# Patient Record
Sex: FEMALE | Race: WHITE | NOT HISPANIC OR LATINO | Employment: UNEMPLOYED
[De-identification: names, ages, dates, MRNs, and addresses within clinical notes are randomized per-mention and may not be internally consistent; named-entity substitution may affect disease eponyms.]

---
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
## 2022-10-21 ENCOUNTER — HOSPITAL ENCOUNTER (EMERGENCY)
Facility: HOSPITAL | Age: 22
Discharge: HOME | End: 2022-10-21
Attending: PHYSICIAN ASSISTANT | Admitting: PHYSICIAN ASSISTANT
Payer: COMMERCIAL

## 2022-10-21 VITALS
SYSTOLIC BLOOD PRESSURE: 111 MMHG | BODY MASS INDEX: 19.77 KG/M2 | TEMPERATURE: 98.8 F | OXYGEN SATURATION: 96 % | DIASTOLIC BLOOD PRESSURE: 67 MMHG | HEART RATE: 74 BPM | RESPIRATION RATE: 18 BRPM | WEIGHT: 141.76 LBS

## 2022-10-21 DIAGNOSIS — J06.9 URI (UPPER RESPIRATORY INFECTION): ICD-10-CM

## 2022-10-21 DIAGNOSIS — J06.9 UPPER RESPIRATORY TRACT INFECTION, UNSPECIFIED TYPE: ICD-10-CM

## 2022-10-21 PROCEDURE — G0463 HOSPITAL OUTPT CLINIC VISIT: HCPCS

## 2022-10-21 PROCEDURE — 99213 OFFICE O/P EST LOW 20 MIN: CPT | Performed by: PHYSICIAN ASSISTANT

## 2022-10-21 RX ORDER — MONTELUKAST SODIUM 10 MG/1
1 TABLET ORAL AT BEDTIME
COMMUNITY
Start: 2022-10-14 | End: 2022-10-28

## 2022-10-21 RX ORDER — FERROUS SULFATE 325(65) MG
325 TABLET ORAL
COMMUNITY
End: 2022-11-18

## 2022-10-21 RX ORDER — RISPERIDONE 0.5 MG/1
0.5 TABLET ORAL 2 TIMES DAILY
COMMUNITY
Start: 2022-10-14 | End: 2023-02-10

## 2022-10-21 RX ORDER — LANOLIN ALCOHOL/MO/W.PET/CERES
1 CREAM (GRAM) TOPICAL
COMMUNITY
End: 2022-10-28

## 2022-10-21 ASSESSMENT — ENCOUNTER SYMPTOMS: FEVER: 0

## 2022-10-22 NOTE — ED PROVIDER NOTES
"  History     Chief Complaint   Patient presents with     Sinusitis     Otalgia     Bilateral ears \"a little bit\", left side worse     HPI  Yudi Morales is a 22 year old female who presents for bilateral ear pain and congestion for about a week.  Patient denies any fevers, body aches, sore throat, nausea, vomiting, diarrhea, or any other associated symptoms.    Allergies:  Allergies   Allergen Reactions     Other [No Clinical Screening - See Comments] Anaphylaxis     \"Sodium Penathol\" per mother. Used in anesthesia.  Family hx of reaction.     Amoxicillin Trihydrate GI Disturbance     Amoxicillin-Pot Clavulanate GI Disturbance     Thiopental      Other reaction(s): Other - Describe In Comment Field  Family Hx of death with this medication       Problem List:    Patient Active Problem List    Diagnosis Date Noted     Elevated LFTs 06/10/2020     Priority: Medium     KIRSTY (generalized anxiety disorder) 07/19/2019     Priority: Medium     Allergic rhinitis 10/04/2011     Priority: Medium        Past Medical History:    Past Medical History:   Diagnosis Date     Allergic rhinitis 10/4/2011     KIRSTY (generalized anxiety disorder) 7/19/2019     PTSD (post-traumatic stress disorder)        Past Surgical History:    No past surgical history on file.    Family History:    No family history on file.    Social History:  Marital Status:  Single [1]  Social History     Tobacco Use     Smoking status: Never     Smokeless tobacco: Never   Substance Use Topics     Alcohol use: No     Drug use: No        Medications:    ferrous sulfate (FEROSUL) 325 (65 Fe) MG tablet  FLUoxetine (PROZAC) 20 MG capsule  melatonin 3 MG tablet  montelukast (SINGULAIR) 10 MG tablet  Multiple Vitamin (TAB-A-TOM) TABS  paliperidone ER (INVEGA) 6 MG 24 hr tablet  risperiDONE (RISPERDAL) 0.5 MG tablet  sertraline (ZOLOFT) 50 MG tablet  vitamin D3 (CHOLECALCIFEROL) 2000 units (50 mcg) tablet          Review of Systems   Constitutional: Negative for fever. "   HENT: Positive for congestion and ear pain.    All other systems reviewed and are negative.      Physical Exam   BP: 111/67  Pulse: 74  Temp: 98.8  F (37.1  C)  Resp: 18  Weight: 64.3 kg (141 lb 12.1 oz)  SpO2: 96 %      Physical Exam  Vitals and nursing note reviewed.   Constitutional:       General: She is not in acute distress.     Appearance: Normal appearance. She is not ill-appearing or toxic-appearing.   HENT:      Right Ear: Tympanic membrane normal.      Left Ear: Tympanic membrane normal.      Nose: No congestion or rhinorrhea.      Mouth/Throat:      Mouth: Mucous membranes are moist.      Pharynx: No oropharyngeal exudate or posterior oropharyngeal erythema.   Eyes:      Conjunctiva/sclera: Conjunctivae normal.      Pupils: Pupils are equal, round, and reactive to light.   Cardiovascular:      Rate and Rhythm: Regular rhythm.      Heart sounds: Normal heart sounds.   Pulmonary:      Breath sounds: Normal breath sounds.   Neurological:      Mental Status: She is oriented to person, place, and time.         ED Course                 Procedures             Critical Care time:               No results found for this or any previous visit (from the past 24 hour(s)).    Medications - No data to display    Assessments & Plan (with Medical Decision Making)   #1.  URI    Discussed exam findings with patient and guardian.  No acute otitis media on physical exam today.  Patient is encouraged to utilize Mucinex with Sudafed as directed over-the-counter.  Any additional concerns patient can return to urgent care or follow-up with primary care provider.  Patient verbalized understanding and agreement of plan.    I have reviewed the nursing notes.    I have reviewed the findings, diagnosis, plan and need for follow up with the patient.    New Prescriptions    No medications on file       Final diagnoses:   URI (upper respiratory infection)       10/21/2022   [facility]     Sudhakar Robbins,  AGNES  10/21/22 2975

## 2022-10-22 NOTE — ED TRIAGE NOTES
Patient presents today with sinusitis, mild otalgia bilaterally and a headache that patient states is from sinus infection. These symptoms have been going on for about a week. Patient has a staff worker with her.      Triage Assessment     Row Name 10/21/22 2020       Triage Assessment (Adult)    Airway WDL WDL       Respiratory WDL    Respiratory WDL X;cough    Cough Frequency infrequent    Cough Type congested       Skin Circulation/Temperature WDL    Skin Circulation/Temperature WDL WDL       Cardiac WDL    Cardiac WDL WDL       Peripheral/Neurovascular WDL    Peripheral Neurovascular WDL WDL       Cognitive/Neuro/Behavioral WDL    Cognitive/Neuro/Behavioral WDL WDL

## 2022-10-22 NOTE — DISCHARGE INSTRUCTIONS
Take Mucinex with Sudafed as directed over-the-counter for symptoms.  Any increase in ear pain patient should return for reevaluation or follow-up with primary care provider.

## 2022-10-22 NOTE — ED TRIAGE NOTES
Patient presents to urgent care with staff from [facility] for bilateral ear problems, a headache that could be caused by possible sinus infection. Symptoms have been for about a week.     Triage Assessment     Row Name 10/21/22 2020       Triage Assessment (Adult)    Airway WDL WDL       Respiratory WDL    Respiratory WDL X;cough    Cough Frequency infrequent    Cough Type congested       Skin Circulation/Temperature WDL    Skin Circulation/Temperature WDL WDL       Cardiac WDL    Cardiac WDL WDL       Peripheral/Neurovascular WDL    Peripheral Neurovascular WDL WDL       Cognitive/Neuro/Behavioral WDL    Cognitive/Neuro/Behavioral WDL WDL

## 2022-10-27 PROBLEM — T39.1X1A ACETAMINOPHEN TOXICITY: Status: ACTIVE | Noted: 2021-02-02

## 2022-10-27 PROBLEM — T14.91XA SUICIDAL BEHAVIOR WITH ATTEMPTED SELF-INJURY (H): Status: ACTIVE | Noted: 2021-02-02

## 2022-10-27 PROBLEM — F39 EPISODIC MOOD DISORDER (H): Status: ACTIVE | Noted: 2021-07-08

## 2022-10-27 PROBLEM — F33.1 MODERATE EPISODE OF RECURRENT MAJOR DEPRESSIVE DISORDER (H): Status: ACTIVE | Noted: 2021-07-08

## 2022-10-27 RX ORDER — SULFAMETHOXAZOLE/TRIMETHOPRIM 800-160 MG
1 TABLET ORAL 2 TIMES DAILY
COMMUNITY
Start: 2021-12-15 | End: 2022-10-28

## 2022-10-27 RX ORDER — LAMOTRIGINE 25 MG/1
25 TABLET ORAL
COMMUNITY
Start: 2021-07-08 | End: 2022-10-28

## 2022-10-27 NOTE — PROGRESS NOTES
Assessment & Plan     Encounter to establish care  Medical, surgical, family, and social histories discussed updated. Reviewed active healthcare problems. Medications reviewed. Limited information for updating histories.     Schizoaffective disorder, bipolar type (H)  Reported diagnosis.  We will try to get outside records, although staff seems uncertain where her care was obtained prior.  Currently living in the group home.  Will refill Zoloft.  No refill needed on Risperdal.  Discussed we need to get her established with psychiatry.  Phone number given for [facility].    - sertraline (ZOLOFT) 50 MG tablet; TAKE 1 AND 1/2 TABLETS BY MOUTH BY MOUTH DAILY  - melatonin 3 MG tablet; Take 0.5 tablets (1.5 mg) by mouth nightly as needed for sleep    KIRSTY (generalized anxiety disorder)  Appears stable.  No worsening anxiety per patient report.  Continue Zoloft.    Intellectual disability  Noted on group home form - some evidence on examination.   Will obtain records to review further.    Dysfunction of left eustachian tube  Retraction of eustachian tubes bilaterally, worse on the left, with sensation of plugged ear and some hearing loss.  Esparza and Amber test today suggestive of conductive loss.  Question if there is a nonpurulent effusion behind the TM that could be contributing.  We will treat with Afrin for 3 days.  Change Claritin to Zyrtec, as she has likely built up a tolerance.  Increase Flonase dose to 2 sprays daily.  We will reevaluate in 2 weeks, signs that would indicate need for urgent evaluation sooner discussed with patient and her staff member.  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  Treating as above with Afrin and antihistamines.  If not resolved at follow-up  in 2 weeks, will refer for full audiology examination.    Seasonal allergic rhinitis, unspecified trigger  Adjustment of antihistamine to Zyrtec, as she has likely built a tolerance to Claritin.  Flonase suggested.  Consider allergy testing and ENT referral in the future if refractory.  - cetirizine (ZYRTEC) 10 MG tablet; Take 1 tablet (10 mg) by mouth daily  - fluticasone (FLONASE) 50 MCG/ACT nasal spray; Spray 2 sprays into both nostrils daily    Encounter for surveillance of contraceptive pills  No red flag findings.  Started in December 2021 after birth and has not had episodes of abstinence from the birth control nor missed doses.  Not sexually active  At this time.  Has regulated periods.  - norgestimate-ethinyl estradiol (ORTHO-CYCLEN) 0.25-35 MG-MCG tablet; Take 1 tablet by mouth daily    History of iron deficiency  Unclear cause.  Started on iron supplementation in the past.  We will hold off on refill at this time-would like to check iron prior to continuing.  She does not endorse heavy menses.  - CBC with Platelets & Differential; Future    Bilateral galactorrhea  Needs further evaluation and full breast exam.  Limited on time today due to other above issues, but will evaluate in 2 weeks.  No red flag findings of pain, mass, purulence, or bloody discharge.  Suspect may be pituitary issue but more likely medication with Risperdal use.  We will get morning labs prior to next appointment.  - TSH with free T4 reflex; Future  - Prolactin; Future  - Comprehensive metabolic panel (BMP + Alb, Alk Phos, ALT, AST, Total. Bili, TP); Future        Did also obtain [place] information, exact ingredients given to patient as she was very concerned with her history of anaphylaxis to sodium thiopental.      I spent a total of 64 minutes on the day of the visit.   Time spent doing chart review, history and exam, documentation and further activities per the note      Return in about 2 weeks (around 11/11/2022) for Follow up  galactorhea, ear pressure.    Sindhu Sandoval MD  [facility] - BARAK Bagley is a 22 year old accompanied by her staff member, presenting for the following health issues:  Establish Care      HPI     Establish care. Was seen in [place] prior. Living in [place]. Grew up in [place]. Unsure where she was seen in [place]. History very limited. Patient poor historian. Have diagnosis on group home sheet - [facility] facility.  They report they do not have any records.  Staff member with her today has very limited information.    Per their records, is on Zoloft 75 mg.  Risperdal 0.5 mg twice per day.  Has diagnosis of schizoaffective disorder.  Previous suicide attempts noted in chart.  Most recent care in [place], she is unsure what hospital system.  No psychiatrist at this point.    Intellectual disability is also noted on their paperwork, cannot confirm this diagnosis at this time.  Did have disability paperwork with them, recommend delaying until I have other records.    Acute Illness  Acute illness concerns:  Seasonal allergies?   Onset/Duration: ongoing, kind of worse now.  Symptoms:  Fever: No  Chills/Sweats: No  Headache (location?): YES. Mild   Sinus Pressure: No  Conjunctivitis:  No  Ear Pain: YES: left  Rhinorrhea: YES  Congestion: YES  Sore Throat: No  Cough: no  Wheeze: No  Decreased Appetite: No  Nausea: No  Vomiting: No  Diarrhea: No  Dysuria/Freq.: No  Dysuria or Hematuria: No  Fatigue/Achiness: No  Sick/Strep Exposure: No  Therapies tried and outcome: sudafed and mucinex, did not help.     Left ear is plugged, has been 5 days of it. No pain. Had it look at and not infected. Cant hear that well. Has tried Sudafed, helps a little. Lots of runny nose and phlegm. Night has postnasal drip.   Using claritin and flonase.  Has chronic allergies.  Unsure how long she has been on Claritin for.  No fevers or facial swelling.  No sinus pain.    Wondering if she has asthma. No  wheezing or shortness of breath. No use albuterol use. Works out hard and doesn't wheeze. Has been in Emergency Department two times having a hard time breathing, unclear history, but staff member thinks it was related to her postnasal drainage.    On iron, was low on iron in the past. Still taking now.  Unsure why she was low on iron in the first place.  On ortho-cyclen.  Was started on this after her birth in December.    Has been on birth control since birth in 2021 December. Confirmed on med sheet.   Not sexually active   No interruptions to BC  No history of migraines with aura, DVT, or family history of clotting disorders. Is a non smoker. No history of elevated blood pressures or hypertension, breast cancer, or stroke.     Does also report galactorrhea since she gave birth.  Never breast-fed.  Think she was started on Risperdal prior to the birth.  It is both breasts, worse with warm heat like in the shower.  No blood or yellow color.  No breast pain or masses.  Uncertain if it is lessening over the last year.  No headaches.  No vision changes.        Objective    /60   Pulse 81   Temp 98.5  F (36.9  C) (Tympanic)   Resp 20   Wt 64.4 kg (142 lb)   SpO2 98%   BMI 19.80 kg/m    Body mass index is 19.8 kg/m .     Physical Exam  Constitutional:       General: She is not in acute distress.     Appearance: Normal appearance. She is not ill-appearing.   HENT:      Right Ear: External ear normal. Tympanic membrane is retracted. Tympanic membrane is not erythematous.      Left Ear: External ear normal. Tympanic membrane is retracted. Tympanic membrane is not erythematous.      Ears:      Comments: Esparza lateralizes to left ear, bone >air on left ear also -- conduction issue     Nose: Congestion present. No mucosal edema or rhinorrhea.      Right Sinus: No maxillary sinus tenderness or frontal sinus tenderness.      Left Sinus: No maxillary sinus tenderness or frontal sinus tenderness.      Mouth/Throat:       Mouth: Mucous membranes are moist.      Pharynx: Oropharynx is clear. No oropharyngeal exudate or posterior oropharyngeal erythema.      Tonsils: No tonsillar exudate or tonsillar abscesses.      Comments: Cobblestoning of posterior pharynx  Eyes:      General: No scleral icterus.        Right eye: No discharge.         Left eye: No discharge.      Extraocular Movements: Extraocular movements intact.      Conjunctiva/sclera: Conjunctivae normal.      Pupils: Pupils are equal, round, and reactive to light.   Cardiovascular:      Rate and Rhythm: Normal rate and regular rhythm.      Heart sounds: No murmur heard.  Pulmonary:      Effort: Pulmonary effort is normal.      Breath sounds: Normal breath sounds. No wheezing, rhonchi or rales.   Abdominal:      Palpations: Abdomen is soft.      Tenderness: There is no abdominal tenderness.   Musculoskeletal:      Cervical back: Normal range of motion and neck supple. No tenderness.      Right lower leg: No edema.      Left lower leg: No edema.   Lymphadenopathy:      Cervical: No cervical adenopathy.   Skin:     General: Skin is warm and dry.      Capillary Refill: Capillary refill takes less than 2 seconds.   Neurological:      General: No focal deficit present.      Mental Status: She is alert and oriented to person, place, and time.      Comments: Poor historian   Psychiatric:         Mood and Affect: Mood normal.         Behavior: Behavior normal.

## 2022-10-28 ENCOUNTER — OFFICE VISIT (OUTPATIENT)
Dept: FAMILY MEDICINE | Facility: OTHER | Age: 22
End: 2022-10-28
Attending: STUDENT IN AN ORGANIZED HEALTH CARE EDUCATION/TRAINING PROGRAM
Payer: COMMERCIAL

## 2022-10-28 ENCOUNTER — MEDICAL CORRESPONDENCE (OUTPATIENT)
Dept: HEALTH INFORMATION MANAGEMENT | Facility: CLINIC | Age: 22
End: 2022-10-28

## 2022-10-28 VITALS
RESPIRATION RATE: 20 BRPM | SYSTOLIC BLOOD PRESSURE: 100 MMHG | DIASTOLIC BLOOD PRESSURE: 60 MMHG | OXYGEN SATURATION: 98 % | WEIGHT: 142 LBS | TEMPERATURE: 98.5 F | BODY MASS INDEX: 19.8 KG/M2 | HEART RATE: 81 BPM

## 2022-10-28 DIAGNOSIS — N64.3 BILATERAL GALACTORRHEA: ICD-10-CM

## 2022-10-28 DIAGNOSIS — Z30.41 ENCOUNTER FOR SURVEILLANCE OF CONTRACEPTIVE PILLS: ICD-10-CM

## 2022-10-28 DIAGNOSIS — F79 INTELLECTUAL DISABILITY: ICD-10-CM

## 2022-10-28 DIAGNOSIS — Z76.89 ENCOUNTER TO ESTABLISH CARE: Primary | ICD-10-CM

## 2022-10-28 DIAGNOSIS — F25.0 SCHIZOAFFECTIVE DISORDER, BIPOLAR TYPE (H): Chronic | ICD-10-CM

## 2022-10-28 DIAGNOSIS — H90.12 CONDUCTIVE HEARING LOSS OF LEFT EAR WITH UNRESTRICTED HEARING OF RIGHT EAR: ICD-10-CM

## 2022-10-28 DIAGNOSIS — J30.2 SEASONAL ALLERGIC RHINITIS, UNSPECIFIED TRIGGER: ICD-10-CM

## 2022-10-28 DIAGNOSIS — Z86.39 HISTORY OF IRON DEFICIENCY: ICD-10-CM

## 2022-10-28 DIAGNOSIS — F41.1 GAD (GENERALIZED ANXIETY DISORDER): Chronic | ICD-10-CM

## 2022-10-28 DIAGNOSIS — H69.92 DYSFUNCTION OF LEFT EUSTACHIAN TUBE: ICD-10-CM

## 2022-10-28 PROBLEM — F39 EPISODIC MOOD DISORDER (H): Chronic | Status: ACTIVE | Noted: 2021-07-08

## 2022-10-28 PROBLEM — Z91.51 HISTORY OF SUICIDE ATTEMPT: Status: ACTIVE | Noted: 2022-10-28

## 2022-10-28 PROBLEM — T14.91XA SUICIDAL BEHAVIOR WITH ATTEMPTED SELF-INJURY (H): Status: RESOLVED | Noted: 2021-02-02 | Resolved: 2022-10-28

## 2022-10-28 PROBLEM — F33.1 MODERATE EPISODE OF RECURRENT MAJOR DEPRESSIVE DISORDER (H): Chronic | Status: ACTIVE | Noted: 2021-07-08

## 2022-10-28 PROCEDURE — G0463 HOSPITAL OUTPT CLINIC VISIT: HCPCS

## 2022-10-28 PROCEDURE — 99205 OFFICE O/P NEW HI 60 MIN: CPT | Performed by: STUDENT IN AN ORGANIZED HEALTH CARE EDUCATION/TRAINING PROGRAM

## 2022-10-28 RX ORDER — NORGESTIMATE AND ETHINYL ESTRADIOL 0.25-0.035
1 KIT ORAL DAILY
COMMUNITY
End: 2022-10-28

## 2022-10-28 RX ORDER — LANOLIN ALCOHOL/MO/W.PET/CERES
1.5 CREAM (GRAM) TOPICAL
Qty: 30 TABLET | Refills: 1 | Status: SHIPPED | OUTPATIENT
Start: 2022-10-28 | End: 2022-11-15

## 2022-10-28 RX ORDER — CETIRIZINE HYDROCHLORIDE 10 MG/1
10 TABLET ORAL DAILY
Qty: 90 TABLET | Refills: 1 | Status: SHIPPED | OUTPATIENT
Start: 2022-10-28 | End: 2023-06-14

## 2022-10-28 RX ORDER — GUAIFENESIN AND DEXTROMETHORPHAN HYDROBROMIDE 600; 30 MG/1; MG/1
1 TABLET, EXTENDED RELEASE ORAL EVERY 12 HOURS PRN
COMMUNITY
End: 2023-06-14

## 2022-10-28 RX ORDER — OXYMETAZOLINE HYDROCHLORIDE 0.05 G/100ML
2 SPRAY NASAL 2 TIMES DAILY
Qty: 1.2 ML | Refills: 0 | Status: SHIPPED | OUTPATIENT
Start: 2022-10-28 | End: 2022-10-31

## 2022-10-28 RX ORDER — ACETAMINOPHEN 325 MG/1
325-650 TABLET ORAL EVERY 6 HOURS PRN
COMMUNITY
End: 2023-06-14

## 2022-10-28 RX ORDER — FLUTICASONE PROPIONATE 50 MCG
1 SPRAY, SUSPENSION (ML) NASAL DAILY
COMMUNITY
End: 2022-10-28

## 2022-10-28 RX ORDER — NORGESTIMATE AND ETHINYL ESTRADIOL 0.25-0.035
1 KIT ORAL DAILY
Qty: 84 TABLET | Refills: 1 | Status: SHIPPED | OUTPATIENT
Start: 2022-10-28 | End: 2022-12-28

## 2022-10-28 RX ORDER — LORATADINE 10 MG/1
10 TABLET ORAL DAILY
COMMUNITY
End: 2022-10-28 | Stop reason: ALTCHOICE

## 2022-10-28 RX ORDER — FLUTICASONE PROPIONATE 50 MCG
2 SPRAY, SUSPENSION (ML) NASAL DAILY
Qty: 18.2 ML | Refills: 0 | Status: SHIPPED | OUTPATIENT
Start: 2022-10-28 | End: 2023-02-03

## 2022-10-28 RX ORDER — PHENYLEPHRINE HCL 10 MG/1
2 TABLET, FILM COATED ORAL EVERY 4 HOURS PRN
COMMUNITY
End: 2023-02-10

## 2022-10-28 RX ORDER — IBUPROFEN 200 MG
200 TABLET ORAL EVERY 4 HOURS PRN
COMMUNITY
End: 2023-06-14

## 2022-10-28 ASSESSMENT — ANXIETY QUESTIONNAIRES
6. BECOMING EASILY ANNOYED OR IRRITABLE: NOT AT ALL
2. NOT BEING ABLE TO STOP OR CONTROL WORRYING: NOT AT ALL
7. FEELING AFRAID AS IF SOMETHING AWFUL MIGHT HAPPEN: NOT AT ALL
1. FEELING NERVOUS, ANXIOUS, OR ON EDGE: NOT AT ALL
IF YOU CHECKED OFF ANY PROBLEMS ON THIS QUESTIONNAIRE, HOW DIFFICULT HAVE THESE PROBLEMS MADE IT FOR YOU TO DO YOUR WORK, TAKE CARE OF THINGS AT HOME, OR GET ALONG WITH OTHER PEOPLE: NOT DIFFICULT AT ALL
GAD7 TOTAL SCORE: 2
4. TROUBLE RELAXING: SEVERAL DAYS
3. WORRYING TOO MUCH ABOUT DIFFERENT THINGS: NOT AT ALL
5. BEING SO RESTLESS THAT IT IS HARD TO SIT STILL: SEVERAL DAYS
GAD7 TOTAL SCORE: 2

## 2022-10-28 ASSESSMENT — PATIENT HEALTH QUESTIONNAIRE - PHQ9: SUM OF ALL RESPONSES TO PHQ QUESTIONS 1-9: 5

## 2022-10-28 ASSESSMENT — PAIN SCALES - GENERAL: PAINLEVEL: NO PAIN (0)

## 2022-10-28 NOTE — NURSING NOTE
"Chief Complaint   Patient presents with     Establish Care       Initial /60   Pulse 81   Temp 98.5  F (36.9  C) (Tympanic)   Resp 20   Wt 64.4 kg (142 lb)   SpO2 98%   BMI 19.80 kg/m   Estimated body mass index is 19.8 kg/m  as calculated from the following:    Height as of 8/23/20: 1.803 m (5' 11\").    Weight as of this encounter: 64.4 kg (142 lb).  Medication Reconciliation: complete  Gertrude Cruz LPN  "

## 2022-10-28 NOTE — PATIENT INSTRUCTIONS
"To help open Eustachian Tube and relieve plugged feeling  In ear.     Buy over the counter Afrin - NOT generic version and with spray tip  NOT  Pump style tip    Lay down on side that is bothering you     Insert Afrin nasal tip in the same side nasal opening.  ( if laying on right side then place in right sided nasal opening)     Pour or gently squeeze so that Afrin liquid starts pouring and going into nares so that  you start tasting it.  You will feel the liquid  going town back of throat - Then stop.     Pull Afrin bottle out of nose and plug that side of nose for 10-15 min while lying there.    Then can get up ( or do other sided if needed)     Then through the day- start or frequently do exercises to open the Eustachian Tube.  These are yawning, chewing gum and trying to \" plug nose and pop ears \".     You can do the above  every 12 hrs up to 3 days and for sure no more than 4 days if not doing this more than once a day.    Hopefully this will open the plugged ear so can hear better.   ----------------------  Continue flonase  Use Zyrtec daily   Stop Singulair - no asthma diagnosis  Do nasal rinses with sterile water and sodium packet twice per day during acute illness  Do the above method with afrin nasal spray for three days only   Call bolded number below for psychiatrist, need to establish for risperidone - [facility] is a good option and has fast openings.   [facility]                ...[phone]  Need to do morning labs, before 8am as soon as possible.   Need follow up on nipple discharge within two weeks  If ear symptoms worsening, as discussed, need to be seen again   Try to get old records to send to us         Psychologists/ Counselors                        [place]  [place]          ...            ..[phone]  [facility]                    ..  [phone]  [facility]                 [phone]  [facility] " (kids)       .         [phone]  [facility](teens)                ..[phone]  [facility]                    [phone]  [facility]                   [phone]  [facility]           ...      .. [phone]  [facility]                ...[phone]  [facility]     ...          ...[phone]  [facility]               [phone]   [facility]..            [phone]  [facility]                       [phone]  [facility]               .    [phone]  [facility]                ..[phone]  [facility]     .      . ..[phone]     [facility]              .   [phone]  [facility]                   [phone]              [facility]              .   [phone]  [facility]              .  ...[phone]  [facility]              .   ..[phone]  [facility]           ...      .. [phone]  [facility]              . ..[phone]  [facility]              .    ..[phone]  [facility]              .     .. [phone]  [facility]                 ... [phone]  [facility]                         .[phone]  [facility]     .      . ..[phone]            [facility]              ..  [phone]                   [facility]             . ... ..[phone]  [facility]              .      ..[phone]  [facility]              .     ..[phone]  [facility]        .      . [phone]  [facility]           .[phone]  [facility]         .    [phone]  [facility]             . ..  ..  [phone]  [facility]            [phone]  [facility]              ..[phone]  [facility]                     [phone]    [facility]           ..     [phone]     [facility]    ...     ...[phone]     [facility]            [phone]                                                  [facility]                ..  .  [phone]  [facility]         .     .  [phone]  [facility]              . [phone]  [facility]  ..        [phone]  [facility]              .    [phone]    [facility]               ...  .  [phone]  [facility]             . [phone]  [facility]            [phone]  [facility]     .      . ..[phone]         *Facilities in bold italics indicate medication management  Services are offered.     Crisis support  Mobile crisis line- [phone]  [facility]: Free online resources including therapy for Mental Health and substance use problems: Http://[facility].org     Crisis Text Line  Text HOME to 918-912 - The Crisis Text Line serves anyone, in any type of crisis, providing access to free, 24/7 support and information via the medium people already use and trust  http://www.crisistextline.org   Here's how it works:  Text HOME to 265-927 from anywhere in the [place], anytime, about any type of crisis.  A live, trained Crisis Counselor receives the text and responds quickly.  The volunteer Crisis Counselor will help you move from a 'hot moment to a cool moment'

## 2022-10-31 ENCOUNTER — TELEPHONE (OUTPATIENT)
Dept: FAMILY MEDICINE | Facility: OTHER | Age: 22
End: 2022-10-31

## 2022-11-02 ENCOUNTER — TELEPHONE (OUTPATIENT)
Dept: FAMILY MEDICINE | Facility: OTHER | Age: 22
End: 2022-11-02

## 2022-11-02 NOTE — TELEPHONE ENCOUNTER
Hansa updated on below.    She wants PCP to know that pt already does see psychiatry and does see richard Farr with [facility].They do her medication management.      Please note.    Hellen Mehta RN

## 2022-11-02 NOTE — TELEPHONE ENCOUNTER
Hansa from [facility] calling and got the AVS.    It said they where going to hold off on filling the Iron RX.     She is going to be out on Monday.     Should this be held until recheck labs on 11.15.2022?    Or refill? If refill hold how long before lab if needed?    Call back [phone]    Hellen Mehta RN

## 2022-11-02 NOTE — TELEPHONE ENCOUNTER
YVETTE for Hansa to call back.Please update.    Per MD below:  Yes, no iron pills until we get her new labs at our next appointment. Checking her hemoglobin and iron level then    Hellen Mehta RN

## 2022-11-03 RX ORDER — FERROUS SULFATE 325(65) MG
325 TABLET ORAL
OUTPATIENT
Start: 2022-11-03

## 2022-11-03 NOTE — TELEPHONE ENCOUNTER
Ferrous sulfate tab  324      Last Written Prescription Date:  11/1/22  Last Fill Quantity: 0,   # refills: 0  Last Office Visit:   Future Office visit:    Next 5 appointments (look out 90 days)    Nov 15, 2022  8:00 AM  (Arrive by 7:45 AM)  Office Visit with Sindhu Sandoval MD  [facility] ([facility] ) [address] [place] [address] AVE  [place] [address]  [phone]           Routing refill request to provider for review/approval because:  Medication is reported/historical

## 2022-11-15 ENCOUNTER — OFFICE VISIT (OUTPATIENT)
Dept: FAMILY MEDICINE | Facility: OTHER | Age: 22
End: 2022-11-15
Attending: STUDENT IN AN ORGANIZED HEALTH CARE EDUCATION/TRAINING PROGRAM
Payer: COMMERCIAL

## 2022-11-15 ENCOUNTER — LAB (OUTPATIENT)
Dept: LAB | Facility: OTHER | Age: 22
End: 2022-11-15
Attending: STUDENT IN AN ORGANIZED HEALTH CARE EDUCATION/TRAINING PROGRAM
Payer: COMMERCIAL

## 2022-11-15 VITALS
SYSTOLIC BLOOD PRESSURE: 100 MMHG | HEIGHT: 71 IN | OXYGEN SATURATION: 97 % | WEIGHT: 149 LBS | HEART RATE: 85 BPM | BODY MASS INDEX: 20.86 KG/M2 | DIASTOLIC BLOOD PRESSURE: 60 MMHG | TEMPERATURE: 98.9 F

## 2022-11-15 DIAGNOSIS — J30.9 ALLERGIC RHINITIS, UNSPECIFIED SEASONALITY, UNSPECIFIED TRIGGER: Chronic | ICD-10-CM

## 2022-11-15 DIAGNOSIS — H90.0 CONDUCTIVE HEARING LOSS, BILATERAL: ICD-10-CM

## 2022-11-15 DIAGNOSIS — R42 LIGHTHEADEDNESS: ICD-10-CM

## 2022-11-15 DIAGNOSIS — Z86.39 HISTORY OF IRON DEFICIENCY: ICD-10-CM

## 2022-11-15 DIAGNOSIS — N64.3 BILATERAL GALACTORRHEA: Primary | ICD-10-CM

## 2022-11-15 DIAGNOSIS — F25.0 SCHIZOAFFECTIVE DISORDER, BIPOLAR TYPE (H): Chronic | ICD-10-CM

## 2022-11-15 DIAGNOSIS — N64.3 BILATERAL GALACTORRHEA: ICD-10-CM

## 2022-11-15 DIAGNOSIS — K59.00 CONSTIPATION, UNSPECIFIED CONSTIPATION TYPE: ICD-10-CM

## 2022-11-15 DIAGNOSIS — F51.01 PRIMARY INSOMNIA: ICD-10-CM

## 2022-11-15 DIAGNOSIS — H74.03 TYMPANOSCLEROSIS OF BOTH EARS: ICD-10-CM

## 2022-11-15 DIAGNOSIS — R09.82 POSTNASAL DRIP: ICD-10-CM

## 2022-11-15 LAB
ALBUMIN SERPL BCG-MCNC: 4.1 G/DL (ref 3.5–5.2)
ALP SERPL-CCNC: 76 U/L (ref 35–104)
ALT SERPL W P-5'-P-CCNC: 16 U/L (ref 10–35)
ANION GAP SERPL CALCULATED.3IONS-SCNC: 10 MMOL/L (ref 7–15)
AST SERPL W P-5'-P-CCNC: 21 U/L (ref 10–35)
BASOPHILS # BLD AUTO: 0 10E3/UL (ref 0–0.2)
BASOPHILS NFR BLD AUTO: 1 %
BILIRUB SERPL-MCNC: 0.3 MG/DL
BUN SERPL-MCNC: 13.4 MG/DL (ref 6–20)
CALCIUM SERPL-MCNC: 9.5 MG/DL (ref 8.6–10)
CHLORIDE SERPL-SCNC: 103 MMOL/L (ref 98–107)
CREAT SERPL-MCNC: 0.89 MG/DL (ref 0.51–0.95)
DEPRECATED HCO3 PLAS-SCNC: 24 MMOL/L (ref 22–29)
EOSINOPHIL # BLD AUTO: 0.2 10E3/UL (ref 0–0.7)
EOSINOPHIL NFR BLD AUTO: 3 %
ERYTHROCYTE [DISTWIDTH] IN BLOOD BY AUTOMATED COUNT: 13.2 % (ref 10–15)
GFR SERPL CREATININE-BSD FRML MDRD: >90 ML/MIN/1.73M2
GLUCOSE SERPL-MCNC: 88 MG/DL (ref 70–99)
HCT VFR BLD AUTO: 42.8 % (ref 35–47)
HGB BLD-MCNC: 14.2 G/DL (ref 11.7–15.7)
IMM GRANULOCYTES # BLD: 0 10E3/UL
IMM GRANULOCYTES NFR BLD: 1 %
IRON BINDING CAPACITY (ROCHE): 359 UG/DL (ref 240–430)
IRON SATN MFR SERPL: 32 % (ref 15–46)
IRON SERPL-MCNC: 116 UG/DL (ref 37–145)
LYMPHOCYTES # BLD AUTO: 2.8 10E3/UL (ref 0.8–5.3)
LYMPHOCYTES NFR BLD AUTO: 43 %
MCH RBC QN AUTO: 28.7 PG (ref 26.5–33)
MCHC RBC AUTO-ENTMCNC: 33.2 G/DL (ref 31.5–36.5)
MCV RBC AUTO: 87 FL (ref 78–100)
MONOCYTES # BLD AUTO: 0.6 10E3/UL (ref 0–1.3)
MONOCYTES NFR BLD AUTO: 9 %
NEUTROPHILS # BLD AUTO: 2.9 10E3/UL (ref 1.6–8.3)
NEUTROPHILS NFR BLD AUTO: 43 %
NRBC # BLD AUTO: 0 10E3/UL
NRBC BLD AUTO-RTO: 0 /100
PLATELET # BLD AUTO: 264 10E3/UL (ref 150–450)
POTASSIUM SERPL-SCNC: 3.8 MMOL/L (ref 3.4–5.3)
PROLACTIN SERPL 3RD IS-MCNC: 103 NG/ML (ref 5–23)
PROT SERPL-MCNC: 7.3 G/DL (ref 6.4–8.3)
RBC # BLD AUTO: 4.94 10E6/UL (ref 3.8–5.2)
SODIUM SERPL-SCNC: 137 MMOL/L (ref 136–145)
TSH SERPL DL<=0.005 MIU/L-ACNC: 3.6 UIU/ML (ref 0.3–4.2)
WBC # BLD AUTO: 6.5 10E3/UL (ref 4–11)

## 2022-11-15 PROCEDURE — 82040 ASSAY OF SERUM ALBUMIN: CPT | Mod: ZL

## 2022-11-15 PROCEDURE — 83550 IRON BINDING TEST: CPT | Mod: ZL

## 2022-11-15 PROCEDURE — G0463 HOSPITAL OUTPT CLINIC VISIT: HCPCS

## 2022-11-15 PROCEDURE — 80053 COMPREHEN METABOLIC PANEL: CPT | Mod: ZL

## 2022-11-15 PROCEDURE — 84443 ASSAY THYROID STIM HORMONE: CPT | Mod: ZL

## 2022-11-15 PROCEDURE — 84146 ASSAY OF PROLACTIN: CPT | Mod: ZL

## 2022-11-15 PROCEDURE — 85025 COMPLETE CBC W/AUTO DIFF WBC: CPT | Mod: ZL

## 2022-11-15 PROCEDURE — 36415 COLL VENOUS BLD VENIPUNCTURE: CPT | Mod: ZL

## 2022-11-15 PROCEDURE — 99214 OFFICE O/P EST MOD 30 MIN: CPT | Performed by: STUDENT IN AN ORGANIZED HEALTH CARE EDUCATION/TRAINING PROGRAM

## 2022-11-15 RX ORDER — LANOLIN ALCOHOL/MO/W.PET/CERES
3 CREAM (GRAM) TOPICAL EVERY EVENING
Qty: 30 TABLET | Refills: 1 | Status: SHIPPED | OUTPATIENT
Start: 2022-11-15 | End: 2022-12-28

## 2022-11-15 RX ORDER — POLYETHYLENE GLYCOL 3350 17 G/17G
POWDER, FOR SOLUTION ORAL
Qty: 510 G | Refills: 1 | Status: SHIPPED | OUTPATIENT
Start: 2022-11-15 | End: 2023-06-14

## 2022-11-15 ASSESSMENT — PAIN SCALES - GENERAL: PAINLEVEL: NO PAIN (0)

## 2022-11-15 NOTE — NURSING NOTE
"Chief Complaint   Patient presents with     Recheck Medication       Initial /60 (BP Location: Left arm, Patient Position: Sitting, Cuff Size: Adult Regular)   Pulse 85   Temp 98.9  F (37.2  C) (Tympanic)   Ht 1.803 m (5' 11\")   Wt 67.6 kg (149 lb)   SpO2 97%   BMI 20.78 kg/m   Estimated body mass index is 20.78 kg/m  as calculated from the following:    Height as of this encounter: 1.803 m (5' 11\").    Weight as of this encounter: 67.6 kg (149 lb).  Medication Reconciliation: complete  Farzana Ibrahim LPN  "

## 2022-11-15 NOTE — PATIENT INSTRUCTIONS
Try to increase water intake and exercise for healthy bowel movements.   Try mirlax as needed - titrate amount to one soft bowel movement daily or every other day.   ENT and audiology will call to schedule appointment.   Can take melantonin 3mg every night   Will follow up once labs are back  Please follow up with psychiatry - risperidone may be contributing to nipple discharge

## 2022-11-15 NOTE — PROGRESS NOTES
Assessment & Plan     Bilateral galactorrhea  Bilateral, milky with no blood ongoing for 11 months since birth of child whom she did not breast-feed.  No masses on exam.  TSH and prolactin level with renal function pending.  Is on Risperdal, started just prior to birth of child; suspect we may need to change to alternate psych medication and will discuss with psych medication provider.  No signs or symptoms of suprasellar mass.  Will await labs and initiate further work-up.    History of iron deficiency  Iron is normal today.  No longer needs to be on iron supplement at this time.    Schizoaffective disorder, bipolar type (H)  On Risperdal and Zoloft.  Follows with [facility], initial visit coming up soon.    Lightheadedness  1 episode after dehydration, now resolved.  Discussed maintaining hydration, rising slowly from a seated position.  Electrolytes normal today.  Follow-up if worsening or recurs.    Allergic rhinitis, unspecified seasonality, unspecified trigger  Longstanding history, has been on Flonase for quite some time.  Changed to Zyrtec at last appointment with minimal change in symptoms.  - Adult Audiology  Referral; Future    Postnasal drip  Longstanding, minimal benefit with Flonase and Zyrtec.  Will benefit from ENT referral.  May need allergy testing and further evaluation of sinuses.  - Adult Audiology  Referral; Future    Tympanosclerosis of both ears  Bilateral with conductive hearing loss noted on Rinne testing.  Did have recurrent infections as a child, had tonsils and adenoids removed.  - Adult Audiology  Referral; Future    Primary insomnia  Melatonin is helpful, would like to take 3 mg scheduled.  Order changed from 1.5 to 3 mg and scheduled instead of as needed.  - melatonin 3 MG tablet; Take 1 tablet (3 mg) by mouth every evening    Conductive hearing loss, bilateral  Unclear Esparza testing, did not seem to lateralize; bilateral bone conduction greater  than air conduction on exam today.  We will refer for audiology testing and ENT evaluation.  - Adult Audiology  Referral; Future  - Adult ENT  Referral; Future    Constipation, unspecified constipation type  Increased gassiness with only 1-2 bowel movements weekly.  Discussed treating with MiraLAX, titrating to 1 soft bowel movement daily or every other day.  Follow-up if minimal improvement or worsening.  - polyethylene glycol (MIRALAX) 17 GM/Dose powder; Take 1/2 capful to 1 capful every day or every other day to keep bowel movements regular. Titrate based on bowel movements. Should have at least one soft, formed stool daily or every other day.    I spent a total of 34 minutes on the day of the visit.   Time spent doing chart review, history and exam, documentation and further activities per the note    Return in about 6 weeks (around 12/27/2022) for Follow up.   Will fax labs to [phone]    Sindhu Sandoval MD  [facility] - BARAK Bagley is a 22 year old accompanied by her Care Taker, presenting for the following health issues:  Recheck Medication      HPI     Concern - breast discharge  Onset: December 9th, since she had her baby, did not breast feed  Description: clear/milky, no blood   Intensity: mild, moderate  Progression of Symptoms:  worsening  Accompanying Signs & Symptoms: None  Previous history of similar problem: None  Precipitating factors:        Worsened by: nmon  Alleviating factors:        Improved by: none  Therapies tried and outcome: none    All the time present, sometimes breasts feel full   No headaches  On risperdone - establishing with med management at [facility]   No vision concerns     One episode of lightheadedness after an episode of emesis overnight   One week ago   Has not recurred   Happened after getting out of bed     Ears feel better than before  Left one still harder to hear out of due to fullness but right is also  "full at times   Using zyrtec, flonase, did Afrin   Dose get rhinorrhea and sneezing - worse in summer   No cough  Does get postnasal drip, has to clear throat a lot, worse in the morning   Has been on flonase for a long time  Changed to claritin at last appointment   Tubes and adenoids removed in 2008    Wants melatonin increased and taken as scheduled     Has had gas for years. Usually goes 2x/week. Type 3 on bristol. No straining. Gas is worse at night. No blood.         Objective    /60 (BP Location: Left arm, Patient Position: Sitting, Cuff Size: Adult Regular)   Pulse 85   Temp 98.9  F (37.2  C) (Tympanic)   Ht 1.803 m (5' 11\")   Wt 67.6 kg (149 lb)   SpO2 97%   BMI 20.78 kg/m    Body mass index is 20.78 kg/m .     Physical Exam  Constitutional:       General: She is not in acute distress.     Appearance: Normal appearance.   HENT:      Right Ear: Ear canal and external ear normal. No drainage or swelling. There is no impacted cerumen. Tympanic membrane is scarred and retracted. Tympanic membrane is not perforated, erythematous or bulging.      Left Ear: Ear canal and external ear normal. No drainage or swelling. There is no impacted cerumen. Tympanic membrane is scarred and retracted. Tympanic membrane is not bulging.      Ears:        Nose: Rhinorrhea present.      Right Sinus: No maxillary sinus tenderness or frontal sinus tenderness.      Left Sinus: No maxillary sinus tenderness or frontal sinus tenderness.      Mouth/Throat:      Mouth: Mucous membranes are moist.      Pharynx: Oropharynx is clear. No oropharyngeal exudate.      Comments: Cobblestoning of posterior pharnyx  Eyes:      Extraocular Movements: Extraocular movements intact.      Conjunctiva/sclera: Conjunctivae normal.      Pupils: Pupils are equal, round, and reactive to light.   Cardiovascular:      Rate and Rhythm: Normal rate and regular rhythm.   Pulmonary:      Effort: Pulmonary effort is normal.      Breath sounds: Normal " breath sounds.   Chest:   Breasts:     Right: Nipple discharge present. No inverted nipple or mass.      Left: Nipple discharge present. No inverted nipple or mass.      Comments: White nipple discharge, milky colored with no blood  Abdominal:      General: There is no distension.      Palpations: Abdomen is soft. There is no mass.      Tenderness: There is no abdominal tenderness.   Musculoskeletal:         General: Normal range of motion.      Cervical back: Normal range of motion and neck supple.   Lymphadenopathy:      Cervical: No cervical adenopathy.      Upper Body:      Right upper body: No supraclavicular or axillary adenopathy.      Left upper body: No supraclavicular or axillary adenopathy.   Skin:     General: Skin is warm and dry.      Capillary Refill: Capillary refill takes less than 2 seconds.   Neurological:      General: No focal deficit present.      Mental Status: She is alert and oriented to person, place, and time.   Psychiatric:         Mood and Affect: Mood normal.         Behavior: Behavior normal.            Results for orders placed or performed in visit on 11/15/22 (from the past 24 hour(s))   Iron and iron binding capacity   Result Value Ref Range    Iron 116 37 - 145 ug/dL    Iron Sat Index 32 15 - 46 %    Iron Binding Capacity 359 240 - 430 ug/dL   Comprehensive metabolic panel (BMP + Alb, Alk Phos, ALT, AST, Total. Bili, TP)   Result Value Ref Range    Sodium 137 136 - 145 mmol/L    Potassium 3.8 3.4 - 5.3 mmol/L    Chloride 103 98 - 107 mmol/L    Carbon Dioxide (CO2) 24 22 - 29 mmol/L    Anion Gap 10 7 - 15 mmol/L    Urea Nitrogen 13.4 6.0 - 20.0 mg/dL    Creatinine 0.89 0.51 - 0.95 mg/dL    Calcium 9.5 8.6 - 10.0 mg/dL    Glucose 88 70 - 99 mg/dL    Alkaline Phosphatase 76 35 - 104 U/L    AST 21 10 - 35 U/L    ALT 16 10 - 35 U/L    Protein Total 7.3 6.4 - 8.3 g/dL    Albumin 4.1 3.5 - 5.2 g/dL    Bilirubin Total 0.3 <=1.2 mg/dL    GFR Estimate >90 >60 mL/min/1.73m2   TSH with free  T4 reflex   Result Value Ref Range    TSH 3.60 0.30 - 4.20 uIU/mL   CBC with Platelets & Differential    Narrative    The following orders were created for panel order CBC with Platelets & Differential.  Procedure                               Abnormality         Status                     ---------                               -----------         ------                     CBC with platelets and d...[[id number]]                      Final result                 Please view results for these tests on the individual orders.   CBC with platelets and differential   Result Value Ref Range    WBC Count 6.5 4.0 - 11.0 10e3/uL    RBC Count 4.94 3.80 - 5.20 10e6/uL    Hemoglobin 14.2 11.7 - 15.7 g/dL    Hematocrit 42.8 35.0 - 47.0 %    MCV 87 78 - 100 fL    MCH 28.7 26.5 - 33.0 pg    MCHC 33.2 31.5 - 36.5 g/dL    RDW 13.2 10.0 - 15.0 %    Platelet Count 264 150 - 450 10e3/uL    % Neutrophils 43 %    % Lymphocytes 43 %    % Monocytes 9 %    % Eosinophils 3 %    % Basophils 1 %    % Immature Granulocytes 1 %    NRBCs per 100 WBC 0 <1 /100    Absolute Neutrophils 2.9 1.6 - 8.3 10e3/uL    Absolute Lymphocytes 2.8 0.8 - 5.3 10e3/uL    Absolute Monocytes 0.6 0.0 - 1.3 10e3/uL    Absolute Eosinophils 0.2 0.0 - 0.7 10e3/uL    Absolute Basophils 0.0 0.0 - 0.2 10e3/uL    Absolute Immature Granulocytes 0.0 <=0.4 10e3/uL    Absolute NRBCs 0.0 10e3/uL

## 2022-11-16 ENCOUNTER — TELEPHONE (OUTPATIENT)
Dept: FAMILY MEDICINE | Facility: OTHER | Age: 22
End: 2022-11-16

## 2022-11-16 DIAGNOSIS — E22.1 HYPERPROLACTINEMIA (H): Primary | ICD-10-CM

## 2022-11-17 ENCOUNTER — NURSE TRIAGE (OUTPATIENT)
Dept: FAMILY MEDICINE | Facility: OTHER | Age: 22
End: 2022-11-17

## 2022-11-17 ENCOUNTER — TELEPHONE (OUTPATIENT)
Dept: FAMILY MEDICINE | Facility: OTHER | Age: 22
End: 2022-11-17

## 2022-11-17 DIAGNOSIS — R30.9 PAIN WITH URINATION: Primary | ICD-10-CM

## 2022-11-17 NOTE — TELEPHONE ENCOUNTER
Per Hansa, patient unable to come in for darryl today d/t staffing issues at facility.  Pt does not have my chart at this time  Hansa reaching out to her guardian to see if it's ok to setup my chart  Waiting for call back from Hansa.    Provider offered 1:40 same day darryl't tomorrow 11/18/22  At this time patient unable to accept d/t working until 2 pm.

## 2022-11-17 NOTE — TELEPHONE ENCOUNTER
Sindhu Sandoval MD  You 46 minutes ago (3:37 PM)     JG  I could fit in anywhere in my schedule - before or after work.   Encourage hydration with water and cranberry juice too until able to get UA.       Sindhu Sandoval MD  You 1 hour ago (3:21 PM)     JG  A telephone visit would be fine too!

## 2022-11-17 NOTE — TELEPHONE ENCOUNTER
8:39 AM    Reason for Call: Phone Call    Description: Hansa whyte/ [facility] called in for patient stating that the patient would like to do the suggested MRI and would like a referral. Please call Hansa back.     Was an appointment offered for this call? No  If yes : Appointment type              Date    Preferred method for responding to this message: Telephone Call  What is your phone number ? [phone]    If we cannot reach you directly, may we leave a detailed response at the number you provided? Yes    Can this message wait until your PCP/provider returns, if available today? Not applicable, provider is in clinic today.    Emma Mccullough

## 2022-11-17 NOTE — TELEPHONE ENCOUNTER
"Hansa, LPN @ [facility] calling to request orders for in-house UA    Fax or to: [phone]    Pended to PCP to review & advise      Reason for Disposition    Patient wants to be seen    Additional Information    Negative: Shock suspected (e.g., cold/pale/clammy skin, too weak to stand, low BP, rapid pulse)    Negative: Sounds like a life-threatening emergency to the triager    Negative: Followed a female genital area injury (e.g., vagina, vulva)    Negative: Followed a male genital area injury (penis, scrotum)    Negative: Vaginal discharge    Negative: Pus (white, yellow) or bloody discharge from end of penis    Negative: Pain or burning with passing urine (urination) and pregnant    Negative: Pain or burning with passing urine (urination) and female    Negative: Pain or burning with passing urine (urination) and male    Negative: Pain or itching in the vulvar area    Negative: Pain in scrotum is main symptom    Negative: Blood in the urine is main symptom    Negative: Symptoms arising from use of a urinary catheter (e.g., coude, Daugherty)    Negative: Unable to urinate (or only a few drops) > 4 hours and bladder feels very full (e.g., palpable bladder or strong urge to urinate)    Negative: Decreased urination and drinking very little and dehydration suspected (e.g., dark urine, no urine > 12 hours, very dry mouth, very lightheaded)    Negative: Patient sounds very sick or weak to the triager    Negative: Fever > 100.4 F  (38.0 C)    Negative: Side (flank) or lower back pain present    Negative: Can't control passage of urine (i.e., urinary incontinence) and new-onset (< 2 weeks) or worsening    Negative: Urinating more frequently than usual (i.e., frequency)    Negative: Bad or foul-smelling urine    Answer Assessment - Initial Assessment Questions  1. SYMPTOM: \"What's the main symptom you're concerned about?\" (e.g., frequency, incontinence)      Painful with urination  2. ONSET: \"When did the  Painful urination  " "start?\"      Last night  3. PAIN: \"Is there any pain?\" If Yes, ask: \"How bad is it?\" (Scale: 1-10; mild, moderate, severe)      C/o pain described as burning  4. CAUSE: \"What do you think is causing the symptoms?\"      Possible Uti  5. OTHER SYMPTOMS: \"Do you have any other symptoms?\" (e.g., fever, flank pain, blood in urine, pain with urination)      none  6. PREGNANCY: \"Is there any chance you are pregnant?\" \"When was your last menstrual period?\"      no    Protocols used: URINARY SYMPTOMS-A-[place]      "

## 2022-11-17 NOTE — TELEPHONE ENCOUNTER
LVM danika Santo LPN  Will offer telephone appt tomorrow 11/18 for what works with patients work schedule

## 2022-11-18 ENCOUNTER — LAB (OUTPATIENT)
Dept: LAB | Facility: OTHER | Age: 22
End: 2022-11-18
Attending: STUDENT IN AN ORGANIZED HEALTH CARE EDUCATION/TRAINING PROGRAM
Payer: COMMERCIAL

## 2022-11-18 ENCOUNTER — VIRTUAL VISIT (OUTPATIENT)
Dept: FAMILY MEDICINE | Facility: OTHER | Age: 22
End: 2022-11-18
Attending: STUDENT IN AN ORGANIZED HEALTH CARE EDUCATION/TRAINING PROGRAM
Payer: COMMERCIAL

## 2022-11-18 DIAGNOSIS — R30.0 DYSURIA: Primary | ICD-10-CM

## 2022-11-18 DIAGNOSIS — R30.9 PAIN WITH URINATION: ICD-10-CM

## 2022-11-18 LAB
ALBUMIN UR-MCNC: NEGATIVE MG/DL
APPEARANCE UR: CLEAR
BILIRUB UR QL STRIP: NEGATIVE
COLOR UR AUTO: NORMAL
GLUCOSE UR STRIP-MCNC: NEGATIVE MG/DL
HGB UR QL STRIP: NEGATIVE
KETONES UR STRIP-MCNC: NEGATIVE MG/DL
LEUKOCYTE ESTERASE UR QL STRIP: NEGATIVE
NITRATE UR QL: NEGATIVE
PH UR STRIP: 5.5 [PH] (ref 4.7–8)
SP GR UR STRIP: 1.02 (ref 1–1.03)
UROBILINOGEN UR STRIP-MCNC: NORMAL MG/DL

## 2022-11-18 PROCEDURE — 99212 OFFICE O/P EST SF 10 MIN: CPT | Mod: 93 | Performed by: STUDENT IN AN ORGANIZED HEALTH CARE EDUCATION/TRAINING PROGRAM

## 2022-11-18 PROCEDURE — 81003 URINALYSIS AUTO W/O SCOPE: CPT | Mod: ZL

## 2022-11-18 PROCEDURE — G0463 HOSPITAL OUTPT CLINIC VISIT: HCPCS | Mod: 25,TEL,95

## 2022-11-18 NOTE — TELEPHONE ENCOUNTER
Telephone darryl today 11/18  2:40 Dr. Sandoval  Patient will be ready for call @ 2:30        T: [phone]

## 2022-11-18 NOTE — PROGRESS NOTES
Yudi is a 22 year old who is being evaluated via a billable telephone visit.      What phone number would you like to be contacted at? [phone]  How would you like to obtain your AVS? Mail a copy    Assessment & Plan     Dysuria  Started yesterday, intermittent, and improving today with hydration.  No fevers, chills, or flank pain.  Planning to obtain urine at the house, house supervisor will bring it over this afternoon.  If signs of infection, will treat with antibiotic - nitrofurantoin 100 bid for five days.  Encouraged her to continue to hydrate and monitor symptoms for now.      I spent a total of 11 minutes on the day of the visit.   Time spent doing chart review, history and exam, documentation and further activities per the note    Sindhu Sandoval MD  [facility] - BARAK Minaya   Yudi is a 22 year old, presenting for the following health issues:  Urinary Problem    HPI     Genitourinary - Female  Onset/Duration: yesterdy  Description:   Painful urination (Dysuria): YES, comes & goes - improving since yesterday with hydration            Frequency: YES  Blood in urine (Hematuria): No  Delay in urine (Hesitency): No  Intensity: moderate  Progression of Symptoms:  worsening and constant  Accompanying Signs & Symptoms:  Fever/chills: No  Flank pain: No  Nausea and vomiting: No  Vaginal symptoms: none  Abdominal/Pelvic Pain: No  History:   History of frequent UTI s: just one in the past   History of kidney stones: No  Sexually Active: No  Possibility of pregnancy: No  Precipitating or alleviating factors: None  Therapies tried and outcome: Increase fluid intake and OTC advil or tylenol             Objective         Virtual visit - no vitals     Physical Exam   healthy, alert and no distress  PSYCH: Alert and oriented times 3; coherent speech, normal   rate and volume, able to articulate logical thoughts, able   to abstract reason, no tangential thoughts, no hallucinations   or  delusions  Her affect is normal  RESP: No cough, no audible wheezing, able to talk in full sentences  Remainder of exam unable to be completed due to telephone visits    UA ordered           Phone call duration: 11 minutes

## 2022-11-23 ENCOUNTER — HOSPITAL ENCOUNTER (EMERGENCY)
Facility: HOSPITAL | Age: 22
Discharge: HOME | End: 2022-11-23
Attending: NURSE PRACTITIONER | Admitting: NURSE PRACTITIONER
Payer: COMMERCIAL

## 2022-11-23 VITALS
HEART RATE: 78 BPM | TEMPERATURE: 98.9 F | RESPIRATION RATE: 16 BRPM | SYSTOLIC BLOOD PRESSURE: 120 MMHG | OXYGEN SATURATION: 98 % | DIASTOLIC BLOOD PRESSURE: 78 MMHG

## 2022-11-23 DIAGNOSIS — B96.89 BV (BACTERIAL VAGINOSIS): ICD-10-CM

## 2022-11-23 DIAGNOSIS — N76.0 BV (BACTERIAL VAGINOSIS): ICD-10-CM

## 2022-11-23 LAB
ALBUMIN UR-MCNC: NEGATIVE MG/DL
APPEARANCE UR: CLEAR
BACTERIA #/AREA URNS HPF: ABNORMAL /HPF
BILIRUB UR QL STRIP: NEGATIVE
CLUE CELLS: PRESENT
COLOR UR AUTO: ABNORMAL
GLUCOSE UR STRIP-MCNC: NEGATIVE MG/DL
HGB UR QL STRIP: NEGATIVE
KETONES UR STRIP-MCNC: NEGATIVE MG/DL
LEUKOCYTE ESTERASE UR QL STRIP: ABNORMAL
MUCOUS THREADS #/AREA URNS LPF: PRESENT /LPF
NITRATE UR QL: NEGATIVE
PH UR STRIP: 6.5 [PH] (ref 4.7–8)
RBC URINE: <1 /HPF
SP GR UR STRIP: 1.01 (ref 1–1.03)
SQUAMOUS EPITHELIAL: 8 /HPF
TRICHOMONAS, WET PREP: ABNORMAL
UROBILINOGEN UR STRIP-MCNC: NORMAL MG/DL
WBC URINE: 5 /HPF
WBC'S/HIGH POWER FIELD, WET PREP: ABNORMAL
YEAST, WET PREP: ABNORMAL

## 2022-11-23 PROCEDURE — 81001 URINALYSIS AUTO W/SCOPE: CPT | Performed by: NURSE PRACTITIONER

## 2022-11-23 PROCEDURE — 81001 URINALYSIS AUTO W/SCOPE: CPT | Performed by: FAMILY MEDICINE

## 2022-11-23 PROCEDURE — 87210 SMEAR WET MOUNT SALINE/INK: CPT | Performed by: NURSE PRACTITIONER

## 2022-11-23 PROCEDURE — G0463 HOSPITAL OUTPT CLINIC VISIT: HCPCS

## 2022-11-23 PROCEDURE — 99213 OFFICE O/P EST LOW 20 MIN: CPT | Performed by: NURSE PRACTITIONER

## 2022-11-23 RX ORDER — METRONIDAZOLE 500 MG/1
500 TABLET ORAL 2 TIMES DAILY
Qty: 14 TABLET | Refills: 0 | Status: SHIPPED | OUTPATIENT
Start: 2022-11-23 | End: 2022-11-30

## 2022-11-23 ASSESSMENT — ENCOUNTER SYMPTOMS
HEMATOLOGIC/LYMPHATIC NEGATIVE: 1
CONSTITUTIONAL NEGATIVE: 1
PSYCHIATRIC NEGATIVE: 1
DYSURIA: 1
RESPIRATORY NEGATIVE: 1
MUSCULOSKELETAL NEGATIVE: 1
ENDOCRINE NEGATIVE: 1
EYES NEGATIVE: 1
CARDIOVASCULAR NEGATIVE: 1

## 2022-11-23 ASSESSMENT — ACTIVITIES OF DAILY LIVING (ADL): ADLS_ACUITY_SCORE: 35

## 2022-11-23 NOTE — ED PROVIDER NOTES
"  History     Chief Complaint   Patient presents with     Urinary Frequency     The history is provided by the patient.     Yudi Morales is a 22 year old female who presents to the  with continued symptoms of bladder and vaginal irritation.  She had a normal UA on 11/18/22.  With her continued symptoms plan to repeat the UA and add wet prep for vaginal irritation. No home treatment     Allergies:  Allergies   Allergen Reactions     Other [No Clinical Screening - See Comments] Anaphylaxis     \"Sodium Penathol\" per mother. Used in anesthesia.  Family hx of reaction.     Latex Rash     Amoxicillin Trihydrate GI Disturbance     Amoxicillin-Pot Clavulanate GI Disturbance     Thiopental      Other reaction(s): Other - Describe In Comment Field  Family Hx of death with this medication       Problem List:    Patient Active Problem List    Diagnosis Date Noted     Hyperprolactinemia (H) 11/16/2022     Priority: Medium     Tympanosclerosis of both ears 11/15/2022     Priority: Medium     Postnasal drip 11/15/2022     Priority: Medium     Primary insomnia 11/15/2022     Priority: Medium     Constipation, unspecified constipation type 11/15/2022     Priority: Medium     History of suicide attempt 10/28/2022     Priority: Medium     2020 - tylenol overdose       Intellectual disability 10/28/2022     Priority: Medium     Schizoaffective disorder, bipolar type (H) 07/08/2021     Priority: Medium     richard Farr with [facility]       KIRSTY (generalized anxiety disorder) 07/19/2019     Priority: Medium     Allergic rhinitis 10/04/2011     Priority: Medium        Past Medical History:    Past Medical History:   Diagnosis Date     Allergic rhinitis 10/4/2011     KIRSTY (generalized anxiety disorder) 7/19/2019     PTSD (post-traumatic stress disorder)      Suicidal behavior with attempted self-injury (H) 2/2/2021       Past Surgical History:    Past Surgical History:   Procedure Laterality Date     TONSILLECTOMY & " ADENOIDECTOMY  2008       Family History:    History reviewed. No pertinent family history.    Social History:  Marital Status:  Single [1]  Social History     Tobacco Use     Smoking status: Never     Smokeless tobacco: Never   Vaping Use     Vaping Use: Never used   Substance Use Topics     Alcohol use: No     Drug use: No        Medications:    cetirizine (ZYRTEC) 10 MG tablet  fluticasone (FLONASE) 50 MCG/ACT nasal spray  melatonin 3 MG tablet  metroNIDAZOLE (FLAGYL) 500 MG tablet  Multiple Vitamin (TAB-A-TOM) TABS  norgestimate-ethinyl estradiol (ORTHO-CYCLEN) 0.25-35 MG-MCG tablet  polyethylene glycol (MIRALAX) 17 GM/Dose powder  risperiDONE (RISPERDAL) 0.5 MG tablet  sertraline (ZOLOFT) 50 MG tablet  acetaminophen (TYLENOL) 325 MG tablet  dextromethorphan-guaiFENesin (MUCINEX DM)  MG 12 hr tablet  ibuprofen (ADVIL/MOTRIN) 200 MG tablet  phenylephrine HCl 10 MG TABS          Review of Systems   Constitutional: Negative.    HENT: Negative.    Eyes: Negative.    Respiratory: Negative.    Cardiovascular: Negative.    Gastrointestinal:        Has chronic gas and bloating    Endocrine: Negative.    Genitourinary: Positive for dysuria and vaginal discharge.   Musculoskeletal: Negative.    Skin: Negative.    Hematological: Negative.    Psychiatric/Behavioral: Negative.        Physical Exam   BP: 120/78  Pulse: 78  Temp: 98.9  F (37.2  C)  Resp: 16  SpO2: 98 %      Physical Exam  Vitals and nursing note reviewed.   Cardiovascular:      Rate and Rhythm: Normal rate.      Pulses: Normal pulses.      Heart sounds: Normal heart sounds.   Pulmonary:      Effort: Pulmonary effort is normal. No respiratory distress.      Breath sounds: Normal breath sounds.   Abdominal:      General: Bowel sounds are normal. There is no distension.      Palpations: Abdomen is soft.      Tenderness: There is no abdominal tenderness.   Skin:     General: Skin is warm and dry.   Neurological:      General: No focal deficit present.       Mental Status: She is alert.   Psychiatric:         Mood and Affect: Mood normal.         Behavior: Behavior normal.         ED Course                 Procedures              Critical Care time:  none               Results for orders placed or performed during the hospital encounter of 11/23/22 (from the past 24 hour(s))   UA with Microscopic reflex to Culture    Specimen: Urine, NOS   Result Value Ref Range    Color Urine Light Yellow Colorless, Straw, Light Yellow, Yellow    Appearance Urine Clear Clear    Glucose Urine Negative Negative mg/dL    Bilirubin Urine Negative Negative    Ketones Urine Negative Negative mg/dL    Specific Gravity Urine 1.015 1.003 - 1.035    Blood Urine Negative Negative    pH Urine 6.5 4.7 - 8.0    Protein Albumin Urine Negative Negative mg/dL    Urobilinogen Urine Normal Normal, 2.0 mg/dL    Nitrite Urine Negative Negative    Leukocyte Esterase Urine Small (A) Negative    Bacteria Urine Few (A) None Seen /HPF    Mucus Urine Present (A) None Seen /LPF    RBC Urine <1 <=2 /HPF    WBC Urine 5 <=5 /HPF    Squamous Epithelials Urine 8 (H) <=1 /HPF    Narrative    Urine Culture not indicated   Wet prep    Specimen: Vagina; Swab   Result Value Ref Range    Trichomonas Absent Absent    Yeast Absent Absent    Clue Cells Present (A) Absent    WBCs/high power field 1+ (A) None       Medications - No data to display    Assessments & Plan (with Medical Decision Making)     I have reviewed the nursing notes.    I have reviewed the findings, diagnosis, plan and need for follow up with the patient.      Patient verbally educated and given appropriate education sheets for the diagnoses and has no questions.  Take medications as directed.   Follow up with your Primary Care provider if symptoms increase or if further concerns develop, return to the ER    Discussed diagnosis of bacterial vaginosis.  Treatment as discussed.  Discussed side effects such as upset stomach with treatment.  If not able to  tolerate medication consider vaginal treatment     Discharge Medication List as of 11/23/2022  5:25 PM      START taking these medications    Details   metroNIDAZOLE (FLAGYL) 500 MG tablet Take 1 tablet (500 mg) by mouth 2 times daily for 7 days, Disp-14 tablet, R-0, E-PrescribeEat yogurt or cottage cheese daily to prevent diarrhea that can be caused by taking this medication.             Final diagnoses:   BV (bacterial vaginosis)       11/23/2022   [facility]     [facility] APRN CNP  11/23/22 0497

## 2022-11-23 NOTE — ED TRIAGE NOTES
Pt presents with c/o uti sx  States that she was tested a few days ago and it was negative.  Frequency, burning, and pain and now itching.    Started 3 days, has not taken anything otc

## 2022-12-01 DIAGNOSIS — E22.1 HYPERPROLACTINEMIA (H): Primary | ICD-10-CM

## 2022-12-28 ENCOUNTER — OFFICE VISIT (OUTPATIENT)
Dept: FAMILY MEDICINE | Facility: OTHER | Age: 22
End: 2022-12-28
Attending: STUDENT IN AN ORGANIZED HEALTH CARE EDUCATION/TRAINING PROGRAM
Payer: COMMERCIAL

## 2022-12-28 ENCOUNTER — APPOINTMENT (OUTPATIENT)
Dept: GENERAL RADIOLOGY | Facility: OTHER | Age: 22
End: 2022-12-28
Attending: STUDENT IN AN ORGANIZED HEALTH CARE EDUCATION/TRAINING PROGRAM
Payer: COMMERCIAL

## 2022-12-28 VITALS
BODY MASS INDEX: 21.34 KG/M2 | OXYGEN SATURATION: 98 % | DIASTOLIC BLOOD PRESSURE: 66 MMHG | SYSTOLIC BLOOD PRESSURE: 112 MMHG | WEIGHT: 153 LBS | RESPIRATION RATE: 18 BRPM | TEMPERATURE: 98.9 F | HEART RATE: 92 BPM

## 2022-12-28 DIAGNOSIS — J30.9 ALLERGIC RHINITIS, UNSPECIFIED SEASONALITY, UNSPECIFIED TRIGGER: Primary | ICD-10-CM

## 2022-12-28 DIAGNOSIS — E22.1 HYPERPROLACTINEMIA (H): ICD-10-CM

## 2022-12-28 DIAGNOSIS — Z30.41 ENCOUNTER FOR SURVEILLANCE OF CONTRACEPTIVE PILLS: ICD-10-CM

## 2022-12-28 DIAGNOSIS — F51.01 PRIMARY INSOMNIA: ICD-10-CM

## 2022-12-28 DIAGNOSIS — N64.3 BILATERAL GALACTORRHEA: Primary | ICD-10-CM

## 2022-12-28 DIAGNOSIS — F25.0 SCHIZOAFFECTIVE DISORDER, BIPOLAR TYPE (H): Chronic | ICD-10-CM

## 2022-12-28 PROCEDURE — 99214 OFFICE O/P EST MOD 30 MIN: CPT | Performed by: STUDENT IN AN ORGANIZED HEALTH CARE EDUCATION/TRAINING PROGRAM

## 2022-12-28 PROCEDURE — G0463 HOSPITAL OUTPT CLINIC VISIT: HCPCS

## 2022-12-28 PROCEDURE — 70250 X-RAY EXAM OF SKULL: CPT | Mod: TC

## 2022-12-28 RX ORDER — ARIPIPRAZOLE 2 MG/1
2 TABLET ORAL EVERY MORNING
COMMUNITY
Start: 2022-12-19 | End: 2023-06-14

## 2022-12-28 RX ORDER — NORGESTIMATE AND ETHINYL ESTRADIOL 0.25-0.035
1 KIT ORAL DAILY
Qty: 84 TABLET | Refills: 3 | Status: SHIPPED | OUTPATIENT
Start: 2022-12-28 | End: 2023-02-10

## 2022-12-28 RX ORDER — SODIUM FLUORIDE 5 MG/ML
1 PASTE, DENTIFRICE DENTAL EVERY MORNING
Qty: 51 G | Refills: 1 | Status: CANCELLED | OUTPATIENT
Start: 2022-12-28

## 2022-12-28 ASSESSMENT — PAIN SCALES - GENERAL: PAINLEVEL: MODERATE PAIN (5)

## 2022-12-28 NOTE — PROGRESS NOTES
Assessment & Plan     Bilateral galactorrhea  Hyperprolactinemia (H)  Continues, bilateral, no blood. See 11/15/22 note for full eval. Again suspect secondary to Risperdal use.  X-ray head negative for metal object today.  We will proceed with sellar MRI to evaluate for possible pituitary lesion.  If no lesion, will need to continue titration off of the Risperdal with her psych med manager and recheck levels at follow up.    Encounter for surveillance of contraceptive pills  No absolute contraindications.  Guardian would like her to stay on birth control.  She is not sexually active.  - norgestimate-ethinyl estradiol (ORTHO-CYCLEN) 0.25-35 MG-MCG tablet; Take 1 tablet by mouth daily    Primary insomnia  Sleep latency is predominantly affected.  However, does sleep 6 hours prior to waking up and not being able to sleep further.  Would like melatonin increased to 5 mg, will do this today.  She mentions Seroquel or other medication, home were recommended she discuss this with her mental health provider, as it is best to keep all psych meds with 1 provider to minimize confusion with changes.  - melatonin 5 MG tablet; Take 1 tablet (5 mg) by mouth every evening    Schizoaffective disorder, bipolar type (H)  Encouraged her to continue med refills with her mental health provider.  I have filled Zoloft in the past, but now that she has a provider, she should manage all the mental health medications/psych meds to minimize confusion.    I spent a total of 30 minutes on the day of the visit.   Time spent doing chart review, history and exam, documentation and further activities per the note      Return in about 2 months (around 2/28/2023) for Follow up. message sent to [facility] to schedule follow up with Hansa and blanca MARTIN.     Sindhu Sandoval MD  [facility] [place] Bagley is a 22 year old, presenting for the following health issues:  MOOD CHANGES      HPI     Abnormal Mood  Symptoms  Onset/Duration: Ongoing  Description:   Depression (if yes, do PHQ-9): YES  Anxiety (if yes, do KIRSTY-7): YES  Accompanying Signs & Symptoms:  Still participating in activities that you used to enjoy: No  Fatigue: YES- lots of sleep issues  Irritability: YES  Difficulty concentrating: YES  Changes in appetite: No  Problems with sleep: YES  Heart racing/beating fast: YES- every once in awhile  Abnormally elevated, expansive, or irritable mood: YES  Persistently increased activity or energy: YES  Thoughts of hurting yourself or others: No  History:  Recent stress or major life event: No  Prior depression or anxiety: yes  Family history of depression or anxiety: YES  Alcohol/drug use: YES  Difficulty sleeping: YES- would like to have her Melatonin increased  Precipitating or alleviating factors: None  Therapies tried and outcome: none and medication(s) Zoloft (sertraline)    Med manager - Suki Farr. Now on abilify. Still on risperidal, she is aware. Trying to transition over and off risperidal. Has virtual med review soon.     Sleep issues - goes to bed at 8, wakes up at 3, can't fall asleep. Gets up and walks. Melatonin helps fall asleep, just not staying asleep. Would like something else if melatonin doesn't work. Wondering about Seroquel.     Birth control refill - not sexually active. No vaginal discharge. No STD concerns. No hypertension, DVT, smoking or absolute contraindications. Initial fill with us 10/28/22.     Has been using sodium fluoride toothpaste. Will get in with dentist to discuss if still appropriate for prescription toothpaste. Reports cavities that need to be filled.     PHQ 5/12/2020 6/10/2020 10/28/2022   PHQ-9 Total Score 9 17 5   Q9: Thoughts of better off dead/self-harm past 2 weeks Several days Several days Not at all     KIRSTY-7 SCORE 10/28/2022   Total Score 2         Objective    /66   Pulse 92   Temp 98.9  F (37.2  C) (Tympanic)   Resp 18   Wt 69.4 kg (153 lb)    SpO2 98%   BMI 21.34 kg/m    Body mass index is 21.34 kg/m .     Physical Exam  Constitutional:       General: She is not in acute distress.     Appearance: Normal appearance. She is not ill-appearing.   HENT:      Right Ear: Ear canal and external ear normal. Tympanic membrane is scarred. Tympanic membrane is not injected, perforated or erythematous.      Left Ear: Ear canal and external ear normal. Tympanic membrane is not injected, perforated or erythematous.   Pulmonary:      Effort: Pulmonary effort is normal.   Skin:     General: Skin is warm and dry.   Neurological:      Mental Status: She is alert.   Psychiatric:         Mood and Affect: Mood normal.         Behavior: Behavior normal.         Thought Content: Thought content normal.      Comments: Flat affect          XR Skull 1/3 Views    Result Date: 12/28/2022  PROCEDURE: XR SKULL 1/3 VIEWS 12/28/2022 10:31 AM HISTORY: needs MRI - unknown if metal in skull from head injury as child; Hyperprolactinemia (H) COMPARISONS: None. TECHNIQUE: 2 views of the skull FINDINGS: No metallic foreign bodies are seen. Cranial vault is intact. No pathologic intracranial calcifications are noted. The sella turcica is normal.        IMPRESSION: No metallic foreign bodies are seen in the skull. DANUTA HARTMAN MD   SYSTEM ID:  [id number]

## 2022-12-28 NOTE — TELEPHONE ENCOUNTER
Singulair  Last Written Prescription Date: Not prescribed here  Last Fill Quantity: ? # of Refills: ?  Last Office Visit: 12/28/22    Zoloft  Last Written Prescription Date: 10/28/22  Last Fill Quantity: 45 # of Refills: 1  Last Office Visit: 12/28/22

## 2022-12-28 NOTE — LETTER
My Depression Action Plan  Name: Yudi Morales   Date of Birth 2000  Date: 12/28/2022    My doctor: Sindhu Sandoval   My clinic: [facility] [place]  [address]  [place] [address]  [phone]          GREEN    ZONE   Good Control    What it looks like:     Things are going generally well. You have normal ups and downs. You may even feel depressed from time to time, but bad moods usually last less than a day.   What you need to do:  1. Continue to care for yourself (see self care plan)  2. Check your depression survival kit and update it as needed  3. Follow your physician s recommendations including any medication.  4. Do not stop taking medication unless you consult with your physician first.           YELLOW         ZONE Getting Worse    What it looks like:     Depression is starting to interfere with your life.     It may be hard to get out of bed; you may be starting to isolate yourself from others.    Symptoms of depression are starting to last most all day and this has happened for several days.     You may have suicidal thoughts but they are not constant.   What you need to do:     1. Call your care team. Your response to treatment will improve if you keep your care team informed of your progress. Yellow periods are signs an adjustment may need to be made.     2. Continue your self-care.  Just get dressed and ready for the day.  Don't give yourself time to talk yourself out of it.    3. Talk to someone in your support network.    4. Open up your Depression Self-Care Plan/Wellness Kit.           RED    ZONE Medical Alert - Get Help    What it looks like:     Depression is seriously interfering with your life.     You may experience these or other symptoms: You can t get out of bed most days, can t work or engage in other necessary activities, you have trouble taking care of basic hygiene, or basic responsibilities, thoughts of suicide or death that will not go away,  self-injurious behavior.     What you need to do:  1. Call your care team and request a same-day appointment. If they are not available (weekends or after hours) call your local crisis line, emergency room or 911.          Depression Self-Care Plan / Wellness Kit    Many people find that medication and therapy are helpful treatments for managing depression. In addition, making small changes to your everyday life can help to boost your mood and improve your wellbeing. Below are some tips for you to consider. Be sure to talk with your medical provider and/or behavioral health consultant if your symptoms are worsening or not improving.     Sleep   Sleep hygiene  means all of the habits that support good, restful sleep. It includes maintaining a consistent bedtime and wake time, using your bedroom only for sleeping or sex, and keeping the bedroom dark and free of distractions like a computer, smartphone, or television.     Develop a Healthy Routine  Maintain good hygiene. Get out of bed in the morning, make your bed, brush your teeth, take a shower, and get dressed. Don t spend too much time viewing media that makes you feel stressed. Find time to relax each day.    Exercise  Get some form of exercise every day. This will help reduce pain and release endorphins, the  feel good  chemicals in your brain. It can be as simple as just going for a walk or doing some gardening, anything that will get you moving.      Diet  Strive to eat healthy foods, including fruits and vegetables. Drink plenty of water. Avoid excessive sugar, caffeine, alcohol, and other mood-altering substances.     Stay Connected with Others  Stay in touch with friends and family members.    Manage Your Mood  Try deep breathing, massage therapy, biofeedback, or meditation. Take part in fun activities when you can. Try to find something to smile about each day.     Psychotherapy  Be open to working with a therapist if your provider recommends it.      Medication  Be sure to take your medication as prescribed. Most anti-depressants need to be taken every day. It usually takes several weeks for medications to work. Not all medicines work for all people. It is important to follow-up with your provider to make sure you have a treatment plan that is working for you. Do not stop your medication abruptly without first discussing it with your provider.    Crisis Resources   These hotlines are for both adults and children. They and are open 24 hours a day, 7 days a week unless noted otherwise.      National Suicide Prevention Lifeline   988 or 6-671-007-MKHY (1529)      Crisis Text Line    www.crisistextline.org  Text HOME to 953441 from anywhere in the [place], anytime, about any type of crisis. A live, trained crisis counselor will receive the text and respond quickly.      [facility] Lifeline for LGBTQ Youth  A national crisis intervention and suicide lifeline for LGBTQ youth under 25. Provides a safe place to talk without judgement. Call 1-185.121.1065; text START to 448919 or visit www.thetrevorproject.org to talk to a trained counselor.      For [facility] crisis numbers, visit the [facility] website at:  https://[place].gov/dhs/people-we-serve/adults/health-care/mental-health/resources/crisis-contacts.jsp

## 2022-12-29 RX ORDER — MONTELUKAST SODIUM 10 MG/1
10 TABLET ORAL AT BEDTIME
Qty: 30 TABLET | Refills: 5 | Status: ON HOLD | OUTPATIENT
Start: 2022-12-29 | End: 2023-09-18

## 2023-01-17 DIAGNOSIS — K02.9 DENTAL DECAY: Primary | ICD-10-CM

## 2023-01-17 RX ORDER — SODIUM FLUORIDE 5 MG/ML
PASTE, DENTIFRICE DENTAL
Qty: 51 G | Refills: 0 | Status: SHIPPED | OUTPATIENT
Start: 2023-01-17 | End: 2023-06-14

## 2023-01-18 ENCOUNTER — HOSPITAL ENCOUNTER (OUTPATIENT)
Dept: MRI IMAGING | Facility: HOSPITAL | Age: 23
Discharge: HOME | End: 2023-01-18
Attending: STUDENT IN AN ORGANIZED HEALTH CARE EDUCATION/TRAINING PROGRAM | Admitting: STUDENT IN AN ORGANIZED HEALTH CARE EDUCATION/TRAINING PROGRAM
Payer: COMMERCIAL

## 2023-01-18 PROCEDURE — 255N000002 HC RX 255 OP 636: Performed by: RADIOLOGY

## 2023-01-18 PROCEDURE — A9585 GADOBUTROL INJECTION: HCPCS | Performed by: RADIOLOGY

## 2023-01-18 PROCEDURE — 70543 MRI ORBT/FAC/NCK W/O &W/DYE: CPT

## 2023-01-18 RX ORDER — GADOBUTROL 604.72 MG/ML
7.5 INJECTION INTRAVENOUS ONCE
Status: COMPLETED | OUTPATIENT
Start: 2023-01-18 | End: 2023-01-18

## 2023-01-18 RX ADMIN — GADOBUTROL 7.5 ML: 604.72 INJECTION INTRAVENOUS at 10:40

## 2023-01-19 ENCOUNTER — TELEPHONE (OUTPATIENT)
Dept: FAMILY MEDICINE | Facility: OTHER | Age: 23
End: 2023-01-19
Payer: COMMERCIAL

## 2023-01-19 DIAGNOSIS — E22.1 HYPERPROLACTINEMIA (H): Primary | ICD-10-CM

## 2023-01-19 DIAGNOSIS — D36.9 MICROADENOMA: ICD-10-CM

## 2023-01-19 DIAGNOSIS — N64.3 BILATERAL GALACTORRHEA: ICD-10-CM

## 2023-01-19 NOTE — TELEPHONE ENCOUNTER
Hansa at [facility]  [phone]    Please call Hansa regarding the MRI results from 11/29/22      Sindhu Sandoval MD   1/19/2023  9:14 AM CST       Unable to reach via phone x 2. Fax sent. Requested call back. Will await return call/fax for endocrine e-consult. Needs recheck prolactin too.

## 2023-01-19 NOTE — TELEPHONE ENCOUNTER
Reviewed MRI results.  Per Hansa, her psychiatry provider plans to taper her off of Risperdal and she should be off of it by 1/30/2023.  We can obtain a recheck prolactin 10 to 14 days after that. Reviewed s/sx to monitor.

## 2023-01-23 ENCOUNTER — OFFICE VISIT (OUTPATIENT)
Dept: OTOLARYNGOLOGY | Facility: OTHER | Age: 23
End: 2023-01-23
Attending: AUDIOLOGIST
Payer: COMMERCIAL

## 2023-01-23 ENCOUNTER — OFFICE VISIT (OUTPATIENT)
Dept: AUDIOLOGY | Facility: OTHER | Age: 23
End: 2023-01-23
Attending: AUDIOLOGIST
Payer: COMMERCIAL

## 2023-01-23 VITALS
SYSTOLIC BLOOD PRESSURE: 102 MMHG | DIASTOLIC BLOOD PRESSURE: 64 MMHG | HEART RATE: 102 BPM | TEMPERATURE: 98.6 F | RESPIRATION RATE: 16 BRPM | OXYGEN SATURATION: 98 %

## 2023-01-23 DIAGNOSIS — R09.82 POSTNASAL DRIP: ICD-10-CM

## 2023-01-23 DIAGNOSIS — J30.9 ALLERGIC RHINITIS, UNSPECIFIED SEASONALITY, UNSPECIFIED TRIGGER: Chronic | ICD-10-CM

## 2023-01-23 DIAGNOSIS — Z01.10 EXAMINATION OF EARS AND HEARING: Primary | ICD-10-CM

## 2023-01-23 DIAGNOSIS — Z01.10 NORMAL HEARING EXAM: ICD-10-CM

## 2023-01-23 DIAGNOSIS — R09.81 NASAL CONGESTION: ICD-10-CM

## 2023-01-23 DIAGNOSIS — J34.3 HYPERTROPHY OF BOTH INFERIOR NASAL TURBINATES: ICD-10-CM

## 2023-01-23 DIAGNOSIS — H74.03 TYMPANOSCLEROSIS OF BOTH EARS: ICD-10-CM

## 2023-01-23 DIAGNOSIS — J34.89 NASAL DRYNESS: Primary | ICD-10-CM

## 2023-01-23 DIAGNOSIS — H90.0 CONDUCTIVE HEARING LOSS, BILATERAL: ICD-10-CM

## 2023-01-23 PROCEDURE — G0463 HOSPITAL OUTPT CLINIC VISIT: HCPCS | Mod: 25

## 2023-01-23 PROCEDURE — 99213 OFFICE O/P EST LOW 20 MIN: CPT | Performed by: NURSE PRACTITIONER

## 2023-01-23 PROCEDURE — 92552 PURE TONE AUDIOMETRY AIR: CPT | Performed by: AUDIOLOGIST

## 2023-01-23 PROCEDURE — 92556 SPEECH AUDIOMETRY COMPLETE: CPT | Performed by: AUDIOLOGIST

## 2023-01-23 PROCEDURE — 92550 TYMPANOMETRY & REFLEX THRESH: CPT | Performed by: AUDIOLOGIST

## 2023-01-23 RX ORDER — MINERAL OIL/HYDROPHIL PETROLAT
OINTMENT (GRAM) TOPICAL PRN
Qty: 50 G | Refills: 11 | Status: SHIPPED | OUTPATIENT
Start: 2023-01-23 | End: 2023-06-14

## 2023-01-23 ASSESSMENT — PAIN SCALES - GENERAL: PAINLEVEL: NO PAIN (0)

## 2023-01-23 NOTE — PROGRESS NOTES
Audiology Evaluation Completed. Please refer SCANNED AUDIOGRAM and/or TYMPANOGRAM for BACKGROUND, RESULTS, RECOMMENDATIONS.      Inna REYES, [facility]  Audiologist #[id number]

## 2023-01-23 NOTE — PATIENT INSTRUCTIONS
Thank you for allowing Adriana Osborne and our ENT team to participate in your care.  If your medications are too expensive, please give the nurse a call.  We can possibly change this medication.  If you have a scheduling or an appointment question please contact our Health Unit Coordinator at their direct line [phone] ext [phone].   ALL nursing questions or concerns can be directed to your ENT nurse at: [phone] - Yvx     Follow up for allergy testing  Start aquaphor to the nose  Continue zyrtec until 7 days prior to allergy testing  You can continue flonase and singulair through allergy testing  Follow up with me after allergy testing

## 2023-01-23 NOTE — NURSING NOTE
Went over instructions with patient for allergy skin testing.  Reviewed patients current medications and patient will avoid all contraindicated medications prior to MQT testing.  Patient verbalizes understanding.  Copy of allergy testing packet was given to the patient.  Patient was brought to scheduling to schedule her testing.  She is advised to call if she has any questions.    Luis M Mcgrath RN on 1/23/2023 at 11:30 AM

## 2023-01-23 NOTE — LETTER
1/23/2023         RE: Yudi Morales  [address] [place] [address] [address]  [facility] [address]        Dear Colleague,    Thank you for referring your patient, Yudi Morales, to the [facility] [place]. Please see a copy of my visit note below.    Otolaryngology Note         Chief Complaint:     Patient presents with:  Follow Up: HEA   Allergic Rhinitis           History of Present Illness:     Yudi Morales is a 22 year old female seen today for concerns for trouble hearing when she had a cold.  Symptoms started about 2 weeks ago and have now resolved.  Hearing was better after the cold symptoms.      No previous concerns for decreased hearing  + history of recurrent OM, no otological surgery.    She had tonsils and adenoidectomy in the past.    No otalgia or otorrhea today.    She vapes - interested in quitting eventually.     + frequent nasal congestion - happens all the time lately, but previously worse in the summer  She takes cetirizine with improvement.  Has been on for about 1.5 months  She is also on flonase with good improvement.     family hx of congential hearing loss, COM    No current concerns with otalgia, otorrhea.    Some mild  history of noise exposure - housemates are loud,  loud at work  No vertigo, facial numbness or tingling.      Audiogram completed 1/23/2020:  Tympanograms are Type A for both ears suggesting normal eardrum mobility.  Acoustic Reflex Thresholds at 1000 Hz are present for both ears.  Thresholds are normal range both ears.  Speech reception thresholds are in good agreement with pure tone average.  Word discrimination scores are excellent at supra-thresholds level.         Medications:     Current Outpatient Rx   Medication Sig Dispense Refill     acetaminophen (TYLENOL) 325 MG tablet Take 325-650 mg by mouth every 6 hours as needed for mild pain       ARIPiprazole (ABILIFY) 2 MG tablet Take 2 mg by mouth every morning       cetirizine (ZYRTEC) 10 MG tablet Take 1  tablet (10 mg) by mouth daily 90 tablet 1     dextromethorphan-guaiFENesin (MUCINEX DM)  MG 12 hr tablet Take 1 tablet by mouth every 12 hours as needed       fluticasone (FLONASE) 50 MCG/ACT nasal spray Spray 2 sprays into both nostrils daily 18.2 mL 0     ibuprofen (ADVIL/MOTRIN) 200 MG tablet Take 200 mg by mouth every 4 hours as needed for pain       melatonin 5 MG tablet Take 1 tablet (5 mg) by mouth every evening 30 tablet 3     mineral oil-hydrophilic petrolatum (AQUAPHOR) external ointment Apply topically as needed for dry skin 50 g 11     montelukast (SINGULAIR) 10 MG tablet Take 1 tablet (10 mg) by mouth At Bedtime 30 tablet 5     Multiple Vitamin (TAB-A-TOM) TABS TAKE ONE TABLET BY MOUTH ONCE DAILY. 30 tablet 8     norgestimate-ethinyl estradiol (ORTHO-CYCLEN) 0.25-35 MG-MCG tablet Take 1 tablet by mouth daily 84 tablet 3     phenylephrine HCl 10 MG TABS Take 2 tablets by mouth every 4 hours as needed       polyethylene glycol (MIRALAX) 17 GM/Dose powder Take 1/2 capful to 1 capful every day or every other day to keep bowel movements regular. Titrate based on bowel movements. Should have at least one soft, formed stool daily or every other day. 510 g 1     risperiDONE (RISPERDAL) 0.5 MG tablet Take 0.5 mg by mouth 2 times daily       sertraline (ZOLOFT) 50 MG tablet TAKE 1 AND 1/2 TABLETS BY MOUTH BY MOUTH DAILY 45 tablet 0     Sodium Fluoride (DENTA 5000 PLUS DT) Apply to affected area every morning Apply thin layer to toothbrush after breakfast for 2 minutes       sodium fluoride 1.1 % CREA Apply pea sized amount of paste once daily and brush for two minutes. This is used in place of conventional toothpaste. 51 g 0            Allergies:     Allergies: Other [no clinical screening - see comments], Latex, Amoxicillin trihydrate, Amoxicillin-pot clavulanate, and Thiopental          Past Medical History:     Past Medical History:   Diagnosis Date     Allergic rhinitis 10/4/2011     KIRSTY (generalized  anxiety disorder) 7/19/2019     PTSD (post-traumatic stress disorder)      Suicidal behavior with attempted self-injury (H) 2/2/2021            Past Surgical History:     Past Surgical History:   Procedure Laterality Date     TONSILLECTOMY & ADENOIDECTOMY  2008       ENT family history reviewed         Social History:     Social History     Tobacco Use     Smoking status: Never     Smokeless tobacco: Never   Vaping Use     Vaping Use: Never used   Substance Use Topics     Alcohol use: No     Drug use: No            Review of Systems:     ROS: See HPI         Physical Exam:     /64   Pulse 102   Temp 98.6  F (37  C) (Tympanic)   Resp 16   SpO2 98%     General - The patient is well nourished and well developed, and appears to have good nutritional status.  Alert and oriented to person and place, answers questions and cooperates with examination appropriately.   Head and Face - Normocephalic and atraumatic, with no gross asymmetry noted.  The facial nerve is intact, with strong symmetric movements.  Voice and Breathing - The patient was breathing comfortably without the use of accessory muscles. There was no wheezing, stridor. The patients voice was clear and strong, and had appropriate pitch and quality.  Ears - External ear normal. Canals are patent. Right tympanic membrane is intact without effusion, retraction or mass. Left tympanic membrane is intact without effusion, retraction or mass.  Eyes - Extraocular movements intact, sclera were not icteric or injected, conjunctiva were pink and moist.  Mouth - Examination of the oral cavity showed pink, healthy oral mucosa. Dentition in good condition. No lesions or ulcerations noted. The tongue was mobile and midline.   Throat - The walls of the oropharynx were smooth, pink, moist, symmetric, and had no lesions or ulcerations.  The tonsillar pillars and soft palate were symmetric. The uvula was midline on elevation.    Neck - Normal range of motion, no palpable  lymphadenopathy.  Palpation of the thyroid was soft and smooth, with no nodules or goiter appreciated.  The trachea was mobile and midline.  Nose - External contour is symmetric, no gross deflection or scars.  Nasal mucosa is pink and moist with no abnormal mucus.  The septum and turbinates were evaluated with nasal speculum, bilateral turbinates are enlarged, pale and boggy.  No polyps, masses, or purulence noted on examination.         Assessment and Plan:       ICD-10-CM    1. Nasal dryness  J34.89 mineral oil-hydrophilic petrolatum (AQUAPHOR) external ointment      2. Hypertrophy of both inferior nasal turbinates  J34.3       3. Allergic rhinitis, unspecified seasonality, unspecified trigger  J30.9       4. Normal hearing exam  Z01.10       5. Nasal congestion  R09.81           Follow up for allergy testing  Start aquaphor to the nose  Continue zyrtec until 7 days prior to allergy testing  You can continue flonase and singulair through allergy testing  Follow up with me after allergy testing    Adriana RING  [facility] ENT          Again, thank you for allowing me to participate in the care of your patient.        Sincerely,        Adriana Osborne NP

## 2023-01-23 NOTE — PROGRESS NOTES
Otolaryngology Note         Chief Complaint:     Patient presents with:  Follow Up: HEA   Allergic Rhinitis           History of Present Illness:     Yudi Morales is a 22 year old female seen today for concerns for trouble hearing when she had a cold.  Symptoms started about 2 weeks ago and have now resolved.  Hearing was better after the cold symptoms.      No previous concerns for decreased hearing  + history of recurrent OM, no otological surgery.    She had tonsils and adenoidectomy in the past.    No otalgia or otorrhea today.    She vapes - interested in quitting eventually.     + frequent nasal congestion - happens all the time lately, but previously worse in the summer  She takes cetirizine with improvement.  Has been on for about 1.5 months  She is also on flonase with good improvement.     family hx of congential hearing loss, COM    No current concerns with otalgia, otorrhea.    Some mild  history of noise exposure - housemates are loud,  loud at work  No vertigo, facial numbness or tingling.      Audiogram completed 1/23/2020:  Tympanograms are Type A for both ears suggesting normal eardrum mobility.  Acoustic Reflex Thresholds at 1000 Hz are present for both ears.  Thresholds are normal range both ears.  Speech reception thresholds are in good agreement with pure tone average.  Word discrimination scores are excellent at supra-thresholds level.         Medications:     Current Outpatient Rx   Medication Sig Dispense Refill     acetaminophen (TYLENOL) 325 MG tablet Take 325-650 mg by mouth every 6 hours as needed for mild pain       ARIPiprazole (ABILIFY) 2 MG tablet Take 2 mg by mouth every morning       cetirizine (ZYRTEC) 10 MG tablet Take 1 tablet (10 mg) by mouth daily 90 tablet 1     dextromethorphan-guaiFENesin (MUCINEX DM)  MG 12 hr tablet Take 1 tablet by mouth every 12 hours as needed       fluticasone (FLONASE) 50 MCG/ACT nasal spray Spray 2 sprays into both nostrils daily  18.2 mL 0     ibuprofen (ADVIL/MOTRIN) 200 MG tablet Take 200 mg by mouth every 4 hours as needed for pain       melatonin 5 MG tablet Take 1 tablet (5 mg) by mouth every evening 30 tablet 3     mineral oil-hydrophilic petrolatum (AQUAPHOR) external ointment Apply topically as needed for dry skin 50 g 11     montelukast (SINGULAIR) 10 MG tablet Take 1 tablet (10 mg) by mouth At Bedtime 30 tablet 5     Multiple Vitamin (TAB-A-TOM) TABS TAKE ONE TABLET BY MOUTH ONCE DAILY. 30 tablet 8     norgestimate-ethinyl estradiol (ORTHO-CYCLEN) 0.25-35 MG-MCG tablet Take 1 tablet by mouth daily 84 tablet 3     phenylephrine HCl 10 MG TABS Take 2 tablets by mouth every 4 hours as needed       polyethylene glycol (MIRALAX) 17 GM/Dose powder Take 1/2 capful to 1 capful every day or every other day to keep bowel movements regular. Titrate based on bowel movements. Should have at least one soft, formed stool daily or every other day. 510 g 1     risperiDONE (RISPERDAL) 0.5 MG tablet Take 0.5 mg by mouth 2 times daily       sertraline (ZOLOFT) 50 MG tablet TAKE 1 AND 1/2 TABLETS BY MOUTH BY MOUTH DAILY 45 tablet 0     Sodium Fluoride (DENTA 5000 PLUS DT) Apply to affected area every morning Apply thin layer to toothbrush after breakfast for 2 minutes       sodium fluoride 1.1 % CREA Apply pea sized amount of paste once daily and brush for two minutes. This is used in place of conventional toothpaste. 51 g 0            Allergies:     Allergies: Other [no clinical screening - see comments], Latex, Amoxicillin trihydrate, Amoxicillin-pot clavulanate, and Thiopental          Past Medical History:     Past Medical History:   Diagnosis Date     Allergic rhinitis 10/4/2011     KIRSTY (generalized anxiety disorder) 7/19/2019     PTSD (post-traumatic stress disorder)      Suicidal behavior with attempted self-injury (H) 2/2/2021            Past Surgical History:     Past Surgical History:   Procedure Laterality Date     TONSILLECTOMY &  ADENOIDECTOMY  2008       ENT family history reviewed         Social History:     Social History     Tobacco Use     Smoking status: Never     Smokeless tobacco: Never   Vaping Use     Vaping Use: Never used   Substance Use Topics     Alcohol use: No     Drug use: No            Review of Systems:     ROS: See HPI         Physical Exam:     /64   Pulse 102   Temp 98.6  F (37  C) (Tympanic)   Resp 16   SpO2 98%     General - The patient is well nourished and well developed, and appears to have good nutritional status.  Alert and oriented to person and place, answers questions and cooperates with examination appropriately.   Head and Face - Normocephalic and atraumatic, with no gross asymmetry noted.  The facial nerve is intact, with strong symmetric movements.  Voice and Breathing - The patient was breathing comfortably without the use of accessory muscles. There was no wheezing, stridor. The patients voice was clear and strong, and had appropriate pitch and quality.  Ears - External ear normal. Canals are patent. Right tympanic membrane is intact without effusion, retraction or mass. Left tympanic membrane is intact without effusion, retraction or mass.  Eyes - Extraocular movements intact, sclera were not icteric or injected, conjunctiva were pink and moist.  Mouth - Examination of the oral cavity showed pink, healthy oral mucosa. Dentition in good condition. No lesions or ulcerations noted. The tongue was mobile and midline.   Throat - The walls of the oropharynx were smooth, pink, moist, symmetric, and had no lesions or ulcerations.  The tonsillar pillars and soft palate were symmetric. The uvula was midline on elevation.    Neck - Normal range of motion, no palpable lymphadenopathy.  Palpation of the thyroid was soft and smooth, with no nodules or goiter appreciated.  The trachea was mobile and midline.  Nose - External contour is symmetric, no gross deflection or scars.  Nasal mucosa is pink and moist  with no abnormal mucus.  The septum and turbinates were evaluated with nasal speculum, bilateral turbinates are enlarged, pale and boggy.  No polyps, masses, or purulence noted on examination.         Assessment and Plan:       ICD-10-CM    1. Nasal dryness  J34.89 mineral oil-hydrophilic petrolatum (AQUAPHOR) external ointment      2. Hypertrophy of both inferior nasal turbinates  J34.3       3. Allergic rhinitis, unspecified seasonality, unspecified trigger  J30.9       4. Normal hearing exam  Z01.10       5. Nasal congestion  R09.81           Follow up for allergy testing  Start aquaphor to the nose  Continue zyrtec until 7 days prior to allergy testing  You can continue flonase and singulair through allergy testing  Follow up with me after allergy testing    Adriana SPRAGUEC  [facility]

## 2023-01-25 DIAGNOSIS — F25.0 SCHIZOAFFECTIVE DISORDER, BIPOLAR TYPE (H): Chronic | ICD-10-CM

## 2023-01-25 NOTE — TELEPHONE ENCOUNTER
Sertraline      Last Written Prescription Date:  12/29/22  Last Fill Quantity: 45,   # refills: 0  Last Office Visit: 12/28/22  Future Office visit:    Next 5 appointments (look out 90 days)    Feb 27, 2023 11:00 AM  (Arrive by 10:45 AM)  SHORT with Sindhu Sandoval MD  [facility] [address]  [facility] [address]  [phone]   Mar 09, 2023  1:30 PM  Office Visit with HC ALLERGY TESTING  [facility] ([facility] ) [address]  [facility] [address]  [phone]   Mar 09, 2023  3:30 PM  (Arrive by 3:15 PM)  Return Visit with Adriana Osborne NP  [facility] ([facility] ) [address] [address]  [phone]           Routing refill request to provider for review/approval because:

## 2023-01-30 ENCOUNTER — TELEPHONE (OUTPATIENT)
Dept: FAMILY MEDICINE | Facility: OTHER | Age: 23
End: 2023-01-30

## 2023-01-30 NOTE — TELEPHONE ENCOUNTER
Received a fax from [facility].   Yudi is requesting to switch birth control. She is currently taking Mono-Linyah and is wondering if it would be able switch back to depo provera as soon as possible. Yudi would to switch because she would to spend more time outside of the group home, but does not always remember to take her medications when she she should.  Dr. Sandoval is out this week.

## 2023-02-01 ENCOUNTER — E-CONSULT (OUTPATIENT)
Dept: ENDOCRINOLOGY | Facility: CLINIC | Age: 23
End: 2023-02-01
Payer: COMMERCIAL

## 2023-02-01 PROCEDURE — 99451 NTRPROF PH1/NTRNET/EHR 5/>: CPT

## 2023-02-02 NOTE — PROGRESS NOTES
2/1/2023     E-Consult has been accepted.    Interprofessional consultation requested by:  Sindhu Sandoval MD      Clinical Question/Purpose: MY CLINICAL QUESTION IS: 5mm pituitary lesion was found on MRI, however the patient is also on Risperdal, and her psychiatry provider is working on tapering her off; she should be off by 1/30/2023.  What would you recommend for management - does she require treatment with a dopaminergic agent or do we need to wait for Risperdal to leave her system?  In addition, what would you recommend for follow-up monitoring of the lesion?.    Patient assessment and information reviewed:     11/15/22 prolactin 103, TSH 3.6, free T4 0.78  1/18/2023 MRI pituitary: right pituitary mass 5 x 5 mm ; upper pituitary border very slightly convex up, right higher than left.  Stalk midline    Med list:  Ortho cyclen (norgestimate- ethinyl estradiol)  Aripiprazole  Risperidone  Sertraline    Assessment:   Hyperprolactinemia  Differential includes risperidone, prolactinoma, other   Pituitary mass - differential includes prolactinoma, nonfunctional, other     You currently don't have a full diagnosis.      Recommendations:   What were her periods like prior to the OCP?    ? Galactorrhea?   Full pituitary work up after she has been off risperidone  including IGF1, cortisol, repeat prolactin.    Treatment with a dopaminergic agent will lower the prolactin but it will only shrink the tumor if the tumor is due to prolactinoma.    Repeat pituitary MRI one year    The recommendations provided in this E-Consult are based on a review of clinical data pertinent to the clinical question presented, without a review of the patient's complete medical record or, the benefit of a comprehensive in-person or virtual patient evaluation. This consultation should not replace the clinical judgement and evaluation of the provider ordering this E-Consult. Any new clinical issues, or changes in patient status since the  filing of this E-Consult will need to be taken into account when assessing these recommendations. Please contact me if you have further questions.    My total time spent reviewing clinical information and formulating assessment was 15 minutes.        Candi Barron MD

## 2023-02-03 DIAGNOSIS — J30.2 SEASONAL ALLERGIC RHINITIS, UNSPECIFIED TRIGGER: ICD-10-CM

## 2023-02-03 RX ORDER — FLUTICASONE PROPIONATE 50 MCG
2 SPRAY, SUSPENSION (ML) NASAL DAILY
Qty: 18.2 ML | Refills: 11 | Status: SHIPPED | OUTPATIENT
Start: 2023-02-03 | End: 2023-06-14

## 2023-02-03 NOTE — TELEPHONE ENCOUNTER
FLONASE NASAL SPRAY      Last Written Prescription Date:  10-28-22  Last Fill Quantity: 1,   # refills: 0  Last Office Visit: 1-23-23  Future Office visit:    Next 5 appointments (look out 90 days)    Feb 27, 2023 11:00 AM  (Arrive by 10:45 AM)  SHORT with Sindhu Sandoval MD  [facility] ([facility] ) [address] [facility] [address]  [phone]   Mar 09, 2023  1:30 PM  Office Visit with HC ALLERGY TESTING  [facility] ([facility] ) [address]  [place] [address]  [phone]   Mar 09, 2023  3:30 PM  (Arrive by 3:15 PM)  Return Visit with Adriana Osborne NP  [facility] ([facility] ) [address]  [facility] [address]  [phone]           Routing refill request to provider for review/approval because:

## 2023-02-09 NOTE — PROGRESS NOTES
Assessment & Plan     Encounter for initial prescription of injectable contraceptive  Discussed risks and benefits of Depo-Provera, she was agreeable to proceed.  Last took birth control this a.m., pregnancy test negative, reviewed that she can stop her OCP.  Scheduled for repeat injection in 3 months.  - medroxyPROGESTERone (DEPO-PROVERA) injection 150 mg  - HCG qualitative urine; Future  - HCG qualitative urine    Hyperprolactinemia (H)  Question of secondary to risperidone versus microadenoma noted on MRI of the pituitary.  Now off risperidone for 10 days, will obtain updated prolactin.  - Insulin-Like Growth Factor 1 Ped; Future  - Cortisol; Future    Microadenoma  Need to determine if functional-Labs planned, early a.m., insulin like growth factor, cortisol, and prolactin. Lab appointment scheduled.   - Insulin-Like Growth Factor 1 Ped; Future  - Cortisol; Future       F/u two weeks.     Sindhu Sandoval MD  [facility] - BARAK Bagley is a 22 year old, presenting for the following health issues:  Contraception      HPI     Concern - Birth control   Onset: Follow up  Description:  Is already taking the pills would like to discuss switching to he shot.   Progression of Symptoms:  none  Accompanying Signs & Symptoms: none  Previous history of similar problem: none  Precipitating factors:        Worsened by: none  Alleviating factors:        Improved by: none  Therapies tried and outcome:  none     She has been on the depo before and did not have issues.   Would prefer to not be on birth control but has guardian and guardian wants her on birth control.   Wants kids again some day.   Has been sexually active - has been on birth control     NIPPLE DISCHARGE  - has not lessened since off risperadol   - off Risperdal for at least one week   - no headaches, vision changes   - needs AM labs and hormone testing to determine if functioning         Objective    /71 (BP Location: Left  "arm, Patient Position: Sitting, Cuff Size: Adult Regular)   Pulse 83   Temp 98.5  F (36.9  C) (Tympanic)   Ht 1.753 m (5' 9\")   Wt 71.3 kg (157 lb 3.2 oz)   SpO2 98%   BMI 23.21 kg/m    Body mass index is 23.21 kg/m .     Physical Exam  Constitutional:       General: She is not in acute distress.     Appearance: Normal appearance. She is not ill-appearing.   Neurological:      Mental Status: She is alert.   Psychiatric:         Mood and Affect: Mood normal.         Behavior: Behavior normal.         Thought Content: Thought content normal.         Judgment: Judgment normal.            Results for orders placed or performed in visit on 02/10/23 (from the past 24 hour(s))   HCG qualitative urine   Result Value Ref Range    hCG Urine Qualitative Negative Negative                 "

## 2023-02-10 ENCOUNTER — OFFICE VISIT (OUTPATIENT)
Dept: FAMILY MEDICINE | Facility: OTHER | Age: 23
End: 2023-02-10
Attending: STUDENT IN AN ORGANIZED HEALTH CARE EDUCATION/TRAINING PROGRAM
Payer: COMMERCIAL

## 2023-02-10 VITALS
WEIGHT: 157.2 LBS | OXYGEN SATURATION: 98 % | DIASTOLIC BLOOD PRESSURE: 71 MMHG | SYSTOLIC BLOOD PRESSURE: 112 MMHG | HEIGHT: 69 IN | BODY MASS INDEX: 23.28 KG/M2 | HEART RATE: 83 BPM | TEMPERATURE: 98.5 F

## 2023-02-10 DIAGNOSIS — Z30.013 ENCOUNTER FOR INITIAL PRESCRIPTION OF INJECTABLE CONTRACEPTIVE: Primary | ICD-10-CM

## 2023-02-10 DIAGNOSIS — N64.3 GALACTORRHEA: ICD-10-CM

## 2023-02-10 DIAGNOSIS — D36.9 MICROADENOMA: ICD-10-CM

## 2023-02-10 DIAGNOSIS — E22.1 HYPERPROLACTINEMIA (H): Chronic | ICD-10-CM

## 2023-02-10 PROBLEM — Z91.51 HISTORY OF SUICIDE ATTEMPT: Chronic | Status: ACTIVE | Noted: 2022-10-28

## 2023-02-10 LAB — HCG UR QL: NEGATIVE

## 2023-02-10 PROCEDURE — 96372 THER/PROPH/DIAG INJ SC/IM: CPT | Performed by: STUDENT IN AN ORGANIZED HEALTH CARE EDUCATION/TRAINING PROGRAM

## 2023-02-10 PROCEDURE — 250N000011 HC RX IP 250 OP 636: Performed by: STUDENT IN AN ORGANIZED HEALTH CARE EDUCATION/TRAINING PROGRAM

## 2023-02-10 PROCEDURE — 81025 URINE PREGNANCY TEST: CPT | Mod: ZL | Performed by: STUDENT IN AN ORGANIZED HEALTH CARE EDUCATION/TRAINING PROGRAM

## 2023-02-10 PROCEDURE — 99213 OFFICE O/P EST LOW 20 MIN: CPT | Performed by: STUDENT IN AN ORGANIZED HEALTH CARE EDUCATION/TRAINING PROGRAM

## 2023-02-10 PROCEDURE — G0463 HOSPITAL OUTPT CLINIC VISIT: HCPCS | Performed by: STUDENT IN AN ORGANIZED HEALTH CARE EDUCATION/TRAINING PROGRAM

## 2023-02-10 RX ORDER — MEDROXYPROGESTERONE ACETATE 150 MG/ML
150 INJECTION, SUSPENSION INTRAMUSCULAR
Status: ACTIVE | OUTPATIENT
Start: 2023-02-10 | End: 2024-02-05

## 2023-02-10 RX ADMIN — MEDROXYPROGESTERONE ACETATE 150 MG: 150 INJECTION, SUSPENSION INTRAMUSCULAR at 11:14

## 2023-02-10 ASSESSMENT — PAIN SCALES - GENERAL: PAINLEVEL: NO PAIN (0)

## 2023-02-10 NOTE — NURSING NOTE
BP: 112/71    LAST PAP/EXAM: No results found for: PAP  URINE HCG:negative    The following medication was given:     MEDICATION: Depo Provera 150mg  ROUTE: IM  SITE: LUQ - Gluteus  : [employer]  LOT #: see mar  EXP:09/23  NEXT INJECTION DUE: 4/28/23 - 5/12/23   Provider: Dr. Sandoval

## 2023-02-15 ENCOUNTER — LAB (OUTPATIENT)
Dept: LAB | Facility: OTHER | Age: 23
End: 2023-02-15
Payer: COMMERCIAL

## 2023-02-15 DIAGNOSIS — D36.9 MICROADENOMA: ICD-10-CM

## 2023-02-15 DIAGNOSIS — E22.1 HYPERPROLACTINEMIA (H): ICD-10-CM

## 2023-02-15 LAB
CORTIS SERPL-MCNC: 23.1 UG/DL
PROLACTIN SERPL 3RD IS-MCNC: 19 NG/ML (ref 5–23)

## 2023-02-15 PROCEDURE — 84146 ASSAY OF PROLACTIN: CPT | Mod: ZL

## 2023-02-15 PROCEDURE — 36415 COLL VENOUS BLD VENIPUNCTURE: CPT | Mod: ZL

## 2023-02-15 PROCEDURE — 84305 ASSAY OF SOMATOMEDIN: CPT | Mod: ZL

## 2023-02-15 PROCEDURE — 82533 TOTAL CORTISOL: CPT | Mod: ZL

## 2023-02-15 NOTE — LETTER
February 28, 2023      Yudi Morales  [address] [place] [address] [address]  [facility] [address]        Dear ,    We are writing to inform you of your test results.    Please call and notify that prolactin looks better. No treatment needed right now. She will need follow up with endocrinology though. I can put in the referral - the closet location is [place] with [facility] or [place] with [facility] or [facility].        Will fax referral to [facility].     Resulted Orders   Prolactin   Result Value Ref Range    Prolactin 19 5 - 23 ng/mL   Insulin-Like Growth Factor 1 Ped   Result Value Ref Range    Insulin Growth Factor 1 (External) 407 83 - 456 ng/mL    Insulin Growth Factor I SD Score (External) 1.2 -2.0 - 2.0 SD    Narrative    Verified by Rik Das on 02/23/2023.   Cortisol   Result Value Ref Range    Cortisol 23.1   ug/dL      Comment:      6 months and older:  8 AM Cortisol Reference Range:  4-22 ug/dL   4 PM Cortisol Reference Range:  3-17 ug/dL    8 hrs post 1 mg dexamethasone given at midnight: < 5  g/dL       If you have any questions or concerns, please call the clinic at the number listed above.       Sincerely,      Sindhu Sandoval MD

## 2023-02-16 ENCOUNTER — HOSPITAL ENCOUNTER (EMERGENCY)
Facility: HOSPITAL | Age: 23
Discharge: HOME | End: 2023-02-16
Attending: PHYSICIAN ASSISTANT | Admitting: PHYSICIAN ASSISTANT
Payer: COMMERCIAL

## 2023-02-16 VITALS
HEART RATE: 76 BPM | RESPIRATION RATE: 16 BRPM | OXYGEN SATURATION: 99 % | TEMPERATURE: 99.1 F | DIASTOLIC BLOOD PRESSURE: 73 MMHG | SYSTOLIC BLOOD PRESSURE: 116 MMHG

## 2023-02-16 DIAGNOSIS — N39.0 UTI (URINARY TRACT INFECTION): ICD-10-CM

## 2023-02-16 DIAGNOSIS — R45.851 PASSIVE SUICIDAL IDEATIONS: ICD-10-CM

## 2023-02-16 LAB
ALBUMIN UR-MCNC: NEGATIVE MG/DL
AMPHETAMINES UR QL: NOT DETECTED
APPEARANCE UR: CLEAR
BARBITURATES UR QL SCN: NOT DETECTED
BENZODIAZ UR QL SCN: NOT DETECTED
BILIRUB UR QL STRIP: NEGATIVE
BUPRENORPHINE UR QL: NOT DETECTED
CANNABINOIDS UR QL: NOT DETECTED
COCAINE UR QL SCN: NOT DETECTED
COLOR UR AUTO: ABNORMAL
D-METHAMPHET UR QL: NOT DETECTED
GLUCOSE UR STRIP-MCNC: NEGATIVE MG/DL
HGB UR QL STRIP: NEGATIVE
HYALINE CASTS: 1 /LPF
KETONES UR STRIP-MCNC: NEGATIVE MG/DL
LEUKOCYTE ESTERASE UR QL STRIP: ABNORMAL
METHADONE UR QL SCN: NOT DETECTED
MUCOUS THREADS #/AREA URNS LPF: PRESENT /LPF
NITRATE UR QL: NEGATIVE
OPIATES UR QL SCN: NOT DETECTED
OXYCODONE UR QL SCN: NOT DETECTED
PCP UR QL SCN: NOT DETECTED
PH UR STRIP: 6.5 [PH] (ref 4.7–8)
PROPOXYPH UR QL: NOT DETECTED
RBC URINE: 1 /HPF
SP GR UR STRIP: 1.02 (ref 1–1.03)
SQUAMOUS EPITHELIAL: 6 /HPF
TRICYCLICS UR QL SCN: NOT DETECTED
UROBILINOGEN UR STRIP-MCNC: NORMAL MG/DL
WBC URINE: 19 /HPF

## 2023-02-16 PROCEDURE — 87086 URINE CULTURE/COLONY COUNT: CPT | Performed by: PHYSICIAN ASSISTANT

## 2023-02-16 PROCEDURE — 80306 DRUG TEST PRSMV INSTRMNT: CPT | Performed by: PHYSICIAN ASSISTANT

## 2023-02-16 PROCEDURE — 81001 URINALYSIS AUTO W/SCOPE: CPT | Mod: XU | Performed by: PHYSICIAN ASSISTANT

## 2023-02-16 PROCEDURE — 99285 EMERGENCY DEPT VISIT HI MDM: CPT

## 2023-02-16 PROCEDURE — 99283 EMERGENCY DEPT VISIT LOW MDM: CPT | Performed by: PHYSICIAN ASSISTANT

## 2023-02-16 RX ORDER — CEPHALEXIN 500 MG/1
500 CAPSULE ORAL 2 TIMES DAILY
Qty: 14 CAPSULE | Refills: 0 | Status: SHIPPED | OUTPATIENT
Start: 2023-02-16 | End: 2023-02-23

## 2023-02-16 RX ORDER — HYDROXYZINE HYDROCHLORIDE 10 MG/1
10 TABLET, FILM COATED ORAL 3 TIMES DAILY PRN
COMMUNITY
End: 2023-06-14

## 2023-02-16 ASSESSMENT — COLUMBIA-SUICIDE SEVERITY RATING SCALE - C-SSRS
ATTEMPT LIFETIME: YES
5. HAVE YOU STARTED TO WORK OUT OR WORKED OUT THE DETAILS OF HOW TO KILL YOURSELF? DO YOU INTEND TO CARRY OUT THIS PLAN?: YES
1. IN THE PAST MONTH, HAVE YOU WISHED YOU WERE DEAD OR WISHED YOU COULD GO TO SLEEP AND NOT WAKE UP?: YES
5. HAVE YOU STARTED TO WORK OUT OR WORKED OUT THE DETAILS OF HOW TO KILL YOURSELF? DO YOU INTEND TO CARRY OUT THIS PLAN?: YES
2. HAVE YOU ACTUALLY HAD ANY THOUGHTS OF KILLING YOURSELF?: YES
1. HAVE YOU WISHED YOU WERE DEAD OR WISHED YOU COULD GO TO SLEEP AND NOT WAKE UP?: YES
ATTEMPT PAST THREE MONTHS: NO
TOTAL  NUMBER OF ACTUAL ATTEMPTS LIFETIME: 1

## 2023-02-16 ASSESSMENT — ENCOUNTER SYMPTOMS: FEVER: 0

## 2023-02-16 ASSESSMENT — ACTIVITIES OF DAILY LIVING (ADL): ADLS_ACUITY_SCORE: 35
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
## 2023-02-16 NOTE — ED NOTES
"Per pt, her boyfriend is currently in residential and is supposed to be out on parole on 2/26 for drug charges.  Her sister and brother-in-law do not want her seeing him for this reason.  Pt denies all drug and ETOH, does state she has a daughter with her boyfriend, but the daughter has been adopted out.    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  Staff brought her prn to her and transported patient here. Pt reports that her hydroxyzine has helped \"a little.\"  Pt requesting to be \"tested for bipolar\" and have her \"depression rated on a scale.\"    Staff remains with patient at this time.  Pt verbalized understanding to hospital policy concerning SI and is agreeable and cooperative.   "

## 2023-02-16 NOTE — ED NOTES
The following information was received from Davi whose relationship to the patient is [facility] staff. Information was obtained via phone. Their phone number is # [phone] and they last had contact with patient on today.       How long have they been a resident: Pt has been a resident for about a year now.    Why does patient live in the facility: Pt has ongoing mental health diagnosis and symptoms.    Significant changes to environment: None.    Legal status (Commitment, probation, guardian, etc.): Unknown    Has the patient made any comments about wanting to kill themselves/others: Yes, Pt made suicidal comments today with a plan to cutting her throat.  Pt also expressed homicidal ideations without intent and plan.    What happened today: Davi reported Pt went to work today but was having a rough day as she was sent back to the facility earlier due to some work attire issues.  Pt also reported having auditory hallucination last year and was having conflicts with her brother in-law who pressured her to break up with her boyfriend who was currently in residential.  Davi reported Pt talked to staff for support, then was able to return to work.  Pt also told staff that she had knives in her room and staff found 2 knives as they took them out of her room today.  However, Pt called staff again around 2pm to reported she was having suicidal ideations and requested to take Hydroxyzine, PRN to help her.  Davi reported she went to  Pt from work and gave her Hydroxyzine, PRN as she took it to calm down.  Davi did further suicide risk assessment as Pt was having suicidal ideaitons with plan to cutting her throat with a knife and wanted to get help.  Davi brought Pt to the ER for further evaluation.    What is different about patient's functioning: Davi reported Pt has been doing well mostly, but seemed more withdrawn, isolating herself and decreased appetite.    Concern about alcohol/drug use: Yes Pt told staff  that she was using THC at her friend's place.    If d/c is recommended, can patient return to current living situation: Writer called and spoke to Hansa Ramesh [phone] who was the .  Hansa reviewed and agreed to have Pt return to their facility.  Staff will continue to check in with Pt to provide therapeutic support, monitor safety and encourage her to follow safety plan.  If no, what needs to happen in order for patient to return: N/A    Additional information: N/A

## 2023-02-16 NOTE — ED TRIAGE NOTES
"Pt presents with c/o SI onset 2 days, pt states she would \"slit my throat\" pt also states she \"blacks out\" and is afraid she will hurt others.  Pt reports 1 suicide attempt about 2 years ago.      Pt is here with [facility] staff who states that she has had auditory hallucinations. Pt is calm, appropriate, and cooperative during triage.  Denies drugs or ETOH.      When asked about any new stressors pt states her family is trying to break her and her \"baby daddy\" up. Pt states they have a one-year-old daughter.      "

## 2023-02-16 NOTE — ED NOTES
Pt walked to room 8, changed into paper scrubs, 1:1 established outside of room, security called at this time to wand pt and secure her belongings.

## 2023-02-16 NOTE — ED PROVIDER NOTES
"  History     Chief Complaint   Patient presents with     Suicidal     The history is provided by the patient.     Yudi Morales is a 22 year old female who presented to the emergency department along with a staff member from her group home for evaluation of suicidal ideation.  Patient reports she was having thoughts of harming herself by stabbing herself in the neck.  She does have a history remotely of Tylenol overdose.  The patient currently denies any thoughts of harming herself or others.  She reports that she was angry and stressed as people are trying to break up her current boyfriend.  She also endorses some auditory hallucinations that are command type but she reports that these are not new and she is not concerned about acting on the commands.  She did tell staff at the group home that she had a knife in her room and staff remove the knife.    Allergies:  Allergies   Allergen Reactions     Other [No Clinical Screening - See Comments] Anaphylaxis     \"Sodium Penathol\" per mother. Used in anesthesia.  Family hx of reaction.     Latex Rash     Amoxicillin Trihydrate GI Disturbance     Amoxicillin-Pot Clavulanate GI Disturbance     Thiopental      Other reaction(s): Other - Describe In Comment Field  Family Hx of death with this medication       Problem List:    Patient Active Problem List    Diagnosis Date Noted     Hyperprolactinemia (H) 11/16/2022     Priority: Medium     Tympanosclerosis of both ears 11/15/2022     Priority: Medium     Postnasal drip 11/15/2022     Priority: Medium     Primary insomnia 11/15/2022     Priority: Medium     Constipation, unspecified constipation type 11/15/2022     Priority: Medium     History of suicide attempt 10/28/2022     Priority: Medium     2020 - tylenol overdose       Intellectual disability 10/28/2022     Priority: Medium     Schizoaffective disorder, bipolar type (H) 07/08/2021     Priority: Medium     richard Farr with [facility]       KIRSTY " (generalized anxiety disorder) 07/19/2019     Priority: Medium     Allergic rhinitis 10/04/2011     Priority: Medium        Past Medical History:    Past Medical History:   Diagnosis Date     Allergic rhinitis 10/4/2011     KIRSTY (generalized anxiety disorder) 7/19/2019     PTSD (post-traumatic stress disorder)      Suicidal behavior with attempted self-injury (H) 2/2/2021       Past Surgical History:    Past Surgical History:   Procedure Laterality Date     TONSILLECTOMY & ADENOIDECTOMY  2008       Family History:    No family history on file.    Social History:  Marital Status:  Single [1]  Social History     Tobacco Use     Smoking status: Every Day     Types: Vaping Device     Smokeless tobacco: Never   Vaping Use     Vaping Use: Every day   Substance Use Topics     Alcohol use: No     Drug use: No        Medications:    acetaminophen (TYLENOL) 325 MG tablet  ARIPiprazole (ABILIFY) 2 MG tablet  cetirizine (ZYRTEC) 10 MG tablet  fluticasone (FLONASE) 50 MCG/ACT nasal spray  hydrOXYzine (ATARAX) 10 MG tablet  melatonin 5 MG tablet  sertraline (ZOLOFT) 50 MG tablet  Sodium Fluoride (DENTA 5000 PLUS DT)  dextromethorphan-guaiFENesin (MUCINEX DM)  MG 12 hr tablet  ibuprofen (ADVIL/MOTRIN) 200 MG tablet  mineral oil-hydrophilic petrolatum (AQUAPHOR) external ointment  montelukast (SINGULAIR) 10 MG tablet  Multiple Vitamin (TAB-A-TOM) TABS  polyethylene glycol (MIRALAX) 17 GM/Dose powder  sodium fluoride 1.1 % CREA          Review of Systems   Constitutional: Negative for fever.   Psychiatric/Behavioral:        See HPI       Physical Exam   BP: 116/73  Pulse: 76  Temp: 99.1  F (37.3  C)  Resp: 16  SpO2: 99 %      Physical Exam  Vitals and nursing note reviewed.   Constitutional:       General: She is not in acute distress.     Appearance: Normal appearance. She is normal weight. She is not ill-appearing, toxic-appearing or diaphoretic.   Pulmonary:      Effort: Pulmonary effort is normal.   Skin:     General: Skin  is warm and dry.      Capillary Refill: Capillary refill takes less than 2 seconds.   Neurological:      General: No focal deficit present.      Mental Status: She is alert and oriented to person, place, and time.   Psychiatric:         Mood and Affect: Mood normal.         Behavior: Behavior normal.         Thought Content: Thought content normal.         Judgment: Judgment normal.      Comments: This is a pleasant and talkative 22-year-old female found seated upright on the exam bed in no distress.  She makes good eye contact.  She has normal speech.  No evidence of acute psychosis, delusions, or flight of ideas.         ED Course     Mental Health Risk Assessment      PSS-3    Date and Time Over the past 2 weeks have you felt down, depressed, or hopeless? Over the past 2 weeks have you had thoughts of killing yourself? Have you ever attempted to kill yourself? When did this last happen? User   02/16/23 1514 yes yes yes more than 6 months ago JRO      C-SSRS ([place])    Date and Time Q1 Wished to be Dead (Past Month) Q2 Suicidal Thoughts (Past Month) Q3 Suicidal Thought Method Q4 Suicidal Intent without Specific Plan Q5 Suicide Intent with Specific Plan Q6 Suicide Behavior (Lifetime) Within the Past 3 Months? RETIRED: Level of Risk per Screen Screening Not Complete User   02/16/23 1541 no yes yes no no yes -- -- -- JRO   02/16/23 1514 no yes yes no yes yes -- -- -- JRO              Suicide assessment completed by mental health (D.E.C., LCSW, etc.)       Procedures              Critical Care time:  none               Results for orders placed or performed during the hospital encounter of 02/16/23 (from the past 24 hour(s))   Urine Drugs of Abuse Screen    Narrative    The following orders were created for panel order Urine Drugs of Abuse Screen.  Procedure                               Abnormality         Status                     ---------                               -----------         ------                      Urine Drugs of Abuse Scr...[[id number]]                      In process                   Please view results for these tests on the individual orders.       Medications - No data to display    Assessments & Plan (with Medical Decision Making)   22-year-old female with passive thoughts of suicide and harming her self.  Currently denies.  Staff members at the group home feel safe bringing her back home.  The patient feels safe being discharged.  DEC assessment.  They agree with discharge.  At this time there is no medical, legal, or ethical indication for acute psychiatric hold.  The patient has a capacity make her own decisions.  They will return here for any return of symptoms, new symptoms, or other concerns.    On return of urinalysis, it appears the patient is urinary tract infection will be treated.  Urine culture sent.    This document was prepared using a combination of typing and voice generated software.  While every attempt was made for accuracy, spelling and grammatical errors may exist.    I have reviewed the nursing notes.    I have reviewed the findings, diagnosis, plan and need for follow up with the patient.       Medical Decision Making  The patient's presentation is strongly suggestive of a clearly self-limited or minor problem.    The patient's evaluation involved:  history and exam without other MDM data elements.  Discussion of the patient with DEC assessment.    The patient's management involved a decision regarding hospitalization.        New Prescriptions    No medications on file       Final diagnoses:   Passive suicidal ideations       2/16/2023   [facility] EM[facility]GENCY [facility]PART[facility]     Daija Stanford PA-C  02/16/23 1701       Daija Stanford PA-C  02/16/23 2226

## 2023-02-16 NOTE — DISCHARGE INSTRUCTIONS
Aftercare Plan  If I am feeling unsafe or I am in a crisis, I will: reach out to my aunts, sister, friends, NHS staff and [facility] crisis line for their support.  Contact my established care providers   Call the National Suicide Prevention Lifeline: 988  Go to the nearest emergency room   Call 911     Fernandar encourage Pt to take her medications as prescribed and keep all of scheduled appointments with her outpatient service providers.  Fernandar recommended Pt to return to her current intermediate/adult foster care home and engage in new outpatient individual therapy service.  However,  was not able to schedule new therapy appointment at this time as nothing was available.  DEC Coordinator will contact Pt within next 1 or 2 business days to ensure coordination of care and provide assistance with appointments.      Warning signs that I or other people might notice when a crisis is developing for me: increased depression, isolation, cry, worry, racing thoughts, anxiety, panic attacks, nightmares, flashbacks, suicidal ideations, commanding auditory hallucination, visual hallucination, family conflict, disrupted appetite and sleep.    Things I am able to do on my own to cope or help me feel better: mudding, drawing, taking walks, jogging, watching movies, TV, listening to music, deep breathing exercise, meditation, affirmations, sensory grounding techniques, drinking some water and washing face.    Things that I am able to do with others to cope or help me better: socializing with friends and reaching out to supportive people.    Things I can use or do for distraction: mudding, drawing, taking walks, jogging, watching movies, TV, listening to music, deep breathing exercise, meditation, affirmations, sensory grounding techniques, drinking some water and washing face.    Changes I can make to support my mental health and wellness: being with sober people and sober environment to promote sobriety.  Joining a peer support group  through [employer] [facility] to increase positive support system.  [employer] [place] ([employer] Mental [employer]ness) improves the lives of children and adults with mental illnesses and their families by providing free classes on mental illnesses and support groups for adults with mental illnesses, parents and family members. For more information:  Phone: 407.216.6518  Toll free: 2-069-TWGB-HELPS  Website: [email or web address]/      People in my life that I can ask for help: my aunts, sister, friends and NHS staff    Your [facility] has a mental health crisis team you can call 24/7:  [facility], EMERGENCY 24-HOUR CRISIS LINE:  [phone]    Other things that are important when I'm in crisis: support from my family, friends and NHS staff  National Suicide Prevention Lifeline at 988  Throughout  [place]: call **CRISIS (**516178)  Crisis Text Line: is available for free, 24/7 by texting [place] to 154212    Additional resources and information: Below is a list of FREE Mental Health Options in the [facility] Area:    [facility] ([facility])  Serves those in emotional crisis with 24-hour, seven-day-a-week crisis counseling, assessment, referral, and medication management.   Suicidal: [phone] Consultation: [phone]  [address] [place] [address] 24/7 Crisis Intervention Center     [facility]  [phone]  Serves those in need of free outpatient mental health care  Hours: Mon, Wed, Fri 1-3pm; Mon-Thurs 6:30-8:30pm    [facility]  [address] [place] [address]  [phone]         Crisis Lines  Crisis Text Line  Text 843832  You will be connected with a trained live crisis counselor to provide support.    Por espanol, texto  MELIA a 683284 o texto a 442-AYUDAME en WhatsApp    [employer] (LGBTQ Youth Crisis Line)  3.491.833.4508  text START to 657-186      Community Resources  Fast Tracker  Linking people to mental health  "and substance use disorder resources  TEEspyn.TuneIn     [place] Mental Health Warm Line  Peer to peer support  Monday thru Saturday, 12 pm to 10 pm  623.830.3571 or 5.015.752.8499  Text \"Support\" to 22651    National [place] on Mental Illness ([employer])  294.926.3542 or 1.888.JOSSUE.HELPS      Mental Health Apps  My3  https://Vimbly.org/    VirtualHopeBox  https://Yemeksepeti/apps/virtual-hope-box/      Additional Information  Today you were seen by a licensed mental health professional through Triage and Transition services, [facility] Providers ([facility])  for a crisis assessment in the Emergency Department at [facility].  It is recommended that you follow up with your established providers (psychiatrist, mental health therapist, and/or primary care doctor - as relevant) as soon as possible. Coordinators from [facility] will be calling you in the next 24-48 hours to ensure that you have the resources you need.  You can also contact [facility] coordinators directly at [phone]. You may have been scheduled for or offered an appointment with a mental health provider. [facility] maintains an extensive network of licensed behavioral health providers to connect patients with the services they need.  We do not charge providers a fee to participate in our referral network.  We match patients with providers based on a patient's specific needs, insurance coverage, and location.  Our first effort will be to refer you to a provider within your care system, and will utilize providers outside your care system as needed.         "

## 2023-02-18 LAB — BACTERIA UR CULT: NORMAL

## 2023-02-23 LAB
INSULIN GROWTH FACTOR 1 (EXTERNAL): 407 NG/ML (ref 83–456)
INSULIN GROWTH FACTOR I SD SCORE (EXTERNAL): 1.2 SD (ref -2–2)

## 2023-02-24 ENCOUNTER — TELEPHONE (OUTPATIENT)
Dept: FAMILY MEDICINE | Facility: OTHER | Age: 23
End: 2023-02-24

## 2023-02-24 NOTE — TELEPHONE ENCOUNTER
Hi Dr. Barron,     I had reached out on this patient with an E consult on 2/1/2023 in regards to her bilateral galactorrhea with elevated prolactin and a 5 mm microadenoma on MRI imaging.  I have obtained additional labs that you recommended.  For analysis of these labs, specifically the  cortisol, would you recommend repeat E consult or formal referral to endocrine? I am uncertain if I can respond to the e-consult.     Her prolactin is normal off of Risperdal now, however, her cortisol is on the upper end for an AM lab.     Component      Latest Ref Rng & Units 2/15/2023   Insulin Growth Factor 1 (External)      83 - 456 ng/mL 407   Insulin Growth Factor I SD Score (External)      -2.0 - 2.0 SD 1.2   Prolactin      5 - 23 ng/mL 19   Cortisol Serum      ug/dL 23.1       I appreciate your help and recommendations.       Sindhu Sandoval MD   [facility]

## 2023-02-27 NOTE — RESULT ENCOUNTER NOTE
Please call and notify that prolactin looks better. No treatment needed right now. She will need follow up with endocrinology though. I can put in the referral - the closet location is [place] with  endocrinology or [place] with [facility] or [facility].

## 2023-03-01 ENCOUNTER — TELEPHONE (OUTPATIENT)
Dept: ALLERGY | Facility: OTHER | Age: 23
End: 2023-03-01

## 2023-03-01 DIAGNOSIS — J30.9 ALLERGIC RHINITIS, UNSPECIFIED SEASONALITY, UNSPECIFIED TRIGGER: Primary | Chronic | ICD-10-CM

## 2023-03-01 NOTE — TELEPHONE ENCOUNTER
Order for Loratadine 10mg chewable tab to be administered after allergy testing.  Order pended for review and signature.    Liz Beth RN on 3/1/2023 at 10:08 AM

## 2023-03-08 NOTE — PROGRESS NOTES
"Otolaryngology Progress Note          Yudi Morales is a 22 year old female presents for follow-up after allergy testing she has chronic post nasal drainage and rhinorrhea leading to throat clearing and expelling mucus.      at [facility], Hansa, accompanies Yudi.  She notes chronic throat clearing.    Yudi vapes  She has a 13-pack-year history of tobacco abuse.  She started smoking at 9 years of age and quit smoking about 1 year ago when she became pregnant.    Yudi denies congestion or facial pressure  She denies heartburn  No heroic snoring or apnea    Pituitary MR reviewed 1/18/23 , sinuses clear      She saw Adriana on 1/23/2023    No known family hx allergies    No improvement on Claritin  Minimal improvement on Zyrtec    Modified Quantitative Allergy Skin Testing results: todays date  Dilution #6:  none  Dilution #5: Ragweed, dust mite  Dilution #2: Birch vicky black walnut multiple molds and cat      Hx of a pituitary microadenoma          Physical Exam  /78 (BP Location: Right arm, Patient Position: Sitting, Cuff Size: Adult Regular)   Pulse 96   Temp 98.7  F (37.1  C)   Ht 1.753 m (5' 9\")   Wt 68 kg (150 lb)   SpO2 98%   BMI 22.15 kg/m    General - The patient is well nourished and well developed, and appears to have good nutritional status.  Alert and oriented to person and place, interactive.  Easily distracted and often looking at her phone.  Yudi is anxious and asks Hansa when they can leave as she holds her vape cartridge  Throat clearing and hacking/spitting throughout exam  Head and Face - Normocephalic and atraumatic, with no gross asymmetry noted of the contour of the facial features.  The facial nerve is intact, with strong symmetric movements.  Neck-no palpable lymphadenopathy or thyroid mass.  Trachea is midline.  Eyes - Extraocular movements intact.   Ears- External auditory canals are patent, tympanic membranes are intact without effusion or worrisome " retractions   Nose - Nasal mucosa is pink and moist with no abnormal mucus.  The septum was deviated left, turbinates 3+ No polyps, masses, or purulence noted on examination.  Mouth - Examination of the oral cavity shows pink, healthy, moist mucosa.  No lesions or ulceration noted.  The dentition are in poor repair.  The tongue is mobile and midline.  Throat - The walls of the oropharynx were smooth, pink, moist, symmetric, and had no lesions or ulcerations.  The tonsillar pillars and soft palate were symmetric.  The uvula was midline on elevation.  Tonsillar fossa clear      After informed consent was obtained and the nose was anesthetized with topical neosynepherine/lidocaine, the scope was advanced into the nares.  There is no purulence and/ or excessive swelling.  Eustachian tubes are patent, no nasopharyngeal mass, no adenoid tissue.  She did not tolerate any further exam.  I was not able to view the supraglottis or glottis.      Impression/Plan  Yuid Morales is a 22 year old female    ICD-10-CM    1. Perennial allergic rhinitis  J30.89 fexofenadine (ALLEGRA) 180 MG tablet      2. Irritable larynx syndrome  J38.7       3. History of tobacco abuse  Z87.891       4. Vaping-related disorder  U07.0           Allegra in the am  Continue zyrtec switch to at bedtime for bid AH use    Stop vaping  Vaping causes throat irritation and chronic cough this was specifically discussed with Yudi    Increase water intake, minimize caffeine    Differential includes tic related/neurogenic.  I do not feel her allergies are severe enough to lead to her repeated throat clearing and hacking.    Start amitriptyline for irritable larynx syndrome only if cleared by Dr. Sandoval and mental health provider Suki Farr [facility] counseling    Side effects including serotonin syndrome discussed today    See Teresa Richardson for complete laryngoscopy if symptoms persist.  Yudi needs to be aware that we need to complete the office  laryngoscopy if she presents back to ENT    Follow-up with me prn for surgical concerns          Monica Wakefield D.O.  Otolaryngology/Head and Neck Surgery  Allergy

## 2023-03-09 ENCOUNTER — OFFICE VISIT (OUTPATIENT)
Dept: OTOLARYNGOLOGY | Facility: OTHER | Age: 23
End: 2023-03-09
Attending: NURSE PRACTITIONER
Payer: COMMERCIAL

## 2023-03-09 ENCOUNTER — OFFICE VISIT (OUTPATIENT)
Dept: ALLERGY | Facility: OTHER | Age: 23
End: 2023-03-09
Attending: NURSE PRACTITIONER
Payer: COMMERCIAL

## 2023-03-09 VITALS
BODY MASS INDEX: 22.22 KG/M2 | SYSTOLIC BLOOD PRESSURE: 110 MMHG | DIASTOLIC BLOOD PRESSURE: 78 MMHG | HEIGHT: 69 IN | HEART RATE: 96 BPM | WEIGHT: 150 LBS | TEMPERATURE: 98.7 F | OXYGEN SATURATION: 98 %

## 2023-03-09 DIAGNOSIS — J38.7 IRRITABLE LARYNX SYNDROME: ICD-10-CM

## 2023-03-09 DIAGNOSIS — Z87.891 HISTORY OF TOBACCO ABUSE: ICD-10-CM

## 2023-03-09 DIAGNOSIS — J30.89 PERENNIAL ALLERGIC RHINITIS: Primary | ICD-10-CM

## 2023-03-09 DIAGNOSIS — U07.0 VAPING-RELATED DISORDER: ICD-10-CM

## 2023-03-09 DIAGNOSIS — J30.9 ALLERGIC RHINITIS, UNSPECIFIED SEASONALITY, UNSPECIFIED TRIGGER: Primary | ICD-10-CM

## 2023-03-09 PROCEDURE — G0463 HOSPITAL OUTPT CLINIC VISIT: HCPCS | Mod: 25

## 2023-03-09 PROCEDURE — 95004 PERQ TESTS W/ALRGNC XTRCS: CPT

## 2023-03-09 PROCEDURE — 99214 OFFICE O/P EST MOD 30 MIN: CPT | Mod: 25 | Performed by: OTOLARYNGOLOGY

## 2023-03-09 PROCEDURE — 92511 NASOPHARYNGOSCOPY: CPT | Performed by: OTOLARYNGOLOGY

## 2023-03-09 PROCEDURE — 95024 IQ TESTS W/ALLERGENIC XTRCS: CPT

## 2023-03-09 PROCEDURE — 250N000013 HC RX MED GY IP 250 OP 250 PS 637: Performed by: NURSE PRACTITIONER

## 2023-03-09 RX ORDER — FEXOFENADINE HCL 180 MG/1
180 TABLET ORAL DAILY
Qty: 90 TABLET | Refills: 11 | Status: SHIPPED | OUTPATIENT
Start: 2023-03-09 | End: 2023-06-07

## 2023-03-09 RX ADMIN — LORATADINE 10 MG: 5 TABLET, CHEWABLE ORAL at 15:07

## 2023-03-09 ASSESSMENT — PAIN SCALES - GENERAL: PAINLEVEL: NO PAIN (0)

## 2023-03-09 NOTE — PROGRESS NOTES
Yudi was seen for allergy skin testing. Patient was seen by this nurse in conjunction with ENT provider. All encounter details are documented in ENT Provider's appointment from this same date. Please see referenced encounter for this visits documentation.       Liz Beth RN on 3/9/2023 at 3:08 PM

## 2023-03-09 NOTE — PATIENT INSTRUCTIONS
Thank you for allowing Dr. Wakefield and our ENT team to participate in your care.  If your medications are too expensive, please give the nurse a call.  We can possibly change this medication.  If you have a scheduling or an appointment question please contact our Health Unit Coordinator at their direct line [phone].   ALL nursing questions or concerns can be directed to your ENT nurse at: [phone] - Shreya    Quit Vaping; this causes throat irrigation    Take Allegra in the AM    Take Zyrtec in the PM    Increase Water    Follow up with ENT as needed

## 2023-03-09 NOTE — NURSING NOTE
This patient presents today for allergy skin testing.      Symptoms have included frequent nasal congestion, sneezing, clearing thick mucus, occasional coughing, past ear infections, feeling like her ears are plugged and some wax build up and are worse in summer season. Denies any tubes in ears. States that she just had a rash on her face. Gets dry patches/eczema on her scalp. Denies any past sinus surgeries. Had her tonsils and adenoids removed when she was 10 years old.     This patient lives in a group home, with a basement.  No suspected mold, water or moisture issues in the home.  There is not carpet in the home, and not in bedroom.  Home has steam heat and window air conditioning units.       No pets.    No allergy testing in the past.    This patient's medications have been reviewed prior to testing and all appropriate medications have been stopped.    Verbal consent given by patient's legal guardian Kisha Pate from [facility], verbal consent heard over phone by Liz LEMUS RN and Luis M CABALLERO RN. Patient was given the ok to sign the consent form as guardian is not here. Patient accompanied by group home staff member Hansa.    MQT/ID test is performed per protocol.  The patient tolerated testing well. Benadryl gel applied to testing sites and children's chewable claritin given post testing per protocol.  All findings are recorded on the paper flow sheet. Results are reviewed with this patient.  They are given written information regarding allergy.       The patient will follow-up with Dr. Wakefield for treatment plan.      Liz Beth RN on 3/9/2023 at 2:34 PM

## 2023-03-09 NOTE — LETTER
"    3/9/2023         RE: Yudi Morales  [address] [place] [address] [address]  [facility] [address]        Dear Colleague,    Thank you for referring your patient, Yudi Morales, to the [facility]. Please see a copy of my visit note below.    Otolaryngology Progress Note          Yudi Morales is a 22 year old female presents for follow-up after allergy testing she has chronic post nasal drainage and rhinorrhea leading to throat clearing and expelling mucus.      at [facility], Hansa, accompanies Yudi.  She notes chronic throat clearing.    Yudi vapes  She has a 13-pack-year history of tobacco abuse.  She started smoking at 9 years of age and quit smoking about 1 year ago when she became pregnant.    Yudi denies congestion or facial pressure  She denies heartburn  No heroic snoring or apnea    Pituitary MR reviewed 1/18/23 , sinuses clear      She saw Adriana on 1/23/2023    No known family hx allergies    No improvement on Claritin  Minimal improvement on Zyrtec    Modified Quantitative Allergy Skin Testing results: todays date  Dilution #6:  none  Dilution #5: Ragweed, dust mite  Dilution #2: Birch vicky black walnut multiple molds and cat      Hx of a pituitary microadenoma          Physical Exam  /78 (BP Location: Right arm, Patient Position: Sitting, Cuff Size: Adult Regular)   Pulse 96   Temp 98.7  F (37.1  C)   Ht 1.753 m (5' 9\")   Wt 68 kg (150 lb)   SpO2 98%   BMI 22.15 kg/m    General - The patient is well nourished and well developed, and appears to have good nutritional status.  Alert and oriented to person and place, interactive.  Easily distracted and often looking at her phone.  Yudi is anxious and asks Hansa when they can leave as she holds her vape cartridge  Throat clearing and hacking/spitting throughout exam  Head and Face - Normocephalic and atraumatic, with no gross asymmetry noted of the contour of the facial features.  The facial nerve is intact, with " strong symmetric movements.  Neck-no palpable lymphadenopathy or thyroid mass.  Trachea is midline.  Eyes - Extraocular movements intact.   Ears- External auditory canals are patent, tympanic membranes are intact without effusion or worrisome retractions   Nose - Nasal mucosa is pink and moist with no abnormal mucus.  The septum was deviated left, turbinates 3+ No polyps, masses, or purulence noted on examination.  Mouth - Examination of the oral cavity shows pink, healthy, moist mucosa.  No lesions or ulceration noted.  The dentition are in poor repair.  The tongue is mobile and midline.  Throat - The walls of the oropharynx were smooth, pink, moist, symmetric, and had no lesions or ulcerations.  The tonsillar pillars and soft palate were symmetric.  The uvula was midline on elevation.  Tonsillar fossa clear      After informed consent was obtained and the nose was anesthetized with topical neosynepherine/lidocaine, the scope was advanced into the nares.  There is no purulence and/ or excessive swelling.  Eustachian tubes are patent, no nasopharyngeal mass, no adenoid tissue.  She did not tolerate any further exam.  I was not able to view the supraglottis or glottis.      Impression/Plan  Yudi Morales is a 22 year old female    ICD-10-CM    1. Perennial allergic rhinitis  J30.89 fexofenadine (ALLEGRA) 180 MG tablet      2. Irritable larynx syndrome  J38.7       3. History of tobacco abuse  Z87.891       4. Vaping-related disorder  U07.0           Allegra in the am  Continue zyrtec switch to at bedtime for bid AH use    Stop vaping  Vaping causes throat irritation and chronic cough this was specifically discussed with Yudi    Increase water intake, minimize caffeine    Differential includes tic related/neurogenic.  I do not feel her allergies are severe enough to lead to her repeated throat clearing and hacking.    Start amitriptyline for irritable larynx syndrome only if cleared by Dr. Sandoval and mental health  provider Kuldip Larson counseling    Side effects including serotonin syndrome discussed today    See Teresa or Debra for complete laryngoscopy if symptoms persist.  Yudi needs to be aware that we need to complete the office laryngoscopy if she presents back to ENT    Follow-up with me prn for surgical concerns          Monica Wakefield D.O.  Otolaryngology/Head and Neck Surgery  Allergy              Again, thank you for allowing me to participate in the care of your patient.        Sincerely,        Monica Wakefield MD

## 2023-03-28 ENCOUNTER — OFFICE VISIT (OUTPATIENT)
Dept: FAMILY MEDICINE | Facility: OTHER | Age: 23
End: 2023-03-28
Attending: PHYSICIAN ASSISTANT
Payer: COMMERCIAL

## 2023-03-28 DIAGNOSIS — N92.6 MISSED MENSES: ICD-10-CM

## 2023-03-28 DIAGNOSIS — N92.0 SPOTTING: ICD-10-CM

## 2023-03-28 DIAGNOSIS — N30.00 ACUTE CYSTITIS WITHOUT HEMATURIA: Primary | ICD-10-CM

## 2023-03-28 DIAGNOSIS — N39.9 URINARY PROBLEM IN FEMALE: ICD-10-CM

## 2023-03-28 LAB
ALBUMIN UR-MCNC: 10 MG/DL
APPEARANCE UR: ABNORMAL
BACTERIA #/AREA URNS HPF: ABNORMAL /HPF
BILIRUB UR QL STRIP: NEGATIVE
COLOR UR AUTO: ABNORMAL
GLUCOSE UR STRIP-MCNC: NEGATIVE MG/DL
HCG UR QL: NEGATIVE
HGB UR QL STRIP: NEGATIVE
HYALINE CASTS: 3 /LPF
KETONES UR STRIP-MCNC: NEGATIVE MG/DL
LEUKOCYTE ESTERASE UR QL STRIP: ABNORMAL
MUCOUS THREADS #/AREA URNS LPF: PRESENT /LPF
NITRATE UR QL: NEGATIVE
PH UR STRIP: 5.5 [PH] (ref 5–9)
RBC URINE: 2 /HPF
SP GR UR STRIP: 1.02 (ref 1–1.03)
SQUAMOUS EPITHELIAL: 17 /HPF
UROBILINOGEN UR STRIP-MCNC: NORMAL MG/DL
WBC URINE: 5 /HPF

## 2023-03-28 PROCEDURE — G0463 HOSPITAL OUTPT CLINIC VISIT: HCPCS | Performed by: PHYSICIAN ASSISTANT

## 2023-03-28 PROCEDURE — 87086 URINE CULTURE/COLONY COUNT: CPT | Mod: ZL | Performed by: PHYSICIAN ASSISTANT

## 2023-03-28 PROCEDURE — 99214 OFFICE O/P EST MOD 30 MIN: CPT | Performed by: PHYSICIAN ASSISTANT

## 2023-03-28 PROCEDURE — 81001 URINALYSIS AUTO W/SCOPE: CPT | Mod: ZL | Performed by: PHYSICIAN ASSISTANT

## 2023-03-28 PROCEDURE — 81025 URINE PREGNANCY TEST: CPT | Mod: ZL | Performed by: PHYSICIAN ASSISTANT

## 2023-03-28 RX ORDER — CEPHALEXIN 500 MG/1
500 CAPSULE ORAL 2 TIMES DAILY
Qty: 14 CAPSULE | Refills: 0 | Status: SHIPPED | OUTPATIENT
Start: 2023-03-28 | End: 2023-04-04

## 2023-03-28 NOTE — PROGRESS NOTES
"  Assessment & Plan     1. Acute cystitis without hematuria  - Urine Culture Aerobic Bacterial  - cephALEXin (KEFLEX) 500 MG capsule; Take 1 capsule (500 mg) by mouth 2 times daily for 7 days  Dispense: 14 capsule; Refill: 0  - Vitals stable. PE available for review below. UA results: moderate LE, moderate bacteria, mucus present, 5 WBC, however, contaminated with 17 squamous cells. Based off UA results, patient does meet criteria for antibiotic therapy. I did discuss with patient that if a urine culture was performed, that we will inform patient if a change to their treatment plan needs to occur based off culture results - with urine cultures typically returning in 1-3 days. In the meantime, recommend, alternating tylenol and ibuprofen every 4-6 hours as needed, warm heating pad, pushing fluids/hydration, cranberry juice/pills, urinating when urge arises. May also try over the counter remedies such as Azo (as long as pyridium not already prescribed). Patient aware that if they do take Azo, that this medication can change color of urine to an \"orange\" color. If fevers, chills, flank pain/back pain, inability to urinate/struggle to urinate, signs of dehydration or other worrisome signs occur, patient agreeable to follow up for reevaluation. Patient is in agreement and understanding of the above treatment plan. All questions and concerns were addressed and answered to patient's satisfaction. AVS reviewed with patient.     2. Urinary problem in female  - UA with Microscopic  - UTI suspected. Will culture and treat.     3. Missed menses  - Pregnancy, Urine (HCG)  - Urine pregnancy negative    4. Spotting  - Pregnancy, Urine (HCG)  - Urine pregnancy negative    Return if symptoms worsen or fail to improve.    Lizz Dooley PA-C  [facility] is a 22 year old, presenting for the following health issues:  No chief complaint on file.  No flowsheet data found.  HPI     Possible " pregnancy. She has a daughter 2022.     LMP - spotting since baby, has been on birth control since  Parity status -  1, Para 1, Induced AB 0, Spontaneous AB 0, Living children 1  Home pregnancy test - Yes, incomplete  Current symptoms - absence of menses and fatigue  Abdominal pain - No  Vaginal symptoms, bleeding, spotting, discharge - Yes    Pertinent History:  Birth control use - Yes - Depo, last injection 2/10/23 (she would still kindly like to be tested for assurance).   Planned pregnancy - No  Taking a prenatal multivitamin - No  Concerns about STDs - No  Using tobacco - Yes  Using alcohol - No  Using recreational drugs - No  Additional symptoms: No     Review of Systems   Constitutional, HEENT, cardiovascular, pulmonary, GI, , musculoskeletal, neuro, skin, endocrine and psych systems are negative, except as otherwise noted.      Objective    LMP 2023   There is no height or weight on file to calculate BMI.  Physical Exam   GENERAL: healthy, alert and no distress  RESP: lungs clear to auscultation - no rales, rhonchi or wheezes  CV: regular rate and rhythm, normal S1 S2, no S3 or S4, no murmur, click or rub, no peripheral edema and peripheral pulses strong  ABDOMEN: soft, nontender, no hepatosplenomegaly, no masses and bowel sounds normal  BACK: no CVA tenderness, no paralumbar tenderness  PSYCH: mentation appears normal, affect normal/bright    Results for orders placed or performed in visit on 23   Pregnancy, Urine (HCG)     Status: Normal   Result Value Ref Range    hCG Urine Qualitative Negative Negative   UA with Microscopic     Status: Abnormal   Result Value Ref Range    Color Urine Light Yellow Colorless, Straw, Light Yellow, Yellow    Appearance Urine Slightly Cloudy (A) Clear    Glucose Urine Negative Negative mg/dL    Bilirubin Urine Negative Negative    Ketones Urine Negative Negative mg/dL    Specific Gravity Urine 1.018 1.000 - 1.030    Blood Urine Negative Negative     pH Urine 5.5 5.0 - 9.0    Protein Albumin Urine 10 (A) Negative mg/dL    Urobilinogen Urine Normal Normal, 2.0 mg/dL    Nitrite Urine Negative Negative    Leukocyte Esterase Urine Moderate (A) Negative    Bacteria Urine Moderate (A) None Seen /HPF    Mucus Urine Present (A) None Seen /LPF    RBC Urine 2 <=2 /HPF    WBC Urine 5 <=5 /HPF    Squamous Epithelials Urine 17 (H) <=1 /HPF    Hyaline Casts Urine 3 (H) <=2 /LPF

## 2023-03-28 NOTE — PATIENT INSTRUCTIONS
You were prescribed an antibiotic, please take into consideration the following information:  - Take entire course of antibiotic even if you start to feel better.  - Antibiotics can cause stomach upset including nausea and diarrhea. Read your bottle or ask the pharmacist if antibiotic can be taken with food to help prevent nausea. If you have symptoms of diarrhea you can take an over-the-counter probiotic and/or increase foods with probiotics such as yogurt, Jesu, sauerkraut.  -Use caution in sunlight as can lead to increased risk of sunburn while on ABX (antibiotics).     Please refer to your AVS for follow up and pain/symptoms management recommendations (I.e.: medications, helpful conservative treatment modalities, appropriate follow up if need to a specialist or family practice, etc.). Please return to urgent care if your symptoms change or worsen.     Discharge instructions:  -Follow up with persistent symptoms with primary care (or ER if severe worsening)  -If you were prescribed a medication(s), please take this as prescribed/directed  -Monitor your symptoms, if changing/worsening, return to UC/ER or PCP for follow up    Urinary Tract Infection (UTI):  -If you were prescribed an antibiotic, please take this as directed and until completion.     -If your urine was sent for a culture, please note this takes on average 1-3 days to return. Urine cultures will show us if there is/if what bacteria grows from your urine. If a change to your treatment plan (antibiotics, etc.) is needed based off your urine culture we will contact you.     -For pain control (if applicable), alternating Tylenol and Ibuprofen is recommended  -Alternate every 4 hours as needed. I.e.: Ibuprofen at 8am, Tylenol 12pm, Ibuprofen 4pm   -Daily maximum of Tylenol is 4000mg (recommend staying under 3000mg)  -Daily maximum of Ibuprofen is 3200 mg    -A warm heating pad may help as well.     -Rest/relaxation and keeping hydrated with clear  liquids (ie: water or cranberry juice - do not do cranberry juice if on Coumadin/Warfarin).     -Empty your bladder when you feel the urge versus holding urine, after urinating you can bear-down to empty bladder completely, after peeing wipe front to back to avoid introducing bacteria towards vaginal area.     -AZO/Pyridium - may take up to 3 times a day, note that this may turn your urine orange    -Avoid sexual intercourse until you have completed your medication.     -Return to ER if any of the following occur: Worsening of symptoms. Fever over 101 F (38.3 C), No improvement by the third day of treatment, Increasing back or abdominal pain, vomiting, unable to keep fluids or medicine down, weakness, dizziness, or fainting, vaginal discharge, pain, redness, or swelling of the vaginal area

## 2023-03-29 LAB — BACTERIA UR CULT: NORMAL

## 2023-04-24 ENCOUNTER — OFFICE VISIT (OUTPATIENT)
Dept: FAMILY MEDICINE | Facility: OTHER | Age: 23
End: 2023-04-24
Payer: COMMERCIAL

## 2023-04-24 VITALS
TEMPERATURE: 97.8 F | SYSTOLIC BLOOD PRESSURE: 118 MMHG | OXYGEN SATURATION: 98 % | HEART RATE: 104 BPM | RESPIRATION RATE: 20 BRPM | BODY MASS INDEX: 22.91 KG/M2 | DIASTOLIC BLOOD PRESSURE: 78 MMHG | HEIGHT: 67 IN | WEIGHT: 146 LBS

## 2023-04-24 DIAGNOSIS — R11.2 NAUSEA AND VOMITING, UNSPECIFIED VOMITING TYPE: ICD-10-CM

## 2023-04-24 DIAGNOSIS — R10.84 ABDOMINAL PAIN, GENERALIZED: Primary | ICD-10-CM

## 2023-04-24 PROCEDURE — 250N000011 HC RX IP 250 OP 636

## 2023-04-24 PROCEDURE — 96372 THER/PROPH/DIAG INJ SC/IM: CPT

## 2023-04-24 PROCEDURE — 99214 OFFICE O/P EST MOD 30 MIN: CPT

## 2023-04-24 PROCEDURE — G0463 HOSPITAL OUTPT CLINIC VISIT: HCPCS | Mod: 25

## 2023-04-24 PROCEDURE — G0463 HOSPITAL OUTPT CLINIC VISIT: HCPCS

## 2023-04-24 RX ORDER — KETOROLAC TROMETHAMINE 30 MG/ML
30 INJECTION, SOLUTION INTRAMUSCULAR; INTRAVENOUS ONCE
Status: COMPLETED | OUTPATIENT
Start: 2023-04-24 | End: 2023-04-24

## 2023-04-24 RX ORDER — ONDANSETRON 4 MG/1
4 TABLET, ORALLY DISINTEGRATING ORAL ONCE
Status: COMPLETED | OUTPATIENT
Start: 2023-04-24 | End: 2023-04-24

## 2023-04-24 RX ORDER — ONDANSETRON 4 MG/1
4 TABLET, ORALLY DISINTEGRATING ORAL EVERY 8 HOURS PRN
Qty: 15 TABLET | Refills: 0 | Status: SHIPPED | OUTPATIENT
Start: 2023-04-24 | End: 2023-04-29

## 2023-04-24 RX ADMIN — KETOROLAC TROMETHAMINE 30 MG: 30 INJECTION, SOLUTION INTRAMUSCULAR at 12:34

## 2023-04-24 RX ADMIN — ONDANSETRON 4 MG: 4 TABLET, ORALLY DISINTEGRATING ORAL at 12:13

## 2023-04-24 ASSESSMENT — PATIENT HEALTH QUESTIONNAIRE - PHQ9
SUM OF ALL RESPONSES TO PHQ QUESTIONS 1-9: 9
10. IF YOU CHECKED OFF ANY PROBLEMS, HOW DIFFICULT HAVE THESE PROBLEMS MADE IT FOR YOU TO DO YOUR WORK, TAKE CARE OF THINGS AT HOME, OR GET ALONG WITH OTHER PEOPLE: NOT DIFFICULT AT ALL
SUM OF ALL RESPONSES TO PHQ QUESTIONS 1-9: 9

## 2023-04-24 ASSESSMENT — PAIN SCALES - GENERAL: PAINLEVEL: WORST PAIN (10)

## 2023-04-24 NOTE — NURSING NOTE
"Chief Complaint   Patient presents with     Nausea     Medication Problem     Patient presents today with nausea and vomiting. She states that she had a problem getting some of her meds so was off of them but once she restarted to take them (Sertraline), she has been sick. She is having abdominal pains and diarrhea with it.   Initial /78   Pulse 104   Temp 97.8  F (36.6  C) (Tympanic)   Resp 20   Ht 1.702 m (5' 7\")   Wt 66.2 kg (146 lb)   LMP 01/26/2023   SpO2 98%   BMI 22.87 kg/m   Estimated body mass index is 22.87 kg/m  as calculated from the following:    Height as of this encounter: 1.702 m (5' 7\").    Weight as of this encounter: 66.2 kg (146 lb).  Medication Reconciliation: complete    Mariana Alex LPN  "

## 2023-04-24 NOTE — PROGRESS NOTES
ASSESSMENT/PLAN:    (R10.84) Abdominal pain, generalized  (primary encounter diagnosis); (R11.2) Nausea and vomiting, unspecified vomiting type  Comment: Patient presents for 1 day history of abdominal pain with nausea and vomiting.  Pain is located in the right upper quadrant.  She notes that she did stop her sertraline for a week and then restarted it prior to the onset of symptoms.  Patient reports that she had a UTI about a month ago and never took her antibiotics.  I am concerned that this infection may be unresolved.  She declines a UA today.  Baseline lab work was recommended as there is some tenderness to the right upper quadrant on exam and there are etiologies should be ruled out.  Patient initially agreed to work-up but then decided she did not want her blood drawn.  She declines all further work-up today.  She would like something for the pain.  1 dose of IM Toradol was given.  She is also given a dose of Zofran in office and reports that this greatly improved her nausea.  Patient reports she feels well enough to eat now and will be able to take her medication.  Due to abrupt start at 75 mg of sertraline and symptoms that followed, I would recommend that she ramp up starting at 25 mg for 3 days and then 50 mg/week and then up to her full dose at 75 mg.  If at any point she is not tolerating this again I do recommend she follow-up with primary care as well.  Plan: ketorolac (TORADOL) injection 30 mg, CANCELED:         CBC and Differential, CANCELED: Comprehensive         Metabolic Panel, CANCELED: CRP inflammation,         CANCELED: Lipase, CANCELED: Amylase  ondansetron (ZOFRAN ODT) ODT tab 4 mg,         ondansetron (ZOFRAN ODT) 4 MG ODT tab   For abdominal pain I recommend you have your urine checked for infection and some blood work. If your pain persists I do recommend that you follow up. If you develop a fever or other concerning symptoms follow up right away in the emergency room.    I recommend you  started Sertraline at 25 mg (1/2 tablet) daily for three days. Increase to one tablet for one week. Then you may increase to your full dose. Take with food in the morning. If you continue to not tolerate I recommend follow up with your PCP.    Zofran every 8 hours as needed for nausea and vomiting.     Discussed warning signs/symptoms indicative of need to f/u    Follow up if symptoms persist or worsen or concerns    I have reviewed the nursing notes.  I have reviewed the findings, diagnosis, plan and need for follow up with the patient.    I explained my diagnostic considerations and recommendations to the patient, who voiced understanding and agreement with the treatment plan. All questions were answered. We discussed potential side effects of any prescribed or recommended therapies, as well as expectations for response to treatments.    ANJANA CHEEK, AMINA CNP  4/24/2023  11:50 AM    HPI:    Yudi Morales is a 22 year old female  who presents to [facility] today for concerns of nausea and vomiting.    She reports she stopped sertraline for one week. She reports she did not eat before taking the pill and restarted the pill yesterday. She reports that yesterday she began experiencing nausea, vomiting, and abdominal pain. Pain is burning sensation and she reports it's a burning sensation. She has not eaten since taking the pill yesterday and is unable to hold down any of her medications.     abdominal pain.     Subjective:   Pain Location:  generalized    Presence of the Following:  YES: she has diarrhea  No constipation  YES: she has nausea nausea  YES: she has  vomiting  No fevers, chills  YES: she has some urinary symptoms in March but did not take her medication for UTI.   No blood in stool  No mucus in stool    Last Bowel Movement: Today  Last Urination: Today    Any prior diagnosis of:   No peptic ulcer dz/GERD  No pancreatitis  No appendicitis/appendectomy  No gall bladder pathology (gallstone,  cholecystectomy, etc.)  No liver disease/pathology  No renal disease, renal stones, etc.  No diverticulosis/diverticulitis  No IBS/IBD  No diabetes    No prior GI or GYN surgeries (appendectomy, cholecystectomy, hysterectomy, etc.)    Female: LMP Months ago    No recent travel  No recent antibiotic usage  No sick/ill contact exposure      Tobacco use: No  Alcohol use: No, only holidays    Last meal: Yesterday morning    PCP: Lori    Patient reports ongoing mood issues but reports her mood has been very stable recently.      Past Medical History:   Diagnosis Date     Allergic rhinitis 10/4/2011     KIRSTY (generalized anxiety disorder) 7/19/2019     PTSD (post-traumatic stress disorder)      Suicidal behavior with attempted self-injury (H) 2/2/2021     Past Surgical History:   Procedure Laterality Date     TONSILLECTOMY & ADENOIDECTOMY  2008     Social History     Tobacco Use     Smoking status: Every Day     Types: Vaping Device     Smokeless tobacco: Never   Vaping Use     Vaping status: Every Day     Substances: Nicotine, CBD, Flavoring     Devices: Disposable   Substance Use Topics     Alcohol use: No     Comment: only on holidays     Current Outpatient Medications   Medication Sig Dispense Refill     acetaminophen (TYLENOL) 325 MG tablet Take 325-650 mg by mouth every 6 hours as needed for mild pain       ARIPiprazole (ABILIFY) 2 MG tablet Take 2 mg by mouth every morning       cetirizine (ZYRTEC) 10 MG tablet Take 1 tablet (10 mg) by mouth daily 90 tablet 1     dextromethorphan-guaiFENesin (MUCINEX DM)  MG 12 hr tablet Take 1 tablet by mouth every 12 hours as needed       fluticasone (FLONASE) 50 MCG/ACT nasal spray Spray 2 sprays into both nostrils daily 18.2 mL 11     hydrOXYzine (ATARAX) 10 MG tablet Take 10 mg by mouth 3 times daily as needed for itching       ibuprofen (ADVIL/MOTRIN) 200 MG tablet Take 200 mg by mouth every 4 hours as needed for pain       melatonin 5 MG tablet Take 1 tablet (5 mg)  "by mouth every evening 30 tablet 3     mineral oil-hydrophilic petrolatum (AQUAPHOR) external ointment Apply topically as needed for dry skin 50 g 11     montelukast (SINGULAIR) 10 MG tablet Take 1 tablet (10 mg) by mouth At Bedtime 30 tablet 5     Multiple Vitamin (TAB-A-TOM) TABS TAKE ONE TABLET BY MOUTH ONCE DAILY. 30 tablet 8     polyethylene glycol (MIRALAX) 17 GM/Dose powder Take 1/2 capful to 1 capful every day or every other day to keep bowel movements regular. Titrate based on bowel movements. Should have at least one soft, formed stool daily or every other day. 510 g 1     sertraline (ZOLOFT) 50 MG tablet TAKE 1 AND 1/2 TABLETS BY MOUTH BY MOUTH DAILY 45 tablet 0     Sodium Fluoride (DENTA 5000 PLUS DT) Apply to affected area every morning Apply thin layer to toothbrush after breakfast for 2 minutes       sodium fluoride 1.1 % CREA Apply pea sized amount of paste once daily and brush for two minutes. This is used in place of conventional toothpaste. 51 g 0     fexofenadine (ALLEGRA) 180 MG tablet Take 1 tablet (180 mg) by mouth daily for 90 days (Patient not taking: Reported on 4/24/2023) 90 tablet 11     Allergies   Allergen Reactions     Other [No Clinical Screening - See Comments] Anaphylaxis     \"Sodium Penathol\" per mother. Used in anesthesia.  Family hx of reaction.     Latex Rash     Amoxicillin Trihydrate GI Disturbance     Amoxicillin-Pot Clavulanate GI Disturbance     Phenobarbital      Thiopental      Other reaction(s): Other - Describe In Comment Field  Family Hx of death with this medication     Past medical history, past surgical history, current medications and allergies reviewed and accurate to the best of my knowledge.      ROS:  Refer to HPI    /78   Pulse 104   Temp 97.8  F (36.6  C) (Tympanic)   Resp 20   Ht 1.702 m (5' 7\")   Wt 66.2 kg (146 lb)   LMP 01/26/2023   SpO2 98%   BMI 22.87 kg/m      EXAM:  General Appearance: Well appearing 22 year old female, appropriate " appearance for age. No acute distress   Eyes: conjunctivae normal without erythema or irritation, corneas clear, no drainage or crusting, no eyelid swelling, pupils equal   Oropharynx: moist mucous membranes, voice clear.    Nose:  Bilateral nares: no erythema, no edema, no drainage or congestion   Neck: supple   Respiratory: normal chest wall and respirations.  Normal effort.  Clear to auscultation bilaterally, no wheezing, crackles or rhonchi.  No increased work of breathing.  No cough appreciated.  Cardiac: RRR with no murmurs  Abdomen: soft, right upper quadrant tenderness, no rigidity, no rebound tenderness or guarding, normal bowel sounds present  :  No suprapubic tenderness to palpation.  Absent CVA tenderness to palpation.  Musculoskeletal:  Equal movement of bilateral upper extremities.  Equal movement of bilateral lower extremities.  Normal gait.    Dermatological: no rashes noted of exposed skin  Neuro: Alert and oriented to person, place, and time.  Cranial nerves II-XII grossly intact with no focal or lateralizing deficits.  Muscle tone normal.  Gait normal. No tremor.   Psychological: normal affect, alert, oriented, and pleasant.     PATIENT HEALTH QUESTIONNAIRE-9 (PHQ - 9)    Over the last 2 weeks, how often have you been bothered by any of the following problems?    1. Little interest or pleasure in doing things -  Not at all   2. Feeling down, depressed, or hopeless -  Not at all   3. Trouble falling or staying asleep, or sleeping too much - Nearly every day   4. Feeling tired or having little energy -  Nearly every day   5. Poor appetite or overeating -  Not at all   6. Feeling bad about yourself - or that you are a failure or have let yourself or your family down -  Not at all   7. Trouble concentrating on things, such as reading the newspaper or watching television - Nearly every day   8. Moving or speaking so slowly that other people could have noticed? Or the opposite - being so fidgety or  restless that you have been moving around a lot more than usual Not at all   9. Thoughts that you would be better off dead or of hurting  yourself in some way Not at all   Total Score: 9     If you checked off any problems, how difficult have these problems made it for you to do your work, take care of things at home, or get along with other people?      Developed by Sherrie Rankin, Shanice Mejía, Keven Aguilar and colleagues, with an educational jas from [employer] No permission required to reproduce, translate, display or distribute. permission required to reproduce, translate, display or distribute.

## 2023-04-24 NOTE — PATIENT INSTRUCTIONS
For abdominal pain I recommend you have your urine checked for infection and some blood work. If your pain persists I do recommend that you follow up. If you develop a fever or other concerning symptoms follow up right away in the emergency room.    I recommend you started Sertraline at 25 mg (1/2 tablet) daily for three days. Increase to one tablet for one week. Then you may increase to your full dose. Take with food in the morning. If you continue to not tolerate I recommend follow up with your PCP.    Zofran every 8 hours as needed for nausea and vomiting.

## 2023-04-24 NOTE — PROGRESS NOTES
ASSESSMENT/PLAN:    Differential Diagnoses: ***    I have reviewed the nursing notes.  I have reviewed the findings, diagnosis, plan and need for follow up with the patient.    {Dia Picklist:[id number]}    ***   Discussed with patient viral vs bacterial respiratory illness, and evidence based practice and guidelines for cough without fever or infiltrate on xray are not indicative of pneumonia and should not be treated with antibiotics.    *** Discussed with patient that symptoms and exam are consistent with viral illness.  Discussed that symptomatic treatment of cough is appropriate but not with antibiotics.      *** Symptomatic treatment - Encouraged fluids, salt water gargles, honey (only if greater than 1 year in age due to risk of botulism), elevation, humidifier, sinus rinse/netti pot, lozenges, tea, topical vapor rub, popsicles, rest, etc     *** May use over-the-counter Tylenol or ibuprofen PRN    Discussed warning signs/symptoms indicative of need to f/u    Follow up if symptoms persist or worsen or concerns    I explained my diagnostic considerations and recommendations to the patient, who voiced understanding and agreement with the treatment plan. All questions were answered. We discussed potential side effects of any prescribed or recommended therapies, as well as expectations for response to treatments.    AMINA REYES CNP  4/24/2023  11:38 AM    HPI:    Yudi Morales is a 22 year old female  who presents to [facility] today for concerns of nausea, diarrhea, and abdominal pain.     1    Past Medical History:   Diagnosis Date     Allergic rhinitis 10/4/2011     KIRSTY (generalized anxiety disorder) 7/19/2019     PTSD (post-traumatic stress disorder)      Suicidal behavior with attempted self-injury (H) 2/2/2021     Past Surgical History:   Procedure Laterality Date     TONSILLECTOMY & ADENOIDECTOMY  2008     Social History     Tobacco Use     Smoking status: Every Day     Types: Vaping Device      Smokeless tobacco: Never   Vaping Use     Vaping status: Every Day     Substances: Nicotine, CBD, Flavoring     Devices: Disposable   Substance Use Topics     Alcohol use: No     Comment: only on holidays     Current Outpatient Medications   Medication Sig Dispense Refill     acetaminophen (TYLENOL) 325 MG tablet Take 325-650 mg by mouth every 6 hours as needed for mild pain       ARIPiprazole (ABILIFY) 2 MG tablet Take 2 mg by mouth every morning       cetirizine (ZYRTEC) 10 MG tablet Take 1 tablet (10 mg) by mouth daily 90 tablet 1     dextromethorphan-guaiFENesin (MUCINEX DM)  MG 12 hr tablet Take 1 tablet by mouth every 12 hours as needed       fluticasone (FLONASE) 50 MCG/ACT nasal spray Spray 2 sprays into both nostrils daily 18.2 mL 11     hydrOXYzine (ATARAX) 10 MG tablet Take 10 mg by mouth 3 times daily as needed for itching       ibuprofen (ADVIL/MOTRIN) 200 MG tablet Take 200 mg by mouth every 4 hours as needed for pain       melatonin 5 MG tablet Take 1 tablet (5 mg) by mouth every evening 30 tablet 3     mineral oil-hydrophilic petrolatum (AQUAPHOR) external ointment Apply topically as needed for dry skin 50 g 11     montelukast (SINGULAIR) 10 MG tablet Take 1 tablet (10 mg) by mouth At Bedtime 30 tablet 5     Multiple Vitamin (TAB-A-TOM) TABS TAKE ONE TABLET BY MOUTH ONCE DAILY. 30 tablet 8     polyethylene glycol (MIRALAX) 17 GM/Dose powder Take 1/2 capful to 1 capful every day or every other day to keep bowel movements regular. Titrate based on bowel movements. Should have at least one soft, formed stool daily or every other day. 510 g 1     sertraline (ZOLOFT) 50 MG tablet TAKE 1 AND 1/2 TABLETS BY MOUTH BY MOUTH DAILY 45 tablet 0     Sodium Fluoride (DENTA 5000 PLUS DT) Apply to affected area every morning Apply thin layer to toothbrush after breakfast for 2 minutes       sodium fluoride 1.1 % CREA Apply pea sized amount of paste once daily and brush for two minutes. This is used in place  "of conventional toothpaste. 51 g 0     fexofenadine (ALLEGRA) 180 MG tablet Take 1 tablet (180 mg) by mouth daily for 90 days (Patient not taking: Reported on 4/24/2023) 90 tablet 11     Allergies   Allergen Reactions     Other [No Clinical Screening - See Comments] Anaphylaxis     \"Sodium Penathol\" per mother. Used in anesthesia.  Family hx of reaction.     Latex Rash     Amoxicillin Trihydrate GI Disturbance     Amoxicillin-Pot Clavulanate GI Disturbance     Phenobarbital      Thiopental      Other reaction(s): Other - Describe In Comment Field  Family Hx of death with this medication     Past medical history, past surgical history, current medications and allergies reviewed and accurate to the best of my knowledge.      ROS:  Refer to HPI    /78   Pulse 104   Temp 97.8  F (36.6  C) (Tympanic)   Resp 20   Ht 1.702 m (5' 7\")   Wt 66.2 kg (146 lb)   LMP 01/26/2023   SpO2 98%   BMI 22.87 kg/m      EXAM:  General Appearance: Well appearing 22 year old {Desc; male/female:19515}, appropriate appearance for age. No acute distress   Ears: Left TM intact, translucent with bony landmarks appreciated, no erythema, no effusion, no bulging, no purulence.  Right TM intact, translucent with bony landmarks appreciated, no erythema, no effusion, no bulging, no purulence.  Left auditory canal clear.  Right auditory canal clear.  Normal external ears, non tender.  Eyes: conjunctivae normal without erythema or irritation, corneas clear, no drainage or crusting, no eyelid swelling, pupils equal   Oropharynx: moist mucous membranes, posterior pharynx {w-w/o:5700} erythema, tonsils {ENT TONSILS:457877}, no erythema, no exudates or petechiae, no post nasal drip seen, no trismus, voice clear.    Sinuses:  No sinus tenderness upon palpation of the frontal or maxillary sinuses  Nose:  Bilateral nares: no erythema, no edema, no drainage or congestion   Neck: supple without adenopathy  Respiratory: normal chest wall and " "respirations.  Normal effort.  Clear to auscultation bilaterally, no wheezing, crackles or rhonchi.  No increased work of breathing.  No cough appreciated.  Cardiac: RRR with no murmurs  Abdomen: soft, nontender, no rigidity, no rebound tenderness or guarding, normal bowel sounds present  :  No suprapubic tenderness to palpation.  {PRES/ABS:962569::\"Present\"} CVA tenderness to palpation.    Musculoskeletal:  Equal movement of bilateral upper extremities.  Equal movement of bilateral lower extremities.  Normal gait.    Dermatological: no rashes noted of exposed skin  Neuro: Alert and oriented to person, place, and time.  Cranial nerves II-XII grossly intact with no focal or lateralizing deficits.  Muscle tone normal.  Gait normal. No tremor.   Psychological: normal affect, alert, oriented, and pleasant.     Labs:  ***    Xray:  ***    "

## 2023-05-20 ENCOUNTER — HOSPITAL ENCOUNTER (EMERGENCY)
Facility: HOSPITAL | Age: 23
Discharge: HOME | End: 2023-05-20
Attending: PHYSICIAN ASSISTANT | Admitting: PHYSICIAN ASSISTANT
Payer: COMMERCIAL

## 2023-05-20 VITALS
RESPIRATION RATE: 18 BRPM | SYSTOLIC BLOOD PRESSURE: 124 MMHG | OXYGEN SATURATION: 99 % | TEMPERATURE: 98.8 F | DIASTOLIC BLOOD PRESSURE: 80 MMHG | HEART RATE: 84 BPM

## 2023-05-20 DIAGNOSIS — N30.00 ACUTE CYSTITIS WITHOUT HEMATURIA: ICD-10-CM

## 2023-05-20 DIAGNOSIS — S91.102A OPEN WOUND OF LEFT GREAT TOE, INITIAL ENCOUNTER: ICD-10-CM

## 2023-05-20 DIAGNOSIS — Z91.52 PERSONAL HISTORY OF NONSUICIDAL SELF-INJURY: ICD-10-CM

## 2023-05-20 DIAGNOSIS — H92.03 EARLOBE PAIN, BILATERAL: ICD-10-CM

## 2023-05-20 LAB
ALBUMIN UR-MCNC: 50 MG/DL
APPEARANCE UR: CLEAR
BILIRUB UR QL STRIP: NEGATIVE
C TRACH DNA SPEC QL PROBE+SIG AMP: NEGATIVE
COLOR UR AUTO: YELLOW
GLUCOSE UR STRIP-MCNC: NEGATIVE MG/DL
HGB UR QL STRIP: NEGATIVE
KETONES UR STRIP-MCNC: NEGATIVE MG/DL
LEUKOCYTE ESTERASE UR QL STRIP: ABNORMAL
MUCOUS THREADS #/AREA URNS LPF: PRESENT /LPF
N GONORRHOEA DNA SPEC QL NAA+PROBE: NEGATIVE
NITRATE UR QL: NEGATIVE
PH UR STRIP: 6 [PH] (ref 4.7–8)
RBC URINE: 1 /HPF
SP GR UR STRIP: 1.03 (ref 1–1.03)
SQUAMOUS EPITHELIAL: 3 /HPF
UROBILINOGEN UR STRIP-MCNC: 3 MG/DL
WBC URINE: 6 /HPF

## 2023-05-20 PROCEDURE — 87491 CHLMYD TRACH DNA AMP PROBE: CPT | Performed by: PHYSICIAN ASSISTANT

## 2023-05-20 PROCEDURE — G0463 HOSPITAL OUTPT CLINIC VISIT: HCPCS

## 2023-05-20 PROCEDURE — 99213 OFFICE O/P EST LOW 20 MIN: CPT | Performed by: PHYSICIAN ASSISTANT

## 2023-05-20 PROCEDURE — 87591 N.GONORRHOEAE DNA AMP PROB: CPT | Performed by: PHYSICIAN ASSISTANT

## 2023-05-20 PROCEDURE — 81003 URINALYSIS AUTO W/O SCOPE: CPT | Performed by: PHYSICIAN ASSISTANT

## 2023-05-20 RX ORDER — SULFAMETHOXAZOLE/TRIMETHOPRIM 800-160 MG
1 TABLET ORAL 2 TIMES DAILY
Qty: 6 TABLET | Refills: 0 | Status: SHIPPED | OUTPATIENT
Start: 2023-05-20 | End: 2023-05-23

## 2023-05-20 ASSESSMENT — ENCOUNTER SYMPTOMS
FLANK PAIN: 0
ABDOMINAL PAIN: 0
FEVER: 0
FREQUENCY: 0
HEMATURIA: 0
BACK PAIN: 0
FATIGUE: 0
WOUND: 1
DIARRHEA: 0
NAUSEA: 0
COUGH: 0
DYSURIA: 1
SINUS PRESSURE: 0
HEADACHES: 0
EYE REDNESS: 0
VOMITING: 0
CONSTIPATION: 0
ACTIVITY CHANGE: 0

## 2023-05-20 ASSESSMENT — ACTIVITIES OF DAILY LIVING (ADL): ADLS_ACUITY_SCORE: 33

## 2023-05-20 NOTE — ED PROVIDER NOTES
"  History     Chief Complaint   Patient presents with     Dysuria     HPI  Yudi Morales is a 22 year old female who presents for evaluation of dysuria which started today. Diagnosed with UTIs frequently. Last episode was in March. Interested in STI testing as well. Sexually active with one male partner, Depo-provera injections every 3 months for birth control. Denies hematuria, low back or abdominal pain, n/v. Has not tried any OTC medicines.     Says she also tried piercing her ears herself 3 days ago to feel pain after breaking up with her boyfriend of 2 years. Stuck the needle all the way through both ear lobes. It did seem to become infected, was squeezing pus out of the Right earlobe, but this has now resolved. Both earlobes are painful and itchy. She is letting the holes scab over. No other interest in self-injury or harm.     Lastly, she stubbed her L toe a few days ago and wants me to look at that too. Some pain to the medial side and a blister. Difficult to keep clean.     Allergies:  Allergies   Allergen Reactions     Other [No Clinical Screening - See Comments] Anaphylaxis     \"Sodium Penathol\" per mother. Used in anesthesia.  Family hx of reaction.     Latex Rash     Amoxicillin Trihydrate GI Disturbance     Amoxicillin-Pot Clavulanate GI Disturbance     Phenobarbital      Thiopental      Other reaction(s): Other - Describe In Comment Field  Family Hx of death with this medication       Problem List:    Patient Active Problem List    Diagnosis Date Noted     Hyperprolactinemia (H) 11/16/2022     Priority: Medium     Tympanosclerosis of both ears 11/15/2022     Priority: Medium     Postnasal drip 11/15/2022     Priority: Medium     Primary insomnia 11/15/2022     Priority: Medium     Constipation, unspecified constipation type 11/15/2022     Priority: Medium     History of suicide attempt 10/28/2022     Priority: Medium     2020 - tylenol overdose       Intellectual disability 10/28/2022     Priority: " Medium     Schizoaffective disorder, bipolar type (H) 07/08/2021     Priority: Medium     richard Pellrohith with [facility]       KIRSTY (generalized anxiety disorder) 07/19/2019     Priority: Medium     Allergic rhinitis 10/04/2011     Priority: Medium        Past Medical History:    Past Medical History:   Diagnosis Date     Allergic rhinitis 10/4/2011     KIRSTY (generalized anxiety disorder) 7/19/2019     PTSD (post-traumatic stress disorder)      Suicidal behavior with attempted self-injury (H) 2/2/2021       Past Surgical History:    Past Surgical History:   Procedure Laterality Date     TONSILLECTOMY & ADENOIDECTOMY  2008       Family History:    No family history on file.    Social History:  Marital Status:  Single [1]  Social History     Tobacco Use     Smoking status: Every Day     Types: Vaping Device     Smokeless tobacco: Never   Vaping Use     Vaping status: Every Day     Substances: Nicotine, CBD, Flavoring     Devices: Disposable   Substance Use Topics     Alcohol use: No     Comment: only on holidays     Drug use: No        Medications:    acetaminophen (TYLENOL) 325 MG tablet  ARIPiprazole (ABILIFY) 2 MG tablet  dextromethorphan-guaiFENesin (MUCINEX DM)  MG 12 hr tablet  fluticasone (FLONASE) 50 MCG/ACT nasal spray  ibuprofen (ADVIL/MOTRIN) 200 MG tablet  melatonin 5 MG tablet  mineral oil-hydrophilic petrolatum (AQUAPHOR) external ointment  montelukast (SINGULAIR) 10 MG tablet  Multiple Vitamin (TAB-A-TOM) TABS  polyethylene glycol (MIRALAX) 17 GM/Dose powder  sertraline (ZOLOFT) 50 MG tablet  Sodium Fluoride (DENTA 5000 PLUS DT)  sodium fluoride 1.1 % CREA  sulfamethoxazole-trimethoprim (BACTRIM DS) 800-160 MG tablet  cetirizine (ZYRTEC) 10 MG tablet  fexofenadine (ALLEGRA) 180 MG tablet  hydrOXYzine (ATARAX) 10 MG tablet          Review of Systems   Constitutional: Negative for activity change, fatigue and fever.   HENT: Negative for congestion and sinus pressure.    Eyes: Negative for  redness.   Respiratory: Negative for cough.    Cardiovascular: Negative for chest pain.   Gastrointestinal: Negative for abdominal pain, constipation, diarrhea, nausea and vomiting.   Genitourinary: Positive for dysuria and pelvic pain. Negative for flank pain, frequency, hematuria, urgency, vaginal discharge and vaginal pain.   Musculoskeletal: Negative for back pain.   Skin: Positive for wound. Negative for rash.   Neurological: Negative for headaches.   Psychiatric/Behavioral: Positive for self-injury (pierced ears to feel pain). Negative for suicidal ideas.       Physical Exam   BP: 124/80  Pulse: 84  Temp: 98.8  F (37.1  C)  Resp: 18  SpO2: 99 %      Physical Exam  Vitals and nursing note reviewed.   Constitutional:       General: She is not in acute distress.     Appearance: Normal appearance. She is not ill-appearing.   HENT:      Head: Normocephalic and atraumatic.      Right Ear: External ear normal.      Left Ear: External ear normal.      Nose: Nose normal.      Mouth/Throat:      Mouth: Mucous membranes are moist.   Eyes:      Conjunctiva/sclera: Conjunctivae normal.   Pulmonary:      Effort: Pulmonary effort is normal.   Abdominal:      General: Abdomen is flat.      Palpations: Abdomen is soft.   Musculoskeletal:         General: Normal range of motion.      Cervical back: Normal range of motion.   Skin:     General: Skin is warm.      Capillary Refill: Capillary refill takes less than 2 seconds.   Neurological:      General: No focal deficit present.      Mental Status: She is alert and oriented to person, place, and time.   Psychiatric:         Mood and Affect: Mood normal.         Behavior: Behavior normal.         ED Course     Mental Health Risk Assessment      PSS-3    Date and Time Over the past 2 weeks have you felt down, depressed, or hopeless? Over the past 2 weeks have you had thoughts of killing yourself? Have you ever attempted to kill yourself? When did this last happen? User   05/20/23  1450 no no yes more than 6 months ago MJA                Item Assessment   Suicidal Ideation None   Plan n/a   Intent n/a   Suicidal or self-harm behaviors none   Risk Factors    Protective Factors        Results for orders placed or performed during the hospital encounter of 05/20/23 (from the past 24 hour(s))   UA with Microscopic reflex to Culture    Specimen: Urine, Midstream   Result Value Ref Range    Color Urine Yellow Colorless, Straw, Light Yellow, Yellow    Appearance Urine Clear Clear    Glucose Urine Negative Negative mg/dL    Bilirubin Urine Negative Negative    Ketones Urine Negative Negative mg/dL    Specific Gravity Urine 1.029 1.003 - 1.035    Blood Urine Negative Negative    pH Urine 6.0 4.7 - 8.0    Protein Albumin Urine 50 (A) Negative mg/dL    Urobilinogen Urine 3.0 (A) Normal, 2.0 mg/dL    Nitrite Urine Negative Negative    Leukocyte Esterase Urine Small (A) Negative    Mucus Urine Present (A) None Seen /LPF    RBC Urine 1 <=2 /HPF    WBC Urine 6 (H) <=5 /HPF    Squamous Epithelials Urine 3 (H) <=1 /HPF    Narrative    Urine Culture not indicated       Medications - No data to display    Assessments & Plan (with Medical Decision Making)     I have reviewed the nursing notes.    I have reviewed the findings, diagnosis, plan and need for follow up with the patient.  Acute cystitis  Self-injury - pierced BL earlobes by herself at home  BL earlobe pain    UA was positive for bacterial infection. Await GC test.  Bactrim x 3 days    Earlobes are inflamed but no abscess. Recommend soaking cotton ball with alcohol and letting sit on wounds for 30 seconds or so. Let dry. Apply antibiotic ointment to both sides. Do this 2-3 times throughout the day.     L foot was soaked in warm water with soap for 15 minutes. Cleaned and dried. Recommend also applying antibiotic ointment to medial L hallux nail border throughout the day after soaks, 2-3 times is ideal. Cover with bandage.    Medical Decision Making  The  patient's presentation was of low complexity (an acute and uncomplicated illness or injury).    The patient's evaluation involved:  ordering and/or review of 2 test(s) in this encounter (see separate area of note for details)    The patient's management necessitated moderate risk (prescription drug management including medications given in the ED).        Discharge Medication List as of 5/20/2023  3:47 PM      START taking these medications    Details   sulfamethoxazole-trimethoprim (BACTRIM DS) 800-160 MG tablet Take 1 tablet by mouth 2 times daily for 3 days, Disp-6 tablet, R-0, E-Prescribe             Final diagnoses:   Acute cystitis without hematuria   Earlobe pain, bilateral   Personal history of nonsuicidal self-injury   Open wound of left great toe, initial encounter       5/20/2023   [facility]

## 2023-05-20 NOTE — ED TRIAGE NOTES
Patient presents with c/o dysuria that started today. Denies any abdominal/back/flank. Patient denies any blood in urine. Also would like to be tested for STI's.

## 2023-05-20 NOTE — DISCHARGE INSTRUCTIONS
Urinary tract infection:   Take 3 day course of Bactrim.     Earlobes wounds:   Apply cotton ball soaked in rubbing alcohol to earlobe wounds for about 30 seconds. Allow to dry. Cover with antibiotic ointment. Do this 2-3 times per day.     Left Toe wound:   Soak in warm water with mild soap. Dry gently. Apply antibiotic ointment. Cover with bandage.

## 2023-05-20 NOTE — ED TRIAGE NOTES
Pt presents with c/o having painful urination and burning   Denies any lower back pain or abdominal pain.   Pt presents with c/o wanting an STI test done too   s/x Started today    no otc meds taken today   Denies taking any AZO

## 2023-05-23 ENCOUNTER — NURSE TRIAGE (OUTPATIENT)
Dept: NURSING | Facility: CLINIC | Age: 23
End: 2023-05-23
Payer: COMMERCIAL

## 2023-05-24 NOTE — TELEPHONE ENCOUNTER
"Pt reports \"had a negative pregnancy test, still having pregnancy signs\". Pt reports abdominal pain for a week. Pt reports pain is intermittent. Pt reports she does have unusual vaginal discharge. Pt reports she is on birth control and periods are irregular. Pt reports she had a negative pregnancy test \"yesterday or the day before\" . Pt rates pain \"7\". Pt reports last time was four days ago. Pt also reports she feels \"pulling\" on her belly button and \"hearbeat on left side then right side of abdomen\". Pt speaks rapidly and it is hard for Writer to follow everything she says.    Advised pt to have another adult drive her to the ER now per protocol.     Pt verbalizes understanding and agrees to plan.     Reason for Disposition    Patient sounds very sick or weak to the triager    Additional Information    Negative: Shock suspected (e.g., cold/pale/clammy skin, too weak to stand, low BP, rapid pulse)    Negative: Difficult to awaken or acting confused (e.g., disoriented, slurred speech)    Negative: Passed out (i.e., lost consciousness, collapsed and was not responding)    Negative: Sounds like a life-threatening emergency to the triager    Negative: Chest pain    Negative: Pain is mainly in upper abdomen  (if needed ask: \"is it mainly above the belly button?\")    Negative: Followed an abdomen (stomach) injury    Negative: [1] Abdominal pain AND [2] pregnant < 20 weeks    Negative: [1] Abdominal pain AND [2] pregnant 20 or more weeks    Negative: [1] Abdominal pain AND [2] postpartum (from 0 to 6 weeks after delivery)    Negative: [1] SEVERE pain (e.g., excruciating) AND [2] present > 1 hour    Negative: [1] SEVERE pain AND [2] age > 60 years    Negative: [1] Vomiting AND [2] contains red blood or black (\"coffee ground\") material  (Exception: few red streaks in vomit that only happened once)    Negative: Blood in bowel movements (Exception: blood on surface of BM with constipation)    Negative: Black or tarry bowel " movements (Exception: chronic-unchanged black-grey bowel movements AND is taking iron pills or Pepto-bismol)    Protocols used: ABDOMINAL PAIN - FEMALE-A-AH

## 2023-06-07 ENCOUNTER — TELEPHONE (OUTPATIENT)
Dept: FAMILY MEDICINE | Facility: OTHER | Age: 23
End: 2023-06-07

## 2023-06-07 NOTE — TELEPHONE ENCOUNTER
Emergency Department and Urgent Care Follow-up      Reason for ER/UC visit: UTI  o Date seen: 05/20/23      New or Worsening symptoms:  persistent       Prescription Received/Picked up from Pharmacy?: bactrim   o Medications started? 05/20/23  o Any questions or issues regarding your prescription?: Patient states she completed antibiotic but is have persistent symptoms      Follow-up Results or Labs that are pending: everything completed      Questions or concerns?: persistent symptoms      ER Recommends Follow-up by:       RN Recommendations: be reseen in UC/ED with any new or worsening symptoms  o Appointment scheduled: 06/08/23    If you start feeling worse, or have any further questions, please feel free to contact Nurse Triage at [phone].  If needing immediate medical attention at any time please call 911/Go to the ER.

## 2023-06-08 ENCOUNTER — APPOINTMENT (OUTPATIENT)
Dept: GENERAL RADIOLOGY | Facility: HOSPITAL | Age: 23
End: 2023-06-08
Attending: NURSE PRACTITIONER
Payer: COMMERCIAL

## 2023-06-08 ENCOUNTER — HOSPITAL ENCOUNTER (EMERGENCY)
Facility: HOSPITAL | Age: 23
Discharge: HOME | End: 2023-06-08
Attending: NURSE PRACTITIONER | Admitting: NURSE PRACTITIONER
Payer: COMMERCIAL

## 2023-06-08 VITALS
TEMPERATURE: 97.8 F | RESPIRATION RATE: 18 BRPM | HEART RATE: 65 BPM | DIASTOLIC BLOOD PRESSURE: 66 MMHG | SYSTOLIC BLOOD PRESSURE: 101 MMHG | OXYGEN SATURATION: 97 %

## 2023-06-08 DIAGNOSIS — M54.9 BACK PAIN: ICD-10-CM

## 2023-06-08 DIAGNOSIS — N30.01 ACUTE CYSTITIS WITH HEMATURIA: Primary | ICD-10-CM

## 2023-06-08 LAB
ALBUMIN UR-MCNC: 200 MG/DL
ANION GAP SERPL CALCULATED.3IONS-SCNC: 9 MMOL/L (ref 7–15)
APPEARANCE UR: ABNORMAL
BACTERIA #/AREA URNS HPF: ABNORMAL /HPF
BILIRUB UR QL STRIP: NEGATIVE
BUN SERPL-MCNC: 5.2 MG/DL (ref 6–20)
CALCIUM SERPL-MCNC: 10 MG/DL (ref 8.6–10)
CHLORIDE SERPL-SCNC: 102 MMOL/L (ref 98–107)
COLOR UR AUTO: YELLOW
CREAT SERPL-MCNC: 0.76 MG/DL (ref 0.51–0.95)
DEPRECATED HCO3 PLAS-SCNC: 26 MMOL/L (ref 22–29)
ERYTHROCYTE [DISTWIDTH] IN BLOOD BY AUTOMATED COUNT: 13.2 % (ref 10–15)
GFR SERPL CREATININE-BSD FRML MDRD: >90 ML/MIN/1.73M2
GLUCOSE SERPL-MCNC: 99 MG/DL (ref 70–99)
GLUCOSE UR STRIP-MCNC: NEGATIVE MG/DL
HCG UR QL: NEGATIVE
HCT VFR BLD AUTO: 44.3 % (ref 35–47)
HGB BLD-MCNC: 14.8 G/DL (ref 11.7–15.7)
HGB UR QL STRIP: ABNORMAL
HOLD SPECIMEN: NORMAL
KETONES UR STRIP-MCNC: NEGATIVE MG/DL
LEUKOCYTE ESTERASE UR QL STRIP: ABNORMAL
MCH RBC QN AUTO: 28.1 PG (ref 26.5–33)
MCHC RBC AUTO-ENTMCNC: 33.4 G/DL (ref 31.5–36.5)
MCV RBC AUTO: 84 FL (ref 78–100)
MUCOUS THREADS #/AREA URNS LPF: PRESENT /LPF
NITRATE UR QL: POSITIVE
PH UR STRIP: 6 [PH] (ref 4.7–8)
PLATELET # BLD AUTO: 275 10E3/UL (ref 150–450)
POTASSIUM SERPL-SCNC: 4 MMOL/L (ref 3.4–5.3)
RBC # BLD AUTO: 5.27 10E6/UL (ref 3.8–5.2)
RBC URINE: 31 /HPF
SODIUM SERPL-SCNC: 137 MMOL/L (ref 136–145)
SP GR UR STRIP: 1.03 (ref 1–1.03)
SQUAMOUS EPITHELIAL: 3 /HPF
UROBILINOGEN UR STRIP-MCNC: 2 MG/DL
WBC # BLD AUTO: 7.9 10E3/UL (ref 4–11)
WBC CLUMPS #/AREA URNS HPF: PRESENT /HPF
WBC URINE: >182 /HPF

## 2023-06-08 PROCEDURE — 72070 X-RAY EXAM THORAC SPINE 2VWS: CPT

## 2023-06-08 PROCEDURE — 36415 COLL VENOUS BLD VENIPUNCTURE: CPT | Performed by: NURSE PRACTITIONER

## 2023-06-08 PROCEDURE — G0463 HOSPITAL OUTPT CLINIC VISIT: HCPCS | Mod: 25

## 2023-06-08 PROCEDURE — 85027 COMPLETE CBC AUTOMATED: CPT | Performed by: NURSE PRACTITIONER

## 2023-06-08 PROCEDURE — 250N000011 HC RX IP 250 OP 636: Performed by: NURSE PRACTITIONER

## 2023-06-08 PROCEDURE — 80048 BASIC METABOLIC PNL TOTAL CA: CPT | Performed by: NURSE PRACTITIONER

## 2023-06-08 PROCEDURE — 81025 URINE PREGNANCY TEST: CPT | Performed by: NURSE PRACTITIONER

## 2023-06-08 PROCEDURE — 87086 URINE CULTURE/COLONY COUNT: CPT | Performed by: NURSE PRACTITIONER

## 2023-06-08 PROCEDURE — 96372 THER/PROPH/DIAG INJ SC/IM: CPT | Performed by: NURSE PRACTITIONER

## 2023-06-08 PROCEDURE — 99213 OFFICE O/P EST LOW 20 MIN: CPT | Performed by: NURSE PRACTITIONER

## 2023-06-08 PROCEDURE — 72100 X-RAY EXAM L-S SPINE 2/3 VWS: CPT

## 2023-06-08 PROCEDURE — 81001 URINALYSIS AUTO W/SCOPE: CPT | Performed by: NURSE PRACTITIONER

## 2023-06-08 RX ORDER — KETOROLAC TROMETHAMINE 30 MG/ML
30 INJECTION, SOLUTION INTRAMUSCULAR; INTRAVENOUS ONCE
Status: COMPLETED | OUTPATIENT
Start: 2023-06-08 | End: 2023-06-08

## 2023-06-08 RX ORDER — SULFAMETHOXAZOLE/TRIMETHOPRIM 800-160 MG
1 TABLET ORAL 2 TIMES DAILY
Qty: 20 TABLET | Refills: 0 | Status: SHIPPED | OUTPATIENT
Start: 2023-06-08 | End: 2023-06-14

## 2023-06-08 RX ADMIN — KETOROLAC TROMETHAMINE 30 MG: 30 INJECTION, SOLUTION INTRAMUSCULAR; INTRAVENOUS at 17:40

## 2023-06-08 ASSESSMENT — ENCOUNTER SYMPTOMS
DYSURIA: 0
BACK PAIN: 1
NAUSEA: 0
PSYCHIATRIC NEGATIVE: 1
FLANK PAIN: 0
ABDOMINAL PAIN: 0
SHORTNESS OF BREATH: 0
NECK PAIN: 0
NECK STIFFNESS: 0
DIARRHEA: 0
FREQUENCY: 0
HEMATURIA: 0
CHILLS: 0
VOMITING: 0
FEVER: 0

## 2023-06-08 ASSESSMENT — ACTIVITIES OF DAILY LIVING (ADL): ADLS_ACUITY_SCORE: 35

## 2023-06-08 NOTE — ED TRIAGE NOTES
Patient presents to urgent care for low back pain that started 30 min ago. Patient states goes down into her legs.

## 2023-06-08 NOTE — ED PROVIDER NOTES
"  History     Chief Complaint   Patient presents with     Back Pain     HPI  Yudi Morales is a 22 year old female who presents to urgent care today ambulatory with complaints of mid to lower back pain which started shortly before arrival.  Patient states the pain made her drop to her knees.  Denies hitting head, LOC or any other injuries.  Denies any history of back pain.  Denies any bowel or bladder dysfunction.  Denies any saddle anesthesia.  Denies any numbness or weakness of bilateral lower extremities not attributed to pain.  Denies any dysuria, frequency or hematuria.  Denies any abdominal pain.  Denies any neck pain or stiffness.  Patient thinks she may be pregnant.  Patient states that she has not eaten in the last 4 days as she does not have any food, does not want to see a  at this time states that her [employer] worker is going to be getting a hold of her tomorrow.  Denies any fever, chills, nausea, vomiting, diarrhea, shortness of breath or chest pain.  No other concerns.    Allergies:  Allergies   Allergen Reactions     Other [No Clinical Screening - See Comments] Anaphylaxis     \"Sodium Penathol\" per mother. Used in anesthesia.  Family hx of reaction.     Latex Rash     Amoxicillin Trihydrate GI Disturbance     Amoxicillin-Pot Clavulanate GI Disturbance     Phenobarbital      Thiopental      Other reaction(s): Other - Describe In Comment Field  Family Hx of death with this medication       Problem List:    Patient Active Problem List    Diagnosis Date Noted     Hyperprolactinemia (H) 11/16/2022     Priority: Medium     Tympanosclerosis of both ears 11/15/2022     Priority: Medium     Postnasal drip 11/15/2022     Priority: Medium     Primary insomnia 11/15/2022     Priority: Medium     Constipation, unspecified constipation type 11/15/2022     Priority: Medium     History of suicide attempt 10/28/2022     Priority: Medium     2020 - tylenol overdose       Intellectual disability 10/28/2022    "  Priority: Medium     Schizoaffective disorder, bipolar type (H) 07/08/2021     Priority: Medium     richard Qureshiquiquetrey with [facility]       KIRSTY (generalized anxiety disorder) 07/19/2019     Priority: Medium     Allergic rhinitis 10/04/2011     Priority: Medium        Past Medical History:    Past Medical History:   Diagnosis Date     Allergic rhinitis 10/4/2011     KIRSTY (generalized anxiety disorder) 7/19/2019     PTSD (post-traumatic stress disorder)      Suicidal behavior with attempted self-injury (H) 2/2/2021       Past Surgical History:    Past Surgical History:   Procedure Laterality Date     TONSILLECTOMY & ADENOIDECTOMY  2008       Family History:    No family history on file.    Social History:  Marital Status:  Single [1]  Social History     Tobacco Use     Smoking status: Every Day     Types: Vaping Device     Smokeless tobacco: Never   Vaping Use     Vaping status: Every Day     Substances: Nicotine, CBD, Flavoring     Devices: Disposable   Substance Use Topics     Alcohol use: No     Comment: only on holidays     Drug use: No        Medications:    sulfamethoxazole-trimethoprim (BACTRIM DS) 800-160 MG tablet  acetaminophen (TYLENOL) 325 MG tablet  ARIPiprazole (ABILIFY) 2 MG tablet  cetirizine (ZYRTEC) 10 MG tablet  dextromethorphan-guaiFENesin (MUCINEX DM)  MG 12 hr tablet  fluticasone (FLONASE) 50 MCG/ACT nasal spray  hydrOXYzine (ATARAX) 10 MG tablet  ibuprofen (ADVIL/MOTRIN) 200 MG tablet  melatonin 5 MG tablet  mineral oil-hydrophilic petrolatum (AQUAPHOR) external ointment  montelukast (SINGULAIR) 10 MG tablet  Multiple Vitamin (TAB-A-TOM) TABS  polyethylene glycol (MIRALAX) 17 GM/Dose powder  sertraline (ZOLOFT) 50 MG tablet  Sodium Fluoride (DENTA 5000 PLUS DT)  sodium fluoride 1.1 % CREA      Review of Systems   Constitutional: Negative for chills and fever.   Respiratory: Negative for shortness of breath.    Cardiovascular: Negative for chest pain.   Gastrointestinal: Negative  for abdominal pain, diarrhea, nausea and vomiting.   Genitourinary: Negative for dysuria, flank pain, frequency, hematuria and pelvic pain.   Musculoskeletal: Positive for back pain and gait problem (Back pain with ambulation). Negative for neck pain and neck stiffness.   Skin: Negative.    Psychiatric/Behavioral: Negative.      Physical Exam   BP: 101/66  Pulse: 65  Temp: 97.8  F (36.6  C)  Resp: 18  SpO2: 97 %    Physical Exam  Vitals and nursing note reviewed.   Constitutional:       General: She is not in acute distress.     Appearance: Normal appearance. She is not ill-appearing or toxic-appearing.   Cardiovascular:      Rate and Rhythm: Normal rate and regular rhythm.      Pulses: Normal pulses.      Heart sounds: Normal heart sounds.   Pulmonary:      Effort: Pulmonary effort is normal.      Breath sounds: Normal breath sounds.   Abdominal:      General: Bowel sounds are normal.      Palpations: Abdomen is soft.      Tenderness: There is no abdominal tenderness. There is no right CVA tenderness or left CVA tenderness.   Musculoskeletal:        Arms:       Cervical back: Normal.      Thoracic back: Tenderness present. Decreased range of motion.      Lumbar back: Tenderness present. Decreased range of motion.   Skin:     General: Skin is warm and dry.      Capillary Refill: Capillary refill takes less than 2 seconds.   Neurological:      Mental Status: She is alert.   Psychiatric:         Mood and Affect: Mood normal.       ED Course     Results for orders placed or performed during the hospital encounter of 06/08/23 (from the past 24 hour(s))   UA with Microscopic reflex to Culture    Specimen: Urine, Midstream   Result Value Ref Range    Color Urine Yellow Colorless, Straw, Light Yellow, Yellow    Appearance Urine Cloudy (A) Clear    Glucose Urine Negative Negative mg/dL    Bilirubin Urine Negative Negative    Ketones Urine Negative Negative mg/dL    Specific Gravity Urine 1.032 1.003 - 1.035    Blood Urine  Small (A) Negative    pH Urine 6.0 4.7 - 8.0    Protein Albumin Urine 200 (A) Negative mg/dL    Urobilinogen Urine 2.0 Normal, 2.0 mg/dL    Nitrite Urine Positive (A) Negative    Leukocyte Esterase Urine Large (A) Negative    Bacteria Urine Few (A) None Seen /HPF    WBC Clumps Urine Present (A) None Seen /HPF    Mucus Urine Present (A) None Seen /LPF    RBC Urine 31 (H) <=2 /HPF    WBC Urine >182 (H) <=5 /HPF    Squamous Epithelials Urine 3 (H) <=1 /HPF    Narrative    Urine Culture ordered based on laboratory criteria   HCG qualitative urine   Result Value Ref Range    hCG Urine Qualitative Negative Negative   Urine Culture    Specimen: Urine, Midstream   Result Value Ref Range    Culture (A)      >100,000 CFU/mL Lactose fermenting gram negative bacilli   CBC with platelets   Result Value Ref Range    WBC Count 7.9 4.0 - 11.0 10e3/uL    RBC Count 5.27 (H) 3.80 - 5.20 10e6/uL    Hemoglobin 14.8 11.7 - 15.7 g/dL    Hematocrit 44.3 35.0 - 47.0 %    MCV 84 78 - 100 fL    MCH 28.1 26.5 - 33.0 pg    MCHC 33.4 31.5 - 36.5 g/dL    RDW 13.2 10.0 - 15.0 %    Platelet Count 275 150 - 450 10e3/uL   Basic metabolic panel   Result Value Ref Range    Sodium 137 136 - 145 mmol/L    Potassium 4.0 3.4 - 5.3 mmol/L    Chloride 102 98 - 107 mmol/L    Carbon Dioxide (CO2) 26 22 - 29 mmol/L    Anion Gap 9 7 - 15 mmol/L    Urea Nitrogen 5.2 (L) 6.0 - 20.0 mg/dL    Creatinine 0.76 0.51 - 0.95 mg/dL    Calcium 10.0 8.6 - 10.0 mg/dL    Glucose 99 70 - 99 mg/dL    GFR Estimate >90 >60 mL/min/1.73m2   Extra Tube    Narrative    The following orders were created for panel order Extra Tube.  Procedure                               Abnormality         Status                     ---------                               -----------         ------                     Extra Blue Top Tube[[id number]]                              Final result               Extra Red Top Tube[[id number]]                               Final result               Extra  Heparinized Syringe[[id number]]                        Final result                 Please view results for these tests on the individual orders.   Extra Blue Top Tube   Result Value Ref Range    Hold Specimen JIC    Extra Red Top Tube   Result Value Ref Range    Hold Specimen JIC    Extra Heparinized Syringe   Result Value Ref Range    Hold Specimen JIC    Lumbar spine XR, 2-3 views    Narrative    Exam: XR LUMBAR SPINE 2/3 VIEWS    Exam Reason: back pain    Comparison: None.    Findings:   AP, lateral, and lateral coned down to the lumbosacral junction  radiographic views were obtained.     No acute fracture or subluxation.    There is grade 1 anterolisthesis of L5 on S1 with bilateral pars  interarticularis defects.     Intervertebral disc spaces are maintained.      The paravertebral soft tissues are normal.      Impression    IMPRESSION:      No acute fracture or subluxation.    Grade 1 anterolisthesis of L5 on S1 with bilateral pars  interarticularis defects.    KWAME GONGORA MD         SYSTEM ID:  [id number]   XR Thoracic Spine 2 Views    Narrative    Exam: XR THORACIC SPINE 2 VIEWS    Exam reason: back pain    Technique: AP and lateral views were obtained.    Comparison: None.    Findings:     No acute fracture or subluxation.    Normal alignment.     No significant degenerative changes.    Paraspinous soft tissues are unremarkable.      Impression    Impression:    No acute fracture or subluxation.    KWAME GONGORA MD         SYSTEM ID:  [email or web address]       Medications   ketorolac (TORADOL) injection 30 mg (30 mg Intramuscular $Given 6/8/23 1740)     Assessments & Plan (with Medical Decision Making)     I have reviewed the nursing notes.    I have reviewed the findings, diagnosis, plan and need for follow up with the patient.  (N30.01) Acute cystitis with hematuria  (primary encounter diagnosis)  (M54.9) Back pain  Plan:   Patient ambulatory with a nontoxic appearance.  Patient able to stand from a seated  position in order to ambulate.  Patient arrived with complaints of back pain which she said is gradually improving.  Patient states that she has not eaten in four days and believes she may be pregnant.  Does not want to speak to a , states that her [employer] worker will be touching base with her tomorrow.  No abdominal tenderness or CVA tenderness present.  Lab work and thoracic and lumbar x-rays completed and patient left AMA prior to resulting.  Was able to stop patient before she left AMA and gave her a meal box which we supply in the emergency department and patient was receptive but continued to decline staying for rest of visit.  After patient had discharged urine pregnancy test resulted as negative.  CBC and BMP overall unremarkable.  UA indicates urinary tract infection, telephone call to patient and sent in Bactrim to [facility].  Patient states she is able to  antibiotic at [facility] as its in close distance from her house.  Thoracic spine x-ray completed and no acute fracture or subluxation noted.  X-ray of lumbar spine completed and shows no acute fracture or subluxation.  Grade 1 anterolisthesis of L5 on S1 with bilateral pars interarticularis defects present, no changes at this time.  Patient strongly encouraged to return to the emergency department with any worsening in condition or additional concerns.  Patient in agreement with treatment plan.    Discharge Medication List as of 6/8/2023  6:01 PM        Final diagnoses:   Acute cystitis with hematuria   Back pain     6/8/2023   [facility]     Barbie Tracy NP  06/09/23 0934

## 2023-06-08 NOTE — LETTER
June 21, 2023      Yudi Morales  [address]  [place] [address]        Dear ,    We are writing to inform you of your test results.    {results letter list:[id number]}    Resulted Orders   Urine Culture   Result Value Ref Range    Culture >100,000 CFU/mL Escherichia coli (A)        If you have any questions or concerns, please call the clinic at the number listed above.       Sincerely,      Barbie Tracy NP

## 2023-06-08 NOTE — ED TRIAGE NOTES
Pt arrives with c/o low back pain that started approx 30 mins ago. Pt states pain goes down into her legs. Pt was able to ambulate into the ER

## 2023-06-09 DIAGNOSIS — Z30.42 ENCOUNTER FOR MANAGEMENT AND INJECTION OF INJECTABLE PROGESTIN CONTRACEPTIVE: Primary | ICD-10-CM

## 2023-06-10 LAB — BACTERIA UR CULT: ABNORMAL

## 2023-06-14 ENCOUNTER — OFFICE VISIT (OUTPATIENT)
Dept: FAMILY MEDICINE | Facility: OTHER | Age: 23
End: 2023-06-14
Attending: STUDENT IN AN ORGANIZED HEALTH CARE EDUCATION/TRAINING PROGRAM
Payer: COMMERCIAL

## 2023-06-14 VITALS
WEIGHT: 135.9 LBS | HEIGHT: 67 IN | DIASTOLIC BLOOD PRESSURE: 66 MMHG | OXYGEN SATURATION: 96 % | BODY MASS INDEX: 21.33 KG/M2 | TEMPERATURE: 98.1 F | HEART RATE: 98 BPM | SYSTOLIC BLOOD PRESSURE: 106 MMHG

## 2023-06-14 DIAGNOSIS — F25.0 SCHIZOAFFECTIVE DISORDER, BIPOLAR TYPE (H): Primary | Chronic | ICD-10-CM

## 2023-06-14 DIAGNOSIS — N30.00 ACUTE CYSTITIS WITHOUT HEMATURIA: ICD-10-CM

## 2023-06-14 DIAGNOSIS — Z30.42 ENCOUNTER FOR SURVEILLANCE OF INJECTABLE CONTRACEPTIVE: ICD-10-CM

## 2023-06-14 DIAGNOSIS — Z91.51 HISTORY OF SUICIDE ATTEMPT: Chronic | ICD-10-CM

## 2023-06-14 DIAGNOSIS — F41.1 GAD (GENERALIZED ANXIETY DISORDER): Chronic | ICD-10-CM

## 2023-06-14 PROCEDURE — 99214 OFFICE O/P EST MOD 30 MIN: CPT | Performed by: STUDENT IN AN ORGANIZED HEALTH CARE EDUCATION/TRAINING PROGRAM

## 2023-06-14 PROCEDURE — G0463 HOSPITAL OUTPT CLINIC VISIT: HCPCS

## 2023-06-14 RX ORDER — CEPHALEXIN 500 MG/1
500 CAPSULE ORAL 2 TIMES DAILY
Qty: 14 CAPSULE | Refills: 0 | Status: SHIPPED | OUTPATIENT
Start: 2023-06-14 | End: 2023-06-21

## 2023-06-14 RX ORDER — ARIPIPRAZOLE 2 MG/1
2 TABLET ORAL EVERY MORNING
Qty: 30 TABLET | Refills: 1 | Status: SHIPPED | OUTPATIENT
Start: 2023-06-14 | End: 2023-07-21

## 2023-06-14 ASSESSMENT — ANXIETY QUESTIONNAIRES
GAD7 TOTAL SCORE: 14
1. FEELING NERVOUS, ANXIOUS, OR ON EDGE: MORE THAN HALF THE DAYS
7. FEELING AFRAID AS IF SOMETHING AWFUL MIGHT HAPPEN: SEVERAL DAYS
7. FEELING AFRAID AS IF SOMETHING AWFUL MIGHT HAPPEN: SEVERAL DAYS
6. BECOMING EASILY ANNOYED OR IRRITABLE: SEVERAL DAYS
GAD7 TOTAL SCORE: 14
3. WORRYING TOO MUCH ABOUT DIFFERENT THINGS: MORE THAN HALF THE DAYS
4. TROUBLE RELAXING: NEARLY EVERY DAY
GAD7 TOTAL SCORE: 14
5. BEING SO RESTLESS THAT IT IS HARD TO SIT STILL: NEARLY EVERY DAY
2. NOT BEING ABLE TO STOP OR CONTROL WORRYING: MORE THAN HALF THE DAYS

## 2023-06-14 ASSESSMENT — PATIENT HEALTH QUESTIONNAIRE - PHQ9
SUM OF ALL RESPONSES TO PHQ QUESTIONS 1-9: 17
10. IF YOU CHECKED OFF ANY PROBLEMS, HOW DIFFICULT HAVE THESE PROBLEMS MADE IT FOR YOU TO DO YOUR WORK, TAKE CARE OF THINGS AT HOME, OR GET ALONG WITH OTHER PEOPLE: SOMEWHAT DIFFICULT
SUM OF ALL RESPONSES TO PHQ QUESTIONS 1-9: 17

## 2023-06-14 ASSESSMENT — PAIN SCALES - GENERAL: PAINLEVEL: NO PAIN (0)

## 2023-06-14 NOTE — PATIENT INSTRUCTIONS
"Abilify - this is a mood stabilizer for your Schizoaffective disorder.   Take Keflex 500mg twice daily for 7 days for your urine infection.   [facility] will call to set up appointment for mental health - psychiatry.     While on antibiotics, it's recommended you take probiotics, eat more yogurt or drink Kefir, to promote good bacteria in your gut. Doing this for 2 weeks after the finishing the antibiotics is also recommended to minimize GI side effects, like diarrhea. For probiotics, ones that have >1,000,000 colony forming units (CFUs) are good to take.      CRISIS SUPPORT  Mobile Crisis Line - [phone]  [facility] has free online resources including therapy for Mental Health and substance use problems: http://[employer]org    CRISIS TEXT LINE  Text \"HOME\" to 995 161  The crisis text elijah serves anyone, in any type of crisis, providing access to free, 24/7 support and information. Just text HOME to 177-629 from anywhere in the [place], anytime about any type of crisis and a live, trained Crisis Counselor will receive the text and respond quickly.     [facility]  Call [phone] or [phone]  Text \"support\" to 86396  Open 7 days a week, 9am to 9pm.     The [place] Warmline takes more than 15,000 calls per year from across the [facility], and provides support and connection with others. For many people, the Warmline is an important tool that helps them before they reach a point of crisis and supports their mental health.    The [place] Warmline provides peer support and a connection with others. For many people, the Warmline is an important tool that helps them avoid mental health crisis and use of crisis services, [facility] and hospital emergency departments. Calls are answered by peer support staff who have first-hand experience living with a mental health condition and are available to listen to you, provide support, and connect you with resources. And if you re in need of more help, we can " directly connect you to the nearest crisis services.    [employer]  Call 988  The Lifeline provides 24/7, free and confidential support for people in distress, prevention and crisis resources for you or your loved ones, and best practices for professionals in the [place].    [facility]  The [facility] educates, advocates and helps build communities of well-being by providing mobile and residential mental health crisis intervention and stabilization services. These services are client, culture and recovery focused providing pathways to health and wellness in [facility].    [address] [place] [address]  [phone]
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
## 2023-07-10 ENCOUNTER — TELEPHONE (OUTPATIENT)
Dept: FAMILY MEDICINE | Facility: OTHER | Age: 23
End: 2023-07-10
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
## 2023-07-10 NOTE — TELEPHONE ENCOUNTER
Recommendation to go to the emergency room for evaluation due to concern for self and other harm  Michelle Botello MD
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
## 2023-07-10 NOTE — TELEPHONE ENCOUNTER
Appreciate police conducting a safety check.  Yudi no longer lives at the address listed  Police were able to see a facebook post of Yudi from a couple of hours ago.   No contact was made with Yudi d/t her phone only works with [employer]  We will make arrangements for a clinic visit tomorrow.  Michelle Botello MD

## 2023-07-10 NOTE — TELEPHONE ENCOUNTER
Spoke with MD, will do welfare check. Called pt phone X2. No voicemail set up.Unable to leave voicemail.    Called 911 for safety check. Updated 911 on patients calls and MD recommendation below. They have someone on the way at this time.    Please note.    Hellen MONTES DE OCA RN

## 2023-07-11 NOTE — TELEPHONE ENCOUNTER
Please note.Per covering provider-schedule with PCP today. OVERBOOK request.     Hellen MONTES DE OCA RN

## 2023-07-11 NOTE — TELEPHONE ENCOUNTER
Called, no answer.No voicemail. Unable to contact pt at this time.Pt does not have MyChart.    Please note.    Hellen MONTES DE OCA RN
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
## 2023-07-21 ENCOUNTER — OFFICE VISIT (OUTPATIENT)
Dept: FAMILY MEDICINE | Facility: OTHER | Age: 23
End: 2023-07-21
Attending: STUDENT IN AN ORGANIZED HEALTH CARE EDUCATION/TRAINING PROGRAM
Payer: COMMERCIAL

## 2023-07-21 VITALS
BODY MASS INDEX: 20.52 KG/M2 | HEART RATE: 91 BPM | DIASTOLIC BLOOD PRESSURE: 76 MMHG | TEMPERATURE: 98.7 F | SYSTOLIC BLOOD PRESSURE: 122 MMHG | OXYGEN SATURATION: 99 % | WEIGHT: 131 LBS

## 2023-07-21 DIAGNOSIS — F15.10 METHAMPHETAMINE USE (H): ICD-10-CM

## 2023-07-21 DIAGNOSIS — F25.0 SCHIZOAFFECTIVE DISORDER, BIPOLAR TYPE (H): Primary | ICD-10-CM

## 2023-07-21 DIAGNOSIS — F41.1 GAD (GENERALIZED ANXIETY DISORDER): ICD-10-CM

## 2023-07-21 PROBLEM — D35.2 PITUITARY MICROADENOMA (H): Chronic | Status: ACTIVE | Noted: 2023-07-21

## 2023-07-21 PROBLEM — R09.82 POSTNASAL DRIP: Status: ACTIVE | Noted: 2022-11-15

## 2023-07-21 PROBLEM — D35.2 PITUITARY MICROADENOMA (H): Status: ACTIVE | Noted: 2023-07-21

## 2023-07-21 PROBLEM — E22.1 HYPERPROLACTINEMIA (H): Chronic | Status: RESOLVED | Noted: 2022-11-16 | Resolved: 2023-07-21

## 2023-07-21 PROBLEM — F12.90 MARIJUANA USE: Chronic | Status: ACTIVE | Noted: 2023-07-21

## 2023-07-21 PROBLEM — H74.03 TYMPANOSCLEROSIS OF BOTH EARS: Status: ACTIVE | Noted: 2022-11-15

## 2023-07-21 PROBLEM — F12.90 MARIJUANA USE: Status: ACTIVE | Noted: 2023-07-21

## 2023-07-21 LAB
ALBUMIN SERPL BCG-MCNC: 4.3 G/DL (ref 3.5–5.2)
ALP SERPL-CCNC: 92 U/L (ref 35–104)
ALT SERPL W P-5'-P-CCNC: 14 U/L (ref 0–50)
AMPHETAMINES UR QL: NOT DETECTED
ANION GAP SERPL CALCULATED.3IONS-SCNC: 9 MMOL/L (ref 7–15)
AST SERPL W P-5'-P-CCNC: 21 U/L (ref 0–45)
BARBITURATES UR QL SCN: NOT DETECTED
BASOPHILS # BLD AUTO: 0 10E3/UL (ref 0–0.2)
BASOPHILS NFR BLD AUTO: 1 %
BENZODIAZ UR QL SCN: NOT DETECTED
BILIRUB SERPL-MCNC: 0.6 MG/DL
BUN SERPL-MCNC: 8.8 MG/DL (ref 6–20)
BUPRENORPHINE UR QL: NOT DETECTED
C TRACH DNA SPEC QL PROBE+SIG AMP: NEGATIVE
CALCIUM SERPL-MCNC: 9.4 MG/DL (ref 8.6–10)
CANNABINOIDS UR QL: NOT DETECTED
CHLORIDE SERPL-SCNC: 105 MMOL/L (ref 98–107)
COCAINE UR QL SCN: NOT DETECTED
CREAT SERPL-MCNC: 0.7 MG/DL (ref 0.51–0.95)
D-METHAMPHET UR QL: NOT DETECTED
DEPRECATED HCO3 PLAS-SCNC: 25 MMOL/L (ref 22–29)
EOSINOPHIL # BLD AUTO: 0.1 10E3/UL (ref 0–0.7)
EOSINOPHIL NFR BLD AUTO: 1 %
ERYTHROCYTE [DISTWIDTH] IN BLOOD BY AUTOMATED COUNT: 12.9 % (ref 10–15)
GFR SERPL CREATININE-BSD FRML MDRD: >90 ML/MIN/1.73M2
GLUCOSE SERPL-MCNC: 77 MG/DL (ref 70–99)
HCG UR QL: NEGATIVE
HCT VFR BLD AUTO: 40.2 % (ref 35–47)
HGB BLD-MCNC: 13.2 G/DL (ref 11.7–15.7)
IMM GRANULOCYTES # BLD: 0 10E3/UL
IMM GRANULOCYTES NFR BLD: 0 %
LYMPHOCYTES # BLD AUTO: 2.6 10E3/UL (ref 0.8–5.3)
LYMPHOCYTES NFR BLD AUTO: 43 %
MCH RBC QN AUTO: 28.4 PG (ref 26.5–33)
MCHC RBC AUTO-ENTMCNC: 32.8 G/DL (ref 31.5–36.5)
MCV RBC AUTO: 87 FL (ref 78–100)
METHADONE UR QL SCN: NOT DETECTED
MONOCYTES # BLD AUTO: 0.6 10E3/UL (ref 0–1.3)
MONOCYTES NFR BLD AUTO: 10 %
N GONORRHOEA DNA SPEC QL NAA+PROBE: NEGATIVE
NEUTROPHILS # BLD AUTO: 2.7 10E3/UL (ref 1.6–8.3)
NEUTROPHILS NFR BLD AUTO: 45 %
NRBC # BLD AUTO: 0 10E3/UL
NRBC BLD AUTO-RTO: 0 /100
OPIATES UR QL SCN: NOT DETECTED
OXYCODONE UR QL SCN: NOT DETECTED
PCP UR QL SCN: NOT DETECTED
PLATELET # BLD AUTO: 267 10E3/UL (ref 150–450)
POTASSIUM SERPL-SCNC: 3.9 MMOL/L (ref 3.4–5.3)
PROPOXYPH UR QL: NOT DETECTED
PROT SERPL-MCNC: 6.9 G/DL (ref 6.4–8.3)
RBC # BLD AUTO: 4.64 10E6/UL (ref 3.8–5.2)
SODIUM SERPL-SCNC: 139 MMOL/L (ref 136–145)
TRICYCLICS UR QL SCN: NOT DETECTED
WBC # BLD AUTO: 6 10E3/UL (ref 4–11)

## 2023-07-21 PROCEDURE — 86780 TREPONEMA PALLIDUM: CPT | Mod: ZL | Performed by: STUDENT IN AN ORGANIZED HEALTH CARE EDUCATION/TRAINING PROGRAM

## 2023-07-21 PROCEDURE — 85025 COMPLETE CBC W/AUTO DIFF WBC: CPT | Mod: ZL | Performed by: STUDENT IN AN ORGANIZED HEALTH CARE EDUCATION/TRAINING PROGRAM

## 2023-07-21 PROCEDURE — 81025 URINE PREGNANCY TEST: CPT | Mod: ZL | Performed by: STUDENT IN AN ORGANIZED HEALTH CARE EDUCATION/TRAINING PROGRAM

## 2023-07-21 PROCEDURE — 80306 DRUG TEST PRSMV INSTRMNT: CPT | Mod: ZL | Performed by: STUDENT IN AN ORGANIZED HEALTH CARE EDUCATION/TRAINING PROGRAM

## 2023-07-21 PROCEDURE — 86803 HEPATITIS C AB TEST: CPT | Mod: ZL | Performed by: STUDENT IN AN ORGANIZED HEALTH CARE EDUCATION/TRAINING PROGRAM

## 2023-07-21 PROCEDURE — 36415 COLL VENOUS BLD VENIPUNCTURE: CPT | Mod: ZL | Performed by: STUDENT IN AN ORGANIZED HEALTH CARE EDUCATION/TRAINING PROGRAM

## 2023-07-21 PROCEDURE — 99214 OFFICE O/P EST MOD 30 MIN: CPT | Performed by: STUDENT IN AN ORGANIZED HEALTH CARE EDUCATION/TRAINING PROGRAM

## 2023-07-21 PROCEDURE — 87389 HIV-1 AG W/HIV-1&-2 AB AG IA: CPT | Mod: ZL | Performed by: STUDENT IN AN ORGANIZED HEALTH CARE EDUCATION/TRAINING PROGRAM

## 2023-07-21 PROCEDURE — G0463 HOSPITAL OUTPT CLINIC VISIT: HCPCS

## 2023-07-21 PROCEDURE — 80053 COMPREHEN METABOLIC PANEL: CPT | Mod: ZL | Performed by: STUDENT IN AN ORGANIZED HEALTH CARE EDUCATION/TRAINING PROGRAM

## 2023-07-21 PROCEDURE — 87491 CHLMYD TRACH DNA AMP PROBE: CPT | Mod: ZL | Performed by: STUDENT IN AN ORGANIZED HEALTH CARE EDUCATION/TRAINING PROGRAM

## 2023-07-21 RX ORDER — ARIPIPRAZOLE 5 MG/1
5 TABLET ORAL EVERY MORNING
Qty: 30 TABLET | Refills: 3 | Status: ON HOLD | OUTPATIENT
Start: 2023-07-21 | End: 2023-09-18

## 2023-07-21 ASSESSMENT — ANXIETY QUESTIONNAIRES
GAD7 TOTAL SCORE: 16
GAD7 TOTAL SCORE: 16
4. TROUBLE RELAXING: MORE THAN HALF THE DAYS
7. FEELING AFRAID AS IF SOMETHING AWFUL MIGHT HAPPEN: MORE THAN HALF THE DAYS
6. BECOMING EASILY ANNOYED OR IRRITABLE: MORE THAN HALF THE DAYS
1. FEELING NERVOUS, ANXIOUS, OR ON EDGE: MORE THAN HALF THE DAYS
5. BEING SO RESTLESS THAT IT IS HARD TO SIT STILL: MORE THAN HALF THE DAYS
2. NOT BEING ABLE TO STOP OR CONTROL WORRYING: NEARLY EVERY DAY
IF YOU CHECKED OFF ANY PROBLEMS ON THIS QUESTIONNAIRE, HOW DIFFICULT HAVE THESE PROBLEMS MADE IT FOR YOU TO DO YOUR WORK, TAKE CARE OF THINGS AT HOME, OR GET ALONG WITH OTHER PEOPLE: SOMEWHAT DIFFICULT
3. WORRYING TOO MUCH ABOUT DIFFERENT THINGS: NEARLY EVERY DAY

## 2023-07-21 ASSESSMENT — PATIENT HEALTH QUESTIONNAIRE - PHQ9
SUM OF ALL RESPONSES TO PHQ QUESTIONS 1-9: 15
10. IF YOU CHECKED OFF ANY PROBLEMS, HOW DIFFICULT HAVE THESE PROBLEMS MADE IT FOR YOU TO DO YOUR WORK, TAKE CARE OF THINGS AT HOME, OR GET ALONG WITH OTHER PEOPLE: SOMEWHAT DIFFICULT
SUM OF ALL RESPONSES TO PHQ QUESTIONS 1-9: 15

## 2023-07-21 ASSESSMENT — PAIN SCALES - GENERAL: PAINLEVEL: NO PAIN (0)

## 2023-07-21 NOTE — RESULT ENCOUNTER NOTE
Please call and notify patient that labs so far look good. Negative pregnancy test. No drugs in her urine drug screen.

## 2023-07-21 NOTE — PATIENT INSTRUCTIONS
Psychiatry will call to schedule appointment   Increase ability to 5mg.   Follow up in 10 days with labs.

## 2023-07-21 NOTE — Clinical Note
I put in a referral for Dr Brothers and also the intensive outpatient program through behavioral health. Is there anything else I need to do with these? Not sure I did it correctly and just want to make sure they aren't missed.

## 2023-07-22 LAB
HCV AB SERPL QL IA: NONREACTIVE
HIV 1+2 AB+HIV1 P24 AG SERPL QL IA: NONREACTIVE
T PALLIDUM AB SER QL: NONREACTIVE

## 2023-07-24 ENCOUNTER — TELEPHONE (OUTPATIENT)
Dept: PSYCHIATRY | Facility: OTHER | Age: 23
End: 2023-07-24

## 2023-07-24 NOTE — TELEPHONE ENCOUNTER
Attempted to reach pt but no answer and vm full; if pt calls back per conrad mcintyre she will accept this as a new pt please schedule a new pt (60 min)

## 2023-08-25 ENCOUNTER — TELEPHONE (OUTPATIENT)
Dept: FAMILY MEDICINE | Facility: OTHER | Age: 23
End: 2023-08-25

## 2023-08-25 NOTE — TELEPHONE ENCOUNTER
3:10 PM    Reason for Call: OVERBOOK    Patient is having the following symptoms: Patient is calling to be seen  for mental health update/ wants letter for  animal. Patient wants next week., pt also needs a copy of med list/days.    The patient is requesting an appointment for Overbook with .    Was an appointment offered for this call? Yes  If yes : Appointment type              Date   09/05/2023  wants sooner    Preferred method for responding to this message: Telephone Call  What is your phone number ?   [phone]       If we cannot reach you directly, may we leave a detailed response at the number you provided? yes    Can this message wait until your PCP/provider returns, if unavailable today? YES, Provider out today    Leana Mcguire

## 2023-08-28 NOTE — TELEPHONE ENCOUNTER
Attempt # 1  Outcome: Left Message   Comment:  8/29/23 at 4pm for   mental health update/ wants letter for  animal

## 2023-08-29 NOTE — TELEPHONE ENCOUNTER
Attempt # 2  Outcome: Left Message   Comment: LVM for patient to schedule with Dr. Sandoval 8/29/23 @ 4pm for mental health + support animal letter.

## 2023-09-14 ENCOUNTER — HOSPITAL ENCOUNTER (INPATIENT)
Facility: HOSPITAL | Age: 23
LOS: 4 days | Discharge: HOME | End: 2023-09-18
Attending: PHYSICIAN ASSISTANT | Admitting: STUDENT IN AN ORGANIZED HEALTH CARE EDUCATION/TRAINING PROGRAM
Payer: COMMERCIAL

## 2023-09-14 DIAGNOSIS — R45.851 SUICIDAL IDEATION: ICD-10-CM

## 2023-09-14 DIAGNOSIS — F25.0 SCHIZOAFFECTIVE DISORDER, BIPOLAR TYPE (H): Primary | Chronic | ICD-10-CM

## 2023-09-14 DIAGNOSIS — F15.10 METHAMPHETAMINE ABUSE (H): ICD-10-CM

## 2023-09-14 DIAGNOSIS — F41.9 ANXIETY: ICD-10-CM

## 2023-09-14 LAB
ALBUMIN SERPL BCG-MCNC: 4.6 G/DL (ref 3.5–5.2)
ALP SERPL-CCNC: 100 U/L (ref 35–104)
ALT SERPL W P-5'-P-CCNC: 12 U/L (ref 0–50)
ANION GAP SERPL CALCULATED.3IONS-SCNC: 10 MMOL/L (ref 7–15)
AST SERPL W P-5'-P-CCNC: 19 U/L (ref 0–45)
BASOPHILS # BLD AUTO: 0 10E3/UL (ref 0–0.2)
BASOPHILS NFR BLD AUTO: 1 %
BILIRUB SERPL-MCNC: 0.5 MG/DL
BUN SERPL-MCNC: 8.6 MG/DL (ref 6–20)
CALCIUM SERPL-MCNC: 9.7 MG/DL (ref 8.6–10)
CHLORIDE SERPL-SCNC: 103 MMOL/L (ref 98–107)
CREAT SERPL-MCNC: 0.83 MG/DL (ref 0.51–0.95)
DEPRECATED HCO3 PLAS-SCNC: 25 MMOL/L (ref 22–29)
EGFRCR SERPLBLD CKD-EPI 2021: >90 ML/MIN/1.73M2
EOSINOPHIL # BLD AUTO: 0.1 10E3/UL (ref 0–0.7)
EOSINOPHIL NFR BLD AUTO: 2 %
ERYTHROCYTE [DISTWIDTH] IN BLOOD BY AUTOMATED COUNT: 12.4 % (ref 10–15)
ETHANOL SERPL-MCNC: <0.01 G/DL
GLUCOSE SERPL-MCNC: 108 MG/DL (ref 70–99)
HCT VFR BLD AUTO: 43.9 % (ref 35–47)
HGB BLD-MCNC: 14.4 G/DL (ref 11.7–15.7)
HOLD SPECIMEN: NORMAL
IMM GRANULOCYTES # BLD: 0 10E3/UL
IMM GRANULOCYTES NFR BLD: 0 %
LYMPHOCYTES # BLD AUTO: 2.4 10E3/UL (ref 0.8–5.3)
LYMPHOCYTES NFR BLD AUTO: 46 %
MCH RBC QN AUTO: 28.3 PG (ref 26.5–33)
MCHC RBC AUTO-ENTMCNC: 32.8 G/DL (ref 31.5–36.5)
MCV RBC AUTO: 86 FL (ref 78–100)
MONOCYTES # BLD AUTO: 0.5 10E3/UL (ref 0–1.3)
MONOCYTES NFR BLD AUTO: 9 %
NEUTROPHILS # BLD AUTO: 2.2 10E3/UL (ref 1.6–8.3)
NEUTROPHILS NFR BLD AUTO: 42 %
NRBC # BLD AUTO: 0 10E3/UL
NRBC BLD AUTO-RTO: 0 /100
PLATELET # BLD AUTO: 299 10E3/UL (ref 150–450)
POTASSIUM SERPL-SCNC: 4.7 MMOL/L (ref 3.4–5.3)
PROT SERPL-MCNC: 7.1 G/DL (ref 6.4–8.3)
RBC # BLD AUTO: 5.08 10E6/UL (ref 3.8–5.2)
SODIUM SERPL-SCNC: 138 MMOL/L (ref 136–145)
WBC # BLD AUTO: 5.3 10E3/UL (ref 4–11)

## 2023-09-14 PROCEDURE — 80053 COMPREHEN METABOLIC PANEL: CPT | Performed by: PHYSICIAN ASSISTANT

## 2023-09-14 PROCEDURE — 250N000013 HC RX MED GY IP 250 OP 250 PS 637: Performed by: PHYSICIAN ASSISTANT

## 2023-09-14 PROCEDURE — 99285 EMERGENCY DEPT VISIT HI MDM: CPT | Performed by: PHYSICIAN ASSISTANT

## 2023-09-14 PROCEDURE — 85025 COMPLETE CBC W/AUTO DIFF WBC: CPT | Performed by: PHYSICIAN ASSISTANT

## 2023-09-14 PROCEDURE — 36415 COLL VENOUS BLD VENIPUNCTURE: CPT | Performed by: PHYSICIAN ASSISTANT

## 2023-09-14 PROCEDURE — 124N000001 HC R&B MH

## 2023-09-14 PROCEDURE — 82077 ASSAY SPEC XCP UR&BREATH IA: CPT | Performed by: PHYSICIAN ASSISTANT

## 2023-09-14 PROCEDURE — 84703 CHORIONIC GONADOTROPIN ASSAY: CPT

## 2023-09-14 PROCEDURE — 250N000013 HC RX MED GY IP 250 OP 250 PS 637: Performed by: NURSE PRACTITIONER

## 2023-09-14 RX ORDER — LORAZEPAM 1 MG/1
2 TABLET ORAL EVERY 6 HOURS PRN
Status: CANCELLED | OUTPATIENT
Start: 2023-09-14

## 2023-09-14 RX ORDER — LORAZEPAM 1 MG/1
1 TABLET ORAL ONCE
Status: COMPLETED | OUTPATIENT
Start: 2023-09-14 | End: 2023-09-14

## 2023-09-14 RX ORDER — OLANZAPINE 10 MG/1
10 TABLET ORAL 3 TIMES DAILY PRN
Status: DISCONTINUED | OUTPATIENT
Start: 2023-09-14 | End: 2023-09-15

## 2023-09-14 RX ORDER — MAGNESIUM HYDROXIDE/ALUMINUM HYDROXICE/SIMETHICONE 120; 1200; 1200 MG/30ML; MG/30ML; MG/30ML
30 SUSPENSION ORAL EVERY 4 HOURS PRN
Status: CANCELLED | OUTPATIENT
Start: 2023-09-14

## 2023-09-14 RX ORDER — POLYETHYLENE GLYCOL 3350 17 G/17G
17 POWDER, FOR SOLUTION ORAL DAILY PRN
Status: CANCELLED | OUTPATIENT
Start: 2023-09-14

## 2023-09-14 RX ORDER — LANOLIN ALCOHOL/MO/W.PET/CERES
3 CREAM (GRAM) TOPICAL
Status: DISCONTINUED | OUTPATIENT
Start: 2023-09-14 | End: 2023-09-18 | Stop reason: HOSPADM

## 2023-09-14 RX ORDER — OLANZAPINE 10 MG/1
10 TABLET, ORALLY DISINTEGRATING ORAL ONCE
Status: COMPLETED | OUTPATIENT
Start: 2023-09-14 | End: 2023-09-14

## 2023-09-14 RX ORDER — OLANZAPINE 10 MG/1
10 TABLET ORAL 3 TIMES DAILY PRN
Status: CANCELLED | OUTPATIENT
Start: 2023-09-14

## 2023-09-14 RX ORDER — OLANZAPINE 10 MG/2ML
10 INJECTION, POWDER, FOR SOLUTION INTRAMUSCULAR 3 TIMES DAILY PRN
Status: CANCELLED | OUTPATIENT
Start: 2023-09-14

## 2023-09-14 RX ORDER — OLANZAPINE 5 MG/1
10 TABLET, ORALLY DISINTEGRATING ORAL AT BEDTIME
Status: DISCONTINUED | OUTPATIENT
Start: 2023-09-14 | End: 2023-09-14

## 2023-09-14 RX ORDER — LORAZEPAM 1 MG/1
1 TABLET ORAL 3 TIMES DAILY PRN
Status: DISCONTINUED | OUTPATIENT
Start: 2023-09-14 | End: 2023-09-18 | Stop reason: HOSPADM

## 2023-09-14 RX ORDER — ACETAMINOPHEN 325 MG/1
650 TABLET ORAL EVERY 4 HOURS PRN
Status: DISCONTINUED | OUTPATIENT
Start: 2023-09-14 | End: 2023-09-18 | Stop reason: HOSPADM

## 2023-09-14 RX ORDER — ACETAMINOPHEN 325 MG/1
650 TABLET ORAL EVERY 4 HOURS PRN
Status: CANCELLED | OUTPATIENT
Start: 2023-09-14

## 2023-09-14 RX ORDER — HYDROXYZINE HYDROCHLORIDE 25 MG/1
25 TABLET, FILM COATED ORAL EVERY 4 HOURS PRN
Status: CANCELLED | OUTPATIENT
Start: 2023-09-14

## 2023-09-14 RX ORDER — OLANZAPINE 10 MG/2ML
10 INJECTION, POWDER, FOR SOLUTION INTRAMUSCULAR 3 TIMES DAILY PRN
Status: DISCONTINUED | OUTPATIENT
Start: 2023-09-14 | End: 2023-09-15

## 2023-09-14 RX ORDER — HYDROXYZINE HYDROCHLORIDE 25 MG/1
25 TABLET, FILM COATED ORAL EVERY 4 HOURS PRN
Status: DISCONTINUED | OUTPATIENT
Start: 2023-09-14 | End: 2023-09-18 | Stop reason: HOSPADM

## 2023-09-14 RX ORDER — MAGNESIUM HYDROXIDE/ALUMINUM HYDROXICE/SIMETHICONE 120; 1200; 1200 MG/30ML; MG/30ML; MG/30ML
30 SUSPENSION ORAL EVERY 4 HOURS PRN
Status: DISCONTINUED | OUTPATIENT
Start: 2023-09-14 | End: 2023-09-18 | Stop reason: HOSPADM

## 2023-09-14 RX ADMIN — OLANZAPINE 10 MG: 10 TABLET, FILM COATED ORAL at 19:34

## 2023-09-14 RX ADMIN — LORAZEPAM 1 MG: 1 TABLET ORAL at 15:36

## 2023-09-14 RX ADMIN — NICOTINE POLACRILEX 2 MG: 2 GUM, CHEWING ORAL at 20:39

## 2023-09-14 ASSESSMENT — ACTIVITIES OF DAILY LIVING (ADL)
FALL_HISTORY_WITHIN_LAST_SIX_MONTHS: NO
DOING_ERRANDS_INDEPENDENTLY_DIFFICULTY: NO
DRESSING/BATHING_DIFFICULTY: NO
ADLS_ACUITY_SCORE: 35
TOILETING_ISSUES: NO
WALKING_OR_CLIMBING_STAIRS_DIFFICULTY: NO
ADLS_ACUITY_SCORE: 35
CONCENTRATING,_REMEMBERING_OR_MAKING_DECISIONS_DIFFICULTY: NO
CHANGE_IN_FUNCTIONAL_STATUS_SINCE_ONSET_OF_CURRENT_ILLNESS/INJURY: NO
ADLS_ACUITY_SCORE: 28
DIFFICULTY_EATING/SWALLOWING: NO
ADLS_ACUITY_SCORE: 28
WEAR_GLASSES_OR_BLIND: NO

## 2023-09-14 NOTE — CONSULTS
Diagnostic Evaluation Consultation  Crisis Assessment    Patient Name: Yudi Morales  Age:  23 year old  Legal Sex: female  Gender Identity: female  Pronouns:   Race: White  Ethnicity: Not  or   Language: English      Patient was assessed: Virtual: IngrisPatterns      Patient location: [facility] ED08    Referral Data and Chief Complaint  Yudi Morales presents to the ED with family/friends. Patient is presenting to the ED for the following concerns: Physical aggression, Verbal agitation, Suicidal ideation, Anxiety.   Factors that make the mental health crisis life threatening or complex are:  Patient presents to the hospital with concerns of anxiety and suicidal ideation. She reports recent substance use consisting of methamphetamine and cannabis use that she shares is contributing to concerns. Patient also alludes to some concerns with her significant other that have led to more anxiety and concerns with suicidal ideation.      Informed Consent and Assessment Methods  Explained the crisis assessment process, including applicable information disclosures and limits to confidentiality, assessed understanding of the process, and obtained consent to proceed with the assessment.  Assessment methods included conducting a formal interview with patient, review of medical records, collaboration with medical staff, and obtaining relevant collateral information from family and community providers when available.     Patient response to interventions: no evidence of understanding  Coping skills were attempted to reduce the crisis:        History of the Crisis   Patient has a history of psychiatric hospitalizations for suicidal ideation and anxiety related concerns. She shares she has been experiencing significant concerns of panic as of late contributing to her overall mental health and ability to function independently.    Brief Psychosocial History  Family:  Lives with Significant Other, Children  no  Support System:  Significant Other  Employment Status:  disabled, unemployed  Source of Income:  disability, social security  Financial Environmental Concerns:     Current Hobbies:  television/movies/videos, group/social activities, writing/journaling/blogging, reading, arts/crafts, cooking/baking  Barriers in Personal Life:  mental health concerns, emotional concerns    Significant Clinical History  Current Anxiety Symptoms:  panic attack, anxious, excessive worry  Current Depression/Trauma:  thoughts of death/suicide  Current Somatic Symptoms:  anxious  Current Psychosis/Thought Disturbance:     Current Eating Symptoms:     Chemical Use History:  Alcohol: None  Benzodiazepines: None  Opiates: None  Cocaine: None  Marijuana: Occasional  Other Use: Methamphetamines   Past diagnosis:  Substance Use Disorder, Anxiety Disorder  Family history:  No known history of mental health or chemical health concerns  Past treatment:  Individual therapy, Primary Care, Psychiatric Medication Management, Inpatient Hospitalization  Details of most recent treatment:     Other relevant history:          Collateral Information  Is there collateral information: No     Collateral information name, relationship, phone number:       What happened today:       What is different about patient's functioning:       Concern about alcohol/drug use:      What do you think the patient needs:      Has patient made comments about wanting to kill themselves/others:      If d/c is recommended, can they take part in safety/aftercare planning:       Additional collateral information:        Risk Assessment  Gem Suicide Severity Rating Scale Full Clinical Version:  Suicidal Ideation  Q1 Wish to be Dead (Lifetime): Yes  Q2 Non-Specific Active Suicidal Thoughts (Lifetime): Yes  3. Active Suicidal Ideation with any Methods (Not Plan) Without Intent to Act (Lifetime): Yes  Q4 Active Suicidal Ideation with Some Intent to Act, Without Specific Plan  (Lifetime): Yes  Q5 Active Suicidal Ideation with Specific Plan and Intent (Lifetime): Yes  Q6 Suicide Behavior (Lifetime): yes     Suicidal Behavior (Lifetime)  Actual Attempt (Lifetime): Yes  Total Number of Actual Attempts (Lifetime): 1  Has subject engaged in non-suicidal self-injurious behavior? (Lifetime): Yes  Interrupted Attempts (Lifetime): No  Aborted or Self-Interrupted Attempt (Lifetime): No  Preparatory Acts or Behavior (Lifetime): No    Semmes Suicide Severity Rating Scale Recent:   Suicidal Ideation (Recent)  Q1 Wished to be Dead (Past Month): yes  Q2 Suicidal Thoughts (Past Month): yes  Q3 Suicidal Thought Method: yes  Q4 Suicidal Intent without Specific Plan: no  Q5 Suicide Intent with Specific Plan: yes  Within the Past 3 Months?: yes  Level of Risk per Screen: high risk          Environmental or Psychosocial Events: work or task failure, threats to a prized relationship, ongoing abuse of substances, other life stressors  Protective Factors: Protective Factors: responsibilities and duties to others, including pets and children, lives in a responsibly safe and stable environment, able to access care without barriers    Does the patient have thoughts of harming others? Feels Like Hurting Others: no  Previous Attempt to Hurt Others: no  Current presentation: Irritable  Violence Threats in Past 6 Months: No  Current Violence Plan or Thoughts: No  Is the patient engaging in sexually inappropriate behavior?: no  Duty to warn initiated: no    Is the patient engaging in sexually inappropriate behavior?  no        Mental Status Exam   Affect: Labile  Appearance: Disheveled  Attention Span/Concentration: Attentive  Eye Contact: Variable    Fund of Knowledge: Appropriate   Language /Speech Content: Fluent  Language /Speech Volume: Normal  Language /Speech Rate/Productions: Normal  Recent Memory: Intact  Remote Memory: Intact  Mood: Angry, Irritable  Orientation to Person: Yes   Orientation to Place:  Yes  Orientation to Time of Day: Yes  Orientation to Date: Yes     Situation (Do they understand why they are here?): Yes  Psychomotor Behavior: Agitated  Thought Content: Suicidal  Thought Form: Intact     Mini-Cog Assessment  Number of Words Recalled:    Clock-Drawing Test:     Three Item Recall:    Mini-Cog Total Score:       Medication  Psychotropic medications:   Medication Orders - Psychiatric (From admission, onward)      Start     Dose/Rate Route Frequency Ordered Stop    09/14/23 1720  OLANZapine zydis (zyPREXA) ODT tab 10 mg         10 mg Oral ONCE 09/14/23 1715               Current Care Team  Patient Care Team:  Sindhu Sandoval MD as PCP - General (Family Medicine)  Sindhu Sandoval MD as Assigned PCP  Monica Wakefield MD as Assigned Surgical Provider    Diagnosis  Patient Active Problem List   Diagnosis Code    Allergic rhinitis J30.9    KIRSTY (generalized anxiety disorder) F41.1    Schizoaffective disorder, bipolar type (H) F25.0    History of suicide attempt Z91.51    Intellectual disability F79    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 Patient shares she has been experiencing several life stressors contributing to her current functioning. She also reports recent substance use contributing to a deterioration in functioning. Upon interview, patient appears labile and has minimal insight into her current concerns. She reports suicidal ideation, but in response to actions taken by  hospital staff to ensure her safety. She appears to struggle to connect the fact that the hospital staff's reaction is in response to her actions and reports of suicidal ideation with multiple plans. Patient appears appropriate for inpatient hospitalization at this time. She demonstrates a significant risk of harm toward herself and/or others in the community at this time. Patient's attending provider has been informed of recommendations for care and is in agreement.       Imminent risk of harm: Suicidal Behavior  Severe psychiatric, behavioral or other comorbid conditions are appropriate for management at inpatient mental health as indicated by at least one of the following: Psychiatric Symptoms  Severe dysfunction in daily living is present as indicated by at least one of the following: Complete inability to maintain any appropriate aspect of personal responsibility in any adult roles  Situation and expectations are appropriate for inpatient care: Voluntary treatment at lower level of care is not feasible, Patient is unwilling to participate in treatment voluntarily and requires treatment, Patient management/treatment at lower level of care is not feasible or is inappropriate  Inpatient mental health services are necessary to meet patient needs and at least one of the following: Specific condition related to admission diagnosis is present and judged likely to further improve at proposed level of care, Specific condition related to admission diagnosis is present and judged likely to deteriorate in absence of treatment at proposed level of care      Patient coping skills attempted to reduce the crisis:       Disposition  Recommended disposition: Inpatient Mental Health        Reviewed case and recommendations with attending provider. Attending Name: Daija Stanford PA-C       Attending concurs with disposition: yes       Patient and/or validated legal guardian concurs with disposition:   yes       Final disposition:   inpatient mental health    Legal status on admission: 72 Hour Hold    Assessment Details   Total duration spent on the patient case in minutes: 60 min     CPT code(s) utilized: 65946 - Psychotherapy for Crisis - 60 (30-74*) min    JH Samuels, Psychotherapist  [facility] - Triage & Transition Services  Callback: [phone]

## 2023-09-14 NOTE — ED NOTES
Patient is crying that she wants to leave and had stormed out of the room, pt crying stating that she just wants to go home and that she isn't suicidal anymore.  Was able to get the patient back to the room without incident, pt crying on the cot. Pt SO talked with pt to have her stay also.

## 2023-09-14 NOTE — ED TRIAGE NOTES
"\"I would be better off dead than alive.  I would take a knife to my throat.  I am very anxious and stressed where my throat feels like it is swelling and that l am having problems breathing.  I have not been able to get medications for depression and anxiety.\"         "

## 2023-09-14 NOTE — ED PROVIDER NOTES
"  History     Chief Complaint   Patient presents with    Anxiety    Psychiatric Evaluation    Suicidal     The history is provided by the patient.     Yudi Morales is a 23 year old female who presented to the emergency department ambulatory along with boyfriend and EMS for evaluation of severe anxiety as well as suicidal thoughts with a plan.  She reports significant home stress with recent boyfriend as well as intermittent drug abuse.  Patient tells me that she has been experiencing severe worsening anxiety to the point of affecting her life and now has thoughts to end her life by any means necessary.    Allergies:  Allergies   Allergen Reactions    Other [No Clinical Screening - See Comments] Anaphylaxis     \"Sodium Penathol\" per mother. Used in anesthesia.  Family hx of reaction.    Latex Rash    Amoxicillin Trihydrate GI Disturbance    Amoxicillin-Pot Clavulanate GI Disturbance    Phenobarbital     Thiopental      Other reaction(s): Other - Describe In Comment Field  Family Hx of death with this medication       Problem List:    Patient Active Problem List    Diagnosis Date Noted    Suicidal ideation 09/14/2023     Priority: Medium    Pituitary microadenoma (H) 07/21/2023     Priority: Medium     1/2023 5 x 5 mm hypoenhancing focus in the right pituitary gland  High prolactin resolved with stopping risperidone.   Cortisol and IGF-1 within normal limits       Marijuana use 07/21/2023     Priority: Medium    Tympanosclerosis of both ears 11/15/2022     Priority: Medium    Postnasal drip 11/15/2022     Priority: Medium    Primary insomnia 11/15/2022     Priority: Medium    Constipation, unspecified constipation type 11/15/2022     Priority: Medium    History of suicide attempt 10/28/2022     Priority: Medium     2020 - tylenol overdose      Intellectual disability 10/28/2022     Priority: Medium    Schizoaffective disorder, bipolar type (H) 07/08/2021     Priority: Medium     richard Farr with [facility] " Counseling      KIRSTY (generalized anxiety disorder) 07/19/2019     Priority: Medium    Allergic rhinitis 10/04/2011     Priority: Medium        Past Medical History:    Past Medical History:   Diagnosis Date    Allergic rhinitis 10/4/2011    KIRSTY (generalized anxiety disorder) 7/19/2019    Hyperprolactinemia (H) 11/16/2022    PTSD (post-traumatic stress disorder)     Suicidal behavior with attempted self-injury (H) 2/2/2021       Past Surgical History:    Past Surgical History:   Procedure Laterality Date    TONSILLECTOMY & ADENOIDECTOMY  2008       Family History:    No family history on file.    Social History:  Marital Status:  Single [1]  Social History     Tobacco Use    Smoking status: Every Day     Types: Vaping Device    Smokeless tobacco: Never   Vaping Use    Vaping Use: Every day    Substances: Nicotine, CBD, Flavoring    Devices: Disposable   Substance Use Topics    Alcohol use: No     Comment: only on holidays    Drug use: No        Medications:    No current outpatient medications on file.        Review of Systems   Psychiatric/Behavioral:          See HPI       Physical Exam   BP: 114/63  Pulse: 86  Temp: 99.2  F (37.3  C)  Resp: 18  SpO2: 96 %      Physical Exam  Vitals and nursing note reviewed.   Constitutional:       Appearance: Normal appearance. She is normal weight.   Cardiovascular:      Rate and Rhythm: Normal rate and regular rhythm.   Pulmonary:      Effort: Pulmonary effort is normal.   Skin:     General: Skin is warm and dry.      Capillary Refill: Capillary refill takes less than 2 seconds.   Neurological:      General: No focal deficit present.      Mental Status: She is alert and oriented to person, place, and time.   Psychiatric:      Comments: Anxious and tearful         ED Course     Mental Health Risk Assessment        PSS-3      Date and Time Over the past 2 weeks have you felt down, depressed, or hopeless? Over the past 2 weeks have you had thoughts of killing yourself? Have you  ever attempted to kill yourself? When did this last happen? User   09/14/23 1502 yes yes yes within the last month (but not today) CP          C-SSRS ([place])      Date and Time Q1 Wished to be Dead (Past Month) Q2 Suicidal Thoughts (Past Month) Q3 Suicidal Thought Method Q4 Suicidal Intent without Specific Plan Q5 Suicide Intent with Specific Plan Q6 Suicide Behavior (Lifetime) Within the Past 3 Months? RETIRED: Level of Risk per Screen Screening Not Complete User   09/14/23 1829 yes yes yes no yes -- -- -- -- TF   09/14/23 1828 -- -- -- -- -- yes -- -- -- TF   09/14/23 1524 yes yes yes no no yes -- -- -- AK   09/14/23 1502 yes yes yes no no yes -- -- -- CP                  ED Course as of 09/14/23 1935   Thu Sep 14, 2023   1624 Patient admits to marijuana and methamphetamine use.  Last use was 2 days ago.   1723 Patient is refusing admission.   1724 She has capacity.  She is denying suicidal thoughts.   1810 DEC assessment      Procedures              Critical Care time:  none               Results for orders placed or performed during the hospital encounter of 09/14/23 (from the past 24 hour(s))   CBC with platelets differential    Narrative    The following orders were created for panel order CBC with platelets differential.  Procedure                               Abnormality         Status                     ---------                               -----------         ------                     CBC with platelets and d...[[id number]]                      Final result                 Please view results for these tests on the individual orders.   Comprehensive metabolic panel   Result Value Ref Range    Sodium 138 136 - 145 mmol/L    Potassium 4.7 3.4 - 5.3 mmol/L    Chloride 103 98 - 107 mmol/L    Carbon Dioxide (CO2) 25 22 - 29 mmol/L    Anion Gap 10 7 - 15 mmol/L    Urea Nitrogen 8.6 6.0 - 20.0 mg/dL    Creatinine 0.83 0.51 - 0.95 mg/dL    Calcium 9.7 8.6 - 10.0 mg/dL    Glucose 108 (H) 70 - 99 mg/dL     Alkaline Phosphatase 100 35 - 104 U/L    AST 19 0 - 45 U/L    ALT 12 0 - 50 U/L    Protein Total 7.1 6.4 - 8.3 g/dL    Albumin 4.6 3.5 - 5.2 g/dL    Bilirubin Total 0.5 <=1.2 mg/dL    GFR Estimate >90 >60 mL/min/1.73m2   Ethyl Alcohol Level   Result Value Ref Range    Alcohol ethyl <0.01 <=0.01 g/dL   CBC with platelets and differential   Result Value Ref Range    WBC Count 5.3 4.0 - 11.0 10e3/uL    RBC Count 5.08 3.80 - 5.20 10e6/uL    Hemoglobin 14.4 11.7 - 15.7 g/dL    Hematocrit 43.9 35.0 - 47.0 %    MCV 86 78 - 100 fL    MCH 28.3 26.5 - 33.0 pg    MCHC 32.8 31.5 - 36.5 g/dL    RDW 12.4 10.0 - 15.0 %    Platelet Count 299 150 - 450 10e3/uL    % Neutrophils 42 %    % Lymphocytes 46 %    % Monocytes 9 %    % Eosinophils 2 %    % Basophils 1 %    % Immature Granulocytes 0 %    NRBCs per 100 WBC 0 <1 /100    Absolute Neutrophils 2.2 1.6 - 8.3 10e3/uL    Absolute Lymphocytes 2.4 0.8 - 5.3 10e3/uL    Absolute Monocytes 0.5 0.0 - 1.3 10e3/uL    Absolute Eosinophils 0.1 0.0 - 0.7 10e3/uL    Absolute Basophils 0.0 0.0 - 0.2 10e3/uL    Absolute Immature Granulocytes 0.0 <=0.4 10e3/uL    Absolute NRBCs 0.0 10e3/uL   Extra Tube    Narrative    The following orders were created for panel order Extra Tube.  Procedure                               Abnormality         Status                     ---------                               -----------         ------                     Extra Red Top Tube[[id number]]                               Final result                 Please view results for these tests on the individual orders.   Extra Red Top Tube   Result Value Ref Range    Hold Specimen [facility]        Medications   acetaminophen (TYLENOL) tablet 650 mg (has no administration in time range)   alum & mag hydroxide-simethicone (MAALOX) suspension 30 mL (has no administration in time range)   melatonin tablet 3 mg (has no administration in time range)   hydrOXYzine (ATARAX) tablet 25 mg (has no administration in time range)    OLANZapine (zyPREXA) tablet 10 mg (has no administration in time range)     Or   OLANZapine (zyPREXA) injection 10 mg (has no administration in time range)   nicotine (NICORETTE) gum 2 mg (has no administration in time range)   LORazepam (ATIVAN) tablet 1 mg (has no administration in time range)   LORazepam (ATIVAN) tablet 1 mg (1 mg Oral $Given 9/14/23 1536)   OLANZapine zydis (zyPREXA) ODT tab 10 mg (10 mg Oral Not Given 9/14/23 1730)       Assessments & Plan (with Medical Decision Making)   23-year-old female with severe anxiety and depression with suicidal ideation.  Obviously would fit any reasonable indication for admission.  She is requesting admission.  Voluntary status.  Graciously accepted by Dr. Salcido.  Oral Ativan in the emergency department for anxiety.    Addendum: Patient refused admission after initial acceptance.  DEC assessment who completely agrees with admission.  Remains voluntary status.  Accepted by Carol Horton.    This document was prepared using a combination of typing and voice generated software.  While every attempt was made for accuracy, spelling and grammatical errors may exist.     I have reviewed the nursing notes.    I have reviewed the findings, diagnosis, plan and need for follow up with the patient.           Medical Decision Making  The patient's presentation was of moderate complexity (an undiagnosed new problem with uncertain diagnosis).    The patient's evaluation involved:  ordering and/or review of 3+ test(s) in this encounter (multiple labs)    The patient's management necessitated high risk (a decision regarding hospitalization).        Current Discharge Medication List          Final diagnoses:   Suicidal ideation   Anxiety   Methamphetamine abuse (H)       9/14/2023   [facility]GENCY [facility]       Daija Stanford PA-C  09/14/23 5548       Daija Stanford PA-C  09/14/23 1936

## 2023-09-14 NOTE — ED NOTES
"Patient states she is addicted to marijuana and meth.. Last used 48 hours ago. States throat closed on her last time. She wants to come in to get help as she is startiong to feel the pain in her throat and the rest of her body. Suicidal thoughts and plan due to \"life\"  "

## 2023-09-15 ENCOUNTER — TELEPHONE (OUTPATIENT)
Dept: BEHAVIORAL HEALTH | Facility: CLINIC | Age: 23
End: 2023-09-15
Payer: COMMERCIAL

## 2023-09-15 PROCEDURE — 250N000013 HC RX MED GY IP 250 OP 250 PS 637: Performed by: NURSE PRACTITIONER

## 2023-09-15 PROCEDURE — 124N000001 HC R&B MH

## 2023-09-15 PROCEDURE — 99223 1ST HOSP IP/OBS HIGH 75: CPT | Mod: AI

## 2023-09-15 PROCEDURE — 250N000013 HC RX MED GY IP 250 OP 250 PS 637

## 2023-09-15 RX ORDER — HALOPERIDOL 5 MG/1
5 TABLET ORAL EVERY 8 HOURS PRN
Status: DISCONTINUED | OUTPATIENT
Start: 2023-09-15 | End: 2023-09-18 | Stop reason: HOSPADM

## 2023-09-15 RX ORDER — LORAZEPAM 2 MG/ML
2 INJECTION INTRAMUSCULAR EVERY 8 HOURS PRN
Status: DISCONTINUED | OUTPATIENT
Start: 2023-09-15 | End: 2023-09-18 | Stop reason: HOSPADM

## 2023-09-15 RX ORDER — LORAZEPAM 1 MG/1
2 TABLET ORAL EVERY 8 HOURS PRN
Status: DISCONTINUED | OUTPATIENT
Start: 2023-09-15 | End: 2023-09-18 | Stop reason: HOSPADM

## 2023-09-15 RX ORDER — HALOPERIDOL 5 MG/ML
5 INJECTION INTRAMUSCULAR EVERY 8 HOURS PRN
Status: DISCONTINUED | OUTPATIENT
Start: 2023-09-15 | End: 2023-09-18 | Stop reason: HOSPADM

## 2023-09-15 RX ORDER — DIPHENHYDRAMINE HCL 50 MG
50 CAPSULE ORAL EVERY 8 HOURS PRN
Status: DISCONTINUED | OUTPATIENT
Start: 2023-09-15 | End: 2023-09-18 | Stop reason: HOSPADM

## 2023-09-15 RX ORDER — DIPHENHYDRAMINE HYDROCHLORIDE 50 MG/ML
50 INJECTION INTRAMUSCULAR; INTRAVENOUS EVERY 8 HOURS PRN
Status: DISCONTINUED | OUTPATIENT
Start: 2023-09-15 | End: 2023-09-18 | Stop reason: HOSPADM

## 2023-09-15 RX ORDER — ARIPIPRAZOLE 5 MG/1
5 TABLET ORAL EVERY MORNING
Status: DISCONTINUED | OUTPATIENT
Start: 2023-09-15 | End: 2023-09-16

## 2023-09-15 RX ADMIN — ARIPIPRAZOLE 5 MG: 5 TABLET ORAL at 12:18

## 2023-09-15 RX ADMIN — Medication 3 MG: at 19:51

## 2023-09-15 RX ADMIN — NICOTINE POLACRILEX 2 MG: 2 GUM, CHEWING ORAL at 19:51

## 2023-09-15 RX ADMIN — LORAZEPAM 1 MG: 1 TABLET ORAL at 18:38

## 2023-09-15 ASSESSMENT — ACTIVITIES OF DAILY LIVING (ADL)
ADLS_ACUITY_SCORE: 28
DRESS: SCRUBS (BEHAVIORAL HEALTH);INDEPENDENT
ORAL_HYGIENE: INDEPENDENT
ADLS_ACUITY_SCORE: 28
ADLS_ACUITY_SCORE: 28
HYGIENE/GROOMING: INDEPENDENT
ADLS_ACUITY_SCORE: 28

## 2023-09-15 NOTE — DISCHARGE INSTRUCTIONS
Behavioral Discharge Planning and Instructions    Summary:  Yudi Morales is a 23-year-old with a history of SZAD, bipolar type, KIRSTY, stimulant use disorder (methamphetamine) and cannabis use disorder who presented to [facility] ED with complaints of a panic attack, anxiety and SI with a plan     Main Diagnosis:  Schizoaffective disorder, bipolar type  2.   SI  3.   KIRSTY  4.   Stimulant use disorder, moderate (methamphetamine)  5.   Cannabis use disorder, moderate    Health Care Follow-up:     [facility]  [address] [place] [address]  [phone]  PCP: Sindhu Sandoval: September 25th @ 9:45am    NUMBERS TO CALL IN CRISIS    National Suicide Prevention Lifeline ([facility])  1-550.985.2636    Crisis Text Line ([place])  Text  HOME  to 242643    Mental Health Crisis Line ([place], [place])  503.100.2513    [place] Mobile Crisis ([place], [place])   [phone]      If you are feeling very unsafe, call 911 or bring yourself to the Emergency Room        Counseling in [place]:  Pepe Chatman           [phone]  [facility]      [phone]  [facility]     Katharine Anaya, [facility] (young kids)    [phone]   Sandee Feliciano [facility] (older kids & adults)  [phone]   Alex Lee [facility]      [phone]  [facility]       [phone]   Bennie Burnham MS, LP   Maribeth Haas MA, LPC   Ricky Griffin MS psychotherapist   Octavia Dominguez MS, LPC   Leslie Dhaliwal, [facility], counselor   Shreya Burnham [facility], counselor  [place]        [phone]  Ike Yost, counseling  Dr Camilla Brothers, psychiatry  Maira Bethea, counseling  Dario Ochoa, counseling  Eliza Hendricks, counseling  Shanice Pedersen, psychiatry   [facility]       [phone]   Adriana Trujillo MA, LMFT, FS   Arabella Otero MSW, [facility]          [phone]  [facility]      [phone]   Shreya Madden MS,  [facility]          [phone]        Ludin Nichols MSW, LICSW , C-MI   Yeimy Austin MSW, LGSW                                                    Len Zhang SW      Roma Howell MS, LPC   Tia Leaders   [employer]                [phone]      Megan Fernández PsyD     Nilda Rodrigues PsyD, owner      Christine Rhoades PsyD    Rony Flynn MPAS, PA-C    Martine Marques PhD   Nory Baez MSW, LICSW    [facility]       [phone]   Rahel Thomas [facility]   Stanford Patel APRN, CNP,     Marcia Schuster MSW, LGSW (adolescents)   Jackie Grinnel APRN, CNP (Hib via telehealth; adolescents)   Yusra CHEN CNP (Hib via telehealth)   Narciso Holland MA, LPC, BCIA (Hib via telehealth)   Ayah Boothe [facility] MSW, LGSW   Kate Guerin [facility]   Jose Armando Godfrey DNP, APRN, CNP   Kisha Franks RN, BSN   Umm Wilkins RN-BC, BSN (Hib via telehealth)  [facility]         [phone]   Jayshree Blum, MSN, PMHNP-[facility]           [phone]   Reynaldo Cifuentes MA, LMFT   Suki Farr MS RN [facility] NP   Shreya Thrasher, [facility]         [phone]  [facility]       [phone]   [facility]   Jodie Epstein (to be [facility])   Jama Simpson (to be [facility])      Counseling in [place]:  [facility]       [phone]   mental health & CD counseling  Narciso Langley       [phone]  [facility]       [phone]  [facility]        [phone]   Adriana De La Rosa  [facility]       [employer]              [phone]   can do weekends and evenings   DeLphuc Recio, MSW, LICSW, LCSW, CCTP    Yuniel Beth MA, LMFT, [facility]  Rachid Meng       evenings, weekends  [phone]      Counseling in [place]:  [facility]         [phone]   Jayshree Blum, MSN, PMHNP-BC   Marcie Barnard MA, [facility]      [phone]  SAMANTHA Motta  Psychological       [phone]   Petra Motta [facility]  [facility]       Kristina Flores       [phone]  [facility]    [address]     [facility]      [phone]  [facility]     Dario Javed MS Ed, LMFT    [phone]    (kids) denotes providers who also see kids       Attend all scheduled appointments with your outpatient providers. Call at least 24 hours in advance if you need to reschedule an appointment to ensure continued access to your outpatient providers.     Major Treatments, Procedures and Findings:  You were provided with: a psychiatric assessment, assessed for medical stability, medication evaluation and/or management, group therapy, family therapy, individual therapy, CD evaluation/assessment, milieu management, and medical interventions    Symptoms to Report: feeling more aggressive, increased confusion, losing more sleep, mood getting worse, or thoughts of suicide    Early warning signs can include: increased depression or anxiety sleep disturbances increased thoughts or behaviors of suicide or self-harm  increased unusual thinking, such as paranoia or hearing voices    Safety and Wellness:  Take all medicines as directed.  Make no changes unless your doctor suggests them.      Follow treatment recommendations.  Refrain from alcohol and non-prescribed drugs.  Ask your support system to help you reduce your access to items that could harm yourself or others. Items could include:  Firearms  Medicines (both prescribed and over-the-counter)  Knives and other sharp objects  Ropes and like materials  Car keys  If there is a concern for safety, call 911. If there is a concern for safety, call 911.    Resources:   Crisis Intervention: 674.510.1416 or 091-631-1744 (TTY: 984.608.9293).  Call anytime for help.  National [place] on Mental Illness (www.[place].carlos.org): 503.371.5035 or 055-831-3183.  [place] Association for Children's  "Mental Health (www.[facility].org): 324.398.8327.  Alcoholics Anonymous (www.alcoholics-anonymous.org): Check your phone book for your local chapter.  Suicide Awareness Voices of Education (SAVE) (www.save.org): 647-535-RKHQ (0859)  National Suicide Prevention Line (www.mentalhealthmn.org): 692-941-EOLQ (1360)  Mental Health Consumer/Survivor Network of [place] (www.mhcsn.net): 170.692.5860 or 628-294-4762  Mental Health Association of [place] (www.mentalhealth.org): 524.225.5543 or 173-356-8916  Self- Management and Recovery Training., [employer]-- Toll free: 464.146.2136  [employer]  Text 4 Life: txt \"LIFE\" to 55329 for immediate support and crisis intervention  Crisis text line: Text \"[place]\" to 107799. Free, confidential, 24/7.  Crisis Intervention: 388.371.3986 or 517-338-4566. Call anytime for help.     General Medication Instructions:   See your medication sheet(s) for instructions.   Take all medicines as directed.  Make no changes unless your doctor suggests them.   Go to all your doctor visits.  Be sure to have all your required lab tests. This way, your medicines can be refilled on time.  Do not use any drugs not prescribed by your doctor.  Avoid alcohol.    Advance Directives:   Scanned document on file with [facility]? No scanned doc  Is document scanned? Pt states no documents  Honoring Choices Your Rights Handout: Informed and given  Was more information offered? Pt declined    The Treatment team has appreciated the opportunity to work with you. If you have any questions or concerns about your recent admission, you can contact the unit which can receive your call 24 hours a day, 7 days a week. They will be able to get in touch with a Provider if needed. The unit number is *** .  "

## 2023-09-15 NOTE — H&P
"[facility] PSYCHIATRY   HISTORY AND PHYSICAL     ADMISSION DATA     Yudi Morales MRN# [id number]   Age: 23 year old YOB: 2000     Date of Admission: 9/14/2023  Primary Physician: Sindhu Sandoval        CHIEF COMPLAINT   \"I don't know.\"       HISTORY OF PRESENT ILLNESS     Yudi Morales is a 23-year-old with a history of SZAD, bipolar type, KIRSTY, stimulant use disorder (methamphetamine) and cannabis use disorder who presented to [facility] ED with complaints of a panic attack, anxiety and SI with a plan. It is unclear if she presented via private car or law enforcement. Pt reports she called \"the \" and was brought to the ED by CLARIBEL.     Per ED provider 9/14/23:  Yudi Morales is a 23 year old female who presented to the emergency department ambulatory along with boyfriend and EMS for evaluation of severe anxiety as well as suicidal thoughts with a plan.  She reports significant home stress with recent boyfriend as well as intermittent drug abuse.  Patient tells me that she has been experiencing severe worsening anxiety to the point of affecting her life and now has thoughts to end her life by any means necessary.     Per Dec 9/14/23:  Patient presents to the hospital with concerns of anxiety symptoms consisting of extreme panic and suicidal ideation as a result. Patient shares she has been experiencing several life stressors contributing to her current functioning. She also reports recent substance use contributing to a deterioration in functioning. Upon interview, patient appears labile and has minimal insight into her current concerns. She reports suicidal ideation, but in response to actions taken by hospital staff to ensure her safety. She appears to struggle to connect the fact that the hospital staff's reaction is in response to her actions and reports of suicidal ideation with multiple plans. Patient appears appropriate for inpatient hospitalization at this time. She " "demonstrates a significant risk of harm toward herself and/or others in the community at this time. Patient's attending provider has been informed of recommendations for care and is in agreement.     Upon psychiatric interview, Yudi is in bed with eyes closed. She opens her eyes easily to my voice. I explained my role within the treatment team during her hospitalization. When asked about the events leading to hospitalization, she replies \"I don't know\". We moved on there her psychosocial and psychiatric history. She denies having any psychiatric background. She then sat up in bed. She was quite irritable and verbally hostile. She is demanding to be discharged. She tells me she called the  because she was having a panic attack and her throat was closing and she was having CP. She states they didn't take her complaints seriously, but \"gave me a pill and I got better\". She continues to state she wants to go home and that she is being held against her will. I did speak to her about SI comments and would have to complete an assessment. She denied SI, HI or SIB. She reported she would \"clay\" me if I did not let her go. I asked Yudi to allow a proper assessment of her and the chart to be able to best assist her in this situation. She became increasingly agitated and verbally aggressive. We ended our conversation.   Shortly we ended our conversation, Yudi signed a voluntary status for with nursing. She then requested to speak to me again. We met in her room. She was calm and pleasant initially. She inquired about the verbiage of the voluntary status form. We discussed how the form states she agrees to treatment in the hospital and she does have the right to request a discharge. She did want to leave now, which I stated I was concerned by some of the comments made to 2 providers in the ED yesterday about SI with a plan. Yudi tells me she was \"forced\" to admit to SI by ER staff. She became increasingly agitated and " "states she had talked to her boyfriend who she states told her \"It's safe at home now\".   I asked Yudi if she would be willing to restart her Abilify, which she hasn't taken for a month, and reassess on a day-by-day basis pf her progress. She agreed to stay 5 days at most, otherwise she tells me she would \"break windows and smash shit up\". I did inform her of the potential issues with destroying private property. Again she reiterated she would damage [facility] property if she was not discharged in 5 days.   Yudi agreed to restart her Abilify today. I suggested starting at 2 mg today to assess tolerability and response. She informed me, in an irritated manner, that she would take 5 mg as her provider ordered. We discussed BR and SE of Abilify, including dizziness, HA, GI upset, orthostasis insomnia, sedation, EPSE and TD. Yudi consents to treatment.    As I was leaving, I informed pt my concern was for her well-being and safety. She turned over on her bed into the fetal position. She tells me \"I don't trust you\". She declined to talk to me further.        PSYCHIATRIC HISTORY     Yudi appears to be a poor historian in terms of her psychiatric history. She denied any formal MH dx. She initially informed me she takes no medications, but when I asked about Abilify she states \"yes, I take that. Either 5 or 10 mg\". She states she has been taking Abilify as ordered. A medication reconciliation reveals she has not refilled her medication since July 2023. When asked, she states that she ran out some time ago.  Per her last PCP visit note 7/21/23 with Dr. Sindhu Sandoval, Yudi carries a history of SZAD, bipolar type and KIRSTY. She was previously treated with Zoloft for depression. In this note, Yudi had requested to restart Zoloft, but Dr. Sandoval notes that treatment for depression could be assessed in the future as pt needed stabilization on Abilify. During this visit, Dr. Sandoval referred pt to [facility] services. It does not appear that she has established care. Per documentation, it appears she was referred to [facility] as well.   Yudi states she does not see a psychiatrist or therapist, but had these services at [facility] in [place]. She states she has a CM, but does not know the individuals name. She also has an [facility] worker named Sheyla.   She does not know about previous medications used. No records are readily available.   Trauma/abuse history is not known through records. Due to pt cooperation, it is uncertain if these are factors.      SUBSTANCE USE HISTORY   History   Drug Use No       Social History    Substance and Sexual Activity      Alcohol use: No        Comment: only on holidays      History   Smoking Status     Every Day     Types: Vaping Device   Smokeless Tobacco     Never     Yudi has a history of methamphetamine use and cannabis use. Per ED records, pt last used these substances 3 days ago prior to this interview. Due to pt verbal and physical agitation along with resistance with cooperation, a full assessment was difficult to realize. It appears per records she has been to 1 treatment for ED.         SOCIAL HISTORY   Social History     Socioeconomic History     Marital status: Single     Spouse name: Not on file     Number of children: Not on file     Years of education: Not on file     Highest education level: Not on file   Occupational History     Not on file   Tobacco Use     Smoking status: Every Day     Types: Vaping Device     Smokeless tobacco: Never   Vaping Use     Vaping Use: Every day     Substances: Nicotine, CBD, Flavoring     Devices: Disposable   Substance and Sexual Activity     Alcohol use: No     Comment: only on holidays     Drug use: No     Sexual activity: Yes     Partners: Male     Birth control/protection: Injection   Other Topics Concern     Not on file   Social History Narrative        Mom- Mariam Clay- Jay     Social  Determinants of Health     Financial Resource Strain: Not on file   Food Insecurity: Not on file   Transportation Needs: Not on file   Physical Activity: Not on file   Stress: Not on file   Social Connections: Not on file   Intimate Partner Violence: Not on file   Housing Stability: Not on file     Pt tells me she was raised in the [place], [place] area with her parents. She does not know if they are her biologic parents. She has 4 siblings. She dropped out of high school at age 18, maybe sophomore or carlos a year. She did not pursue a GED. She has never been . She has a boyfriend. She states she had a child, but put them up for adoption. She denies any known legal issues.        FAMILY HISTORY   No family history on file.      PAST MEDICAL HISTORY   Past Medical History:   Diagnosis Date     Allergic rhinitis 10/4/2011     KIRSTY (generalized anxiety disorder) 7/19/2019     Hyperprolactinemia (H) 11/16/2022     PTSD (post-traumatic stress disorder)      Suicidal behavior with attempted self-injury (H) 2/2/2021       Past Surgical History:   Procedure Laterality Date     TONSILLECTOMY & ADENOIDECTOMY  2008       Other [no clinical screening - see comments], Latex, Amoxicillin trihydrate, Amoxicillin-pot clavulanate, Phenobarbital, and Thiopental     MEDICATIONS   Prior to Admission medications    Medication Sig Start Date End Date Taking? Authorizing Provider   ARIPiprazole (ABILIFY) 5 MG tablet Take 1 tablet (5 mg) by mouth every morning 7/21/23  Yes Sindhu Sandoval MD   montelukast (SINGULAIR) 10 MG tablet Take 1 tablet (10 mg) by mouth At Bedtime  Patient not taking: Reported on 6/14/2023 12/29/22   Sindhu Sandoval MD   Multiple Vitamin (TAB-A-TOM) TABS TAKE ONE TABLET BY MOUTH ONCE DAILY.  Patient not taking: Reported on 6/14/2023 9/9/21   Nela Edmond MD   sertraline (ZOLOFT) 50 MG tablet TAKE 1 AND 1/2 TABLETS BY MOUTH BY MOUTH DAILY  Patient not taking: Reported on 6/14/2023 1/26/23    Sindhu Sandoval MD   Sodium Fluoride (DENTA 5000 PLUS DT) Apply to affected area every morning Apply thin layer to toothbrush after breakfast for 2 minutes  Patient not taking: Reported on 6/14/2023    Reported, Patient        PHYSICAL EXAM/ROS     I have reviewed the physical exam as documented by KIAN Young and agree with findings and assessment and have no additional findings to add at this time. The review of systems is negative other than noted in the HPI.       LABS   Recent Results (from the past 24 hour(s))   Comprehensive metabolic panel    Collection Time: 09/14/23  3:41 PM   Result Value Ref Range    Sodium 138 136 - 145 mmol/L    Potassium 4.7 3.4 - 5.3 mmol/L    Chloride 103 98 - 107 mmol/L    Carbon Dioxide (CO2) 25 22 - 29 mmol/L    Anion Gap 10 7 - 15 mmol/L    Urea Nitrogen 8.6 6.0 - 20.0 mg/dL    Creatinine 0.83 0.51 - 0.95 mg/dL    Calcium 9.7 8.6 - 10.0 mg/dL    Glucose 108 (H) 70 - 99 mg/dL    Alkaline Phosphatase 100 35 - 104 U/L    AST 19 0 - 45 U/L    ALT 12 0 - 50 U/L    Protein Total 7.1 6.4 - 8.3 g/dL    Albumin 4.6 3.5 - 5.2 g/dL    Bilirubin Total 0.5 <=1.2 mg/dL    GFR Estimate >90 >60 mL/min/1.73m2   Ethyl Alcohol Level    Collection Time: 09/14/23  3:41 PM   Result Value Ref Range    Alcohol ethyl <0.01 <=0.01 g/dL   CBC with platelets and differential    Collection Time: 09/14/23  3:41 PM   Result Value Ref Range    WBC Count 5.3 4.0 - 11.0 10e3/uL    RBC Count 5.08 3.80 - 5.20 10e6/uL    Hemoglobin 14.4 11.7 - 15.7 g/dL    Hematocrit 43.9 35.0 - 47.0 %    MCV 86 78 - 100 fL    MCH 28.3 26.5 - 33.0 pg    MCHC 32.8 31.5 - 36.5 g/dL    RDW 12.4 10.0 - 15.0 %    Platelet Count 299 150 - 450 10e3/uL    % Neutrophils 42 %    % Lymphocytes 46 %    % Monocytes 9 %    % Eosinophils 2 %    % Basophils 1 %    % Immature Granulocytes 0 %    NRBCs per 100 WBC 0 <1 /100    Absolute Neutrophils 2.2 1.6 - 8.3 10e3/uL    Absolute Lymphocytes 2.4 0.8 - 5.3 10e3/uL    Absolute Monocytes 0.5 0.0  "- 1.3 10e3/uL    Absolute Eosinophils 0.1 0.0 - 0.7 10e3/uL    Absolute Basophils 0.0 0.0 - 0.2 10e3/uL    Absolute Immature Granulocytes 0.0 <=0.4 10e3/uL    Absolute NRBCs 0.0 10e3/uL   Extra Red Top Tube    Collection Time: 09/14/23  3:41 PM   Result Value Ref Range    Hold Specimen JIC          MENTAL STATUS EXAM   Vitals: /98   Pulse 66   Temp 98.7  F (37.1  C) (Tympanic)   Resp 16   SpO2 98%     Appearance:  fatigued  Attitude:  uncooperative  Eye Contact:  intense  Mood:  angry, irritable  Affect:  mood congruent and intensity is exaggerated  Speech:   somewhat poor articulation,  Psychomotor Behavior:  no evidence of tardive dyskinesia, dystonia, or tics  Thought Process:   somewhat illogical  Associations:  no loose associations  Thought Content:  no evidence of suicidal ideation or homicidal ideation, no auditory hallucinations present, no visual hallucinations present, and does not elicit delusions  Insight:   poor  Judgment:  limited  Oriented to:  time, person, and place  Attention Span and Concentration:  fair  Recent and Remote Memory:  fair  Language: Able to name objects  Fund of Knowledge: low-normal  Muscle Strength and Tone: normal  Gait and Station: Normal       ASSESSMENT     This is a 23 year old female with a PMH SZAD, bipolar type, KIRSTY, stimulant use disorder (methamphetamine) and cannabis use disorder who presented to [facility] ED with complaints of a panic attack, anxiety and SI with a plan. While in the department 9/14, the DEC and PA assessment note pt has made SI comments with a plan. Pt denies SI and reports the ER \"forced\" her to make these comments. It appears she has not taken her Abilify for approximately 1 month per medication scribe. Last script was filled in July of this year. Also complicating, and likely exacerbating her presentation, is the report that she used methamphetamine and cannabis 3 days ago. These factors could be related to her irritable " mood. She does agree to stay voluntary for 5 days, though we had the discussion was that discharge would be based on clinical improvement. She clearly lacks insight into her mental health, likely exacerbated by substance use, and may require an emergency hold if appropriate. Thus, Yudi Morales was admitted to [facility] for further safety and stabilization.   We resumed pt Abilify at 5 mg per pt request. We will assess clinical improvement and titrate as necessary. Due to the level of irritability and threat of potential property damage, we discontinued olanzapine 10 mg PO/IM and replaced therapy with haldol/benadryl/ativan for pt and staff safety. We still have a UDS, UPT and UA to collect at this time. Pt is aware of the risks and would like to proceed with Abilify.          DIAGNOSIS   Schizoaffective disorder, bipolar type  2.   SI  3.   KIRSTY  4.   Stimulant use disorder, moderate (methamphetamine)  5.   Cannabis use disorder, moderate     PLAN     Location: Unit 5  Legal Status: Orders Placed This Encounter      Voluntary    Safety Assessment:    Behavioral Orders   Procedures     Code 1 - Restrict to Unit     Routine Programming     As clinically indicated     Status 15     Every 15 minutes.      PTA psychotropic medications held:     -Zoloft    PTA psychotropic medications continued/changed:     -Abilify 5 mg    New medications initiated:     - none    Programming: Patient will be treated in a therapeutic milieu with appropriate individual and group therapies. Education will be provided on diagnoses, medications, and treatments.     Medical diagnoses:  Per medicine    Consult: none  Tests: UDS, UPT, UA    Anticipated LOS: 4-7 days  Disposition: home with OP services    Justification for hospitalization: reasons for hospitalization include potential safety risk to self or others within the last week, decreased functioning in outpatient setting and in the setting of no outpatient  management, need for highly structured inpatient management for stabilization of psychiatric symptoms, need for psychiatric medication initiation and stabilization.       ATTESTATION      AMINA Maciel CNP

## 2023-09-15 NOTE — PROGRESS NOTES
09/14/23 1945   Patient Belongings   Patient Belongings locker;sent to security per site process   Patient Belongings Put in Hospital Secure Location (Security or Locker, etc.) clothing;wallet;cash/credit card   Belongings Search Yes   Clothing Search Yes   Second Staff Anusha   Comment SS card, [place] DL, broken debit card,  business cards, appointment cards, a black wallet, 2 brushes, a bag, 2 pencils, make up, phone  and cord, string, a pringles conatiner thing like speaker, a bra, socks, a tank top and pants.     List items sent to safe: broken debit card. Business cards, social security card, a [place] DL. Appointment cards  All other belongings put in assigned cubby in belongings room.     I have reviewed my belongings list on admission and verify that it is correct.     Patient signature_______________________________    Second staff witness (if patient unable to sign) ______________________________       I have received all my belongings at discharge.    Patient signature________________________________    Suki   9/14/2023  7:56 PM

## 2023-09-15 NOTE — PLAN OF CARE
Face to face shift report received from PRATIK Root. Rounding completed, pt observed sleeping in bed at start of shift..  Problem: Adult Behavioral Health Plan of Care  Goal: Patient-Specific Goal (Individualization)  Description: Patient will sleep 6 to 8 hours per night  Patient will eat at least 50% of meals  Patient will attend at least 50% of groups  Patient will comply with recommendations of treatment team  Patient will remain medication compliant  Patient will be free from self harm or injury    Outcome: Progressing     Problem: Suicide Risk  Goal: Absence of Self-Harm  Outcome: Progressing   Goal Outcome Evaluation:          Pt. Irritable towards staff this shift. They stated that they were being kept here against their will and did not want to stay here. After speaking to provider, Pt. Stated that they would stay for 5 more days. 0% was eaten at breakfast and 100% at lunch. Pt. Did eat a snack between breakfast and lunch. Scheduled medication was taken without any issues.    Voluntary rights were signed and placed in patient's chart.    Pt. Has a visitor scheduled for 6:00 PM today. Visitor has called twice asking to bring in outside items. Multiple staff have stated that outside items are not allowed on the unit.      Face to face report will be communicated to oncoming RN.    Laurel Mercado RN  9/15/2023  2:47 PM

## 2023-09-15 NOTE — ED NOTES
Pt at first was reluctant to go into wc to be admitted.  Able to talk with patient along with provider and was able to get pt to go into wc willingly.

## 2023-09-15 NOTE — MEDICATION SCRIBE - ADMISSION MEDICATION HISTORY
Medication Scribe Admission Medication History    Admission medication history is complete. The information provided in this note is only as accurate as the sources available at the time of the update.    Medication reconciliation/reorder completed by provider prior to medication history? No    Information Source(s): Patient and CareEverywhere/SureScripts via in-person    Pertinent Information:   Patient manages her own medications and is a fair historian. Reports her boyfriend helps her with reminding her to take her medications when she has them. Unable to gauge truthfulness of patient.   Pt reports she has a hard time being able to get to her appt's and has been unable to follow through with keeping up with her medications. Reports she has not been taking any medications for over a month. She did  the prescription for abilify 5 mg on 7/21/23 (increased from 2 mg) and there appears to be 3 refills remaining.     Changes made to PTA medication list:  Added: None  Deleted: None  Changed: None    Medication Affordability:  Not including over the counter (OTC) medications, was there a time in the past 3 months when you did not take your medications as prescribed because of cost?: No    Allergies reviewed with patient and updates made in EHR: yes- no changes made    Medication History Completed By: Marly Licona 9/15/2023 11:23 AM    Prior to Admission medications    Medication Sig Last Dose Taking? Auth Provider Long Term End Date   ARIPiprazole (ABILIFY) 5 MG tablet Take 1 tablet (5 mg) by mouth every morning More than a month Yes Sindhu Sandoval MD Yes    montelukast (SINGULAIR) 10 MG tablet Take 1 tablet (10 mg) by mouth At Bedtime  Patient not taking: Reported on 6/14/2023   Sindhu Sandoval MD Yes    Multiple Vitamin (TAB-A-TOM) TABS TAKE ONE TABLET BY MOUTH ONCE DAILY.  Patient not taking: Reported on 6/14/2023   Nela Edmond MD     sertraline (ZOLOFT) 50 MG tablet TAKE 1 AND 1/2 TABLETS  BY MOUTH BY MOUTH DAILY  Patient not taking: Reported on 6/14/2023   Sindhu Sandoval MD Yes    Sodium Fluoride (DENTA 5000 PLUS DT) Apply to affected area every morning Apply thin layer to toothbrush after breakfast for 2 minutes  Patient not taking: Reported on 6/14/2023   Reported, Patient

## 2023-09-15 NOTE — PLAN OF CARE
Problem: Suicide Risk  Goal: Absence of Self-Harm  Outcome: Progressing    Patient was free from self-harm during NOC.     Problem: Adult Behavioral Health Plan of Care  Goal: Patient-Specific Goal (Individualization)  Description: Patient will sleep 6 to 8 hours per night  Patient will eat at least 50% of meals  Patient will attend at least 50% of groups  Patient will comply with recommendations of treatment team  Patient will remain medication compliant  Patient will be free from self harm or injury    Outcome: Progressing     Face to face end of shift report received from PRATIK Ignacio. Rounding completed. Patient observed resting in bed at beginning of shift.      Pt appeared to be sleeping with eyes closed with even & unlabored respirations. Appeared to be sleeping for 7 hours.    Face to face end of shift report communicated to [facility] day shift RN.     Ivett Carrera RN  9/15/2023  2:58 AM

## 2023-09-15 NOTE — TELEPHONE ENCOUNTER
Received a call from [facility] about pt being accepted at 7:30pm on 9/14/23 from [facility] ED.     Pt is admitted to [address] [place] [address] at [facility].     Pt added to Intake admit list and Indicia has been completed.

## 2023-09-15 NOTE — PLAN OF CARE
"Social Service Psychosocial Assessment    Pt very agitated. Wants to leave. Refusing to answer questions as she is \"being held against her will.\"  Presenting Problem: Pt admitted for SI with a plan and increase in anxiety.    Marital Status:  Single, Has a boyfriend     Spouse / Children: Pt had a child but adopted it out.     Psychiatric TX HX: This is Pt first time on our unit, Pt has Hx of IP psychiatric hospitalizations,      Suicide Risk Assessment: Pt admitted with SI with a plan, unknown    Access to Lethal Means (explain): unknown    Family Psych HX: unknown      A & Ox: x4      Medication Adherence: See H&P    Medical Issues: See H&P      Visual -Motor Functioning: Good    Communication Skills /Needs: Good    Ethnicity: White      Spirituality/Spiritism Affiliation: unknown    Clergy Request: No      History: None reported      Living Situation: unknown     ADL s: Independent       Education: unknown    Financial Situation: SSDI    Occupation: Unemployed     Leisure & Recreation: Pt likes to watch tv, movies, likes to attend group activities, writing in a journal, reading, cooking.      Childhood History: Pt grew up in [place], [place].      Trauma Abuse HX: unknown    Relationship / Sexuality: unknown    Substance Use/ Abuse: Pt has Hx of marijuana and meth use, last used a couple days ago.     Chemical Dependency Treatment HX: unknown    Legal Issues: unknown    Significant Life Events: unknown    Strengths: Ability to communicate needs, in a safe environment, has insurance    Challenges /Limitation: Poor coping skills, current mental health symptoms, lack of insight    Patient Support Contact (Include name, relationship, number, and summary of conversation): Pt has JEANNA signed for Rivas Sinha, and Jessica. No numbers listed.      Interventions:         Community-Based Programs- [facility]    Medical/Dental Care- PCP- Sindhu Delaney    Medication Management- would " benefit    Individual Therapy- would benefit    Case Management- would benefit     Insurance Coverage- [employer]    Suicide Risk Assessment- Unknown    High Risk Safety Plan- Talk to supports; Call crisis lines; Go to local ER if feeling suicidal.    ANIBAL Braxton  9/15/2023  8:58 AM

## 2023-09-16 LAB — HCG SERPL QL: NEGATIVE

## 2023-09-16 PROCEDURE — 124N000001 HC R&B MH

## 2023-09-16 PROCEDURE — 250N000013 HC RX MED GY IP 250 OP 250 PS 637

## 2023-09-16 PROCEDURE — 250N000013 HC RX MED GY IP 250 OP 250 PS 637: Performed by: NURSE PRACTITIONER

## 2023-09-16 PROCEDURE — 99233 SBSQ HOSP IP/OBS HIGH 50: CPT

## 2023-09-16 RX ORDER — ARIPIPRAZOLE 10 MG/1
10 TABLET ORAL EVERY MORNING
Status: DISCONTINUED | OUTPATIENT
Start: 2023-09-17 | End: 2023-09-18 | Stop reason: HOSPADM

## 2023-09-16 RX ORDER — ARIPIPRAZOLE 5 MG/1
5 TABLET ORAL ONCE
Status: COMPLETED | OUTPATIENT
Start: 2023-09-16 | End: 2023-09-16

## 2023-09-16 RX ADMIN — NICOTINE POLACRILEX 2 MG: 2 GUM, CHEWING ORAL at 15:41

## 2023-09-16 RX ADMIN — HALOPERIDOL 5 MG: 5 TABLET ORAL at 17:06

## 2023-09-16 RX ADMIN — ARIPIPRAZOLE 5 MG: 5 TABLET ORAL at 08:27

## 2023-09-16 RX ADMIN — LORAZEPAM 2 MG: 1 TABLET ORAL at 17:06

## 2023-09-16 RX ADMIN — ARIPIPRAZOLE 5 MG: 5 TABLET ORAL at 09:08

## 2023-09-16 RX ADMIN — DIPHENHYDRAMINE HYDROCHLORIDE 50 MG: 50 CAPSULE ORAL at 17:06

## 2023-09-16 RX ADMIN — NICOTINE POLACRILEX 2 MG: 2 GUM, CHEWING ORAL at 14:19

## 2023-09-16 ASSESSMENT — ACTIVITIES OF DAILY LIVING (ADL)
HYGIENE/GROOMING: INDEPENDENT
ADLS_ACUITY_SCORE: 28
DRESS: SCRUBS (BEHAVIORAL HEALTH);INDEPENDENT
ADLS_ACUITY_SCORE: 28
LAUNDRY: UNABLE TO COMPLETE
ADLS_ACUITY_SCORE: 28
ORAL_HYGIENE: INDEPENDENT
ADLS_ACUITY_SCORE: 28

## 2023-09-16 NOTE — PLAN OF CARE
"Patient spent majority of shift in her room resting/sleeping. Boyfriend visited and boundaries needed to be reinforced a few times, otherwise calm and appropriate. After visit pt accepted offer of PRN Ativan for anxiety, with good effect. Pt denies SI/HI/AVH/depression/pain. Endorses anxiety. Pt asked writer several times about discharging today. \"I'm ready to leave. Staying here is making things worse.\" Treatment plan reinforced and pt agreed today to stay five days when talking with the provider; pt verbalized understanding. Appetite okay, hygiene okay, brushed hair this evening, no shower.    Report will be given to night RN.       Problem: Adult Behavioral Health Plan of Care  Goal: Patient-Specific Goal (Individualization)  Description: Patient will sleep 6 to 8 hours per night  Patient will eat at least 50% of meals  Patient will attend at least 50% of groups  Patient will comply with recommendations of treatment team  Patient will remain medication compliant  Patient will be free from self harm or injury    Outcome: Progressing     Problem: Suicide Risk  Goal: Absence of Self-Harm  Outcome: Progressing                           "
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
## 2023-09-16 NOTE — PLAN OF CARE
Problem: Adult Behavioral Health Plan of Care  Goal: Patient-Specific Goal (Individualization)  Description: Patient will sleep 6 to 8 hours per night  Patient will eat at least 50% of meals  Patient will attend at least 50% of groups  Patient will comply with recommendations of treatment team  Patient will remain medication compliant  Patient will be free from self harm or injury    Note:   Rounding complete.  Pt observed sleeping with regular and unlabored respirations.      Pt has been in bed with eyes closed and regular respirations.  15 minute and PRN checks all night.  No complaints offered.  Will continue to monitor.     Pt slept 7 hours     Face to face end of shift communicated to oncoming PRATIK.     Anusha PRICE  September 16, 2023  6:36 AM     Goal Outcome Evaluation:

## 2023-09-16 NOTE — PLAN OF CARE
Problem: Adult Behavioral Health Plan of Care  Goal: Patient-Specific Goal (Individualization)  Description: Patient will sleep 6 to 8 hours per night  Patient will eat at least 50% of meals  Patient will attend at least 50% of groups  Patient will comply with recommendations of treatment team  Patient will remain medication compliant  Patient will be free from self harm or injury    9/16/2023 1220 by Anusha Cordova RN  Note: Pt was med compliant.  When offered pt declined taking a urine analysis.  Pt slept until lunch, which she ate in lounge.  Pt then went back to room to sleep.  When writer has interacted with pt, she gives one word answers.       Problem: Suicide Risk  Goal: Absence of Self-Harm  Outcome: Progressing   Goal Outcome Evaluation:    Plan of Care Reviewed With: patient

## 2023-09-17 ENCOUNTER — APPOINTMENT (OUTPATIENT)
Dept: GENERAL RADIOLOGY | Facility: HOSPITAL | Age: 23
End: 2023-09-17
Payer: COMMERCIAL

## 2023-09-17 LAB
ALBUMIN UR-MCNC: NEGATIVE MG/DL
AMPHETAMINES UR QL SCN: NORMAL
APPEARANCE UR: CLEAR
BARBITURATES UR QL SCN: NORMAL
BENZODIAZ UR QL SCN: NORMAL
BILIRUB UR QL STRIP: NEGATIVE
BZE UR QL SCN: NORMAL
CANNABINOIDS UR QL SCN: NORMAL
COLOR UR AUTO: ABNORMAL
FENTANYL UR QL: NORMAL
GLUCOSE UR STRIP-MCNC: NEGATIVE MG/DL
HCG UR QL: NEGATIVE
HGB UR QL STRIP: NEGATIVE
KETONES UR STRIP-MCNC: NEGATIVE MG/DL
LEUKOCYTE ESTERASE UR QL STRIP: NEGATIVE
MUCOUS THREADS #/AREA URNS LPF: PRESENT /LPF
NITRATE UR QL: NEGATIVE
OPIATES UR QL SCN: NORMAL
PCP QUAL URINE (ROCHE): NORMAL
PH UR STRIP: 6 [PH] (ref 4.7–8)
RBC URINE: 0 /HPF
SP GR UR STRIP: 1.01 (ref 1–1.03)
SQUAMOUS EPITHELIAL: 0 /HPF
UROBILINOGEN UR STRIP-MCNC: NORMAL MG/DL
WBC URINE: 1 /HPF

## 2023-09-17 PROCEDURE — 99232 SBSQ HOSP IP/OBS MODERATE 35: CPT

## 2023-09-17 PROCEDURE — 73130 X-RAY EXAM OF HAND: CPT | Mod: RT

## 2023-09-17 PROCEDURE — 81025 URINE PREGNANCY TEST: CPT | Performed by: PHYSICIAN ASSISTANT

## 2023-09-17 PROCEDURE — 124N000001 HC R&B MH

## 2023-09-17 PROCEDURE — 73090 X-RAY EXAM OF FOREARM: CPT | Mod: RT

## 2023-09-17 PROCEDURE — 81001 URINALYSIS AUTO W/SCOPE: CPT | Performed by: PHYSICIAN ASSISTANT

## 2023-09-17 PROCEDURE — 250N000013 HC RX MED GY IP 250 OP 250 PS 637

## 2023-09-17 PROCEDURE — 80307 DRUG TEST PRSMV CHEM ANLYZR: CPT | Performed by: PHYSICIAN ASSISTANT

## 2023-09-17 PROCEDURE — 73110 X-RAY EXAM OF WRIST: CPT | Mod: RT

## 2023-09-17 PROCEDURE — 250N000013 HC RX MED GY IP 250 OP 250 PS 637: Performed by: NURSE PRACTITIONER

## 2023-09-17 RX ADMIN — ARIPIPRAZOLE 10 MG: 10 TABLET ORAL at 09:18

## 2023-09-17 RX ADMIN — LORAZEPAM 1 MG: 1 TABLET ORAL at 16:31

## 2023-09-17 RX ADMIN — Medication 3 MG: at 20:23

## 2023-09-17 RX ADMIN — NICOTINE POLACRILEX 2 MG: 2 GUM, CHEWING ORAL at 09:22

## 2023-09-17 RX ADMIN — ACETAMINOPHEN 650 MG: 325 TABLET, FILM COATED ORAL at 16:30

## 2023-09-17 RX ADMIN — NICOTINE POLACRILEX 2 MG: 2 GUM, CHEWING ORAL at 13:59

## 2023-09-17 RX ADMIN — NICOTINE POLACRILEX 2 MG: 2 GUM, CHEWING ORAL at 19:12

## 2023-09-17 RX ADMIN — NICOTINE POLACRILEX 2 MG: 2 GUM, CHEWING ORAL at 12:50

## 2023-09-17 RX ADMIN — HYDROXYZINE HYDROCHLORIDE 25 MG: 25 TABLET, FILM COATED ORAL at 15:56

## 2023-09-17 ASSESSMENT — ACTIVITIES OF DAILY LIVING (ADL)
ORAL_HYGIENE: INDEPENDENT
ADLS_ACUITY_SCORE: 28
ORAL_HYGIENE: INDEPENDENT
ADLS_ACUITY_SCORE: 28
ADLS_ACUITY_SCORE: 28
HYGIENE/GROOMING: INDEPENDENT
ADLS_ACUITY_SCORE: 28
ADLS_ACUITY_SCORE: 28
LAUNDRY: UNABLE TO COMPLETE
ADLS_ACUITY_SCORE: 28
DRESS: INDEPENDENT
ADLS_ACUITY_SCORE: 28
LAUNDRY: UNABLE TO COMPLETE
DRESS: INDEPENDENT;SCRUBS (BEHAVIORAL HEALTH)
ADLS_ACUITY_SCORE: 28
HYGIENE/GROOMING: INDEPENDENT
ADLS_ACUITY_SCORE: 28
ADLS_ACUITY_SCORE: 28

## 2023-09-17 NOTE — PLAN OF CARE
"Face to face end of shift report received from Anusha PRICE RN. Rounding completed. Patient observed in bed, awake.     Pt is withdrawn, isolative to her room. Her affect is flat. She has poor eye contact and will look at the floor when speaking with this writer. She is very soft spoken and difficult to understand. Pt comes out to eat breakfast and speak with the provider, but returns to her room soon after. Pt took AM medications without any hesitation. She questioned why pill is a different color and reminded her that she had an increase in her dose. Pt has not attended any groups this morning. She denied any SI/HI and AH/VH. Denied pain. Pt is preoccupied with getting a hold of her boyfriend and does not focus her attention on any other topic. Pt was weighed this morning and she stated \"It can't be anymore than 120 lbs.\" Pt weight 127.2. Will monitor how much lunch pt eats. Offered shower supplies though she declined.   Steady gait- no falls. Pt reports that her R wrist feels swollen and that she had punched a wall about 6 days ago. Informed provider, consuelo Dunn. X-ray ordered and completed. Pt sat in lounge calmly throughout the remainder of the afternoon. Able to make her needs known. Frequent rounding,.     Problem: Adult Behavioral Health Plan of Care  Goal: Patient-Specific Goal (Individualization)  Description: Patient will sleep 6 to 8 hours per night  Patient will eat at least 50% of meals  Patient will attend at least 50% of groups  Patient will comply with recommendations of treatment team  Patient will remain medication compliant  Patient will be free from self harm or injury    Outcome: Progressing     Problem: Suicide Risk  Goal: Absence of Self-Harm  Outcome: Progressing       Tavia Frias RN  9/17/2023  11:23 AM    "

## 2023-09-17 NOTE — PLAN OF CARE
"  Problem: Adult Behavioral Health Plan of Care  Goal: Patient-Specific Goal (Individualization)  Description: Patient will sleep 6 to 8 hours per night  Patient will eat at least 50% of meals  Patient will attend at least 50% of groups  Patient will comply with recommendations of treatment team  Patient will remain medication compliant  Patient will be free from self harm or injury    Note: Patient is very restless in beginning of shift, frequently up to nurse's station with rapid/rambling speech. Making numerous statements about \"wanting and needing to go home\". Reports concerns that she will lose her apartment and states \"I need to make sure I don't lose my apartment and I want to see my boyfriend\". Staff reminded patient that it is a weekend and it might not be possible to talk to anyone about her apartment until Monday anyways. Encouraged to stay over the weekend as previously discussed with provider. Does not provide much insight to substance use stating, \"I only use the clear stuff to help me get what I need to done and I use the green stuff to get hungry enough to eat\".   Patient remains appropriate during conversation, agreeing to talk to provider again tomorrow instead of siging a 12 hour intent and requests PRN medication.   1706- Received PRN Haldol 5 mg, Ativan 2 mg and Benadryl 50 mg.   Patient is up to lounge making personal phone calls, writing in journal and coloring off/on throughout evening, otherwise laying in bed resting. Declines group encouragements. Behaviors remain appropriate all shift.      Problem: Suicide Risk  Goal: Absence of Self-Harm  Note: Continue to monitor at this time.     Face to face end of shift report communicated to jose CHRISTIE.     Sandee Peguero RN  9/16/2023  11:25 PM       "

## 2023-09-17 NOTE — PROGRESS NOTES
"[facility] PSYCHIATRY  PROGRESS NOTE     SUBJECTIVE     Prior to interviewing the patient, I met with nursing and reviewed patient's clinical condition. We discussed clinical care both before and after the interview. I have reviewed the patient's clinical course by review of records including previous notes, labs, and vital signs.     Per nursing, the patient had the following behavioral events over the last 24-hours: Thoughts of wanting to leave. Slept 7 hours.     Upon psychiatric interview, Yudi was eating breakfast alone in the lounge. She denied SI, HI or SIB. She states she feels more improved overall. She states her mood is \"good\" and that she slept well, considering she is not in her home environment. She does not elicit any delusions. She does not appear to be responding to internal stimuli. Her affect is more mobile today. She smiled and laughed in conversation. She was pleasant, goal-oriented and linear. She appears more awake today. No irritability.     She denied any adverse affect of Abilify. We discussed BR and SE of Abilify, including dizziness, HA, GI upset, orthostasis insomnia, sedation, EPSE and TD. She denied any adverse effects.     Yudi verbalized she feels ready to discharge by tomorrow. She does have transportation, we would need to call to let them know what time she would be ready to discharge. I did discuss this appears to be a reasonable plan, though would need to schedule a PCP appointment. I informed her the team would work on discharge planning tomorrow. I also informed that discharge could be variable depending on any symptom alterations or disposition planning. She agreed. To this plan.     She did co right hand, wrist and forearm pain/swelling. She tells me she punched a wall 6 days ago for an unknown reason and has been having discomfort since. She noticed some swelling to the right wrist when compared to her left. I did appreciate slight swelling to right wrist in " "comparison to her left. She has full ROM and sensation, right extremity is warm, pink. Cap reflill to right digits is less than 3 seconds. Tenderness to right hand/wrist is diffuse. She is tender in the anatomical snuff box. Slight diffuse tenderness diffusely to right forearm without focal tenderness. Able to pronate/supinate without limitation. Does have slight discomfort noted.      MEDICATIONS   Scheduled Meds:    ARIPiprazole  10 mg Oral QAM     PRN Meds:.acetaminophen, alum & mag hydroxide-simethicone, haloperidol **AND** LORazepam **AND** diphenhydrAMINE, haloperidol lactate **AND** LORazepam **AND** diphenhydrAMINE, hydrOXYzine, LORazepam, melatonin, nicotine     ALLERGIES   Allergies   Allergen Reactions     Other [No Clinical Screening - See Comments] Anaphylaxis     \"Sodium Penathol\" per mother. Used in anesthesia.  Family hx of reaction.     Latex Rash     Amoxicillin Trihydrate GI Disturbance     Amoxicillin-Pot Clavulanate GI Disturbance     Phenobarbital      Thiopental      Other reaction(s): Other - Describe In Comment Field  Family Hx of death with this medication        MENTAL STATUS EXAM   Vitals: /71   Pulse 91   Temp 98.7  F (37.1  C) (Tympanic)   Resp 16   Wt 57.7 kg (127 lb 3.2 oz)   SpO2 98%   BMI 19.92 kg/m      Appearance:  awake, alert, adequately groomed, fatigued, and slightly unkempt  Attitude:  somewhat cooperative, increasingly cooperative during conversation  Eye Contact:  good  Mood:  good  Affect:  restricted range, more mobile and bright  Speech:  clear, coherent  Psychomotor Behavior:  no evidence of tardive dyskinesia, dystonia, or tics  Thought Process: mostly logical  Associations:  no loose associations  Thought Content:  no evidence of suicidal ideation or homicidal ideation does not appear to be responding to internal stimuli  Insight:  fair  Judgment:  limited  Oriented to:  time, person, and place  Attention Span and Concentration:  intact  Recent and Remote " "Memory:  intact  Language: English with normal vocabulary and syntax  Fund of Knowledge: low-normal  Muscle Strength and Tone: normal  Gait and Station: Normal       LABS   No results found for this or any previous visit (from the past 24 hour(s)).      IMPRESSION     This is a 23 year old female with a PMH SZAD, bipolar type, KIRSTY, stimulant use disorder (methamphetamine) and cannabis use disorder who presented to [facility] ED with complaints of a panic attack, anxiety and SI with a plan. While in the department 9/14, the DEC and PA assessment note pt has made SI comments with a plan. Pt denies SI and reports the ER \"forced\" her to make these comments. It appears she has not taken her Abilify for approximately 1 month per medication scribe. Last script was filled in July of this year. Also complicating, and likely exacerbating her presentation, is the report that she used methamphetamine and cannabis 3 days ago. These factors could be related to her irritable mood. She does agree to stay voluntary for 5 days, though we had the discussion was that discharge would be based on clinical improvement. She clearly lacks insight into her mental health, likely exacerbated by substance use, and may require an emergency hold if appropriate. Thus, Yudi Morales was admitted to [facility] for further safety and stabilization.     Today: Yudi was quite pleasant in conversation today. Her eye contact was appropriate and her affect mobility has increased. Her thought process is logical, linear and goal-oriented. It appears that trauma, complicated with her substance use, could be contributing to her presentation in context of her SZAD, bipolar type diagnosis. She denied SI, HI and SIB.  She may benefit in a mood stabilizer but may not be willing to stay inpatient during this process. We will need to schedule a PCP appointment at minimum and continue to offer OP psychiatry services. I " suspect she would be prepared for discharge Monday if appropriate appointments have been set and mood remains euthymic.   She did co right hand, wrist and forearm pain from reportedly punching a wall 6 days ago. Physical exam was unremarkable except for some diffuse tenderness throughout these areas and slightly increase pain at anatomical snuff box. CMS intact, full ROM of digits, wrist and forearm on the right. Slight swelling appreciated to right wrist compared to left. Xrays ordered and preliminary reading by radiologist is negative for acute fracture.      DIAGNOSES     Schizoaffective disorder, bipolar type  2.   SI  3.   KIRSTY  4.   Stimulant use disorder, moderate (methamphetamine)  5.   Cannabis use disorder, moderate       PLAN     Location: Unit 5  Legal Status: Orders Placed This Encounter      Voluntary    Safety Assessment:    Behavioral Orders   Procedures     Code 1 - Restrict to Unit     Routine Programming     As clinically indicated     Status 15     Every 15 minutes.      PTA psychotropic medications stopped:     -Zoloft    PTA psychotropic medications continued/changed:     -Abilify 5 mg->increased to 10 mg 9/16    New medications tried and stopped:     -None    New medications initiated:     -none    Today's Changes:    - none    Programming: Patient will be treated in a therapeutic milieu with appropriate individual and group therapies. Education will be provided on diagnoses, medications, and treatments.     Medical diagnoses:  Per medicine    Consult: None  Tests: Xrays or right hand, wrist, forearm-preliminary report is negative for acute fracture.   Anticipated LOS: 4-7 days  Disposition: home with OP services.          ATTESTATION      AMINA Maciel CNP

## 2023-09-17 NOTE — PLAN OF CARE
"  Problem: Adult Behavioral Health Plan of Care  Goal: Patient-Specific Goal (Individualization)  Description: Patient will sleep 6 to 8 hours per night  Patient will eat at least 50% of meals  Patient will attend at least 50% of groups  Patient will comply with recommendations of treatment team  Patient will remain medication compliant  Patient will be free from self harm or injury    Outcome: Progressing  Note: Report received from Tavia CHRISTIE.  Rounding complete.  Patient observed in the lounge at start of shift. She is tearful because she misses home.  \"I would be okay if I went home.\"  C/O anxiety and requests PRN medications.  She states she was hesitant to tell staff she was having anxiety as she worries it will lengthen her stay here. Patient is cooperative with medications this shift. She is soft spoke.  Polite during interactions.   1556:  Patient received PRN hydroxyzine 25 mg po for anxiety.    Patient continues to have high level of anxiety with left shoulder and neck pain.  She states this occurs when she is highly stressed or anxious.    1630:  Administered PRN acetaminophen 650 mg po for left shoulder and neck pain.    1631: Administered lorazepam 1 mg po for c/o high anxiety.  This seemed to be effective.  Patient has not been tearful and seems more calm.    Patient requests a pregnancy test.  She also agreed to urine drug screen and urinalysis at this time.  Patient states UDS may be positive for meth and THC.  She states she uses meth r/t lack of motivation to do anything when not using and marijuana (which she calls 'green') so she has an appetite and eats while using methamphetamine.    2023:  PRN Melatonin 3 mg po administered for sleep.      Problem: Suicide Risk  Goal: Absence of Self-Harm  Outcome: Progressing  Note: Patient had no noted self harm this shift.    Goal Outcome Evaluation:                        "

## 2023-09-17 NOTE — PLAN OF CARE
Problem: Adult Behavioral Health Plan of Care  Goal: Patient-Specific Goal (Individualization)  Description: Patient will sleep 6 to 8 hours per night  Patient will eat at least 50% of meals  Patient will attend at least 50% of groups  Patient will comply with recommendations of treatment team  Patient will remain medication compliant  Patient will be free from self harm or injury    Note:   Rounding complete.  Pt observed sleeping with regular and unlabored respirations.      Pt has been in bed with eyes closed and regular respirations.  15 minute and PRN checks all night.  No complaints offered.  Will continue to monitor.     Face to face end of shift communicated to oncoming PRATIK.     Anusha PRICE  September 17, 2023  6:39 AM     Goal Outcome Evaluation:    Plan of Care Reviewed With: patient

## 2023-09-18 VITALS
DIASTOLIC BLOOD PRESSURE: 69 MMHG | SYSTOLIC BLOOD PRESSURE: 114 MMHG | WEIGHT: 127.2 LBS | TEMPERATURE: 99 F | OXYGEN SATURATION: 99 % | HEART RATE: 108 BPM | RESPIRATION RATE: 16 BRPM | BODY MASS INDEX: 19.92 KG/M2

## 2023-09-18 PROCEDURE — 250N000013 HC RX MED GY IP 250 OP 250 PS 637: Performed by: NURSE PRACTITIONER

## 2023-09-18 PROCEDURE — 250N000013 HC RX MED GY IP 250 OP 250 PS 637

## 2023-09-18 PROCEDURE — 99239 HOSP IP/OBS DSCHRG MGMT >30: CPT

## 2023-09-18 RX ORDER — ARIPIPRAZOLE 10 MG/1
10 TABLET ORAL EVERY MORNING
Qty: 30 TABLET | Refills: 0 | Status: SHIPPED | OUTPATIENT
Start: 2023-09-19 | End: 2023-10-28

## 2023-09-18 RX ADMIN — NICOTINE POLACRILEX 2 MG: 2 GUM, CHEWING ORAL at 10:06

## 2023-09-18 RX ADMIN — ARIPIPRAZOLE 10 MG: 10 TABLET ORAL at 08:33

## 2023-09-18 RX ADMIN — ALUMINUM HYDROXIDE, MAGNESIUM HYDROXIDE, AND SIMETHICONE 30 ML: 200; 200; 20 SUSPENSION ORAL at 07:59

## 2023-09-18 RX ADMIN — ACETAMINOPHEN 650 MG: 325 TABLET, FILM COATED ORAL at 12:02

## 2023-09-18 RX ADMIN — NICOTINE POLACRILEX 2 MG: 2 GUM, CHEWING ORAL at 07:54

## 2023-09-18 RX ADMIN — NICOTINE POLACRILEX 2 MG: 2 GUM, CHEWING ORAL at 11:22

## 2023-09-18 ASSESSMENT — ACTIVITIES OF DAILY LIVING (ADL)
ADLS_ACUITY_SCORE: 28
HYGIENE/GROOMING: INDEPENDENT
ADLS_ACUITY_SCORE: 28
DRESS: INDEPENDENT
ADLS_ACUITY_SCORE: 28
ADLS_ACUITY_SCORE: 28
ORAL_HYGIENE: INDEPENDENT
ADLS_ACUITY_SCORE: 28

## 2023-09-18 NOTE — DISCHARGE SUMMARY
"[facility] PSYCHIATRY  DISCHARGE SUMMARY     DISCHARGE DATA     Yudi Morales MRN# [id number]   Age: 23 year old YOB: 2000     Date of Admission: 9/14/2023  Date of Discharge: September 18, 2023  Discharge Provider: AMINA Maciel CNP       REASON FOR ADMISSION     This is a 23 year old female with a PMH SZAD, bipolar type, KIRSTY, stimulant use disorder (methamphetamine) and cannabis use disorder who presented to [facility] ED with complaints of a panic attack, anxiety and SI with a plan. While in the department 9/14, the DEC and PA assessment note pt has made SI comments with a plan. Pt denies SI and reports the ER \"forced\" her to make these comments. It appears she has not taken her Abilify for approximately 1 month per medication scribe. Last script was filled in July of this year. Also complicating, and likely exacerbating her presentation, is the report that she used methamphetamine and cannabis 3 days ago. These factors could be related to her irritable mood. She does agree to stay voluntary for 5 days, though we had the discussion was that discharge would be based on clinical improvement. She clearly lacks insight into her mental health, likely exacerbated by substance use, and may require an emergency hold if appropriate. Thus, Yudi Morales was admitted to [facility] for further safety and stabilization.        DISCHARGE DIAGNOSES     Schizoaffective disorder, bipolar type  2.   SI  3.   KIRSTY  4.   Stimulant use disorder, moderate (methamphetamine)  5.   Cannabis use disorder, moderate       CONSULTS     none       HOSPITAL COURSE     Legal status: Orders Placed This Encounter      Voluntary    Patient was admitted to unit 5 due to the aforementioned presentation. The patient was placed under 15 minute checks to ensure patient safety. The patient participated in unit programming and groups as able.    Ms. Morales did not require seclusion/restraint " "during hospitalization.     We reviewed with Ms. Morales current and past medication trials including duration, dose, response and side effects. During this hospitalization, the following changes to the patient's psychotropic medications were made:    PTA psychotropic medications stopped:      -Zoloft     PTA psychotropic medications continued/changed:      -Abilify 5 mg->increased to 10 mg 9/16     New medications tried and stopped:      -None     New medications initiated:      -none     Yudi Morales was admitted to [facility] 5 for SI with a plan. She attributed these thoughts to increased life stressors. Complicating her presentation was the recent use of methamphetamine and cannabis, which she reported she used 3 days prior to admission. She stated that she uses methamphetamine for energy to get simple daily tasks complete because she finds she feels tired often through the days. She uses methamphetamine once every 5 days or so for this reason and not for the \"high\" effets. She stated she uses cannabis to increase her appetite and will only smoke when she notices she is eating less.   She came to the unit quite irritable and angry. She denied have SI and claimed the ER staff \"forced\" her to say she was suicidal. She did, however, agree to stay for 5 days on a voluntary basis. It appears from records that she was taking Zoloft previously, but was held by her PCP as pt mood had not been stabilized. Pt was recently started on Abilify by PCP, which was titrated to 5 mg once daily. Yudi reports she has been out of this medication for approximately 1 month. We offered to start Abilify at 2 mg, but pt preferred to start at her current dose of 5 mg. She tolerated this initial dose and was titrated to 10 mg during this hospital stay. She tolerated this dose as well with notable improvement in mood/irritability. Her mood improvement likely was influenced by abstinence of substances. We did not continue " her Zoloft at this time as her mood improved and could potentially have adverse effects, leading to decompensation. This could be discussed with her provider at a later date, if necessary. We did have multiple discussions related to abstinence in the future. Her main concerns for discharging were getting back to her apartment to ensure she was not evicted, obtaining her therapy dog and reuniting with her significant other.   She did have right hand, wrist and forearm pain from punching a wall approximately 1 week ago. Physical examination revealed mostly diffuse tenderness throughout these areas with some increased tenderness in the anatomical snuffbox of the right wrist. Slight appreciable edema of right wrist when compared to left. CMS of BUE were intact and equal. Xrays were obtained and were interpreted by radiology as negative. Informed pt to follow up with PCP as needed for this issue.     With these changes and supports the patient noticed improvement in their symptoms and felt sufficiently ready for discharge. As a result, Yudi Morales was discharged. At the time of discharge, Yudi Morales was determined to not be a danger to self or others. The patient was also medically stable for discharge. At the current time of discharge, the patient does not meet criteria for involuntary hospitalization. On the day of discharge, the patient reports that they do not have suicidal or homicidal ideation. Steps taken to minimize risk include: assessing patient s behavior and thought process daily during hospital stay, discharging patient with adequate plan for follow up for mental and physical health and discussing safety plan of returning to the hospital should the patient ever have thoughts of harming themselves or others. Therefore, based on all available evidence including the factors cited above, the patient does not appear to be at imminent risk for self-harm, and is appropriate for outpatient level of care.  "However, if patient uses substances or is medication non-adherent, their risk of decompensation and SI will be elevated. This was discussed with the patient.       DISCHARGE MEDICATIONS     Discharge Medication List as of 9/18/2023 12:53 PM        CONTINUE these medications which have CHANGED    Details   ARIPiprazole (ABILIFY) 10 MG tablet Take 1 tablet (10 mg) by mouth every morning, Disp-30 tablet, R-0, E-Prescribe           STOP taking these medications       montelukast (SINGULAIR) 10 MG tablet Comments:   Reason for Stopping:         Multiple Vitamin (TAB-A-TOM) TABS Comments:   Reason for Stopping:         sertraline (ZOLOFT) 50 MG tablet Comments:   Reason for Stopping:         Sodium Fluoride (DENTA 5000 PLUS DT) Comments:   Reason for Stopping:                    MENTAL STATUS EXAM   Vitals: /69   Pulse 108   Temp 99  F (37.2  C) (Tympanic)   Resp 16   Wt 57.7 kg (127 lb 3.2 oz)   SpO2 99%   BMI 19.92 kg/m      Appearance: Alert, oriented, dressed in hospital scrubs, appears stated age   Attitude: Cooperative   Eye Contact: Good  Mood: \"Better\"  Affect: Full range of affect  Speech: Normal rate and rhythm   Psychomotor Behavior: No tremor, rigidity, or psychomotor abnormality   Thought Process: Logical, goal directed   Associations: No loose associations   Thought Content: Denies SI or plan. No SIB. Denies A/V hallucinations. No evidence of delusional thought.  Insight: Good  Judgment: Good  Oriented to: Person, place, and time  Attention Span and Concentration: Intact  Recent and Remote Memory: Intact  Language: English with appropriate syntax and vocabulary  Fund of Knowledge: Average  Muscle Strength and Tone: Grossly normal  Gait and Station: Grossly normal       DISCHARGE PLAN     1.  Education given regarding diagnostic and treatment options with risks, benefits and alternatives with adequate verbalization of understanding.  2.  Discharge to home. Upon detailed review of risk factors, " patient amenable for release.   3.  Continue aforementioned medications and associated medication changes with follow-up by outpatient provider.  4.  Crisis management planning in place.    5.  Nursing and  to review further discharge recommendations.   6.  Patient is being discharged with the following appointments as detailed below.    [facility]  [address] [place] [address]  [phone]  PCP: Sindhu Sandoval: September 25th @ 9:45am          DISCHARGE SERVICES PROVIDED     40 minutes spent on discharge services, including:  Final examination of patient.  Review and discussion of hospital stay.  Instructions for continued outpatient care/goals.  Preparation of discharge records.  Preparation of medications refills and new prescriptions.  Preparation of applicable referral forms.        ATTESTATION     AMINA Maciel CNP       LABS THIS ADMISSION     Results for orders placed or performed during the hospital encounter of 09/14/23   XR Hand Port Right G/E 3 Views     Status: None    Narrative    Exam: XR HAND PORT RIGHT G/E 3 VIEWS     History:Female, age 23 years, right hand pain 6 days after punching  wall    Comparison:  No relevant prior imaging.    Technique: Three views are submitted.    Findings: Bones are normally mineralized. No evidence of acute or  subacute fracture.  No evidence of dislocation.           Impression    Impression:  No evidence of acute or subacute bony abnormality.     This report is in agreement with the preliminary report.    MARYLOU OCAMPO MD         SYSTEM ID:  [id number]   XR Wrist Right G/E 3 Views     Status: None    Narrative    Exam: XR WRIST RIGHT G/E 3 VIEWS     History:Female, age 23 years, pain/slight swelling 6 days after  punching wall. Anatomical snuff box tenderness    Comparison:  No relevant prior imaging.    Technique: Three views are submitted.    Findings: Bones are normally mineralized. No evidence of acute or  subacute  fracture.  No evidence of dislocation.           Impression    Impression:  No evidence of acute or subacute bony abnormality.     This report is in agreement with the preliminary report.    MARYLOU OCAMPO MD         SYSTEM ID:  [id number]   XR Forearm Port Right 2 Views     Status: None    Narrative    Exam: XR FOREARM PORT RIGHT 2 VIEWS     History:Female, age 23 years, pain up entire forearm 6 days after  punching wall    Comparison:  No relevant prior imaging.    Technique: Two views are submitted    Findings: Bones are normally mineralized. No evidence of acute or  subacute fracture.  No evidence of dislocation.           Impression    Impression:  No evidence of acute or subacute bony abnormality.     This report is in agreement with the preliminary report.    MARYLOU OCAMPO MD         SYSTEM ID:  [id number]   Comprehensive metabolic panel     Status: Abnormal   Result Value Ref Range    Sodium 138 136 - 145 mmol/L    Potassium 4.7 3.4 - 5.3 mmol/L    Chloride 103 98 - 107 mmol/L    Carbon Dioxide (CO2) 25 22 - 29 mmol/L    Anion Gap 10 7 - 15 mmol/L    Urea Nitrogen 8.6 6.0 - 20.0 mg/dL    Creatinine 0.83 0.51 - 0.95 mg/dL    Calcium 9.7 8.6 - 10.0 mg/dL    Glucose 108 (H) 70 - 99 mg/dL    Alkaline Phosphatase 100 35 - 104 U/L    AST 19 0 - 45 U/L    ALT 12 0 - 50 U/L    Protein Total 7.1 6.4 - 8.3 g/dL    Albumin 4.6 3.5 - 5.2 g/dL    Bilirubin Total 0.5 <=1.2 mg/dL    GFR Estimate >90 >60 mL/min/1.73m2   UA with Microscopic reflex to Culture     Status: Abnormal    Specimen: Urine, Midstream   Result Value Ref Range    Color Urine Light Yellow Colorless, Straw, Light Yellow, Yellow    Appearance Urine Clear Clear    Glucose Urine Negative Negative mg/dL    Bilirubin Urine Negative Negative    Ketones Urine Negative Negative mg/dL    Specific Gravity Urine 1.010 1.003 - 1.035    Blood Urine Negative Negative    pH Urine 6.0 4.7 - 8.0    Protein Albumin Urine Negative Negative mg/dL    Urobilinogen Urine  Normal Normal, 2.0 mg/dL    Nitrite Urine Negative Negative    Leukocyte Esterase Urine Negative Negative    Mucus Urine Present (A) None Seen /LPF    RBC Urine 0 <=2 /HPF    WBC Urine 1 <=5 /HPF    Squamous Epithelials Urine 0 <=1 /HPF    Narrative    Urine Culture not indicated   HCG qualitative urine (UPT)     Status: Normal   Result Value Ref Range    hCG Urine Qualitative Negative Negative   Ethyl Alcohol Level     Status: Normal   Result Value Ref Range    Alcohol ethyl <0.01 <=0.01 g/dL   CBC with platelets and differential     Status: None   Result Value Ref Range    WBC Count 5.3 4.0 - 11.0 10e3/uL    RBC Count 5.08 3.80 - 5.20 10e6/uL    Hemoglobin 14.4 11.7 - 15.7 g/dL    Hematocrit 43.9 35.0 - 47.0 %    MCV 86 78 - 100 fL    MCH 28.3 26.5 - 33.0 pg    MCHC 32.8 31.5 - 36.5 g/dL    RDW 12.4 10.0 - 15.0 %    Platelet Count 299 150 - 450 10e3/uL    % Neutrophils 42 %    % Lymphocytes 46 %    % Monocytes 9 %    % Eosinophils 2 %    % Basophils 1 %    % Immature Granulocytes 0 %    NRBCs per 100 WBC 0 <1 /100    Absolute Neutrophils 2.2 1.6 - 8.3 10e3/uL    Absolute Lymphocytes 2.4 0.8 - 5.3 10e3/uL    Absolute Monocytes 0.5 0.0 - 1.3 10e3/uL    Absolute Eosinophils 0.1 0.0 - 0.7 10e3/uL    Absolute Basophils 0.0 0.0 - 0.2 10e3/uL    Absolute Immature Granulocytes 0.0 <=0.4 10e3/uL    Absolute NRBCs 0.0 10e3/uL   Extra Tube     Status: None    Narrative    The following orders were created for panel order Extra Tube.  Procedure                               Abnormality         Status                     ---------                               -----------         ------                     Extra Red Top Tube[[id number]]                               Final result                 Please view results for these tests on the individual orders.   Extra Red Top Tube     Status: None   Result Value Ref Range    Hold Specimen JI    HCG qualitative     Status: Normal   Result Value Ref Range    hCG Serum Qualitative  Negative Negative   Drug Abuse Screen Qual Urine     Status: Normal   Result Value Ref Range    Amphetamines Urine Screen Negative Screen Negative    Barbituates Urine Screen Negative Screen Negative    Benzodiazepine Urine Screen Negative Screen Negative    Cannabinoids Urine Screen Negative Screen Negative    Cocaine Urine Screen Negative Screen Negative    Fentanyl Qual Urine Screen Negative Screen Negative    Opiates Urine Screen Negative Screen Negative    PCP Urine Screen Negative Screen Negative   CBC with platelets differential     Status: None    Narrative    The following orders were created for panel order CBC with platelets differential.  Procedure                               Abnormality         Status                     ---------                               -----------         ------                     CBC with platelets and d...[[id number]]                      Final result                 Please view results for these tests on the individual orders.   Urine Drugs of Abuse Screen     Status: Normal    Narrative    The following orders were created for panel order Urine Drugs of Abuse Screen.  Procedure                               Abnormality         Status                     ---------                               -----------         ------                     Drug Abuse Screen Qual U...[[id number]]  Normal              Final result                 Please view results for these tests on the individual orders.            TREATMENT TEAM CARE PLAN     Progress: Symptoms improved.    Continued Stay Criteria/Rationale: Ongoing treatment and safe discharge planning.    Medical/Physical: See above.    Precautions: See above.     Plan: Continue inpatient care with unit support and medication management.    Rationale for change in precautions or plan: NA due to no change.    Participants: AMINA Maciel CNP, Nursing, SW, OT.    The patient's care was discussed with the treatment team and chart notes  were reviewed.

## 2023-09-18 NOTE — PLAN OF CARE
Problem: Adult Behavioral Health Plan of Care  Goal: Plan of Care Review  Outcome: Adequate for Care Transition  Flowsheets (Taken 9/18/2023 1006)  Patient Agreement with Plan of Care: agrees  Goal: Patient-Specific Goal (Individualization)  Description: Patient will sleep 6 to 8 hours per night  Patient will eat at least 50% of meals  Patient will attend at least 50% of groups  Patient will comply with recommendations of treatment team  Patient will remain medication compliant  Patient will be free from self harm or injury    9/18/2023 1131 by Agata Costa RN  Outcome: Adequate for Care Transition  9/18/2023 0802 by Agata Costa RN  Outcome: Progressing  Note: Shift Summary: Discharge Note    Patient Discharged to home on 9/18/2023 1:30 PM via bicycle. .     Patient informed of discharge instructions in AVS. patient verbalizes understanding and denies having any questions pertaining to AVS. Patient stable at time of discharge. Patient denies SI, HI, and thoughts of self harm at time of discharge. All personal belongings returned to patient. Discharge prescriptions sent to [facility] and picked up by nurse via electronic communication. Psych evaluation, history and physical, AVS, and discharge summary faxed to next level of care- info available in Epic for PCP Sindhu Sandoval.     Agata Costa RN  9/18/2023  1:30 PM    Goal: Individualized Daily Interaction Plan (IDIP)  Outcome: Adequate for Care Transition  Goal: Adheres to Safety Considerations for Self and Others  Outcome: Adequate for Care Transition  Intervention: Develop and Maintain Individualized Safety Plan  Recent Flowsheet Documentation  Taken 9/18/2023 1006 by Agata Costa RN  Safety Measures:   environmental rounds completed   safety rounds completed   suicide assessment completed  Goal: Absence of New-Onset Illness or Injury  Outcome: Adequate for Care Transition  Intervention: Identify and Manage Fall Risk  Recent Flowsheet  Documentation  Taken 9/18/2023 1006 by Agata Costa RN  Safety Measures:   environmental rounds completed   safety rounds completed   suicide assessment completed  Goal: Optimized Coping Skills in Response to Life Stressors  Outcome: Adequate for Care Transition  Goal: Develops/Participates in Therapeutic [place] to Support Successful Transition  Outcome: Adequate for Care Transition  Intervention: Foster Therapeutic [place]  Recent Flowsheet Documentation  Taken 9/18/2023 1006 by Agata Costa RN  Trust Relationship/Rapport:   care explained   choices provided   emotional support provided   empathic listening provided   questions answered   thoughts/feelings acknowledged     Problem: Suicide Risk  Goal: Absence of Self-Harm  Description: Patient will be free of suicidal ideation or intent by discharge.  9/18/2023 1131 by Agata Costa RN  Outcome: Adequate for Care Transition  9/18/2023 0802 by Agata Costa RN  Outcome: Progressing     Problem: Suicidal Behavior  Goal: Suicidal Behavior is Absent or Managed  Outcome: Adequate for Care Transition   Goal Outcome Evaluation:    Plan of Care Reviewed With: patient

## 2023-09-18 NOTE — PLAN OF CARE
Problem: Adult Behavioral Health Plan of Care  Goal: Patient-Specific Goal (Individualization)  Description: Patient will sleep 6 to 8 hours per night  Patient will eat at least 50% of meals  Patient will attend at least 50% of groups  Patient will comply with recommendations of treatment team  Patient will remain medication compliant  Patient will be free from self harm or injury    Note:   Rounding complete.  Pt observed sleeping with regular and unlabored respirations.      Pt has been in bed with eyes closed and regular respirations.  15 minute and PRN checks all night.  No complaints offered.  Will continue to monitor.     Pt slept 7 hours    Face to face end of shift communicated to oncoming PRATIK.     Anusha PRICE  September 18, 2023  6:22 AM     Goal Outcome Evaluation:

## 2023-09-18 NOTE — PROGRESS NOTES
Pt is discharging at the recommendation of the treatment team. Pt is discharging to home transported by self. Pt denies having any thoughts of hurting themself or anyone else. Pt denies anxiety or depression. Pt has follow up with PCP. Discharge instructions, including; demographic sheet, psychiatric evaluation, discharge summary, and AVS were faxed to these next level of care providers.     PCP appointment set up. Referral sent to [place].    [address] [place] [address]  [phone]  PCP: Sindhu Sandoval: September 25th @ 9:45am

## 2023-09-18 NOTE — PLAN OF CARE
Face to face shift report received from Anusha PRICE RN. Rounding completed, pt observed.    Problem: Adult Behavioral Health Plan of Care  Goal: Patient-Specific Goal (Individualization)  Description: Patient will sleep 6 to 8 hours per night  Patient will eat at least 50% of meals  Patient will attend at least 50% of groups  Patient will comply with recommendations of treatment team  Patient will remain medication compliant  Patient will be free from self harm or injury    Outcome: Progressing  Note: Shift Summary:     Problem: Suicide Risk  Goal: Absence of Self-Harm  Description: Patient will be free of suicidal ideation or intent by discharge.  Outcome: Progressing  Face to face report will be communicated to oncoming RN.    Agata Costa RN  9/18/2023

## 2023-09-20 ENCOUNTER — TELEPHONE (OUTPATIENT)
Dept: PSYCHIATRY | Facility: OTHER | Age: 23
End: 2023-09-20

## 2023-09-20 ENCOUNTER — TELEPHONE (OUTPATIENT)
Dept: BEHAVIORAL HEALTH | Facility: HOSPITAL | Age: 23
End: 2023-09-20

## 2023-09-20 NOTE — TELEPHONE ENCOUNTER
Tried to reach patient by phone listed in the demographic tab x 2 and phone rings twice and busy signal comes on.  Trying to reach patient for scheduled appointment

## 2023-09-20 NOTE — TELEPHONE ENCOUNTER
"[facility]: Post-Hospital Discharge Note     Situation   Post hospital discharge call placed 09/20/23.    Yudi did not answer. A message was not left as there was not an option to leave a message.  Messages are left with a call back number should they need to reach staff with any questions, need for additional resources, or need assistance setting up a post hospitalization follow up appointments, if unable to do so independently.    Inpatient Mental Health Admission Information:  Admission Date: 9/14/23  Admission Reason: This is a 23 year old female with a [facility] SZAD, bipolar type, KIRSTY, stimulant use disorder (methamphetamine) and cannabis use disorder who presented to [facility] ED with complaints of a panic attack, anxiety and SI with a plan. While in the department 9/14, the DEC and PA assessment note pt has made SI comments with a plan. Pt denies SI and reports the ER \"forced\" her to make these comments. It appears she has not taken her Abilify for approximately 1 month per medication scribe. Last script was filled in July of this year. Also complicating, and likely exacerbating her presentation, is the report that she used methamphetamine and cannabis 3 days ago. These factors could be related to her irritable mood. She does agree to stay voluntary for 5 days, though we had the discussion was that discharge would be based on clinical improvement. She clearly lacks insight into her mental health, likely exacerbated by substance use, and may require an emergency hold if appropriate. Thus, Yudi Morales was admitted to [facility] for further safety and stabilization.      Inpatient Mental Health Discharge Information:  Discharge Date: 9/18/23  Discharged to: Home/ self care  Discharge Diagnosis: Schizoaffective disorder, bipolar type  2.   SI  3.   KIRSTY  4.   Stimulant use disorder, moderate (methamphetamine)  5.   Cannabis use disorder, moderate       Background    The " "following information is obtained from the hospital after visit summary and inpatient provider notes.     \"Legal status: Orders Placed This Encounter      Voluntary     Patient was admitted to unit 5 due to the aforementioned presentation. The patient was placed under 15 minute checks to ensure patient safety. The patient participated in unit programming and groups as able.     Ms. Morales did not require seclusion/restraint during hospitalization.      We reviewed with Ms. Morales current and past medication trials including duration, dose, response and side effects. During this hospitalization, the following changes to the patient's psychotropic medications were made:     PTA psychotropic medications stopped:      -Zoloft     PTA psychotropic medications continued/changed:      -Abilify 5 mg->increased to 10 mg 9/16     New medications tried and stopped:      -None     New medications initiated:      -none     Yudi Morales was admitted to [facility] for SI with a plan. She attributed these thoughts to increased life stressors. Complicating her presentation was the recent use of methamphetamine and cannabis, which she reported she used 3 days prior to admission. She stated that she uses methamphetamine for energy to get simple daily tasks complete because she finds she feels tired often through the days. She uses methamphetamine once every 5 days or so for this reason and not for the \"high\" effets. She stated she uses cannabis to increase her appetite and will only smoke when she notices she is eating less.   She came to the unit quite irritable and angry. She denied have SI and claimed the ER staff \"forced\" her to say she was suicidal. She did, however, agree to stay for 5 days on a voluntary basis. It appears from records that she was taking Zoloft previously, but was held by her PCP as pt mood had not been stabilized. Pt was recently started on Abilify by PCP, which was titrated to 5 mg once " daily. Yudi reports she has been out of this medication for approximately 1 month. We offered to start Abilify at 2 mg, but pt preferred to start at her current dose of 5 mg. She tolerated this initial dose and was titrated to 10 mg during this hospital stay. She tolerated this dose as well with notable improvement in mood/irritability. Her mood improvement likely was influenced by abstinence of substances. We did not continue her Zoloft at this time as her mood improved and could potentially have adverse effects, leading to decompensation. This could be discussed with her provider at a later date, if necessary. We did have multiple discussions related to abstinence in the future. Her main concerns for discharging were getting back to her apartment to ensure she was not evicted, obtaining her therapy dog and reuniting with her significant other.   She did have right hand, wrist and forearm pain from punching a wall approximately 1 week ago. Physical examination revealed mostly diffuse tenderness throughout these areas with some increased tenderness in the anatomical snuffbox of the right wrist. Slight appreciable edema of right wrist when compared to left. CMS of BUE were intact and equal. Xrays were obtained and were interpreted by radiology as negative. Informed pt to follow up with PCP as needed for this issue.      With these changes and supports the patient noticed improvement in their symptoms and felt sufficiently ready for discharge. As a result, Yudi Morales was discharged. At the time of discharge, Yudi Morales was determined to not be a danger to self or others. The patient was also medically stable for discharge. At the current time of discharge, the patient does not meet criteria for involuntary hospitalization. On the day of discharge, the patient reports that they do not have suicidal or homicidal ideation. Steps taken to minimize risk include: assessing patient s behavior and thought process  "daily during hospital stay, discharging patient with adequate plan for follow up for mental and physical health and discussing safety plan of returning to the hospital should the patient ever have thoughts of harming themselves or others. Therefore, based on all available evidence including the factors cited above, the patient does not appear to be at imminent risk for self-harm, and is appropriate for outpatient level of care. However, if patient uses substances or is medication non-adherent, their risk of decompensation and SI will be elevated. This was discussed with the patient.\"      Post Discharge Assessment   How have your symptoms been since being discharged from the hospital? Unable to reach patient  Do you have your discharge instructions/after visit summary? N/A  Do you have any questions related to your discharge instructions? N/A    Discharge Medication Assessment   Medications were reviewed in full on discharge, including: Medications to be started, medications to be stopped, medications to be continued from preadmission and any side effects.      Prescriptions were e-scribed or sent to their preferred pharmacy at discharge and were able to be filled: Yes  Do you have any questions about your medications? N/A    Outpatient Plan/Future Appointments  Discharge follow up appointment scheduled within 14 days of discharging from hospital? Yes   [facility]  [address] [place] [address]  [phone]  PCP: Sinhdu Sandoval: September 25th @ 9:45am      Further information, barriers, or follow up this writer has addressed: N/A    "

## 2023-09-24 ENCOUNTER — APPOINTMENT (OUTPATIENT)
Dept: GENERAL RADIOLOGY | Facility: HOSPITAL | Age: 23
End: 2023-09-24
Attending: PHYSICIAN ASSISTANT
Payer: COMMERCIAL

## 2023-09-24 ENCOUNTER — HOSPITAL ENCOUNTER (EMERGENCY)
Facility: HOSPITAL | Age: 23
Discharge: HOME | End: 2023-09-24
Attending: PHYSICIAN ASSISTANT | Admitting: PHYSICIAN ASSISTANT
Payer: COMMERCIAL

## 2023-09-24 VITALS
TEMPERATURE: 98.2 F | RESPIRATION RATE: 16 BRPM | SYSTOLIC BLOOD PRESSURE: 120 MMHG | HEART RATE: 98 BPM | OXYGEN SATURATION: 98 % | DIASTOLIC BLOOD PRESSURE: 84 MMHG

## 2023-09-24 DIAGNOSIS — M79.604 RIGHT LEG PAIN: ICD-10-CM

## 2023-09-24 LAB — HCG UR QL: NEGATIVE

## 2023-09-24 PROCEDURE — 73502 X-RAY EXAM HIP UNI 2-3 VIEWS: CPT

## 2023-09-24 PROCEDURE — 73562 X-RAY EXAM OF KNEE 3: CPT | Mod: RT

## 2023-09-24 PROCEDURE — 73610 X-RAY EXAM OF ANKLE: CPT | Mod: RT

## 2023-09-24 PROCEDURE — 81025 URINE PREGNANCY TEST: CPT | Performed by: PHYSICIAN ASSISTANT

## 2023-09-24 PROCEDURE — G0463 HOSPITAL OUTPT CLINIC VISIT: HCPCS

## 2023-09-24 PROCEDURE — 250N000013 HC RX MED GY IP 250 OP 250 PS 637: Performed by: PHYSICIAN ASSISTANT

## 2023-09-24 PROCEDURE — 99213 OFFICE O/P EST LOW 20 MIN: CPT | Performed by: PHYSICIAN ASSISTANT

## 2023-09-24 RX ORDER — IBUPROFEN 600 MG/1
600 TABLET, FILM COATED ORAL ONCE
Status: COMPLETED | OUTPATIENT
Start: 2023-09-24 | End: 2023-09-24

## 2023-09-24 RX ADMIN — IBUPROFEN 600 MG: 600 TABLET ORAL at 13:08

## 2023-09-24 ASSESSMENT — ACTIVITIES OF DAILY LIVING (ADL): ADLS_ACUITY_SCORE: 33

## 2023-09-24 NOTE — ED PROVIDER NOTES
"  History     Chief Complaint   Patient presents with    Leg Pain     HPI  Yudi Morales is a 23 year old female with h/o schizoaffective disorder who presents with right leg pain. Denies injury. States her right hip, knee, and ankle are painful, feels like they are broken. She walks many miles per day. She was admitted to the inpatient psych unit 10 days ago. Denies SI/HI.     She is also requesting a taxi voucher and a \"food box.\" She was told by nursing that we no longer had out taxi vouchers. I told pt we do not provide food boxes unless pt has been here for a prolonged period of time. We will certainly address her concern of leg pain but we cannot provide her a meal each time she comes. She was also seen taking a large amount of our cleaning wipes and putting them into a bag for home use in her back pack. Advised her that she may not take anymore as these are for hospital use only.     Allergies:  Allergies   Allergen Reactions    Other [No Clinical Screening - See Comments] Anaphylaxis     \"Sodium Penathol\" per mother. Used in anesthesia.  Family hx of reaction.    Latex Rash    Amoxicillin Trihydrate GI Disturbance    Amoxicillin-Pot Clavulanate GI Disturbance    Phenobarbital     Thiopental      Other reaction(s): Other - Describe In Comment Field  Family Hx of death with this medication       Problem List:    Patient Active Problem List    Diagnosis Date Noted    Suicidal ideation 09/14/2023     Priority: Medium    Pituitary microadenoma (H) 07/21/2023     Priority: Medium     1/2023 5 x 5 mm hypoenhancing focus in the right pituitary gland  High prolactin resolved with stopping risperidone.   Cortisol and IGF-1 within normal limits       Marijuana use 07/21/2023     Priority: Medium    Tympanosclerosis of both ears 11/15/2022     Priority: Medium    Postnasal drip 11/15/2022     Priority: Medium    Primary insomnia 11/15/2022     Priority: Medium    Constipation, unspecified constipation type 11/15/2022 "     Priority: Medium    History of suicide attempt 10/28/2022     Priority: Medium     2020 - tylenol overdose      Intellectual disability 10/28/2022     Priority: Medium    Schizoaffective disorder, bipolar type (H) 07/08/2021     Priority: Medium     richard Farr with [facility]      KIRSTY (generalized anxiety disorder) 07/19/2019     Priority: Medium    Allergic rhinitis 10/04/2011     Priority: Medium        Past Medical History:    Past Medical History:   Diagnosis Date    Allergic rhinitis 10/4/2011    KIRSTY (generalized anxiety disorder) 7/19/2019    Hyperprolactinemia (H) 11/16/2022    PTSD (post-traumatic stress disorder)     Suicidal behavior with attempted self-injury (H) 2/2/2021       Past Surgical History:    Past Surgical History:   Procedure Laterality Date    TONSILLECTOMY & ADENOIDECTOMY  2008       Family History:    No family history on file.    Social History:  Marital Status:  Single [1]  Social History     Tobacco Use    Smoking status: Every Day     Types: Vaping Device    Smokeless tobacco: Never   Vaping Use    Vaping Use: Every day    Substances: Nicotine, CBD, Flavoring    Devices: Disposable   Substance Use Topics    Alcohol use: No     Comment: only on holidays    Drug use: No        Medications:    ARIPiprazole (ABILIFY) 10 MG tablet          Review of Systems   All other systems reviewed and are negative.      Physical Exam   BP: 120/84  Pulse: 98  Temp: 98.2  F (36.8  C)  Resp: 16  SpO2: 98 %      Physical Exam  Vitals and nursing note reviewed.   Constitutional:       Appearance: Normal appearance.      Comments: Disheveled appearance.    HENT:      Head: Normocephalic and atraumatic.   Cardiovascular:      Rate and Rhythm: Normal rate and regular rhythm.      Pulses: Normal pulses.      Heart sounds: Normal heart sounds.   Musculoskeletal:         General: Normal range of motion.      Right hip: Tenderness and bony tenderness present. No deformity, lacerations or  crepitus. Normal range of motion. Normal strength.      Right upper leg: Normal.      Right knee: Bony tenderness present. No swelling, deformity, effusion, erythema, ecchymosis, lacerations or crepitus. Normal range of motion. No LCL laxity, MCL laxity, ACL laxity or PCL laxity. Normal alignment, normal meniscus and normal patellar mobility. Normal pulse.      Right lower leg: Normal.      Right ankle: No swelling, deformity, ecchymosis or lacerations. Tenderness present. Normal range of motion.      Right Achilles Tendon: Normal.      Right foot: Normal.   Skin:     General: Skin is warm.      Capillary Refill: Capillary refill takes less than 2 seconds.   Neurological:      General: No focal deficit present.      Mental Status: She is alert.   Psychiatric:         Attention and Perception: Attention normal.         Mood and Affect: Mood normal.         Speech: Speech is tangential.         Behavior: Behavior is cooperative.         Thought Content: Thought content is not paranoid or delusional. Thought content does not include homicidal or suicidal ideation. Thought content does not include homicidal or suicidal plan.         Cognition and Memory: Cognition normal.         Judgment: Judgment is impulsive.         ED Course                 Procedures            Results for orders placed or performed during the hospital encounter of 09/24/23 (from the past 24 hour(s))   HCG qualitative urine   Result Value Ref Range    hCG Urine Qualitative Negative Negative   XR Pelvis and Hip Right 2 Views    Narrative    PROCEDURE:  XR PELVIS AND HIP RIGHT 2 VIEWS    HISTORY: pain    COMPARISON:  None.    TECHNIQUE:  AP pelvis and two-view right hip    FINDINGS:    No acute osseous pathology. No suspicious osseous lesion. The joints  are appropriately aligned. Hip joint spaces are preserved. Underlying  cortical irregularity along the lateral femoral head.    No foreign body.    Lower lumbar spondylosis and degenerative changes  of the sacroiliac  joints. Nonobstructive bowel gas pattern.      Impression    IMPRESSION:   Undulating cortical irregularity on the lateral right femoral head,  likely sequela of impingement. Defect is of indeterminate chronicity.    DANILO CONKLIN MD         SYSTEM ID:  [id number]   XR Knee Right 3 Views    Narrative    PROCEDURE:  XR KNEE RIGHT 3 VIEWS    HISTORY: pain    COMPARISON:  None.    TECHNIQUE:  XR KNEE RIGHT 3 VIEWS    FINDINGS:    No acute osseous abnormality. Joints are appropriately aligned. Joint  spaces are preserved.     No patellar tilt or lateral subluxation. No joint effusion. Soft  tissue is unremarkable.      Impression    IMPRESSION:   No acute osseous abnormality.    DANILO CONKLIN MD         SYSTEM ID:  [id number]   Ankle XR, G/E 3 views, right    Narrative    PROCEDURE:  XR ANKLE RIGHT G/E 3 VIEWS    HISTORY: pain    COMPARISON:  None.    TECHNIQUE:  XR ANKLE RIGHT 3 VIEWS    FINDINGS:   No fracture or dislocation is identified. No suspicious osseous  lesion. The joint spaces are preserved.     No foreign body is seen.     Soft tissues are within normal limits.        Impression    IMPRESSION:   No acute osseous abnormality.    DANILO CONKLIN MD         SYSTEM ID:  [id number]       Medications   ibuprofen (ADVIL/MOTRIN) tablet 600 mg (600 mg Oral $Given 9/24/23 1305)       Assessments & Plan (with Medical Decision Making)   Pt presents with concern of right hip, knee, and ankle pain. She was given Ibuprofen 600mg PO for pain. On exam, no obvious trauma or swelling. She is tender with palpation to the right hip, knee, and ankle. HCG negative. X-rays of the hip, knee, and ankle are negative for acute fracture. The right hip x-ray shows possible signs of impingement as above. I strongly recommended she rest, avoid walking miles per day until her pain heals. Also encouraged ibuprofen for pain. She was discharged home in good condition following. No limping noted. Let with her boyfriend and their  dog.     Plan:   Rest.   You must stop walking so many miles a day for this pain to improve.   Take Ibuprofen for pain, as directed.   Follow up with primary care if pain persists despite rest.     I have reviewed the nursing notes.    I have reviewed the findings, diagnosis, plan and need for follow up with the patient.      New Prescriptions    No medications on file       Final diagnoses:   Right leg pain       9/24/2023   [facility]

## 2023-09-24 NOTE — ED TRIAGE NOTES
Patient states her right leg hurts.     Triage Assessment       Row Name 09/24/23 3221       Triage Assessment (Adult)    Airway WDL WDL       Respiratory WDL    Respiratory WDL WDL       Skin Circulation/Temperature WDL    Skin Circulation/Temperature WDL WDL       Cardiac WDL    Cardiac WDL WDL       Peripheral/Neurovascular WDL    Peripheral Neurovascular WDL WDL       Cognitive/Neuro/Behavioral WDL    Cognitive/Neuro/Behavioral WDL WDL

## 2023-09-24 NOTE — ED TRIAGE NOTES
Patient presents to urgent care for right leg pain that she states the pain is 7-20/10.  Patient had a verbal fight with her boyfriend and is in need of help she states. Would like Social workers number and a box of food.  Patient would like a xray of her whole leg.     Triage Assessment       Row Name 09/24/23 1123       Triage Assessment (Adult)    Airway WDL WDL       Respiratory WDL    Respiratory WDL WDL       Skin Circulation/Temperature WDL    Skin Circulation/Temperature WDL WDL       Cardiac WDL    Cardiac WDL WDL       Peripheral/Neurovascular WDL    Peripheral Neurovascular WDL WDL       Cognitive/Neuro/Behavioral WDL    Cognitive/Neuro/Behavioral WDL WDL

## 2023-09-24 NOTE — DISCHARGE INSTRUCTIONS
Rest.   You must stop walking so many miles a day for this pain to improve.   Take Ibuprofen for pain, as directed.   Follow up with primary care if pain persists despite rest.

## 2023-09-25 ENCOUNTER — TELEPHONE (OUTPATIENT)
Dept: FAMILY MEDICINE | Facility: OTHER | Age: 23
End: 2023-09-25

## 2023-09-25 NOTE — TELEPHONE ENCOUNTER
10:21 AM    Reason for Call: Phone Call    Description: Patient wanted Dr Sandoval to be notified she apologizes for missing her hospital follow up appointment today the bus didn't show up today and she just moved into her new apartment and she her phone isn't available until she has funds until next week and she not sure what her new address is right now.      Was an appointment offered for this call? No  If yes : Appointment type              Date    Preferred method for responding to this message: Telephone Call  What is your phone number ? Phone isn't available until next week when she has funds and she had no other phone for contact    If we cannot reach you directly, may we leave a detailed response at the number you provided? Yes    Can this message wait until your PCP/provider returns, if available today? YES, No

## 2023-10-25 ENCOUNTER — OFFICE VISIT (OUTPATIENT)
Dept: PSYCHIATRY | Facility: OTHER | Age: 23
End: 2023-10-25
Attending: PSYCHIATRY & NEUROLOGY
Payer: COMMERCIAL

## 2023-10-25 VITALS
WEIGHT: 121.6 LBS | HEART RATE: 85 BPM | SYSTOLIC BLOOD PRESSURE: 86 MMHG | TEMPERATURE: 99.2 F | OXYGEN SATURATION: 97 % | DIASTOLIC BLOOD PRESSURE: 62 MMHG | BODY MASS INDEX: 19.05 KG/M2

## 2023-10-25 DIAGNOSIS — F25.0 SCHIZOAFFECTIVE DISORDER, BIPOLAR TYPE (H): Primary | ICD-10-CM

## 2023-10-25 PROCEDURE — G0463 HOSPITAL OUTPT CLINIC VISIT: HCPCS

## 2023-10-25 PROCEDURE — 99205 OFFICE O/P NEW HI 60 MIN: CPT | Performed by: PSYCHIATRY & NEUROLOGY

## 2023-10-25 RX ORDER — ARIPIPRAZOLE 30 MG/1
30 TABLET ORAL DAILY
Qty: 30 TABLET | Refills: 4 | Status: SHIPPED | OUTPATIENT
Start: 2023-10-25 | End: 2024-01-29 | Stop reason: SINTOL

## 2023-10-25 ASSESSMENT — ANXIETY QUESTIONNAIRES
IF YOU CHECKED OFF ANY PROBLEMS ON THIS QUESTIONNAIRE, HOW DIFFICULT HAVE THESE PROBLEMS MADE IT FOR YOU TO DO YOUR WORK, TAKE CARE OF THINGS AT HOME, OR GET ALONG WITH OTHER PEOPLE: EXTREMELY DIFFICULT
GAD7 TOTAL SCORE: 19
5. BEING SO RESTLESS THAT IT IS HARD TO SIT STILL: NEARLY EVERY DAY
7. FEELING AFRAID AS IF SOMETHING AWFUL MIGHT HAPPEN: SEVERAL DAYS
GAD7 TOTAL SCORE: 19
6. BECOMING EASILY ANNOYED OR IRRITABLE: NEARLY EVERY DAY
1. FEELING NERVOUS, ANXIOUS, OR ON EDGE: NEARLY EVERY DAY
2. NOT BEING ABLE TO STOP OR CONTROL WORRYING: NEARLY EVERY DAY
3. WORRYING TOO MUCH ABOUT DIFFERENT THINGS: NEARLY EVERY DAY
4. TROUBLE RELAXING: NEARLY EVERY DAY

## 2023-10-25 ASSESSMENT — PATIENT HEALTH QUESTIONNAIRE - PHQ9
10. IF YOU CHECKED OFF ANY PROBLEMS, HOW DIFFICULT HAVE THESE PROBLEMS MADE IT FOR YOU TO DO YOUR WORK, TAKE CARE OF THINGS AT HOME, OR GET ALONG WITH OTHER PEOPLE: EXTREMELY DIFFICULT
SUM OF ALL RESPONSES TO PHQ QUESTIONS 1-9: 18
SUM OF ALL RESPONSES TO PHQ QUESTIONS 1-9: 18

## 2023-10-25 ASSESSMENT — PAIN SCALES - GENERAL: PAINLEVEL: SEVERE PAIN (6)
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
## 2023-10-28 ENCOUNTER — HOSPITAL ENCOUNTER (EMERGENCY)
Facility: HOSPITAL | Age: 23
Discharge: HOME | End: 2023-10-28
Attending: NURSE PRACTITIONER | Admitting: NURSE PRACTITIONER
Payer: COMMERCIAL

## 2023-10-28 VITALS
RESPIRATION RATE: 22 BRPM | HEART RATE: 83 BPM | OXYGEN SATURATION: 99 % | DIASTOLIC BLOOD PRESSURE: 69 MMHG | TEMPERATURE: 98.4 F | SYSTOLIC BLOOD PRESSURE: 108 MMHG

## 2023-10-28 DIAGNOSIS — R07.9 CHEST PAIN, UNSPECIFIED TYPE: ICD-10-CM

## 2023-10-28 LAB
ALBUMIN SERPL BCG-MCNC: 4.3 G/DL (ref 3.5–5.2)
ALBUMIN UR-MCNC: 10 MG/DL
ALP SERPL-CCNC: 87 U/L (ref 35–104)
ALT SERPL W P-5'-P-CCNC: 11 U/L (ref 0–50)
ANION GAP SERPL CALCULATED.3IONS-SCNC: 9 MMOL/L (ref 7–15)
APPEARANCE UR: CLEAR
APTT PPP: 33 SECONDS (ref 22–38)
AST SERPL W P-5'-P-CCNC: 17 U/L (ref 0–45)
BASOPHILS # BLD AUTO: 0 10E3/UL (ref 0–0.2)
BASOPHILS NFR BLD AUTO: 1 %
BILIRUB SERPL-MCNC: 0.3 MG/DL
BILIRUB UR QL STRIP: NEGATIVE
BUN SERPL-MCNC: 6.4 MG/DL (ref 6–20)
CALCIUM SERPL-MCNC: 9.3 MG/DL (ref 8.6–10)
CHLORIDE SERPL-SCNC: 104 MMOL/L (ref 98–107)
COLOR UR AUTO: ABNORMAL
CREAT SERPL-MCNC: 0.82 MG/DL (ref 0.51–0.95)
DEPRECATED HCO3 PLAS-SCNC: 25 MMOL/L (ref 22–29)
EGFRCR SERPLBLD CKD-EPI 2021: >90 ML/MIN/1.73M2
EOSINOPHIL # BLD AUTO: 0.1 10E3/UL (ref 0–0.7)
EOSINOPHIL NFR BLD AUTO: 3 %
ERYTHROCYTE [DISTWIDTH] IN BLOOD BY AUTOMATED COUNT: 12.6 % (ref 10–15)
GLUCOSE SERPL-MCNC: 89 MG/DL (ref 70–99)
GLUCOSE UR STRIP-MCNC: NEGATIVE MG/DL
HCG UR QL: NEGATIVE
HCT VFR BLD AUTO: 42.4 % (ref 35–47)
HGB BLD-MCNC: 14 G/DL (ref 11.7–15.7)
HGB UR QL STRIP: ABNORMAL
HOLD SPECIMEN: NORMAL
HOLD SPECIMEN: NORMAL
IMM GRANULOCYTES # BLD: 0 10E3/UL
IMM GRANULOCYTES NFR BLD: 0 %
INR PPP: 1.08 (ref 0.85–1.15)
KETONES UR STRIP-MCNC: NEGATIVE MG/DL
LEUKOCYTE ESTERASE UR QL STRIP: NEGATIVE
LYMPHOCYTES # BLD AUTO: 1.5 10E3/UL (ref 0.8–5.3)
LYMPHOCYTES NFR BLD AUTO: 37 %
MAGNESIUM SERPL-MCNC: 1.8 MG/DL (ref 1.7–2.3)
MCH RBC QN AUTO: 28.9 PG (ref 26.5–33)
MCHC RBC AUTO-ENTMCNC: 33 G/DL (ref 31.5–36.5)
MCV RBC AUTO: 88 FL (ref 78–100)
MONOCYTES # BLD AUTO: 0.4 10E3/UL (ref 0–1.3)
MONOCYTES NFR BLD AUTO: 10 %
MUCOUS THREADS #/AREA URNS LPF: PRESENT /LPF
NEUTROPHILS # BLD AUTO: 2.1 10E3/UL (ref 1.6–8.3)
NEUTROPHILS NFR BLD AUTO: 49 %
NITRATE UR QL: NEGATIVE
NRBC # BLD AUTO: 0 10E3/UL
NRBC BLD AUTO-RTO: 0 /100
PH UR STRIP: 5.5 [PH] (ref 4.7–8)
PLATELET # BLD AUTO: 236 10E3/UL (ref 150–450)
POTASSIUM SERPL-SCNC: 3.7 MMOL/L (ref 3.4–5.3)
PROT SERPL-MCNC: 7.3 G/DL (ref 6.4–8.3)
RBC # BLD AUTO: 4.84 10E6/UL (ref 3.8–5.2)
RBC URINE: 49 /HPF
SODIUM SERPL-SCNC: 138 MMOL/L (ref 135–145)
SP GR UR STRIP: 1.02 (ref 1–1.03)
SQUAMOUS EPITHELIAL: 1 /HPF
TROPONIN T SERPL HS-MCNC: <6 NG/L
UROBILINOGEN UR STRIP-MCNC: NORMAL MG/DL
WBC # BLD AUTO: 4.2 10E3/UL (ref 4–11)
WBC URINE: 3 /HPF

## 2023-10-28 PROCEDURE — 36415 COLL VENOUS BLD VENIPUNCTURE: CPT | Performed by: NURSE PRACTITIONER

## 2023-10-28 PROCEDURE — 81025 URINE PREGNANCY TEST: CPT | Performed by: NURSE PRACTITIONER

## 2023-10-28 PROCEDURE — 84484 ASSAY OF TROPONIN QUANT: CPT | Performed by: NURSE PRACTITIONER

## 2023-10-28 PROCEDURE — 85610 PROTHROMBIN TIME: CPT | Performed by: NURSE PRACTITIONER

## 2023-10-28 PROCEDURE — 99284 EMERGENCY DEPT VISIT MOD MDM: CPT | Performed by: NURSE PRACTITIONER

## 2023-10-28 PROCEDURE — 85025 COMPLETE CBC W/AUTO DIFF WBC: CPT | Performed by: NURSE PRACTITIONER

## 2023-10-28 PROCEDURE — 93005 ELECTROCARDIOGRAM TRACING: CPT | Performed by: NURSE PRACTITIONER

## 2023-10-28 PROCEDURE — 93010 ELECTROCARDIOGRAM REPORT: CPT | Performed by: INTERNAL MEDICINE

## 2023-10-28 PROCEDURE — 83735 ASSAY OF MAGNESIUM: CPT | Performed by: NURSE PRACTITIONER

## 2023-10-28 PROCEDURE — 81001 URINALYSIS AUTO W/SCOPE: CPT | Performed by: NURSE PRACTITIONER

## 2023-10-28 PROCEDURE — 85730 THROMBOPLASTIN TIME PARTIAL: CPT | Performed by: NURSE PRACTITIONER

## 2023-10-28 PROCEDURE — 80053 COMPREHEN METABOLIC PANEL: CPT | Performed by: NURSE PRACTITIONER

## 2023-10-28 ASSESSMENT — ENCOUNTER SYMPTOMS: CONSTITUTIONAL NEGATIVE: 1

## 2023-10-28 ASSESSMENT — ACTIVITIES OF DAILY LIVING (ADL)
ADLS_ACUITY_SCORE: 35

## 2023-10-28 NOTE — ED NOTES
Pt just came to the desk to request to be able to go outside to vape, and find out if her lab results were available.

## 2023-10-28 NOTE — ED PROVIDER NOTES
"  History     Chief Complaint   Patient presents with    Dizziness    Vaginal Bleeding     HPI  Yudi Morales is a 23 year old individual with history of generalized anxiety disorder, schizoaffective disorder, intellectual disability, comes in for multiple complaints.  Patient states that she has a \"brain mass\" that is probably getting bigger and causing other symptoms.  Patient states has had vaginal bleeding for over a month and changing 4 pads daily.  Also has been having syncopal episodes at home where her significant other catches her and places her on the couch.  Continues to have dizziness for this.  Is also experiencing right-sided chest pain.    Patient denies fever or chills.  Denies nausea or vomiting.  Denies diarrhea.  Denies any dysuria or hematuria.  States vaginal bleeding is dark with clots.  Does admit to using methamphetamine 3 weeks ago but nothing recently.  Patient states that she is sick of feeling the way she is so comes in today for these symptoms.    Allergies:  Allergies   Allergen Reactions    Other [No Clinical Screening - See Comments] Anaphylaxis     \"Sodium Penathol\" per mother. Used in anesthesia.  Family hx of reaction.    Latex Rash    Amoxicillin Trihydrate GI Disturbance    Amoxicillin-Pot Clavulanate GI Disturbance    Phenobarbital     Thiopental      Other reaction(s): Other - Describe In Comment Field  Family Hx of death with this medication       Problem List:    Patient Active Problem List    Diagnosis Date Noted    Suicidal ideation 09/14/2023     Priority: Medium    Pituitary microadenoma (H) 07/21/2023     Priority: Medium     1/2023 5 x 5 mm hypoenhancing focus in the right pituitary gland  High prolactin resolved with stopping risperidone.   Cortisol and IGF-1 within normal limits       Marijuana use 07/21/2023     Priority: Medium    Tympanosclerosis of both ears 11/15/2022     Priority: Medium    Postnasal drip 11/15/2022     Priority: Medium    Primary insomnia " 11/15/2022     Priority: Medium    Constipation, unspecified constipation type 11/15/2022     Priority: Medium    History of suicide attempt 10/28/2022     Priority: Medium     2020 - tylenol overdose      Intellectual disability 10/28/2022     Priority: Medium    Schizoaffective disorder, bipolar type (H) 07/08/2021     Priority: Medium     richard Farr with [facility]      KIRSTY (generalized anxiety disorder) 07/19/2019     Priority: Medium    Allergic rhinitis 10/04/2011     Priority: Medium        Past Medical History:    Past Medical History:   Diagnosis Date    Allergic rhinitis 10/4/2011    KIRSTY (generalized anxiety disorder) 7/19/2019    Hyperprolactinemia (H24) 11/16/2022    PTSD (post-traumatic stress disorder)     Suicidal behavior with attempted self-injury (H) 2/2/2021       Past Surgical History:    Past Surgical History:   Procedure Laterality Date    TONSILLECTOMY & ADENOIDECTOMY  2008       Family History:    No family history on file.    Social History:  Marital Status:  Single [1]  Social History     Tobacco Use    Smoking status: Every Day     Types: Vaping Device    Smokeless tobacco: Never   Vaping Use    Vaping Use: Every day    Substances: Nicotine, CBD, Flavoring    Devices: Disposable   Substance Use Topics    Alcohol use: No     Comment: only on holidays    Drug use: No        Medications:    ARIPiprazole (ABILIFY) 30 MG tablet          Review of Systems   Constitutional: Negative.    HENT: Negative.         Physical Exam   BP: 108/69  Pulse: 83  Temp: 98.4  F (36.9  C)  Resp: 22  SpO2: 99 %      GENERAL APPEARANCE:  The patient is a 23 year old thin, ill-appearing individual, that appears to be in a manic state.  NECK:  Supple.  Trachea is midline.  No carotid bruits present on auscultation.  CHEST:  Symmetric.  Non-tender to palpation.  No crepitus or deformity.  LUNGS:  Breathing is easy.  Breath sounds are equal and clear bilaterally.  No wheezes, rhonchi, or rales.  HEART:   Regular rate and rhythm with normal S1 and S2.  No murmurs, gallops, or rubs.  ABDOMEN:  Soft, flat, and benign.  No mass, tenderness, guarding, or rebound.  No organomegaly or hernia.  Bowel sounds are present.  No CVA tenderness or flank mass.  No abdominal bruits or thrills present upon auscultation/palpation.  NEUROLOGIC:  No focal sensory or motor deficits are noted.   PSYCHIATRIC:  The patient is awake, alert, and oriented x4.  Recent and remote memory is intact.  Patient appears to be in manic state with rapid speech with multiple complaints.  SKIN:  Warm, dry, and well perfused.  Good turgor.  No lesions, nodules, or rashes are noted.  No bruising noted.      Comment: Discrepancies between my note and notes on behalf of the nursing team or other care providers are secondary to my findings reflecting my physical examination and questioning of the patient.  Any conflicting information provided is not in line with my examination of the patient.       ED Course              ED Course as of 10/28/23 1536   Sat Oct 28, 2023   1245 Labs, ECG, chest x-ray ordered while patient in lobby.   1335 In to see patient and history/physical completed.    1353 EKG 12-lead, tracing only  No STEMI noted.   1503 Patient does not want to stay and await testing as she has to go to some sort of engagement.  Discussed that lab work at this point is benign.  With patient having normal lab work and normal vital signs with longstanding complaints, will discharge patient home.            ECG:    ECG competed at 1350 and personally reviewed at 1353 showing sinus bradycardia with sinus arrhythmia.  Ventricular rate 53.  QTc 380.  Normal axis.  Normal ECG.  No previous ECG available for comparison.         Results for orders placed or performed during the hospital encounter of 10/28/23 (from the past 24 hour(s))   [facility] Draw *Canceled*    Narrative    The following orders were created for panel order [facility] Draw.  Procedure                                Abnormality         Status                     ---------                               -----------         ------                       Please view results for these tests on the individual orders.   CBC with platelets differential    Narrative    The following orders were created for panel order CBC with platelets differential.  Procedure                               Abnormality         Status                     ---------                               -----------         ------                     CBC with platelets and d...[[id number]]                      Final result                 Please view results for these tests on the individual orders.   INR   Result Value Ref Range    INR 1.08 0.85 - 1.15   Partial thromboplastin time   Result Value Ref Range    aPTT 33 22 - 38 Seconds   Comprehensive metabolic panel   Result Value Ref Range    Sodium 138 135 - 145 mmol/L    Potassium 3.7 3.4 - 5.3 mmol/L    Carbon Dioxide (CO2) 25 22 - 29 mmol/L    Anion Gap 9 7 - 15 mmol/L    Urea Nitrogen 6.4 6.0 - 20.0 mg/dL    Creatinine 0.82 0.51 - 0.95 mg/dL    GFR Estimate >90 >60 mL/min/1.73m2    Calcium 9.3 8.6 - 10.0 mg/dL    Chloride 104 98 - 107 mmol/L    Glucose 89 70 - 99 mg/dL    Alkaline Phosphatase 87 35 - 104 U/L    AST 17 0 - 45 U/L    ALT 11 0 - 50 U/L    Protein Total 7.3 6.4 - 8.3 g/dL    Albumin 4.3 3.5 - 5.2 g/dL    Bilirubin Total 0.3 <=1.2 mg/dL   Magnesium   Result Value Ref Range    Magnesium 1.8 1.7 - 2.3 mg/dL   Troponin T, High Sensitivity   Result Value Ref Range    Troponin T, High Sensitivity <6 <=14 ng/L   CBC with platelets and differential   Result Value Ref Range    WBC Count 4.2 4.0 - 11.0 10e3/uL    RBC Count 4.84 3.80 - 5.20 10e6/uL    Hemoglobin 14.0 11.7 - 15.7 g/dL    Hematocrit 42.4 35.0 - 47.0 %    MCV 88 78 - 100 fL    MCH 28.9 26.5 - 33.0 pg    MCHC 33.0 31.5 - 36.5 g/dL    RDW 12.6 10.0 - 15.0 %    Platelet Count 236 150 - 450 10e3/uL    % Neutrophils 49 %    %  Lymphocytes 37 %    % Monocytes 10 %    % Eosinophils 3 %    % Basophils 1 %    % Immature Granulocytes 0 %    NRBCs per 100 WBC 0 <1 /100    Absolute Neutrophils 2.1 1.6 - 8.3 10e3/uL    Absolute Lymphocytes 1.5 0.8 - 5.3 10e3/uL    Absolute Monocytes 0.4 0.0 - 1.3 10e3/uL    Absolute Eosinophils 0.1 0.0 - 0.7 10e3/uL    Absolute Basophils 0.0 0.0 - 0.2 10e3/uL    Absolute Immature Granulocytes 0.0 <=0.4 10e3/uL    Absolute NRBCs 0.0 10e3/uL   EKG 12-lead, tracing only   Result Value Ref Range    Systolic Blood Pressure  mmHg    Diastolic Blood Pressure  mmHg    Ventricular Rate 53 BPM    Atrial Rate 53 BPM    SC Interval 120 ms    QRS Duration 80 ms     ms    QTc 380 ms    P Axis 51 degrees    R AXIS 74 degrees    T Axis 50 degrees    Interpretation ECG       Sinus bradycardia with marked sinus arrhythmia  Otherwise normal ECG  No previous ECGs available     Extra Tube    Narrative    The following orders were created for panel order Extra Tube.  Procedure                               Abnormality         Status                     ---------                               -----------         ------                     Extra Red Top Tube[[id number]]                               Final result               Extra Heparinized Syringe[[id number]]                        Final result                 Please view results for these tests on the individual orders.   Extra Red Top Tube   Result Value Ref Range    Hold Specimen JIC    Extra Heparinized Syringe   Result Value Ref Range    Hold Specimen JIC    UA with Microscopic reflex to Culture    Specimen: Urine, Midstream   Result Value Ref Range    Color Urine Light Yellow Colorless, Straw, Light Yellow, Yellow    Appearance Urine Clear Clear    Glucose Urine Negative Negative mg/dL    Bilirubin Urine Negative Negative    Ketones Urine Negative Negative mg/dL    Specific Gravity Urine 1.018 1.003 - 1.035    Blood Urine Moderate (A) Negative    pH Urine 5.5 4.7 - 8.0     "Protein Albumin Urine 10 (A) Negative mg/dL    Urobilinogen Urine Normal Normal, 2.0 mg/dL    Nitrite Urine Negative Negative    Leukocyte Esterase Urine Negative Negative    Mucus Urine Present (A) None Seen /LPF    RBC Urine 49 (H) <=2 /HPF    WBC Urine 3 <=5 /HPF    Squamous Epithelials Urine 1 <=1 /HPF    Narrative    Urine Culture not indicated   HCG qualitative urine (UPT)   Result Value Ref Range    hCG Urine Qualitative Negative Negative       Medications - No data to display    Assessments & Plan (with Medical Decision Making)     I have reviewed the nursing notes.    I have reviewed the findings, diagnosis, plan and need for follow up with the patient.      Summary:  Patient presents to the ER today for multiple longstanding complaints including \"brain mass\" chest pain, vaginal bleeding, syncope.  Potential diagnosis which have been considered and evaluated include psychiatric disorder, electrolyte abnormality, pregnancy, drug intoxication, UTI, as well as others. Many of these have been excluded using the various modalities and assessment as noted on the chart. At the present time, the diagnosis given seems to be the most likely complaints of chest pain and multiple other verbal complaints.  Upon arrival, vitals signs are normal.  The patient is alert but in a manic type state upon arrival.  Patient has multiple complaints that are longstanding over 2 months.  ECG was obtained showing no acute abnormalities.  High-sensitivity troponin negative making ACS unlikely as symptoms have been going on for over 2 months.  Lab work shows WBC of 4.2 with hemoglobin of 14.0 with normal electrolytes, renal, hepatic functions.  Coags are negative.  UA with moderate blood and 49 RBC's but no leukocyte esterase or nitrates.  Pregnancy negative.  As patient is doing well with normal labs, vital signs, patient is also requesting to leave.  At this time no life threatening/emergent findings noted.  We will discharge patient " home to follow-up with PCP in regards to her multiple complaints.  Advised patient to return if worsening.  Patient verbalized understanding agrees to plan of care.  Patient discharged home.      Critical Care Time: None    Impression and plan discussed with patient. Questions answered, concerns addressed, indications for urgent re-evaluation reviewed, and  given. Patient/Parent/Caregiver agree with treatment plan and have no further questions at this time.  AVS provided at discharge.    This note was created by the Dragon Voice Dictation System. Inadvertent typographical errors, due to software recognition problems, may still exist.             New Prescriptions    No medications on file       Final diagnoses:   Chest pain, unspecified type       10/28/2023   [facility]       Neel Zimmer, AMINA CNP  10/28/23 1538

## 2023-10-28 NOTE — ED NOTES
"Pt presents to the ED with multiple complaints. Pt is very disheveled looking, clothing is dirty with dried blood stains throughout. Pt speech is pressured, rapid, disorganized at times. Pt original starting complaint was that she had heavy vaginal bleeding that has been on going for a month and half, pt unsure about how much or how often she is changing pads/tampons. Pt also states that she has a \"brain tumor \" that needs to be checked, and she has not had a follow up, unable to get into primary. Pt states she is also has been dizzy, having \"fainting\" spells.  Pt states she has not been drinking alcohol, has not used meth is 3 weeks and is sober. Pt immediately requests coffee upon me trying to leave the room, then follows me out of the room for assistance with the WIFI.   "

## 2023-10-28 NOTE — ED TRIAGE NOTES
"Pt in for evaluation of dizziness intermittent over the last 2-3 months. Reports she has had multiple syncopal episodes over the last month the last one was 3 days ago. Reports she has been experiencing ongoing vaginal bleeding the last month. States she has been using pads with tampons to control the bleeding, and is needing to change them 3-4 times a day because she is bleeding through to her clothing. Pt is also concerned she has a \"brain tumor\" and it may be causing most of her current symptoms.She would like to be tested for cancer as she has not been able to gain weight over the last year. Intermittent nausea, no vomiting or diarrhea. Pt very unkempt, hair and clothing appear very dirty.         "

## 2023-10-29 NOTE — ED NOTES
Provider notified pt was wishing to leave AMA, provider in to speak with pt, and discharge instructions given to pt by provider. Pt walked out without discharge paperwork.

## 2023-10-30 LAB
ATRIAL RATE - MUSE: 53 BPM
DIASTOLIC BLOOD PRESSURE - MUSE: NORMAL MMHG
INTERPRETATION ECG - MUSE: NORMAL
P AXIS - MUSE: 51 DEGREES
PR INTERVAL - MUSE: 120 MS
QRS DURATION - MUSE: 80 MS
QT - MUSE: 406 MS
QTC - MUSE: 380 MS
R AXIS - MUSE: 74 DEGREES
SYSTOLIC BLOOD PRESSURE - MUSE: NORMAL MMHG
T AXIS - MUSE: 50 DEGREES
VENTRICULAR RATE- MUSE: 53 BPM

## 2024-01-16 ENCOUNTER — HOSPITAL ENCOUNTER (EMERGENCY)
Facility: HOSPITAL | Age: 24
Discharge: HOME | End: 2024-01-16
Attending: INTERNAL MEDICINE | Admitting: INTERNAL MEDICINE
Payer: COMMERCIAL

## 2024-01-16 VITALS
HEART RATE: 97 BPM | SYSTOLIC BLOOD PRESSURE: 128 MMHG | DIASTOLIC BLOOD PRESSURE: 95 MMHG | TEMPERATURE: 98.7 F | OXYGEN SATURATION: 98 % | RESPIRATION RATE: 18 BRPM

## 2024-01-16 DIAGNOSIS — N93.9 ABNORMAL UTERINE BLEEDING (AUB): ICD-10-CM

## 2024-01-16 LAB — HCG UR QL: NEGATIVE

## 2024-01-16 PROCEDURE — 99283 EMERGENCY DEPT VISIT LOW MDM: CPT

## 2024-01-16 PROCEDURE — 99283 EMERGENCY DEPT VISIT LOW MDM: CPT | Performed by: INTERNAL MEDICINE

## 2024-01-16 PROCEDURE — 81025 URINE PREGNANCY TEST: CPT | Performed by: INTERNAL MEDICINE

## 2024-01-16 ASSESSMENT — ENCOUNTER SYMPTOMS
VOMITING: 0
DIARRHEA: 0
NECK STIFFNESS: 0
NAUSEA: 0
HEMATURIA: 0
APPETITE CHANGE: 0
ABDOMINAL PAIN: 0
FLANK PAIN: 0
EYE REDNESS: 0
FATIGUE: 0
RHINORRHEA: 0
DIZZINESS: 0
ARTHRALGIAS: 0
FREQUENCY: 0
MYALGIAS: 0
HEADACHES: 0
SHORTNESS OF BREATH: 0
FEVER: 0
CHILLS: 0
SORE THROAT: 0
DYSURIA: 0
ACTIVITY CHANGE: 0
COUGH: 0

## 2024-01-16 NOTE — ED PROVIDER NOTES
"  History     Chief Complaint   Patient presents with    Fall     The history is provided by the patient.   Trauma  Mechanism of injury: Fall  Injury location: torso  Injury location detail: abdomen  Time since incident: 1 hour     Fall:       Fall occurred: tripped       Impact surface: ice       Point of impact: front    Current symptoms:       Associated symptoms:             Denies abdominal pain, chest pain, headache, nausea and vomiting.       Allergies:  Allergies   Allergen Reactions    Other [No Clinical Screening - See Comments] Anaphylaxis     \"Sodium Penathol\" per mother. Used in anesthesia.  Family hx of reaction.    Latex Rash    Amoxicillin Trihydrate GI Disturbance    Amoxicillin-Pot Clavulanate GI Disturbance    Phenobarbital     Thiopental      Other reaction(s): Other - Describe In Comment Field  Family Hx of death with this medication       Problem List:    Patient Active Problem List    Diagnosis Date Noted    Suicidal ideation 09/14/2023     Priority: Medium    Pituitary microadenoma (H) 07/21/2023     Priority: Medium     1/2023 5 x 5 mm hypoenhancing focus in the right pituitary gland  High prolactin resolved with stopping risperidone.   Cortisol and IGF-1 within normal limits       Marijuana use 07/21/2023     Priority: Medium    Tympanosclerosis of both ears 11/15/2022     Priority: Medium    Postnasal drip 11/15/2022     Priority: Medium    Primary insomnia 11/15/2022     Priority: Medium    Constipation, unspecified constipation type 11/15/2022     Priority: Medium    History of suicide attempt 10/28/2022     Priority: Medium     2020 - tylenol overdose      Intellectual disability 10/28/2022     Priority: Medium    Schizoaffective disorder, bipolar type (H) 07/08/2021     Priority: Medium     Dr Brothers      KIRSTY (generalized anxiety disorder) 07/19/2019     Priority: Medium    Allergic rhinitis 10/04/2011     Priority: Medium        Past Medical History:    Past Medical History: "   Diagnosis Date    Allergic rhinitis 10/4/2011    KIRSTY (generalized anxiety disorder) 7/19/2019    Hyperprolactinemia (H24) 11/16/2022    PTSD (post-traumatic stress disorder)     Suicidal behavior with attempted self-injury (H) 2/2/2021       Past Surgical History:    Past Surgical History:   Procedure Laterality Date    TONSILLECTOMY & ADENOIDECTOMY  2008       Family History:    No family history on file.    Social History:  Marital Status:  Single [1]  Social History     Tobacco Use    Smoking status: Every Day     Types: Vaping Device    Smokeless tobacco: Never   Vaping Use    Vaping Use: Every day    Substances: Nicotine, CBD, Flavoring    Devices: Disposable   Substance Use Topics    Alcohol use: No     Comment: only on holidays    Drug use: No        Medications:    ARIPiprazole (ABILIFY) 30 MG tablet          Review of Systems   Constitutional:  Negative for activity change, appetite change, chills, fatigue and fever.   HENT:  Negative for congestion, rhinorrhea and sore throat.    Eyes:  Negative for redness.   Respiratory:  Negative for cough and shortness of breath.    Cardiovascular:  Negative for chest pain.   Gastrointestinal:  Negative for abdominal pain, diarrhea, nausea and vomiting.   Genitourinary:  Positive for vaginal bleeding. Negative for dysuria, flank pain, frequency, hematuria, menstrual problem and pelvic pain.   Musculoskeletal:  Negative for arthralgias, myalgias and neck stiffness.   Skin:  Negative for rash.   Neurological:  Negative for dizziness and headaches.   All other systems reviewed and are negative.      Physical Exam   BP: 128/95  Pulse: 97  Temp: 98.7  F (37.1  C)  Resp: 18  SpO2: 98 %      Physical Exam  Constitutional:       General: She is not in acute distress.     Appearance: She is not diaphoretic.   HENT:      Head: Atraumatic.   Eyes:      Pupils: Pupils are equal, round, and reactive to light.   Cardiovascular:      Rate and Rhythm: Regular rhythm.      Heart  sounds: Normal heart sounds.   Pulmonary:      Effort: No respiratory distress.      Breath sounds: Normal breath sounds.   Chest:      Chest wall: No tenderness.   Abdominal:      General: Bowel sounds are normal.      Palpations: Abdomen is soft.      Tenderness: There is no abdominal tenderness. There is no right CVA tenderness, left CVA tenderness, guarding or rebound.   Musculoskeletal:         General: No tenderness. Normal range of motion.      Cervical back: No tenderness.      Thoracic back: No tenderness.      Lumbar back: No tenderness.   Skin:     Findings: No abrasion or laceration.   Neurological:      Mental Status: She is alert and oriented to person, place, and time.         ED Course                 Procedures                  Results for orders placed or performed during the hospital encounter of 01/16/24 (from the past 24 hour(s))   HCG qualitative urine   Result Value Ref Range    hCG Urine Qualitative Negative Negative       Medications - No data to display    Assessments & Plan (with Medical Decision Making)   Fell on his abdomen on ICE , started having vaginal bleeding after than  Lower abdominal , pelvic cramps  Abdominal exam: no bruises, no tenderness  UCG negative    Due to mechannism of injury and completely normal physical exam , I do not think imaging studies indicated  I advised her to return to ER if pain or bleeding getting worse, she understood and agreed.   I have reviewed the nursing notes.    I have reviewed the findings, diagnosis, plan and need for follow up with the patient.        Discharge Medication List as of 1/16/2024  6:46 AM          Final diagnoses:   Abnormal uterine bleeding (AUB)       1/16/2024   [facility]       Guillermo Araya MD  01/16/24 0649

## 2024-01-16 NOTE — ED TRIAGE NOTES
Pt fell on ice about an hour ago and felt blood running down leg. She hasn't had her period for 1.5 months and is unsure if she is pregnant, and if she is is the fall causing an issue with that. Pt has abd pain. Pt is A/Ox4 and ambulatory.      Triage Assessment (Adult)       Row Name 01/16/24 0610          Triage Assessment    Airway WDL WDL        Respiratory WDL    Respiratory WDL WDL        Skin Circulation/Temperature WDL    Skin Circulation/Temperature WDL WDL        Cardiac WDL    Cardiac WDL WDL        Peripheral/Neurovascular WDL    Peripheral Neurovascular WDL WDL        Cognitive/Neuro/Behavioral WDL    Cognitive/Neuro/Behavioral WDL WDL

## 2024-01-16 NOTE — ED NOTES
Pt fell on ice about an hour ago and fell on abd. Pt has 5/10 pain R and LQ pain that is tender to touch, nature of pain is cramping feeling. Pt states she has not gotten her period for 1.5 mo and suddenly after fall has her priod. Pt states she had blood running down her leg and is pretty sure its vaginal. She has concerns about pregnancy and miscarriage. Pt denies chest pain, nausea, SOB. Pt is A/Ox4 and ambulatory. Denies hitting head or LOC.
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
## 2024-01-25 ENCOUNTER — PATIENT OUTREACH (OUTPATIENT)
Dept: FAMILY MEDICINE | Facility: OTHER | Age: 24
End: 2024-01-25

## 2024-01-25 ENCOUNTER — OFFICE VISIT (OUTPATIENT)
Dept: FAMILY MEDICINE | Facility: OTHER | Age: 24
End: 2024-01-25
Attending: FAMILY MEDICINE
Payer: COMMERCIAL

## 2024-01-25 ENCOUNTER — PATIENT OUTREACH (OUTPATIENT)
Dept: CARE COORDINATION | Facility: OTHER | Age: 24
End: 2024-01-25

## 2024-01-25 VITALS
BODY MASS INDEX: 18.78 KG/M2 | DIASTOLIC BLOOD PRESSURE: 60 MMHG | SYSTOLIC BLOOD PRESSURE: 100 MMHG | OXYGEN SATURATION: 98 % | HEART RATE: 90 BPM | TEMPERATURE: 98.4 F | RESPIRATION RATE: 17 BRPM | WEIGHT: 119.9 LBS

## 2024-01-25 DIAGNOSIS — D35.2 PITUITARY MICROADENOMA (H): Chronic | ICD-10-CM

## 2024-01-25 DIAGNOSIS — F41.1 GAD (GENERALIZED ANXIETY DISORDER): Primary | Chronic | ICD-10-CM

## 2024-01-25 DIAGNOSIS — R63.4 WEIGHT LOSS: ICD-10-CM

## 2024-01-25 DIAGNOSIS — F25.0 SCHIZOAFFECTIVE DISORDER, BIPOLAR TYPE (H): Chronic | ICD-10-CM

## 2024-01-25 PROCEDURE — 99213 OFFICE O/P EST LOW 20 MIN: CPT | Performed by: FAMILY MEDICINE

## 2024-01-25 PROCEDURE — G0463 HOSPITAL OUTPT CLINIC VISIT: HCPCS | Mod: 25

## 2024-01-25 PROCEDURE — G0463 HOSPITAL OUTPT CLINIC VISIT: HCPCS

## 2024-01-25 ASSESSMENT — ENCOUNTER SYMPTOMS
UNEXPECTED WEIGHT CHANGE: 1
NERVOUS/ANXIOUS: 1
DYSPHORIC MOOD: 1

## 2024-01-25 ASSESSMENT — PAIN SCALES - GENERAL: PAINLEVEL: NO PAIN (0)
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
## 2024-01-26 ENCOUNTER — DOCUMENTATION ONLY (OUTPATIENT)
Dept: OTHER | Facility: CLINIC | Age: 24
End: 2024-01-26
Payer: COMMERCIAL

## 2024-01-26 ENCOUNTER — PATIENT OUTREACH (OUTPATIENT)
Dept: FAMILY MEDICINE | Facility: OTHER | Age: 24
End: 2024-01-26

## 2024-01-26 NOTE — TELEPHONE ENCOUNTER
Clinic Care Coordination Contact  Care Team Conversations    OSCAR reached out to [facility] to inquire who Yudi's CADI CM was. Yudi's CADI CM is Marti Rainey through [employer]. She can be reached at [phone].    OSCAR reached out to Marti attempting to connect regarding Yudi. No answer. OSCAR left  with request for call back.     Landy Paul, [facility]

## 2024-01-26 NOTE — TELEPHONE ENCOUNTER
Clinic Care Coordination Contact  Care Team Conversations    SW contacted [employer] to please verify that Yudi has a Guardian and to update chart to reflect this information. SW will await response.    ANIBAL Diaz  [facility]

## 2024-01-26 NOTE — PROGRESS NOTES
Clinic Care Coordination Contact  Care Team Conversations    RN CC called patient and left message letting her know that Dr. Brothers isn't in office on Monday so telephone visit for Monday.  Will look to sit with patient in office room if patient arrives in clinic on Monday.  Delores Perez, PRATIK  Care Coordination

## 2024-01-26 NOTE — TELEPHONE ENCOUNTER
Clinic Care Coordination Contact  Care Team Conversations    SW received response back from [employer] that Yazdanism  is the legal guardian. OSCAR will inquire with [facility] to see who the actual guardian is.     OSCAR reached out to [employer]dan[employer]  to inquire who her Guardian is. No answer. OSCAR left  with request for call back.     Landy Paul, ANIBAL  [employer] [facility]

## 2024-01-26 NOTE — TELEPHONE ENCOUNTER
Clinic Care Coordination Contact  Care Team Conversations    OSCAR met face to face with Yudi during PCP visit following referral for social work services. See RN JOHN Estrella's Ana's note for details of this visit.     Yudi reports she has formal services in place such as a CADI worker and [employer] worker. She does not know the name or contact of her CADI worker, OSCAR assured her she will look into this. She shared that she also had a guardian and a rep payee. Voiced concerns related to the guardian and rep payee, feels very frustrated with them.     SW further inquired into CADI services being received, Kanwal would like help setting up Mom's meals, bus passes and some housing supplies through her waiver. OSCAR states she will attempt to reach CADI CM to inquire about this.     Patient currently lives at [facility] but would like different housing. Yudi stated she was on some sort of Section 8 housing program, but SW unfamiliar with the type of program she was explaining. OSCAR will also inquire about Housing Stabilization Services through her waiver.     OSCAR provided Yudi direct contact information to reach out with any questions in the meantime. OSCAR provided listening support and assured Yudi she would look into these items and contact her back directly.     OSCAR will continue to follow.    Landy Paul, JOB  [facility]

## 2024-01-26 NOTE — Clinical Note
AGUILARI - I will wait a little to see if I hear back from her and call Yudi this afternoon to let her know!

## 2024-01-26 NOTE — TELEPHONE ENCOUNTER
Clinic Care Coordination Contact  Care Team Conversations    SW reached out to Yudi to update her on the information learned regarding her CADI CM. SW explained she left a VM but has not heard back yet, and will update her with anything she learns.    Yudi very thankful on the phone for this SW and RN CC's help. She states now is not a good time to talk as she is in the middle of dong stuff for her apartment. SW stated this was no problem and they can touch base next week, Yudi agreeable to this plan.     Landy Paul, ANIBAL  [facility]

## 2024-01-29 ENCOUNTER — PATIENT OUTREACH (OUTPATIENT)
Dept: FAMILY MEDICINE | Facility: OTHER | Age: 24
End: 2024-01-29

## 2024-01-29 ENCOUNTER — VIRTUAL VISIT (OUTPATIENT)
Dept: PSYCHIATRY | Facility: OTHER | Age: 24
End: 2024-01-29
Attending: PSYCHIATRY & NEUROLOGY
Payer: COMMERCIAL

## 2024-01-29 DIAGNOSIS — F41.1 GAD (GENERALIZED ANXIETY DISORDER): Primary | ICD-10-CM

## 2024-01-29 PROCEDURE — 99442 PR PHYSICIAN TELEPHONE EVALUATION 11-20 MIN: CPT | Mod: 93 | Performed by: PSYCHIATRY & NEUROLOGY

## 2024-01-29 RX ORDER — TRAZODONE HYDROCHLORIDE 50 MG/1
TABLET, FILM COATED ORAL
Qty: 60 TABLET | Refills: 1 | Status: ON HOLD | OUTPATIENT
Start: 2024-01-29 | End: 2024-07-06

## 2024-01-29 ASSESSMENT — ANXIETY QUESTIONNAIRES
2. NOT BEING ABLE TO STOP OR CONTROL WORRYING: NEARLY EVERY DAY
3. WORRYING TOO MUCH ABOUT DIFFERENT THINGS: NEARLY EVERY DAY
5. BEING SO RESTLESS THAT IT IS HARD TO SIT STILL: NEARLY EVERY DAY
1. FEELING NERVOUS, ANXIOUS, OR ON EDGE: NEARLY EVERY DAY
7. FEELING AFRAID AS IF SOMETHING AWFUL MIGHT HAPPEN: NEARLY EVERY DAY
GAD7 TOTAL SCORE: 21
6. BECOMING EASILY ANNOYED OR IRRITABLE: NEARLY EVERY DAY
GAD7 TOTAL SCORE: 21

## 2024-01-29 ASSESSMENT — PATIENT HEALTH QUESTIONNAIRE - PHQ9
SUM OF ALL RESPONSES TO PHQ QUESTIONS 1-9: 16
5. POOR APPETITE OR OVEREATING: NEARLY EVERY DAY

## 2024-01-29 ASSESSMENT — PAIN SCALES - GENERAL: PAINLEVEL: NO PAIN (0)

## 2024-01-29 NOTE — PROGRESS NOTES
Clinic Care Coordination Contact  Care Team Conversations    RN CC called patient and left message reminding her of visit with PCP by telephone today and if comes in RN CC can meet with her during visit if she would like.  Message sent to Jan  to let her know telephone visit but RN CC could meet with her if needed and get an office room so she could have visit.     Delores Perez, RN  Care Coordination
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
## 2024-01-30 ENCOUNTER — PATIENT OUTREACH (OUTPATIENT)
Dept: FAMILY MEDICINE | Facility: OTHER | Age: 24
End: 2024-01-30

## 2024-01-30 NOTE — TELEPHONE ENCOUNTER
Clinic Care Coordination Contact  Care Team Conversations    SW reached out again to Marti to follow up on VM. No answer. OSCAR able to leave  with request for call back.     ANIBAL Diaz  [facility]

## 2024-01-30 NOTE — TELEPHONE ENCOUNTER
Clinic Care Coordination Contact  Care Team Conversations    SW reached out to FAY Kidd CM, again. No answer. States not able to receive voicemails at this time.     SW will reattempt.     ANIBAL Diaz  [facility]

## 2024-01-30 NOTE — TELEPHONE ENCOUNTER
Clinic Care Coordination Contact  Care Team Conversations    OSCAR received VM from lu Pelayoan for [facility], attempting to connect regarding Yudi. Requested call back.    OSCAR returned Gita's call. No answer. OSCAR left VM with request for call back.    Landy Paul, [facility]

## 2024-01-30 NOTE — TELEPHONE ENCOUNTER
Clinic Care Coordination Contact  Care Team Conversations    SW received VM from FAY Kidd CM regarding Yudi attempting to connect. Requesting call back.     OSCAR reached back out to Marti via phone. No answer. Stated voicemail box not available at this time. OSCAR unable to leave message and will reattempt at a later date.    Landy Paul, [facility]

## 2024-02-05 ENCOUNTER — PATIENT OUTREACH (OUTPATIENT)
Dept: FAMILY MEDICINE | Facility: OTHER | Age: 24
End: 2024-02-05

## 2024-02-05 NOTE — TELEPHONE ENCOUNTER
Clinic Care Coordination Contact  Care Team Conversations    SW received VM from Beatriz attempting to connect. Beatriz stated it is sometimes easier to reach her via email and provided her email address: [email or web address].    OSCAR reached out to Beatriz at email address provided to inquire about mom's meals, HSS services to assist with finding new housing, items for the apartment, bus passes, and any other services currently in place to support Yudi.     OSCAR will await response.     Landy Paul, JOB  [facility]

## 2024-02-07 ENCOUNTER — PATIENT OUTREACH (OUTPATIENT)
Dept: FAMILY MEDICINE | Facility: OTHER | Age: 24
End: 2024-02-07

## 2024-02-07 ENCOUNTER — TELEPHONE (OUTPATIENT)
Dept: FAMILY MEDICINE | Facility: OTHER | Age: 24
End: 2024-02-07

## 2024-02-07 NOTE — TELEPHONE ENCOUNTER
Clinic Care Coordination Contact  Care Team Conversations    SW received response from Beatriz. She states that she had a meeting on 1/26 with Yudi and her Guardian, Gita. They have also had numerous phone calls since then. She reports that Yudi is more focused on wanting to move right now than getting services at this time. Yudi refused to sign an JEANNA for additional CADI services to be provided.     OSCAR expressed understanding and offered her services if there is any way she can be of assistance during this time.    Landy Paul, ANIBAL  [facility]

## 2024-02-07 NOTE — TELEPHONE ENCOUNTER
Pt was called to get a reminder for her appt krystian, during this call she stated has not been getting her period, pt thinks she possibly pregnant, wanting a pap, pregnancy test? pt has questions about a miscarriage, pt is sexually active- pt does not use any type of protection or birth control.   Pt is seeing Dr. Ayan boland   If you call her please call [phone] if she does not answer please [facility]

## 2024-02-07 NOTE — TELEPHONE ENCOUNTER
Clinic Care Coordination Contact  Care Team Conversations    OSCAR received correspondence from FAY Henderson CM. She reports that she did get Yudi set up with bus passes and these will be getting sent to her by her guardian. She reports that she attempted to offer other services to Yudi during their last visit with her and her guardian, but she refused them. Yudi also explained that her guardian would have those contacts regarding [facility] and other care team members.    SW thanks Denver for her response regarding the bus passes. SW explained she has been trying to get a hold of Crystal, the guardian, as well but with no luck. SW acknowledged that while she may have refused the services in the past, she had presented to clinic wanting these services. SW asked how long ago Beatriz offered these services and if it would be possible to offer them again. SW emphasized need for these services due to Yudi's decline in weight.    SW will await response at this time.    Landy Paul, [facility]

## 2024-02-07 NOTE — TELEPHONE ENCOUNTER
Clinic Care Coordination Contact  Care Team Conversations    OSCAR received call back from Gita attempting to connect regarding Yudi.     OSCAR returned phone call. No answer. OSCAR left  with request for call back.    ANIBAL Diaz  [facility]

## 2024-02-08 ENCOUNTER — PATIENT OUTREACH (OUTPATIENT)
Dept: CARE COORDINATION | Facility: OTHER | Age: 24
End: 2024-02-08

## 2024-02-08 NOTE — PROGRESS NOTES
Clinic Care Coordination Contact  Care Team Conversations    RN CC called patient and talked with her. She states she is sorry she missed her appointment today.  We discussed she hasn't missed it yet and went over date/time.  She states that she looks forward to making it today.  Delores Perez, RN  Care Coordination

## 2024-02-09 ENCOUNTER — HOSPITAL ENCOUNTER (EMERGENCY)
Facility: HOSPITAL | Age: 24
Discharge: HOME | End: 2024-02-09
Attending: INTERNAL MEDICINE | Admitting: INTERNAL MEDICINE
Payer: COMMERCIAL

## 2024-02-09 VITALS
TEMPERATURE: 98.6 F | OXYGEN SATURATION: 99 % | RESPIRATION RATE: 16 BRPM | DIASTOLIC BLOOD PRESSURE: 56 MMHG | HEART RATE: 92 BPM | SYSTOLIC BLOOD PRESSURE: 113 MMHG

## 2024-02-09 DIAGNOSIS — F15.10 METHAMPHETAMINE ABUSE (H): ICD-10-CM

## 2024-02-09 LAB
ALBUMIN SERPL BCG-MCNC: 4.6 G/DL (ref 3.5–5.2)
ALP SERPL-CCNC: 97 U/L (ref 40–150)
ALT SERPL W P-5'-P-CCNC: 17 U/L (ref 0–50)
ANION GAP SERPL CALCULATED.3IONS-SCNC: 15 MMOL/L (ref 7–15)
AST SERPL W P-5'-P-CCNC: 50 U/L (ref 0–45)
BASOPHILS # BLD AUTO: 0.1 10E3/UL (ref 0–0.2)
BASOPHILS NFR BLD AUTO: 1 %
BILIRUB SERPL-MCNC: 1 MG/DL
BUN SERPL-MCNC: 18.7 MG/DL (ref 6–20)
CALCIUM SERPL-MCNC: 9.8 MG/DL (ref 8.6–10)
CHLORIDE SERPL-SCNC: 101 MMOL/L (ref 98–107)
CREAT SERPL-MCNC: 1 MG/DL (ref 0.51–0.95)
DEPRECATED HCO3 PLAS-SCNC: 22 MMOL/L (ref 22–29)
EGFRCR SERPLBLD CKD-EPI 2021: 82 ML/MIN/1.73M2
EOSINOPHIL # BLD AUTO: 0 10E3/UL (ref 0–0.7)
EOSINOPHIL NFR BLD AUTO: 1 %
ERYTHROCYTE [DISTWIDTH] IN BLOOD BY AUTOMATED COUNT: 13.3 % (ref 10–15)
ETHANOL SERPL-MCNC: <0.01 G/DL
GLUCOSE SERPL-MCNC: 79 MG/DL (ref 70–99)
HCT VFR BLD AUTO: 45.7 % (ref 35–47)
HGB BLD-MCNC: 15.3 G/DL (ref 11.7–15.7)
IMM GRANULOCYTES # BLD: 0 10E3/UL
IMM GRANULOCYTES NFR BLD: 0 %
LYMPHOCYTES # BLD AUTO: 3.3 10E3/UL (ref 0.8–5.3)
LYMPHOCYTES NFR BLD AUTO: 39 %
MCH RBC QN AUTO: 28.1 PG (ref 26.5–33)
MCHC RBC AUTO-ENTMCNC: 33.5 G/DL (ref 31.5–36.5)
MCV RBC AUTO: 84 FL (ref 78–100)
MONOCYTES # BLD AUTO: 1.1 10E3/UL (ref 0–1.3)
MONOCYTES NFR BLD AUTO: 12 %
NEUTROPHILS # BLD AUTO: 4.1 10E3/UL (ref 1.6–8.3)
NEUTROPHILS NFR BLD AUTO: 47 %
NRBC # BLD AUTO: 0 10E3/UL
NRBC BLD AUTO-RTO: 0 /100
PLATELET # BLD AUTO: 281 10E3/UL (ref 150–450)
POTASSIUM SERPL-SCNC: 4.4 MMOL/L (ref 3.4–5.3)
PROT SERPL-MCNC: 7.7 G/DL (ref 6.4–8.3)
RBC # BLD AUTO: 5.44 10E6/UL (ref 3.8–5.2)
SODIUM SERPL-SCNC: 138 MMOL/L (ref 135–145)
WBC # BLD AUTO: 8.6 10E3/UL (ref 4–11)

## 2024-02-09 PROCEDURE — 99283 EMERGENCY DEPT VISIT LOW MDM: CPT

## 2024-02-09 PROCEDURE — 99283 EMERGENCY DEPT VISIT LOW MDM: CPT | Performed by: INTERNAL MEDICINE

## 2024-02-09 PROCEDURE — 36415 COLL VENOUS BLD VENIPUNCTURE: CPT | Performed by: INTERNAL MEDICINE

## 2024-02-09 PROCEDURE — 82077 ASSAY SPEC XCP UR&BREATH IA: CPT | Performed by: INTERNAL MEDICINE

## 2024-02-09 PROCEDURE — 85025 COMPLETE CBC W/AUTO DIFF WBC: CPT | Performed by: INTERNAL MEDICINE

## 2024-02-09 PROCEDURE — 250N000013 HC RX MED GY IP 250 OP 250 PS 637: Performed by: INTERNAL MEDICINE

## 2024-02-09 PROCEDURE — 80053 COMPREHEN METABOLIC PANEL: CPT | Performed by: INTERNAL MEDICINE

## 2024-02-09 RX ORDER — LORAZEPAM 1 MG/1
2 TABLET ORAL ONCE
Status: DISCONTINUED | OUTPATIENT
Start: 2024-02-09 | End: 2024-02-09

## 2024-02-09 RX ORDER — LORAZEPAM 1 MG/1
1 TABLET ORAL ONCE
Status: COMPLETED | OUTPATIENT
Start: 2024-02-09 | End: 2024-02-09

## 2024-02-09 ASSESSMENT — ENCOUNTER SYMPTOMS
DIAPHORESIS: 0
COLOR CHANGE: 0
NECK PAIN: 0
COUGH: 0
HEMATURIA: 0
CHEST TIGHTNESS: 0
WOUND: 0
MYALGIAS: 1
ABDOMINAL DISTENTION: 0
NAUSEA: 0
CONFUSION: 0
BLOOD IN STOOL: 0
DYSURIA: 0
PALPITATIONS: 0
SHORTNESS OF BREATH: 0
CHILLS: 0
AGITATION: 1
HEADACHES: 0
WHEEZING: 0
ANAL BLEEDING: 0
HYPERACTIVE: 1
NECK STIFFNESS: 0
NUMBNESS: 0
NERVOUS/ANXIOUS: 1
VOICE CHANGE: 0
ARTHRALGIAS: 0
ABDOMINAL PAIN: 0
DIZZINESS: 0
BACK PAIN: 0
SLEEP DISTURBANCE: 1
VOMITING: 0
FEVER: 0
FLANK PAIN: 0
DEPRESSED MOOD: 0
LIGHT-HEADEDNESS: 0

## 2024-02-09 ASSESSMENT — ACTIVITIES OF DAILY LIVING (ADL): ADLS_ACUITY_SCORE: 35

## 2024-02-09 NOTE — ED NOTES
Call received back from Wanda at [facility]. Requested an After Visit Summary be sent. Fax number obtained [phone] and paperwork was sent.

## 2024-02-09 NOTE — ED PROVIDER NOTES
"  History     Chief Complaint   Patient presents with    Abdominal Pain     Came in by EMS with complaint of pain and numbness in legs.  Now reporting abdominal and chest pain     The history is provided by the patient.   Mental Health Problem  Presenting symptoms: agitation    Presenting symptoms: no depression, no suicidal thoughts, no suicidal threats and no suicide attempt    Degree of incapacity (severity):  Mild  Onset quality:  Gradual  Duration:  2 days  Timing:  Constant  Progression:  Worsening  Chronicity:  Recurrent  Context: drug abuse    Associated symptoms: anxiety    Associated symptoms: no abdominal pain, no chest pain and no headaches          Allergies:  Allergies   Allergen Reactions    Other [No Clinical Screening - See Comments] Anaphylaxis     \"Sodium Penathol\" per mother. Used in anesthesia.  Family hx of reaction.    Latex Rash    Amoxicillin Trihydrate GI Disturbance    Amoxicillin-Pot Clavulanate GI Disturbance    Phenobarbital     Thiopental      Other reaction(s): Other - Describe In Comment Field  Family Hx of death with this medication       Problem List:    Patient Active Problem List    Diagnosis Date Noted    Suicidal ideation 09/14/2023     Priority: Medium    Pituitary microadenoma (H) 07/21/2023     Priority: Medium     1/2023 5 x 5 mm hypoenhancing focus in the right pituitary gland  High prolactin resolved with stopping risperidone.   Cortisol and IGF-1 within normal limits       Marijuana use 07/21/2023     Priority: Medium    Tympanosclerosis of both ears 11/15/2022     Priority: Medium    Postnasal drip 11/15/2022     Priority: Medium    Primary insomnia 11/15/2022     Priority: Medium    Constipation, unspecified constipation type 11/15/2022     Priority: Medium    History of suicide attempt 10/28/2022     Priority: Medium     2020 - tylenol overdose      Intellectual disability 10/28/2022     Priority: Medium    Schizoaffective disorder, bipolar type (H) 07/08/2021     " Priority: Medium     Dr Brothers      KIRSTY (generalized anxiety disorder) 07/19/2019     Priority: Medium    Allergic rhinitis 10/04/2011     Priority: Medium        Past Medical History:    Past Medical History:   Diagnosis Date    Allergic rhinitis 10/4/2011    KIRSTY (generalized anxiety disorder) 7/19/2019    Hyperprolactinemia (H24) 11/16/2022    PTSD (post-traumatic stress disorder)     Suicidal behavior with attempted self-injury (H) 2/2/2021       Past Surgical History:    Past Surgical History:   Procedure Laterality Date    TONSILLECTOMY & ADENOIDECTOMY  2008       Family History:    No family history on file.    Social History:  Marital Status:  Single [1]  Social History     Tobacco Use    Smoking status: Every Day     Types: Vaping Device    Smokeless tobacco: Never   Vaping Use    Vaping Use: Every day    Substances: Nicotine, CBD, Flavoring    Devices: Disposable   Substance Use Topics    Alcohol use: No     Comment: only on holidays    Drug use: No        Medications:    sertraline (ZOLOFT) 50 MG tablet  traZODone (DESYREL) 50 MG tablet          Review of Systems   Constitutional:  Negative for chills, diaphoresis and fever.   HENT:  Negative for voice change.    Eyes:  Negative for visual disturbance.   Respiratory:  Negative for cough, chest tightness, shortness of breath and wheezing.    Cardiovascular:  Negative for chest pain, palpitations and leg swelling.   Gastrointestinal:  Negative for abdominal distention, abdominal pain, anal bleeding, blood in stool, nausea and vomiting.   Genitourinary:  Negative for decreased urine volume, dysuria, flank pain and hematuria.   Musculoskeletal:  Positive for myalgias. Negative for arthralgias, back pain, gait problem, neck pain and neck stiffness.   Skin:  Negative for color change, pallor, rash and wound.   Neurological:  Negative for dizziness, syncope, light-headedness, numbness and headaches.   Psychiatric/Behavioral:  Positive for agitation and sleep  disturbance. Negative for confusion and suicidal ideas. The patient is nervous/anxious and is hyperactive.        Physical Exam   BP: 111/64  Pulse: 104  Temp: 98.6  F (37  C)  Resp: 16  SpO2: 97 %      Physical Exam  Vitals and nursing note reviewed.   Constitutional:       Appearance: She is well-developed.   HENT:      Head: Normocephalic and atraumatic.      Mouth/Throat:      Pharynx: No oropharyngeal exudate.   Eyes:      Conjunctiva/sclera: Conjunctivae normal.      Pupils: Pupils are equal, round, and reactive to light.   Neck:      Thyroid: No thyromegaly.      Vascular: No JVD.      Trachea: No tracheal deviation.   Cardiovascular:      Rate and Rhythm: Normal rate and regular rhythm.      Heart sounds: Normal heart sounds. No murmur heard.     No friction rub. No gallop.   Pulmonary:      Effort: Pulmonary effort is normal. No respiratory distress.      Breath sounds: Normal breath sounds. No stridor. No wheezing or rales.   Chest:      Chest wall: No tenderness.   Abdominal:      General: Bowel sounds are normal. There is no distension.      Palpations: Abdomen is soft. There is no mass.      Tenderness: There is no abdominal tenderness. There is no guarding or rebound.   Musculoskeletal:         General: No tenderness. Normal range of motion.      Cervical back: Normal range of motion and neck supple.   Lymphadenopathy:      Cervical: No cervical adenopathy.   Skin:     General: Skin is warm and dry.      Coloration: Skin is not pale.      Findings: No erythema or rash.   Neurological:      Mental Status: She is alert and oriented to person, place, and time.   Psychiatric:         Attention and Perception: Attention and perception normal.         Mood and Affect: Affect is labile.         Speech: Speech is rapid and pressured.         Behavior: Behavior normal. Behavior is cooperative.         Thought Content: Thought content is not paranoid or delusional. Thought content does not include homicidal or  suicidal ideation. Thought content does not include homicidal or suicidal plan.         Cognition and Memory: Cognition normal.         ED Course              Results for orders placed or performed during the hospital encounter of 02/09/24 (from the past 24 hour(s))   CBC with Platelets & Differential    Narrative    The following orders were created for panel order CBC with Platelets & Differential.  Procedure                               Abnormality         Status                     ---------                               -----------         ------                     CBC with platelets and d...[[id number]]  Abnormal            Final result                 Please view results for these tests on the individual orders.   Comprehensive metabolic panel   Result Value Ref Range    Sodium 138 135 - 145 mmol/L    Potassium 4.4 3.4 - 5.3 mmol/L    Carbon Dioxide (CO2) 22 22 - 29 mmol/L    Anion Gap 15 7 - 15 mmol/L    Urea Nitrogen 18.7 6.0 - 20.0 mg/dL    Creatinine 1.00 (H) 0.51 - 0.95 mg/dL    GFR Estimate 82 >60 mL/min/1.73m2    Calcium 9.8 8.6 - 10.0 mg/dL    Chloride 101 98 - 107 mmol/L    Glucose 79 70 - 99 mg/dL    Alkaline Phosphatase 97 40 - 150 U/L    AST 50 (H) 0 - 45 U/L    ALT 17 0 - 50 U/L    Protein Total 7.7 6.4 - 8.3 g/dL    Albumin 4.6 3.5 - 5.2 g/dL    Bilirubin Total 1.0 <=1.2 mg/dL   Ethyl Alcohol Level   Result Value Ref Range    Alcohol ethyl <0.01 <=0.01 g/dL   CBC with platelets and differential   Result Value Ref Range    WBC Count 8.6 4.0 - 11.0 10e3/uL    RBC Count 5.44 (H) 3.80 - 5.20 10e6/uL    Hemoglobin 15.3 11.7 - 15.7 g/dL    Hematocrit 45.7 35.0 - 47.0 %    MCV 84 78 - 100 fL    MCH 28.1 26.5 - 33.0 pg    MCHC 33.5 31.5 - 36.5 g/dL    RDW 13.3 10.0 - 15.0 %    Platelet Count 281 150 - 450 10e3/uL    % Neutrophils 47 %    % Lymphocytes 39 %    % Monocytes 12 %    % Eosinophils 1 %    % Basophils 1 %    % Immature Granulocytes 0 %    NRBCs per 100 WBC 0 <1 /100    Absolute Neutrophils  4.1 1.6 - 8.3 10e3/uL    Absolute Lymphocytes 3.3 0.8 - 5.3 10e3/uL    Absolute Monocytes 1.1 0.0 - 1.3 10e3/uL    Absolute Eosinophils 0.0 0.0 - 0.7 10e3/uL    Absolute Basophils 0.1 0.0 - 0.2 10e3/uL    Absolute Immature Granulocytes 0.0 <=0.4 10e3/uL    Absolute NRBCs 0.0 10e3/uL       Medications   LORazepam (ATIVAN) tablet 1 mg (1 mg Oral Not Given 2/9/24 0551)       Assessments & Plan (with Medical Decision Making)   Anxiety , agitation due to substance abuse( methamphetamine)  Expressed entire body ache but later denied any pain  Refused taking ativan for anxiety  Labs reviewed     After observation in ER, pt denies any pain or discomfort , want to be discharged home  D C home  I have reviewed the nursing notes.    I have reviewed the findings, diagnosis, plan and need for follow up with the patient.          Discharge Medication List as of 2/9/2024  8:26 AM          Final diagnoses:   Methamphetamine abuse (H)       2/9/2024   [facility]       Guillermo Araya MD  02/09/24 0588
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
## 2024-02-09 NOTE — ED NOTES
"Patient refused ativan.  No reason given, she just said \"no thank you.\"  When questions were asked she didn't answer.   "

## 2024-02-09 NOTE — ED NOTES
Contacted [facility] per guardianship indication. Left message with on-call guardian Wanda at [phone].

## 2024-02-12 ENCOUNTER — PATIENT OUTREACH (OUTPATIENT)
Dept: FAMILY MEDICINE | Facility: OTHER | Age: 24
End: 2024-02-12

## 2024-02-12 NOTE — TELEPHONE ENCOUNTER
Clinic Care Coordination Contact  Care Team Conversations    SW received confirmation from [facility] that Yudi has been added to waitlist.    Landy Paul, ANIBAL  [facility]

## 2024-02-12 NOTE — TELEPHONE ENCOUNTER
Clinic Care Coordination Contact  Care Team Conversations    OSCAR reached out to Yudi to touch base on how things are going and provide update regarding getting in contact with her CADI CM. She reports she had told her CADI worker that she didn't want services but now is thinking she might have changed her mind. She is not sure and wants to talk further regarding this with her. Yudi asked if this SW had the contact information for her. OSCAR provided Yudi with contact information to reach Beatriz regarding services for food and housing.    OSCAR also discussed [facility] program with Yudi. She reports she was interested in services at this time. OSCAR explained there is a waitlist at this point in time and Yudi states this is okay as it gives her more time to think about it before she would start. OSCAR placed referral for [facility] 2/12.     No further questions or concerns at this time. OSCAR sent message to Yudi with Beatriz's number and will continue to monitor.    Landy Paul, W  [facility]

## 2024-02-16 ENCOUNTER — PATIENT OUTREACH (OUTPATIENT)
Dept: FAMILY MEDICINE | Facility: OTHER | Age: 24
End: 2024-02-16

## 2024-02-16 NOTE — TELEPHONE ENCOUNTER
Clinic Care Coordination Contact  Care Team Conversations    SW reached out to Yudi to inquire if she was able to connect with Beatriz regarding meals services. No answer. OSCAR left  with request for call back.    ANIBAL Diaz  [facility]

## 2024-02-17 ENCOUNTER — HOSPITAL ENCOUNTER (EMERGENCY)
Facility: HOSPITAL | Age: 24
Discharge: HOME | End: 2024-02-18
Attending: EMERGENCY MEDICINE | Admitting: EMERGENCY MEDICINE

## 2024-02-17 VITALS
DIASTOLIC BLOOD PRESSURE: 71 MMHG | OXYGEN SATURATION: 99 % | TEMPERATURE: 98.3 F | HEART RATE: 82 BPM | SYSTOLIC BLOOD PRESSURE: 102 MMHG | RESPIRATION RATE: 16 BRPM

## 2024-02-17 DIAGNOSIS — T74.21XA SEXUAL ASSAULT OF ADULT, INITIAL ENCOUNTER: ICD-10-CM

## 2024-02-17 PROCEDURE — 99291 CRITICAL CARE FIRST HOUR: CPT

## 2024-02-17 PROCEDURE — 99284 EMERGENCY DEPT VISIT MOD MDM: CPT | Performed by: EMERGENCY MEDICINE

## 2024-02-17 PROCEDURE — 99292 CRITICAL CARE ADDL 30 MIN: CPT

## 2024-02-17 RX ORDER — CEFTRIAXONE SODIUM 1 G
500 VIAL (EA) INJECTION ONCE
Status: COMPLETED | OUTPATIENT
Start: 2024-02-17 | End: 2024-02-18

## 2024-02-17 RX ORDER — LEVONORGESTREL 1.5 MG/1
1.5 TABLET ORAL ONCE
Status: COMPLETED | OUTPATIENT
Start: 2024-02-17 | End: 2024-02-18

## 2024-02-17 RX ORDER — AZITHROMYCIN 250 MG/1
1000 TABLET, FILM COATED ORAL ONCE
Status: COMPLETED | OUTPATIENT
Start: 2024-02-17 | End: 2024-02-18

## 2024-02-17 ASSESSMENT — ACTIVITIES OF DAILY LIVING (ADL)
ADLS_ACUITY_SCORE: 33
ADLS_ACUITY_SCORE: 33

## 2024-02-18 PROCEDURE — 96372 THER/PROPH/DIAG INJ SC/IM: CPT | Performed by: EMERGENCY MEDICINE

## 2024-02-18 PROCEDURE — 250N000011 HC RX IP 250 OP 636: Performed by: EMERGENCY MEDICINE

## 2024-02-18 PROCEDURE — 250N000013 HC RX MED GY IP 250 OP 250 PS 637: Performed by: EMERGENCY MEDICINE

## 2024-02-18 RX ADMIN — AZITHROMYCIN DIHYDRATE 1000 MG: 250 TABLET ORAL at 00:10

## 2024-02-18 RX ADMIN — CEFTRIAXONE 500 MG: 500 INJECTION, POWDER, FOR SOLUTION INTRAMUSCULAR; INTRAVENOUS at 00:14

## 2024-02-18 RX ADMIN — LEVONORGESTREL 1.5 MG: 1.5 TABLET ORAL at 00:11

## 2024-02-18 ASSESSMENT — ACTIVITIES OF DAILY LIVING (ADL)
ADLS_ACUITY_SCORE: 35

## 2024-02-18 NOTE — ED NOTES
Advocate called and voicemail left.   [de-identified] : Physical Examination\par General: well nourished, in no acute distress, alert and oriented to person, place and time\par Psychiatric: normal mood and affect, no abnormal movements or speech patterns\par Eyes: vision intact - glasses\par Throat: no thyromegaly\par Lymph: no enlarged nodes, no lymphedema in extremity\par Respiratory: no wheezing, no shortness of breath with ambulation\par Cardiac: no cardiac leg swelling, 2+ peripheral pulses\par Neurology: normal gross sensation in extremities to light touch\par Abdomen: soft, non-tender, tympanic, no masses\par \par Musculoskeletal Examination\par Ambulation	_ antalgic gait, _ assistive devices\par \par Knee			Right			Left\par General\par      Swelling/Deformity	normal			normal	\par      Skin			normal			normal\par      Erythema		-			-\par      Standing Alignment	varus			varus\par      Effusion		trace			trace\par Range of Motion\par      Hip			full painless ROM		full painless ROM\par      Knee Flexion		100			100\par      Knee Extension	0			0\par Patella\par      J Sign		-			-\par      Quad Medial/Lateral	1/1 1/1\par      Apprehension		-			-\par      Thomas's		+			+\par      Grind Sign		+			+\par      Crepitus		+			+\par Palpation\par      Medial Joint Line	+			+\par      Medial Fem Condyle	-			-\par      Lateral Joint Line	-			-\par      Quad Tendon		-			-\par      Patella Tendon	-			-\par      Medial Patella		-			-\par      Lateral Patella 	-			-\par      Posterior Knee	+			+\par Ligamentous\par      Varus @ 0° / 30°	-/-			-/-\par      Valgus @ 0° / 30°	-/-			-/-\par \par Strength Examination/Atrophy\par      Hip Flexors 		5+			5+\par      Quadriceps		5+			5+\par      Hamstring		5+			5+\par      Tibialis Anterior	5+			5+\par      Achilles/Soleus	5+			5+\par Sensation\par      Deep Peroneal	normal			normal\par      Superficial Peroneal 	normal			normal\par      Sural  		normal			normal\par      Posterior Tibial 	normal			normal\par      Saphneous 		normal			normal\par Pulses\par      DP			2+			2+\par  [de-identified] : 5 views of the affected bilateral knee (standing AP, flexing standing AP, 30degree flexed lateral, 0degree lateral, sunrise view)\par were ordered, obtained and evaluated by myself today and\par demonstrate:\par \par RIGHT\par There is severe medial weightbearing asymmetry narrowing\par Small to moderate osteophytic lipping\par Trace suprapatellar effusion\par Mild to moderate patellofemoral joint space loss without evidence of tilt [or] subluxation on sunrise view\par Normal soft tissue density\par Otherwise normal osseous bone structure without fracture or dislocation\par \par LEFT\par There is severe medial weightbearing asymmetry narrowing\par moderate to large osteophytic lipping\par Trace suprapatellar effusion\par Mild to moderate patellofemoral joint space loss without evidence of tilt [or] subluxation on sunrise view\par Normal soft tissue density\par Otherwise normal osseous bone structure without fracture or dislocation
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
## 2024-02-18 NOTE — ED NOTES
Consent to treat from [facility] Guardian on call NASRIN Billings. [phone]
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
## 2024-02-19 ASSESSMENT — ENCOUNTER SYMPTOMS
SHORTNESS OF BREATH: 0
CHILLS: 0
COUGH: 0
FEVER: 0
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
## 2024-02-20 ENCOUNTER — PATIENT OUTREACH (OUTPATIENT)
Dept: FAMILY MEDICINE | Facility: OTHER | Age: 24
End: 2024-02-20

## 2024-02-20 ENCOUNTER — PATIENT OUTREACH (OUTPATIENT)
Dept: CARE COORDINATION | Facility: OTHER | Age: 24
End: 2024-02-20
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
## 2024-02-20 NOTE — Clinical Note
BRIGITTE peoples connect on this before her visit Friday afternoon Patient Information     Patient Name MRN Sex Vijay Riggs [id number] Male 2001      Nursing Note by Lenka Mooney at 2017  2:15 PM     Author:  Lenka Mooney Service:  (none) Author Type:  (none)     Filed:  2017  2:12 PM Encounter Date:  2017 Status:  Signed     :  Lenka Mooney            Patient presents for med management.  Lenka Mooney LPN .........................2017  2:00 PM
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
## 2024-02-22 ENCOUNTER — PATIENT OUTREACH (OUTPATIENT)
Dept: FAMILY MEDICINE | Facility: OTHER | Age: 24
End: 2024-02-22

## 2024-02-22 NOTE — TELEPHONE ENCOUNTER
Clinic Care Coordination Contact  Care Team Conversations    SW reached out to Gita again to confirm that she and Yudi are planning to attend appointment tomorrow at 4pm with PCP. No answer. SW left  with request for call back.     ANIBAL Diaz  [facility]

## 2024-02-23 ENCOUNTER — PATIENT OUTREACH (OUTPATIENT)
Dept: FAMILY MEDICINE | Facility: OTHER | Age: 24
End: 2024-02-23

## 2024-02-23 NOTE — TELEPHONE ENCOUNTER
Clinic Care Coordination Contact  Care Team Conversations    OSCAR received call back from Gita. She states that she was able to get in touch with Yudi and inform her of PCP visit this afternoon. As of this morning Yudi told her that she was planning to come to this visit.     Gita will be unable to come, asked if this SW would update her whether or not Yudi was able to make it. OSCAR states that is not a problem and she will plan to connect with Gita Monday to let her know if Yudi was able to make visit.     Gita did want to provide collateral for this SW to pass along to PCP. Per Gita, Yudi is not taking any of her anti psychotic or schizophrenic medications. She is also not following through with any community resources or referrals. OSCAR assured Gita she would pass this along to PCP.    Landy Paul, [facility]

## 2024-02-23 NOTE — TELEPHONE ENCOUNTER
Clinic Care Coordination Contact  Care Team Conversations    SW received VM from [facility] after hours attempting to connect, requested call back.     OSCAR reached out to Crystal via phone. No answer. OSCAR left VM with request for call back.    ANIBAL Diaz  [facility]

## 2024-02-26 ENCOUNTER — PATIENT OUTREACH (OUTPATIENT)
Dept: FAMILY MEDICINE | Facility: OTHER | Age: 24
End: 2024-02-26

## 2024-02-26 ENCOUNTER — PATIENT OUTREACH (OUTPATIENT)
Dept: CARE COORDINATION | Facility: OTHER | Age: 24
End: 2024-02-26

## 2024-02-26 NOTE — PROGRESS NOTES
Clinic Care Coordination Contact  Care Team Conversations    RN CC called patient and no answer and not able to leave a message.  Looking to remind patient of Dr. Brothers visit today.  Also would like to get her sooner visit with PCP as she missed last week. Will look to complete warm transfer if will be following with Dr. Sandoval.  Delores Perez RN  Care Coordination

## 2024-02-26 NOTE — TELEPHONE ENCOUNTER
Clinic Care Coordination Contact  Care Team Conversations    SW received call back from Gita. She reports that she has also not heard from Gita since Friday. She will continue to try to get in contact with her regarding rescheduling appointment.    OSCAR acknowledged and told Gita to feel free to reach out to this SW if there is anything the clinic can to do help support in the meantime.    Landy Paul, ANIBAL  [facility]

## 2024-02-26 NOTE — TELEPHONE ENCOUNTER
Clinic Care Coordination Contact  Care Team Conversations    SW reached out to Gita to follow up on discussion from last week and let her know that Yudi did not make it to her scheduled appointment with PCP on Friday. No answer. OSCAR left  with request for call back.     OSCAR also reached out to Yudi to touch base after missed visit. No answer and unable to leave .     Landy Paul, ANIBAL  [facility]

## 2024-03-02 ENCOUNTER — HOSPITAL ENCOUNTER (EMERGENCY)
Facility: HOSPITAL | Age: 24
Discharge: HOME | End: 2024-03-02
Attending: INTERNAL MEDICINE | Admitting: INTERNAL MEDICINE
Payer: COMMERCIAL

## 2024-03-02 VITALS
HEART RATE: 85 BPM | OXYGEN SATURATION: 97 % | RESPIRATION RATE: 16 BRPM | SYSTOLIC BLOOD PRESSURE: 118 MMHG | DIASTOLIC BLOOD PRESSURE: 74 MMHG | TEMPERATURE: 98 F

## 2024-03-02 DIAGNOSIS — F15.10 METHAMPHETAMINE ABUSE (H): ICD-10-CM

## 2024-03-02 PROCEDURE — 99284 EMERGENCY DEPT VISIT MOD MDM: CPT

## 2024-03-02 PROCEDURE — 250N000013 HC RX MED GY IP 250 OP 250 PS 637: Performed by: INTERNAL MEDICINE

## 2024-03-02 PROCEDURE — 99283 EMERGENCY DEPT VISIT LOW MDM: CPT | Performed by: INTERNAL MEDICINE

## 2024-03-02 RX ORDER — DIAZEPAM 5 MG
10 TABLET ORAL ONCE
Status: COMPLETED | OUTPATIENT
Start: 2024-03-02 | End: 2024-03-02

## 2024-03-02 RX ORDER — MAGNESIUM HYDROXIDE/ALUMINUM HYDROXICE/SIMETHICONE 120; 1200; 1200 MG/30ML; MG/30ML; MG/30ML
30 SUSPENSION ORAL ONCE
Status: COMPLETED | OUTPATIENT
Start: 2024-03-02 | End: 2024-03-02

## 2024-03-02 RX ADMIN — DIAZEPAM 10 MG: 5 TABLET ORAL at 04:04

## 2024-03-02 RX ADMIN — ALUMINUM HYDROXIDE, MAGNESIUM HYDROXIDE, AND SIMETHICONE 15 ML: 200; 200; 20 SUSPENSION ORAL at 02:24

## 2024-03-02 ASSESSMENT — ENCOUNTER SYMPTOMS
CHILLS: 0
ABDOMINAL PAIN: 0
SLEEP DISTURBANCE: 1
COUGH: 0
SORE THROAT: 0
EYE REDNESS: 0
FEVER: 0
NECK STIFFNESS: 0
NERVOUS/ANXIOUS: 1
ACTIVITY CHANGE: 0
VOMITING: 0
DIZZINESS: 0
NAUSEA: 0
DYSURIA: 0
HEADACHES: 0
FATIGUE: 0
RHINORRHEA: 0
AGITATION: 1
ARTHRALGIAS: 0
DIARRHEA: 0
SHORTNESS OF BREATH: 0
APPETITE CHANGE: 0
HEMATURIA: 0
MYALGIAS: 0

## 2024-03-02 ASSESSMENT — COLUMBIA-SUICIDE SEVERITY RATING SCALE - C-SSRS
6. HAVE YOU EVER DONE ANYTHING, STARTED TO DO ANYTHING, OR PREPARED TO DO ANYTHING TO END YOUR LIFE?: NO
1. IN THE PAST MONTH, HAVE YOU WISHED YOU WERE DEAD OR WISHED YOU COULD GO TO SLEEP AND NOT WAKE UP?: NO
2. HAVE YOU ACTUALLY HAD ANY THOUGHTS OF KILLING YOURSELF IN THE PAST MONTH?: NO

## 2024-03-02 ASSESSMENT — ACTIVITIES OF DAILY LIVING (ADL)
ADLS_ACUITY_SCORE: 35

## 2024-03-02 NOTE — ED TRIAGE NOTES
Pt arrives with [facility] reporting that she wants help getting off of meth. Pt used meth tonight. Pt reports that she has chest pain for the past couple of days and she can feel her heart racing. Pt is intermittent in responding to staff questions. Pt closes eyes and does not respond.

## 2024-03-02 NOTE — ED NOTES
Call placed to [facility]  and message left to call the ER, will attempt to call prior to Pt discharge.

## 2024-03-02 NOTE — ED NOTES
Pt sleeping, attempted to arouse pt, pt flickering eye lids and flaring nostrils during attempt. Refusing to open eyes. Discussed that transport should be here shortly. Awaiting detox transport.

## 2024-03-02 NOTE — ED NOTES
Patient discharged home at this time. Patient given written and verbal discharge instructions regarding home care, f/u and medications. Patient verbalized understanding of all discharge instructions.   [facility]  here to provide transportation to Detox.

## 2024-03-02 NOTE — ED NOTES
[facility] called requesting info for [facility]  for consent due to guardianship. Phone # provided.

## 2024-03-02 NOTE — ED PROVIDER NOTES
"  History     Chief Complaint   Patient presents with    Addiction Problem     The history is provided by the patient.   Mental Health Problem  Presenting symptoms: agitation    Presenting symptoms: no suicidal thoughts, no suicidal threats and no suicide attempt    Onset quality:  Gradual  Duration:  6 months  Timing:  Intermittent  Progression:  Waxing and waning  Chronicity:  Recurrent  Context: drug abuse    Associated symptoms: anxiety    Associated symptoms: no abdominal pain, no appetite change, no chest pain, no fatigue and no headaches          Allergies:  Allergies   Allergen Reactions    Other [No Clinical Screening - See Comments] Anaphylaxis     \"Sodium Penathol\" per mother. Used in anesthesia.  Family hx of reaction.    Latex Rash    Amoxicillin Trihydrate GI Disturbance    Amoxicillin-Pot Clavulanate GI Disturbance    Phenobarbital     Thiopental      Other reaction(s): Other - Describe In Comment Field  Family Hx of death with this medication       Problem List:    Patient Active Problem List    Diagnosis Date Noted    Suicidal ideation 09/14/2023     Priority: Medium    Pituitary microadenoma (H) 07/21/2023     Priority: Medium     1/2023 5 x 5 mm hypoenhancing focus in the right pituitary gland  High prolactin resolved with stopping risperidone.   Cortisol and IGF-1 within normal limits       Marijuana use 07/21/2023     Priority: Medium    Tympanosclerosis of both ears 11/15/2022     Priority: Medium    Postnasal drip 11/15/2022     Priority: Medium    Primary insomnia 11/15/2022     Priority: Medium    Constipation, unspecified constipation type 11/15/2022     Priority: Medium    History of suicide attempt 10/28/2022     Priority: Medium     2020 - tylenol overdose      Intellectual disability 10/28/2022     Priority: Medium    Schizoaffective disorder, bipolar type (H) 07/08/2021     Priority: Medium     Dr Brothers      KIRSTY (generalized anxiety disorder) 07/19/2019     Priority: Medium    " Allergic rhinitis 10/04/2011     Priority: Medium        Past Medical History:    Past Medical History:   Diagnosis Date    Allergic rhinitis 10/4/2011    KIRSTY (generalized anxiety disorder) 7/19/2019    Hyperprolactinemia (H24) 11/16/2022    PTSD (post-traumatic stress disorder)     Suicidal behavior with attempted self-injury (H) 2/2/2021       Past Surgical History:    Past Surgical History:   Procedure Laterality Date    TONSILLECTOMY & ADENOIDECTOMY  2008       Family History:    No family history on file.    Social History:  Marital Status:  Single [1]  Social History     Tobacco Use    Smoking status: Every Day     Packs/day: 2     Types: Vaping Device, Cigarettes    Smokeless tobacco: Never   Vaping Use    Vaping Use: Every day    Substances: Nicotine, CBD, Flavoring    Devices: Disposable   Substance Use Topics    Alcohol use: No     Comment: only on holidays    Drug use: Yes     Types: Methamphetamines     Comment: Last use, recently        Medications:    sertraline (ZOLOFT) 50 MG tablet  traZODone (DESYREL) 50 MG tablet          Review of Systems   Constitutional:  Negative for activity change, appetite change, chills, fatigue and fever.   HENT:  Negative for congestion, rhinorrhea and sore throat.    Eyes:  Negative for redness.   Respiratory:  Negative for cough and shortness of breath.    Cardiovascular:  Negative for chest pain.   Gastrointestinal:  Negative for abdominal pain, diarrhea, nausea and vomiting.   Genitourinary:  Negative for dysuria and hematuria.   Musculoskeletal:  Negative for arthralgias, myalgias and neck stiffness.   Skin:  Negative for rash.   Neurological:  Negative for dizziness and headaches.   Psychiatric/Behavioral:  Positive for agitation and sleep disturbance. Negative for suicidal ideas. The patient is nervous/anxious.        Physical Exam   BP: 147/91  Pulse: 106  Temp: 99.2  F (37.3  C)  Resp: 16  SpO2: 99 %      Physical Exam  Vitals and nursing note reviewed.    Constitutional:       Appearance: She is well-developed.   HENT:      Head: Normocephalic and atraumatic.      Mouth/Throat:      Pharynx: No oropharyngeal exudate.   Eyes:      Conjunctiva/sclera: Conjunctivae normal.      Pupils: Pupils are equal, round, and reactive to light.   Neck:      Thyroid: No thyromegaly.      Vascular: No JVD.      Trachea: No tracheal deviation.   Cardiovascular:      Rate and Rhythm: Normal rate and regular rhythm.      Heart sounds: Normal heart sounds. No murmur heard.     No friction rub. No gallop.   Pulmonary:      Effort: Pulmonary effort is normal. No respiratory distress.      Breath sounds: Normal breath sounds. No stridor. No wheezing or rales.   Chest:      Chest wall: No tenderness.   Abdominal:      General: Bowel sounds are normal. There is no distension.      Palpations: Abdomen is soft. There is no mass.      Tenderness: There is no abdominal tenderness. There is no guarding or rebound.   Musculoskeletal:         General: No tenderness. Normal range of motion.      Cervical back: Normal range of motion and neck supple.   Lymphadenopathy:      Cervical: No cervical adenopathy.   Skin:     General: Skin is warm and dry.      Coloration: Skin is not pale.      Findings: No erythema or rash.   Neurological:      Mental Status: She is alert and oriented to person, place, and time.   Psychiatric:         Attention and Perception: Attention normal. She is attentive. She does not perceive auditory or visual hallucinations.         Mood and Affect: Mood is anxious.         Speech: Speech is rapid and pressured.         Behavior: Behavior is agitated. Behavior is cooperative.         Thought Content: Thought content is not paranoid or delusional. Thought content does not include homicidal or suicidal ideation. Thought content does not include homicidal or suicidal plan.         ED Course        Procedures                No results found for this or any previous visit (from the  past 24 hour(s)).    Medications   diazepam (VALIUM) tablet 10 mg (10 mg Oral $Given 3/2/24 3427)   alum & mag hydroxide-simethicone (MAALOX) suspension 30 mL (15 mLs Oral $Given 3/2/24 4295)       Assessments & Plan (with Medical Decision Making)   Methamphetamine abuse  Agitated and sleepless  Symptoms improved after receiving diazepam    D C home,SW consulted for follow up for helping her to detox  I have reviewed the nursing notes.    I have reviewed the findings, diagnosis, plan and need for follow up with the patient.      New Prescriptions    No medications on file       Final diagnoses:   Methamphetamine abuse (H)       3/2/2024   [facility]       Guillermo Araya MD  03/02/24 4643

## 2024-03-02 NOTE — ED NOTES
Per [facility] they had not officially confirmed the bed til I called them back. Per the nurse, she will be calling the  and will call with an approximate arrival time.

## 2024-03-02 NOTE — ED NOTES
[facility] contacted as pt reports she is still interested in going. Spoke with April at [facility] who states she does have a bed available. Handoff report given and she states she does have a  on call. States she will call  and send them over to  patient. Pt updated on wait time for ride and elects to sleep until arrival.

## 2024-03-02 NOTE — ED NOTES
Spoke with Zarina [phone] from [facility], notified that Pt is in the ER and plan to discharge to detox or home.

## 2024-03-02 NOTE — ED NOTES
[facility] was called per pt request. Currently no female beds available. [facility] stated that it may be possible that beds will open mid morning.

## 2024-03-11 ENCOUNTER — PATIENT OUTREACH (OUTPATIENT)
Dept: CARE COORDINATION | Facility: OTHER | Age: 24
End: 2024-03-11

## 2024-03-11 NOTE — PROGRESS NOTES
Clinic Care Coordination Contact  Care Team Conversations    RN CC alerted patient into ER.  RN CC reviewed chart.  RN CC called patient and busy signal.  Looking to see if patient able to get sooner appointment with PCP.  Has 3/21 visit scheduled.  Delores Perez, PRATIK  Care Coordination

## 2024-03-18 ENCOUNTER — PATIENT OUTREACH (OUTPATIENT)
Dept: FAMILY MEDICINE | Facility: OTHER | Age: 24
End: 2024-03-18

## 2024-03-18 NOTE — TELEPHONE ENCOUNTER
Clinic Care Coordination Contact  Care Team Conversations    SW also reached out to Gita, Guardian, to remind her of upcoming appointment with PCP on 3/21. She states she has also not been able to get in contact with Yudi and does not have a working number for her. She took down details of the appointment and says she will continue to attempt to get in contact with her to inform her of the appointment.     Landy Paul, JOB  [facility]

## 2024-03-18 NOTE — TELEPHONE ENCOUNTER
Clinic Care Coordination Contact  Care Team Conversations    SW reached out to Yudi looking to remind her of appointment with PCP scheduled for 3/21. No answer. States phone unable to accept calls at this time. No option to leave .     ANIBAL Diaz  [facility]

## 2024-03-22 ENCOUNTER — PATIENT OUTREACH (OUTPATIENT)
Dept: FAMILY MEDICINE | Facility: OTHER | Age: 24
End: 2024-03-22

## 2024-03-22 NOTE — TELEPHONE ENCOUNTER
Clinic Care Coordination Contact  Care Team Conversations    SW attempted to reach Yudi following no show for visit with PCP yesterday. No answer. States phone unable to accept calls at this time. No option to leave .    SW also reached out to Gita to update her that Yudi was a no show for her visit with PCP yesterday pre request from earlier in the week to be updated. No answer. SW left  providing update.     ANIBAL Diaz  [facility]

## 2024-04-04 ENCOUNTER — PATIENT OUTREACH (OUTPATIENT)
Dept: FAMILY MEDICINE | Facility: OTHER | Age: 24
End: 2024-04-04

## 2024-04-04 NOTE — TELEPHONE ENCOUNTER
Clinic Care Coordination Contact  Care Team Conversations    SW reached out to Yudi looking to remind her of appointment with PCP scheduled for 4/9. No answer. States phone unable to accept calls at this time. No option to leave .     ANIBAL Diaz  [facility]

## 2024-04-09 ENCOUNTER — PATIENT OUTREACH (OUTPATIENT)
Dept: CARE COORDINATION | Facility: OTHER | Age: 24
End: 2024-04-09

## 2024-04-09 NOTE — PROGRESS NOTES
Clinic Care Coordination Contact  Care Team Conversations    RN CC called patient and phone not accepting calls.  Patient has PCP visit today and looking to connect her with RN CC in that pod.  Delores Perez, RN  Care Coordination

## 2024-04-15 ENCOUNTER — CARE COORDINATION (OUTPATIENT)
Dept: CARE COORDINATION | Facility: OTHER | Age: 24
End: 2024-04-15

## 2024-04-19 ENCOUNTER — HOSPITAL ENCOUNTER (EMERGENCY)
Facility: HOSPITAL | Age: 24
Discharge: HOME | End: 2024-04-19
Attending: STUDENT IN AN ORGANIZED HEALTH CARE EDUCATION/TRAINING PROGRAM
Payer: COMMERCIAL

## 2024-04-19 ENCOUNTER — TELEPHONE (OUTPATIENT)
Dept: CARE COORDINATION | Facility: OTHER | Age: 24
End: 2024-04-19

## 2024-04-19 ENCOUNTER — PATIENT OUTREACH (OUTPATIENT)
Dept: CARE COORDINATION | Facility: OTHER | Age: 24
End: 2024-04-19

## 2024-04-19 ENCOUNTER — OFFICE VISIT (OUTPATIENT)
Dept: FAMILY MEDICINE | Facility: OTHER | Age: 24
End: 2024-04-19
Attending: STUDENT IN AN ORGANIZED HEALTH CARE EDUCATION/TRAINING PROGRAM
Payer: COMMERCIAL

## 2024-04-19 VITALS
BODY MASS INDEX: 16.56 KG/M2 | HEIGHT: 69 IN | SYSTOLIC BLOOD PRESSURE: 110 MMHG | DIASTOLIC BLOOD PRESSURE: 70 MMHG | TEMPERATURE: 97.9 F | OXYGEN SATURATION: 99 % | HEART RATE: 79 BPM | WEIGHT: 111.8 LBS

## 2024-04-19 VITALS
SYSTOLIC BLOOD PRESSURE: 114 MMHG | OXYGEN SATURATION: 98 % | HEART RATE: 111 BPM | DIASTOLIC BLOOD PRESSURE: 73 MMHG | RESPIRATION RATE: 16 BRPM

## 2024-04-19 DIAGNOSIS — Z72.89 SELF-INJURIOUS BEHAVIOR: ICD-10-CM

## 2024-04-19 DIAGNOSIS — R74.01 ELEVATED AST (SGOT): ICD-10-CM

## 2024-04-19 DIAGNOSIS — B37.31 YEAST INFECTION OF THE VAGINA: ICD-10-CM

## 2024-04-19 DIAGNOSIS — Z11.3 SCREEN FOR STD (SEXUALLY TRANSMITTED DISEASE): ICD-10-CM

## 2024-04-19 DIAGNOSIS — F15.10 METHAMPHETAMINE USE (H): ICD-10-CM

## 2024-04-19 DIAGNOSIS — F25.0 SCHIZOAFFECTIVE DISORDER, BIPOLAR TYPE (H): Chronic | ICD-10-CM

## 2024-04-19 DIAGNOSIS — F25.0 SCHIZOAFFECTIVE DISORDER, BIPOLAR TYPE (H): Primary | Chronic | ICD-10-CM

## 2024-04-19 DIAGNOSIS — B37.31 YEAST INFECTION OF THE VAGINA: Primary | ICD-10-CM

## 2024-04-19 DIAGNOSIS — D35.2 PITUITARY MICROADENOMA (H): ICD-10-CM

## 2024-04-19 DIAGNOSIS — G47.00 INSOMNIA, UNSPECIFIED TYPE: ICD-10-CM

## 2024-04-19 LAB
ALBUMIN SERPL BCG-MCNC: 4.5 G/DL (ref 3.5–5.2)
ALBUMIN UR-MCNC: 20 MG/DL
ALP SERPL-CCNC: 79 U/L (ref 40–150)
ALT SERPL W P-5'-P-CCNC: 12 U/L (ref 0–50)
AMORPH CRY #/AREA URNS HPF: ABNORMAL /HPF
ANION GAP SERPL CALCULATED.3IONS-SCNC: 6 MMOL/L (ref 7–15)
APPEARANCE UR: ABNORMAL
AST SERPL W P-5'-P-CCNC: 21 U/L (ref 0–45)
BACTERIAL VAGINOSIS VAG-IMP: NEGATIVE
BILIRUB SERPL-MCNC: 0.3 MG/DL
BILIRUB UR QL STRIP: NEGATIVE
BUN SERPL-MCNC: 15.6 MG/DL (ref 6–20)
C TRACH DNA SPEC QL PROBE+SIG AMP: NEGATIVE
CALCIUM SERPL-MCNC: 9.4 MG/DL (ref 8.6–10)
CANDIDA DNA VAG QL NAA+PROBE: DETECTED
CANDIDA GLABRATA / CANDIDA KRUSEI DNA: DETECTED
CHLORIDE SERPL-SCNC: 102 MMOL/L (ref 98–107)
COLOR UR AUTO: YELLOW
CREAT SERPL-MCNC: 0.81 MG/DL (ref 0.51–0.95)
DEPRECATED HCO3 PLAS-SCNC: 30 MMOL/L (ref 22–29)
EGFRCR SERPLBLD CKD-EPI 2021: >90 ML/MIN/1.73M2
GLUCOSE SERPL-MCNC: 62 MG/DL (ref 70–99)
GLUCOSE UR STRIP-MCNC: NEGATIVE MG/DL
HCG UR QL: NEGATIVE
HGB UR QL STRIP: NEGATIVE
KETONES UR STRIP-MCNC: NEGATIVE MG/DL
LEUKOCYTE ESTERASE UR QL STRIP: NEGATIVE
MUCOUS THREADS #/AREA URNS LPF: PRESENT /LPF
N GONORRHOEA DNA SPEC QL NAA+PROBE: NEGATIVE
NITRATE UR QL: NEGATIVE
PH UR STRIP: 6.5 [PH] (ref 4.7–8)
POTASSIUM SERPL-SCNC: 3.5 MMOL/L (ref 3.4–5.3)
PROT SERPL-MCNC: 7.3 G/DL (ref 6.4–8.3)
RBC URINE: 0 /HPF
SODIUM SERPL-SCNC: 138 MMOL/L (ref 135–145)
SP GR UR STRIP: 1.03 (ref 1–1.03)
SQUAMOUS EPITHELIAL: 2 /HPF
T VAGINALIS DNA VAG QL NAA+PROBE: NOT DETECTED
UROBILINOGEN UR STRIP-MCNC: NORMAL MG/DL
WBC URINE: 1 /HPF

## 2024-04-19 PROCEDURE — 99214 OFFICE O/P EST MOD 30 MIN: CPT | Performed by: STUDENT IN AN ORGANIZED HEALTH CARE EDUCATION/TRAINING PROGRAM

## 2024-04-19 PROCEDURE — 87389 HIV-1 AG W/HIV-1&-2 AB AG IA: CPT | Mod: ZL | Performed by: STUDENT IN AN ORGANIZED HEALTH CARE EDUCATION/TRAINING PROGRAM

## 2024-04-19 PROCEDURE — 80053 COMPREHEN METABOLIC PANEL: CPT | Mod: ZL | Performed by: STUDENT IN AN ORGANIZED HEALTH CARE EDUCATION/TRAINING PROGRAM

## 2024-04-19 PROCEDURE — G0463 HOSPITAL OUTPT CLINIC VISIT: HCPCS

## 2024-04-19 PROCEDURE — 81003 URINALYSIS AUTO W/O SCOPE: CPT | Performed by: STUDENT IN AN ORGANIZED HEALTH CARE EDUCATION/TRAINING PROGRAM

## 2024-04-19 PROCEDURE — 81025 URINE PREGNANCY TEST: CPT | Performed by: STUDENT IN AN ORGANIZED HEALTH CARE EDUCATION/TRAINING PROGRAM

## 2024-04-19 PROCEDURE — 86780 TREPONEMA PALLIDUM: CPT | Mod: ZL | Performed by: STUDENT IN AN ORGANIZED HEALTH CARE EDUCATION/TRAINING PROGRAM

## 2024-04-19 PROCEDURE — 86803 HEPATITIS C AB TEST: CPT | Mod: ZL | Performed by: STUDENT IN AN ORGANIZED HEALTH CARE EDUCATION/TRAINING PROGRAM

## 2024-04-19 PROCEDURE — 36415 COLL VENOUS BLD VENIPUNCTURE: CPT | Mod: ZL | Performed by: STUDENT IN AN ORGANIZED HEALTH CARE EDUCATION/TRAINING PROGRAM

## 2024-04-19 PROCEDURE — 99283 EMERGENCY DEPT VISIT LOW MDM: CPT | Mod: 27

## 2024-04-19 PROCEDURE — 87491 CHLMYD TRACH DNA AMP PROBE: CPT | Mod: ZL | Performed by: STUDENT IN AN ORGANIZED HEALTH CARE EDUCATION/TRAINING PROGRAM

## 2024-04-19 PROCEDURE — 0352U MULTIPLEX VAGINAL PANEL BY PCR: CPT | Performed by: STUDENT IN AN ORGANIZED HEALTH CARE EDUCATION/TRAINING PROGRAM

## 2024-04-19 PROCEDURE — 99283 EMERGENCY DEPT VISIT LOW MDM: CPT | Performed by: STUDENT IN AN ORGANIZED HEALTH CARE EDUCATION/TRAINING PROGRAM

## 2024-04-19 RX ORDER — FLUCONAZOLE 150 MG/1
150 TABLET ORAL
Qty: 3 TABLET | Refills: 0 | Status: SHIPPED | OUTPATIENT
Start: 2024-04-19 | End: 2024-04-19

## 2024-04-19 RX ORDER — ARIPIPRAZOLE 5 MG/1
5 TABLET ORAL DAILY
Qty: 15 TABLET | Refills: 0 | Status: ON HOLD | OUTPATIENT
Start: 2024-04-19 | End: 2024-07-06

## 2024-04-19 RX ORDER — LANOLIN ALCOHOL/MO/W.PET/CERES
3 CREAM (GRAM) TOPICAL
Qty: 30 TABLET | Refills: 0 | Status: SHIPPED | OUTPATIENT
Start: 2024-04-19 | End: 2024-04-19

## 2024-04-19 RX ORDER — LANOLIN ALCOHOL/MO/W.PET/CERES
3 CREAM (GRAM) TOPICAL
Qty: 30 TABLET | Refills: 0 | Status: ON HOLD | OUTPATIENT
Start: 2024-04-19 | End: 2024-09-12

## 2024-04-19 RX ORDER — ARIPIPRAZOLE 5 MG/1
5 TABLET ORAL DAILY
Qty: 15 TABLET | Refills: 0 | Status: SHIPPED | OUTPATIENT
Start: 2024-04-19 | End: 2024-04-19

## 2024-04-19 RX ORDER — FLUCONAZOLE 150 MG/1
150 TABLET ORAL
Qty: 3 TABLET | Refills: 0 | Status: ON HOLD | OUTPATIENT
Start: 2024-04-19 | End: 2024-06-25

## 2024-04-19 ASSESSMENT — COLUMBIA-SUICIDE SEVERITY RATING SCALE - C-SSRS
5. HAVE YOU STARTED TO WORK OUT OR WORKED OUT THE DETAILS OF HOW TO KILL YOURSELF? DO YOU INTEND TO CARRY OUT THIS PLAN?: NO
1. IN THE PAST MONTH, HAVE YOU WISHED YOU WERE DEAD OR WISHED YOU COULD GO TO SLEEP AND NOT WAKE UP?: YES
4. HAVE YOU HAD THESE THOUGHTS AND HAD SOME INTENTION OF ACTING ON THEM?: YES
6. HAVE YOU EVER DONE ANYTHING, STARTED TO DO ANYTHING, OR PREPARED TO DO ANYTHING TO END YOUR LIFE?: YES
2. HAVE YOU ACTUALLY HAD ANY THOUGHTS OF KILLING YOURSELF IN THE PAST MONTH?: YES
3. HAVE YOU BEEN THINKING ABOUT HOW YOU MIGHT KILL YOURSELF?: NO

## 2024-04-19 ASSESSMENT — ACTIVITIES OF DAILY LIVING (ADL)
ADLS_ACUITY_SCORE: 35
ADLS_ACUITY_SCORE: 35

## 2024-04-19 ASSESSMENT — PAIN SCALES - GENERAL: PAINLEVEL: NO PAIN (0)

## 2024-04-19 NOTE — ED NOTES
Dr. Rock states no 1:1 needed for patient and cancelled security sitting as 1:1 sitter. PCS informed.

## 2024-04-19 NOTE — PROGRESS NOTES
S on-call Guardian    T: [phone]      Manuel, On-call Guardian gave verbal ok to admin the Abilify & order any additional medications at PCPs discretion.
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
## 2024-04-19 NOTE — TELEPHONE ENCOUNTER
Pt currently in [facility] ED  Pt has no-showed PCP several times - has not yet established care  Writer is current RNCC - also unable to reach Pt after several attempts    PCP asked writer to relay her recommendations for commitment based on chart review & conversations with OSCAR Guerin & Delores HU, RNCC  Chanelle spoke with Dr. Rock & OSCAR Clifton @ ED dept    Writer called [facility] & spoke with on-call guardian  Relayed Pt is currently checked into the ED  Gave ED phone line for Guardian to reach out to make Pt contact    Will update PCP as able
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
## 2024-04-19 NOTE — ED NOTES
Did go speak with the patient again and she declined a DEC assessment, states she's able and willing to come to her clinic appointment and will talk about it then

## 2024-04-19 NOTE — PROGRESS NOTES
Clinic Care Coordination Contact  Care Team Conversations    RN JOHN alerted that patient into ER.  PRATIK LOPEZ called OSCAR Gregory in ER to discuss patient.  She hadn't met with patient yet and unaware of situation.  RN CC let her know patient has been into ER several times and feel she is in need of stabilization. She states patient had a psych evaluation and waiting to see what will happen.  Let her know patient has a guardian and PCP Dr. Sandoval.  Let her know RN CC able to come down there and can introduce her to new PRATIK Haile if she is able.  PRATIK LOPEZ talked with PRATIK Haile and alerted her to patient in ER and if requested will look to go to ER to meet patient.  PCP joined conversation and she has been looking to connect with patient to get her committed.  RN CC let her know I had called ER and recommended she call ER with her concerns as we are not able to get a hold of patient once she leaves ER.  Let her know RN CC will be looking to pass patient off to PRATIK Haile once they are introduced.  Delores Perez RN  Care Coordination
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
## 2024-04-20 LAB — T PALLIDUM AB SER QL: NONREACTIVE

## 2024-04-21 LAB
HCV AB SERPL QL IA: NONREACTIVE
HIV 1+2 AB+HIV1 P24 AG SERPL QL IA: NONREACTIVE

## 2024-04-22 ENCOUNTER — PATIENT OUTREACH (OUTPATIENT)
Dept: CARE COORDINATION | Facility: OTHER | Age: 24
End: 2024-04-22

## 2024-04-22 NOTE — RESULT ENCOUNTER NOTE
Please call and notify patient that her electrolytes, kidney function, and liver function look fine.  STD testing including HIV, hepatitis C, syphilis, and gonorrhea and chlamydia was negative.   [Follow-Up] : a follow-up visit [Abnormal CXR/ Chest CT] : an abnormal CXR/ chest CT [Cough] : cough [TextBox_44] : Complains of dry cough and throat itch at times. s/p EGD: GERD, Rx Omeprazole by GI recently

## 2024-04-23 ENCOUNTER — PATIENT OUTREACH (OUTPATIENT)
Dept: FAMILY MEDICINE | Facility: OTHER | Age: 24
End: 2024-04-23

## 2024-04-23 ENCOUNTER — TELEPHONE (OUTPATIENT)
Dept: FAMILY MEDICINE | Facility: OTHER | Age: 24
End: 2024-04-23
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
## 2024-04-23 NOTE — PROGRESS NOTES
Clinic Care Coordination Contact  Care Team Conversations    RN CC discharging patient as connected with RN JOHN Haile and Dr. Sandoval.  No further outreach by this RN CC.  Delores Perez, RN  Care Coordination

## 2024-04-29 ENCOUNTER — PATIENT OUTREACH (OUTPATIENT)
Dept: FAMILY MEDICINE | Facility: OTHER | Age: 24
End: 2024-04-29

## 2024-04-29 NOTE — Clinical Note
FYI - Not sure what happened but I am unable to get through to her Guardian. Will have to wait and hear back from the main office, which can take a few days.

## 2024-04-29 NOTE — TELEPHONE ENCOUNTER
Clinic Care Coordination Contact  Care Team Conversations    SW reached out to Gita, luan, again following lack of response to previous VM. States this number is no longer in service. SW attempted x2.     SW reached out to main [facility]  line to inquire about Yudi's guardian and to see if Gita is still the guardian and if so, what a new best number to reach her would be. SW will await response from [facility].    Landy Paul, ANIBAL  [facility]

## 2024-05-01 ENCOUNTER — PATIENT OUTREACH (OUTPATIENT)
Dept: FAMILY MEDICINE | Facility: OTHER | Age: 24
End: 2024-05-01

## 2024-05-01 NOTE — TELEPHONE ENCOUNTER
Clinic Care Coordination Contact  Care Team Conversations    OSCAR received VM from Chantal Patel with [facility] [employer]. She states she can be reached at [phone] to discuss Yudi as she will be covering for her Guardian. Requested call back.    OSCAR reached out to Chantal to return call.

## 2024-05-01 NOTE — TELEPHONE ENCOUNTER
Clinic Care Coordination Contact  Care Team Conversations    SW reached out to Yudi to remind her of upcoming appointment tomorrow. No answer. SW left VM reminding her of appointment.    ANIBAL Diaz  [facility]

## 2024-05-01 NOTE — TELEPHONE ENCOUNTER
Clinic Care Coordination Contact  Care Team Conversations    SW received VM from Chantal Patel attempting to connect regarding Yudi. Requested call back.    OSCAR returned Rafael call. Chantal informed this SW that Gita, the previous Guardian, no longer works for [employer]. Chantal will be the interim Guardian for Yudi until a replacement for Duy is hired. OSCAR updated chart to reflect Chantal's contact number.    Chantal informed this SW that she is new to Yudi's case and has not yet met her. OSCAR provided her updated contact information for Yudi and [facility] Beatriz Rainey.     OSCAR provided brief history of her work with Yudi, as well as update this OSCAR received from PCP and RN CC at last visit. OSCAR explained that current priority determined at that visit was new housing. OSCAR explained that this would ideally be a group home but the last time this SW was able to discuss with Yudi she was not interested in group home housing at this time. Chantal stated that she would reach out to [facility] to connect regarding potential new living placement.     OSCAR explained that Yudi has visit again with PCP tomorrow at 1pm. OSCAR stated that she would plan to meet with Yudi during visit if able, if Yudi shows up.    Chantal and this SW make plan to connect tomorrow following visit.     Landy Paul, [facility]

## 2024-05-01 NOTE — Clinical Note
BRIGITTE - Her previous Guardian left so this will be the new Guardian. I let them know about the housing needs. This Guardian was wanting us to get a good read on her insight at tomorrow's visit and let her know. Hopefully she shows up tomorrow. Dr. Sandoval, lets connect before her visit!

## 2024-05-02 ENCOUNTER — PATIENT OUTREACH (OUTPATIENT)
Dept: CARE COORDINATION | Facility: OTHER | Age: 24
End: 2024-05-02

## 2024-05-02 NOTE — TELEPHONE ENCOUNTER
LVM for Patient to remind of darryl't today with PCP  Writer will continue to reach out to Patient    See note from OSCAR Bill in regards to Pt having a new Guardian  Will help facilitate contact between Pt & Guardian as able

## 2024-05-03 ENCOUNTER — PATIENT OUTREACH (OUTPATIENT)
Dept: FAMILY MEDICINE | Facility: OTHER | Age: 24
End: 2024-05-03

## 2024-05-03 NOTE — TELEPHONE ENCOUNTER
Clinic Care Coordination Contact  Care Team Conversations    OSCAR reached out to Yudi following no show to appointment yesterday. No answer. OSCAR left  with request for call back.     OSCAR reached out to Chantal, Guardian, to update her that Yudi was a no show for visit with PCP. OSCAR informed Chantal she will keep her posted if she surfaces again in the clinic or ER. Chantal informed this SW that she would also keep her posted if she is able to make contact.    Landy Paul, [facility]

## 2024-05-10 ENCOUNTER — PATIENT OUTREACH (OUTPATIENT)
Dept: CARE COORDINATION | Facility: OTHER | Age: 24
End: 2024-05-10

## 2024-05-10 ENCOUNTER — HOSPITAL ENCOUNTER (EMERGENCY)
Facility: HOSPITAL | Age: 24
Discharge: HOME | End: 2024-05-10
Attending: NURSE PRACTITIONER | Admitting: NURSE PRACTITIONER
Payer: COMMERCIAL

## 2024-05-10 VITALS
OXYGEN SATURATION: 99 % | DIASTOLIC BLOOD PRESSURE: 69 MMHG | HEART RATE: 89 BPM | TEMPERATURE: 97.9 F | BODY MASS INDEX: 17.42 KG/M2 | WEIGHT: 111 LBS | SYSTOLIC BLOOD PRESSURE: 106 MMHG | HEIGHT: 67 IN | RESPIRATION RATE: 18 BRPM

## 2024-05-10 DIAGNOSIS — B37.31 YEAST INFECTION OF THE VAGINA: Primary | ICD-10-CM

## 2024-05-10 DIAGNOSIS — Z20.2 ENCOUNTER FOR ASSESSMENT OF STD EXPOSURE: Primary | ICD-10-CM

## 2024-05-10 DIAGNOSIS — Z11.3 SCREENING EXAMINATION FOR STD (SEXUALLY TRANSMITTED DISEASE): ICD-10-CM

## 2024-05-10 LAB
ALBUMIN UR-MCNC: 20 MG/DL
APPEARANCE UR: CLEAR
BACTERIAL VAGINOSIS VAG-IMP: NEGATIVE
BILIRUB UR QL STRIP: NEGATIVE
C TRACH DNA SPEC QL PROBE+SIG AMP: NEGATIVE
CANDIDA DNA VAG QL NAA+PROBE: NOT DETECTED
CANDIDA GLABRATA / CANDIDA KRUSEI DNA: DETECTED
COLOR UR AUTO: YELLOW
GLUCOSE UR STRIP-MCNC: NEGATIVE MG/DL
HGB UR QL STRIP: ABNORMAL
KETONES UR STRIP-MCNC: NEGATIVE MG/DL
LEUKOCYTE ESTERASE UR QL STRIP: NEGATIVE
MUCOUS THREADS #/AREA URNS LPF: PRESENT /LPF
N GONORRHOEA DNA SPEC QL NAA+PROBE: NEGATIVE
NITRATE UR QL: NEGATIVE
PH UR STRIP: 6 [PH] (ref 4.7–8)
RBC URINE: >182 /HPF
SP GR UR STRIP: 1.03 (ref 1–1.03)
SQUAMOUS EPITHELIAL: 2 /HPF
T VAGINALIS DNA VAG QL NAA+PROBE: NOT DETECTED
UROBILINOGEN UR STRIP-MCNC: 2 MG/DL
WBC URINE: 4 /HPF

## 2024-05-10 PROCEDURE — 87491 CHLMYD TRACH DNA AMP PROBE: CPT | Performed by: NURSE PRACTITIONER

## 2024-05-10 PROCEDURE — 99213 OFFICE O/P EST LOW 20 MIN: CPT | Performed by: NURSE PRACTITIONER

## 2024-05-10 PROCEDURE — 0352U MULTIPLEX VAGINAL PANEL BY PCR: CPT | Performed by: NURSE PRACTITIONER

## 2024-05-10 PROCEDURE — 81001 URINALYSIS AUTO W/SCOPE: CPT | Performed by: NURSE PRACTITIONER

## 2024-05-10 PROCEDURE — G0463 HOSPITAL OUTPT CLINIC VISIT: HCPCS

## 2024-05-10 RX ORDER — FLUCONAZOLE 150 MG/1
TABLET ORAL
Qty: 2 TABLET | Refills: 0 | Status: SHIPPED | OUTPATIENT
Start: 2024-05-10 | End: 2024-05-13

## 2024-05-10 ASSESSMENT — ACTIVITIES OF DAILY LIVING (ADL)
ADLS_ACUITY_SCORE: 35
ADLS_ACUITY_SCORE: 35

## 2024-05-10 ASSESSMENT — ENCOUNTER SYMPTOMS
VOMITING: 0
NAUSEA: 1
ABDOMINAL PAIN: 0
FEVER: 0
FREQUENCY: 0
DYSURIA: 0
CHILLS: 0
FATIGUE: 1
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
## 2024-05-10 NOTE — DISCHARGE INSTRUCTIONS
We will notify you of your results when they are available.    I recommend following up with your primary doctor in 1 week if you still have any concerning symptoms for evaluation.    Return to urgent care or emergency room for any worsening or concerning symptoms.

## 2024-05-13 ENCOUNTER — PATIENT OUTREACH (OUTPATIENT)
Dept: CARE COORDINATION | Facility: OTHER | Age: 24
End: 2024-05-13

## 2024-05-13 NOTE — PROGRESS NOTES
Pt seen in the ED 5/10/24  Has future lab orders placed  Writer will offer to help with scheduling lab only appt    Also would like to touch base as RNCC to assist with other concerns addressed at initial meeting

## 2024-05-14 ENCOUNTER — PATIENT OUTREACH (OUTPATIENT)
Dept: FAMILY MEDICINE | Facility: OTHER | Age: 24
End: 2024-05-14

## 2024-05-14 NOTE — TELEPHONE ENCOUNTER
Clinic Care Coordination Contact  Care Team Conversations    SW reached out to Yudi to touch base after seeing she was seen in the ER 5/10. SW wanted to confirm Yudi was aware of upcoming PCP appointment on 5/20.     No answer. SW left  with request for call back.     Landy Paul, [facility]  [facility]

## 2024-05-15 ENCOUNTER — PATIENT OUTREACH (OUTPATIENT)
Dept: FAMILY MEDICINE | Facility: OTHER | Age: 24
End: 2024-05-15

## 2024-05-15 NOTE — Clinical Note
I triple checked and this is the updated number I had for the new guardian, so I am not sure if she also left her position?

## 2024-05-15 NOTE — TELEPHONE ENCOUNTER
Clinic Care Coordination Contact  Care Team Conversations    SW reached out to Chantal, Guardian, to inform her of Yudi's upcoming PCP visit next Monday.     Call unable to go through as it states number no longer in service.     Landy Paul, [facility]

## 2024-05-16 ENCOUNTER — PATIENT OUTREACH (OUTPATIENT)
Dept: FAMILY MEDICINE | Facility: OTHER | Age: 24
End: 2024-05-16

## 2024-05-16 NOTE — TELEPHONE ENCOUNTER
Clinic Care Coordination Contact  Care Team Conversations    SW reached out to Jesu Cornejoan, to inform her of Yudi's upcoming PCP visit next Monday.      Call unable to go through as it states number no longer in service.     OSCAR reached out to the main [facility] Guardianship line to inquire about new contact number for whoever is Yudi's guardian. OSCAR left VM with request for call back from Yudi's guardian providing their new contact information.     OSCAR will await call back.    Landy Paul, [facility]

## 2024-05-21 ENCOUNTER — PATIENT OUTREACH (OUTPATIENT)
Dept: FAMILY MEDICINE | Facility: OTHER | Age: 24
End: 2024-05-21

## 2024-05-22 NOTE — TELEPHONE ENCOUNTER
Clinic Care Coordination Contact  Care Team Conversations    OSCAR received VM from Chantal at [facility]. She reports she is still employed by [employer] and is still Yudi's Guardian until a new one is hired and assigned. She provided updated phone number as this has changed.     OSCAR updated number in the chart for Guardian.

## 2024-05-27 ENCOUNTER — HOSPITAL ENCOUNTER (EMERGENCY)
Facility: HOSPITAL | Age: 24
Discharge: HOME | End: 2024-05-27
Attending: NURSE PRACTITIONER | Admitting: NURSE PRACTITIONER
Payer: COMMERCIAL

## 2024-05-27 VITALS
SYSTOLIC BLOOD PRESSURE: 112 MMHG | TEMPERATURE: 97.5 F | HEART RATE: 97 BPM | DIASTOLIC BLOOD PRESSURE: 71 MMHG | RESPIRATION RATE: 16 BRPM | OXYGEN SATURATION: 98 %

## 2024-05-27 DIAGNOSIS — F41.1 ANXIETY REACTION: ICD-10-CM

## 2024-05-27 PROCEDURE — 99282 EMERGENCY DEPT VISIT SF MDM: CPT | Performed by: NURSE PRACTITIONER

## 2024-05-27 PROCEDURE — 99282 EMERGENCY DEPT VISIT SF MDM: CPT

## 2024-05-27 ASSESSMENT — ENCOUNTER SYMPTOMS
ALLERGIC/IMMUNOLOGIC NEGATIVE: 1
LIGHT-HEADEDNESS: 0
SEIZURES: 0
CARDIOVASCULAR NEGATIVE: 1
EYES NEGATIVE: 1
NERVOUS/ANXIOUS: 1
MUSCULOSKELETAL NEGATIVE: 1
GASTROINTESTINAL NEGATIVE: 1
HEADACHES: 0
CONSTITUTIONAL NEGATIVE: 1
ENDOCRINE NEGATIVE: 1
DIZZINESS: 1
RESPIRATORY NEGATIVE: 1
HEMATOLOGIC/LYMPHATIC NEGATIVE: 1
NUMBNESS: 0
WEAKNESS: 0

## 2024-05-27 ASSESSMENT — ACTIVITIES OF DAILY LIVING (ADL): ADLS_ACUITY_SCORE: 33

## 2024-05-27 ASSESSMENT — COLUMBIA-SUICIDE SEVERITY RATING SCALE - C-SSRS
2. HAVE YOU ACTUALLY HAD ANY THOUGHTS OF KILLING YOURSELF IN THE PAST MONTH?: NO
1. IN THE PAST MONTH, HAVE YOU WISHED YOU WERE DEAD OR WISHED YOU COULD GO TO SLEEP AND NOT WAKE UP?: NO
6. HAVE YOU EVER DONE ANYTHING, STARTED TO DO ANYTHING, OR PREPARED TO DO ANYTHING TO END YOUR LIFE?: NO

## 2024-05-27 NOTE — ED NOTES
"Patient reports that she went to bed a saw a small spider when she woke up, she felt dizzy and nauseated. Unsure if she was bitten by any bugs. Report that she feels \"normal\" now besides being tired.   "

## 2024-05-27 NOTE — ED TRIAGE NOTES
"Patient states she was out camping and saw a spider in her tent but thought nothing of it. States she's having \"symptoms\" now but is unable to tell me what they are. She is also asking that her ear is checked for a bug as well.         " Include Location In Plan?: No Detail Level: Zone

## 2024-05-28 ENCOUNTER — HOSPITAL ENCOUNTER (EMERGENCY)
Facility: HOSPITAL | Age: 24
Discharge: HOME | End: 2024-05-28
Attending: EMERGENCY MEDICINE | Admitting: EMERGENCY MEDICINE
Payer: COMMERCIAL

## 2024-05-28 VITALS
RESPIRATION RATE: 18 BRPM | DIASTOLIC BLOOD PRESSURE: 87 MMHG | HEART RATE: 88 BPM | SYSTOLIC BLOOD PRESSURE: 130 MMHG | OXYGEN SATURATION: 96 % | TEMPERATURE: 98.2 F

## 2024-05-28 DIAGNOSIS — R09.A9 FOREIGN BODY SENSATION IN BOTH EAR CANALS: ICD-10-CM

## 2024-05-28 PROCEDURE — 99283 EMERGENCY DEPT VISIT LOW MDM: CPT

## 2024-05-28 PROCEDURE — 99282 EMERGENCY DEPT VISIT SF MDM: CPT | Performed by: EMERGENCY MEDICINE

## 2024-05-28 ASSESSMENT — ENCOUNTER SYMPTOMS
CHILLS: 0
COUGH: 0
FEVER: 0
SHORTNESS OF BREATH: 0

## 2024-05-28 NOTE — ED PROVIDER NOTES
"  History     Chief Complaint   Patient presents with    Insect Bite     HPI  Yudi Morales is a 23 year old individual with history of generalized anxiety disorder, schizoaffective disorder, comes in for concerns of insect bite.  Patient states was camping and yesterday when going to bed she states there was a spider in the tent.  States that she tried to kill it but it was chasing her around.  Eventually able to go to bed but was very anxious about it so did not sleep.  States woke up with some dizziness and anxiety.  States that this has passed but is concerned about possible spider bite so comes in.  Denies any lesions on the body.    Allergies:  Allergies   Allergen Reactions    Other [No Clinical Screening - See Comments] Anaphylaxis     \"Sodium Penathol\" per mother. Used in anesthesia.  Family hx of reaction.    Thiopental Anaphylaxis     Other reaction(s): Other - Describe In Comment Field    Family Hx of death with this medication    Great grandmother  on the operating table to this medication; worried about allergies to sodium      Other reaction(s): Other - Describe In Comment Field Family Hx of death with this medication    Latex Rash    Amoxicillin Trihydrate GI Disturbance    Amoxicillin-Pot Clavulanate GI Disturbance    Phenobarbital        Problem List:    Patient Active Problem List    Diagnosis Date Noted    Methamphetamine use (H) 2024     Priority: Medium    Self-injurious behavior 2024     Priority: Medium    Suicidal ideation 2023     Priority: Medium    Pituitary microadenoma (H) 2023     Priority: Medium     2023 5 x 5 mm hypoenhancing focus in the right pituitary gland  High prolactin resolved with stopping risperidone.   Cortisol and IGF-1 within normal limits       Marijuana use 2023     Priority: Medium    Tympanosclerosis of both ears 11/15/2022     Priority: Medium    Postnasal drip 11/15/2022     Priority: Medium    Primary insomnia 11/15/2022     " Priority: Medium    Constipation, unspecified constipation type 11/15/2022     Priority: Medium    History of suicide attempt 10/28/2022     Priority: Medium     2020 - tylenol overdose      Intellectual disability 10/28/2022     Priority: Medium    Schizoaffective disorder, bipolar type (H) 07/08/2021     Priority: Medium     Dr Brothers      KIRSTY (generalized anxiety disorder) 07/19/2019     Priority: Medium    Allergic rhinitis 10/04/2011     Priority: Medium        Past Medical History:    Past Medical History:   Diagnosis Date    Allergic rhinitis 10/4/2011    KIRSTY (generalized anxiety disorder) 7/19/2019    Hyperprolactinemia (H24) 11/16/2022    PTSD (post-traumatic stress disorder)     Suicidal behavior with attempted self-injury (H) 2/2/2021       Past Surgical History:    Past Surgical History:   Procedure Laterality Date    TONSILLECTOMY & ADENOIDECTOMY  2008       Family History:    History reviewed. No pertinent family history.    Social History:  Marital Status:  Single [1]  Social History     Tobacco Use    Smoking status: Every Day     Types: Vaping Device    Smokeless tobacco: Never   Vaping Use    Vaping status: Every Day    Substances: Nicotine, CBD, Flavoring    Devices: Disposable   Substance Use Topics    Alcohol use: No     Comment: only on holidays    Drug use: Yes     Types: Methamphetamines     Comment: Last use, recently, states 2 months ago 5/27        Medications:    ARIPiprazole (ABILIFY) 5 MG tablet  sertraline (ZOLOFT) 50 MG tablet  traZODone (DESYREL) 50 MG tablet  fluconazole (DIFLUCAN) 150 MG tablet  melatonin 3 MG tablet          Review of Systems   Constitutional: Negative.    HENT: Negative.     Eyes: Negative.    Respiratory: Negative.     Cardiovascular: Negative.    Gastrointestinal: Negative.    Endocrine: Negative.    Genitourinary: Negative.    Musculoskeletal: Negative.    Skin: Negative.    Allergic/Immunologic: Negative.    Neurological:  Positive for dizziness. Negative  for seizures, syncope, weakness, light-headedness, numbness and headaches.   Hematological: Negative.    Psychiatric/Behavioral:  The patient is nervous/anxious.        Physical Exam   BP: 112/71  Pulse: 97  Temp: 97.5  F (36.4  C)  Resp: 16  SpO2: 98 %      GENERAL APPEARANCE:  The patient is a 23 year old well-developed, well-nourished individual in no acute distress that appears as stated age.  LUNGS:  Breathing is easy.    HEART:  Regular rate and rhythm with normal S1 and S2.  No murmurs, gallops, or rubs.  NEUROLOGIC:  No focal sensory or motor deficits are noted.    PSYCHIATRIC:  The patient is awake, alert, and oriented x4.  Recent and remote memory is intact.  Appropriate mood and affect.  Calm and cooperative with history and physical exam.  SKIN:  Warm, dry, and well perfused.  Good turgor.  No lesions, nodules, or rashes are noted.  No bruising noted.      Comment: Discrepancies between my note and notes on behalf of the nursing team or other care providers are secondary to my findings reflecting my physical examination and questioning of the patient.  Any conflicting information provided is not in line with my examination of the patient.       ED Course     ED Course as of 05/27/24 1913   Mon May 27, 2024   1900 In to see patient and history/physical completed.    1911 Patient has no symptoms.  Vital signs normal.  No skin lesions present.  Symptoms consistent with anxiety reaction.  Will discharge patient home to go sleep.            No results found for this or any previous visit (from the past 24 hour(s)).    Medications - No data to display    Assessments & Plan (with Medical Decision Making)     I have reviewed the nursing notes.    I have reviewed the findings, diagnosis, plan and need for follow up with the patient.      Summary:  Patient presents to the ER today for possible insect bite.  Potential diagnosis which have been considered and evaluated include insect bite, anxiety reaction, as well  as others. Many of these have been excluded using the various modalities and assessment as noted on the chart. At the present time, the diagnosis given seems to be the most likely anxiety reaction.  Upon arrival, vitals signs are normal.  The patient is alert and oriented.  Cardiac examination normal.  Neurological examination normal.  Patient has no noted insect bites on skin assessment.  Patient's anxiety and dizziness consistent with anxiety reaction due to seeing a spider.  Will discharge patient home as no further workup is needed.  Recommended good night sleep for patient.  Advised patient to return if new or worsening symptoms, otherwise follow-up with PCP as needed.  Patient verbalized understand agrees plan of care.  Patient discharged home.      Critical Care Time: None    Impression and plan discussed with patient. Questions answered, concerns addressed, indications for urgent re-evaluation reviewed, and  given. Patient/Parent/Caregiver agree with treatment plan and have no further questions at this time.  AVS provided at discharge.    This note was created by the Dragon Voice Dictation System. Inadvertent typographical errors, due to software recognition problems, may still exist.             Discharge Medication List as of 5/27/2024  7:08 PM          Final diagnoses:   Anxiety reaction       5/27/2024   [facility]       Neel Zimmer APRN CNP  05/27/24 1913

## 2024-05-28 NOTE — DISCHARGE INSTRUCTIONS
Follow-up with your primary care provider for reevaluation.  Contact your primary care provider if you have any questions or concerns.  Do not hesitate to return to the ER if any new or worsening symptoms.     Please read the attached instructions (if any).  They highlight more specific treatments and interventions for you at home.              Thank you for letting me participate in your care and wish you a fast and uneventful recovery,    Neel HUYNH CNP    Do not hesitate to contact me with questions or concerns.  [email or web address]

## 2024-05-28 NOTE — ED NOTES
AVS reviewed. All questions and concerns answered. Education reviewed, pt states no further questions at this time.

## 2024-05-28 NOTE — ED TRIAGE NOTES
Patient presents via [facility]. States she was camping and felt bugs in her ears. None visible upon exam. Patient denies drugs or alcohol. z

## 2024-05-28 NOTE — ED PROVIDER NOTES
"  History     Chief Complaint   Patient presents with    Foreign Body in Ear     HPI  Yudi Morales is a 23 year old female who is here with foreign body sensation in both ears.  Suspect she has a spider in there.  Was seen earlier for the same thing and discharged.    Allergies:  Allergies   Allergen Reactions    Other [No Clinical Screening - See Comments] Anaphylaxis     \"Sodium Penathol\" per mother. Used in anesthesia.  Family hx of reaction.    Thiopental Anaphylaxis     Other reaction(s): Other - Describe In Comment Field    Family Hx of death with this medication    Great grandmother  on the operating table to this medication; worried about allergies to sodium      Other reaction(s): Other - Describe In Comment Field Family Hx of death with this medication    Latex Rash    Amoxicillin Trihydrate GI Disturbance    Amoxicillin-Pot Clavulanate GI Disturbance    Phenobarbital        Problem List:    Patient Active Problem List    Diagnosis Date Noted    Methamphetamine use (H) 2024     Priority: Medium    Self-injurious behavior 2024     Priority: Medium    Suicidal ideation 2023     Priority: Medium    Pituitary microadenoma (H) 2023     Priority: Medium     2023 5 x 5 mm hypoenhancing focus in the right pituitary gland  High prolactin resolved with stopping risperidone.   Cortisol and IGF-1 within normal limits       Marijuana use 2023     Priority: Medium    Tympanosclerosis of both ears 11/15/2022     Priority: Medium    Postnasal drip 11/15/2022     Priority: Medium    Primary insomnia 11/15/2022     Priority: Medium    Constipation, unspecified constipation type 11/15/2022     Priority: Medium    History of suicide attempt 10/28/2022     Priority: Medium     2020 - tylenol overdose      Intellectual disability 10/28/2022     Priority: Medium    Schizoaffective disorder, bipolar type (H) 2021     Priority: Medium     Dr Brothers      KIRSTY (generalized anxiety " disorder) 07/19/2019     Priority: Medium    Allergic rhinitis 10/04/2011     Priority: Medium        Past Medical History:    Past Medical History:   Diagnosis Date    Allergic rhinitis 10/4/2011    KIRSTY (generalized anxiety disorder) 7/19/2019    Hyperprolactinemia (H24) 11/16/2022    PTSD (post-traumatic stress disorder)     Suicidal behavior with attempted self-injury (H) 2/2/2021       Past Surgical History:    Past Surgical History:   Procedure Laterality Date    TONSILLECTOMY & ADENOIDECTOMY  2008       Family History:    No family history on file.    Social History:  Marital Status:  Single [1]  Social History     Tobacco Use    Smoking status: Every Day     Types: Vaping Device    Smokeless tobacco: Never   Vaping Use    Vaping status: Every Day    Substances: Nicotine, CBD, Flavoring    Devices: Disposable   Substance Use Topics    Alcohol use: No     Comment: only on holidays    Drug use: Yes     Types: Methamphetamines     Comment: Last use, recently, states 2 months ago 5/27        Medications:    ARIPiprazole (ABILIFY) 5 MG tablet  fluconazole (DIFLUCAN) 150 MG tablet  melatonin 3 MG tablet  sertraline (ZOLOFT) 50 MG tablet  traZODone (DESYREL) 50 MG tablet          Review of Systems   Constitutional:  Negative for chills and fever.   Respiratory:  Negative for cough and shortness of breath.    All other systems reviewed and are negative.      Physical Exam          Physical Exam  Constitutional:       General: She is not in acute distress.     Appearance: Normal appearance.   HENT:      Head: Normocephalic and atraumatic.      Right Ear: External ear normal.      Left Ear: External ear normal.      Ears:      Comments: Bilateral ear canals clear     Nose: Nose normal.   Eyes:      General: No scleral icterus.     Extraocular Movements: Extraocular movements intact.   Pulmonary:      Effort: Pulmonary effort is normal. No respiratory distress.   Musculoskeletal:         General: No deformity or signs of  injury.   Skin:     General: Skin is dry.      Capillary Refill: Capillary refill takes less than 2 seconds.      Coloration: Skin is not jaundiced.   Neurological:      General: No focal deficit present.      Mental Status: She is alert and oriented to person, place, and time.   Psychiatric:         Mood and Affect: Mood normal.         Behavior: Behavior normal.         ED Course        Procedures             Critical Care time:               No results found for this or any previous visit (from the past 24 hour(s)).    Medications - No data to display    Assessments & Plan (with Medical Decision Making)     I have reviewed the nursing notes.    I have reviewed the findings, diagnosis, plan and need for follow up with the patient.          Medical Decision Making  The patient's presentation was of straightforward complexity (a clearly self-limited or minor problem).    The patient's evaluation involved:  history and exam without other MDM data elements    The patient's management necessitated only low risk treatment.    Well-nourished female with sensation of spiders in her ears.  No spiders identified.  Discharged.    New Prescriptions    No medications on file       Final diagnoses:   Foreign body sensation in both ear canals       5/28/2024   [facility]       Atul Cox MD  05/28/24 0426

## 2024-05-29 ENCOUNTER — TELEPHONE (OUTPATIENT)
Dept: OTOLARYNGOLOGY | Facility: OTHER | Age: 24
End: 2024-05-29

## 2024-05-29 NOTE — TELEPHONE ENCOUNTER
"Spoke to patient and her sister (Nayeli) via phone.  Yudi, reported she went camping 3-4 days ago and believes she has a bug or spider in her ear.  She has been to the ER twice for this feeling and nobody has been able to visualize a bug or spider.  Yudi stated, \"it feels like it is moving around and can't get it out\". She feels it in both ears however it is worse in her left ear. Pain is 6-7/10 in both ears, left worse then right ear.  Treatments tried, ear drops that are for wax removal, warm water dripped in ear with washcloths and cotton balls, none of this has helped.  Her sister Nayeli stated, \"there is like a half piece of spaghetti, 3 inches long, round like a alejo size bump, behind her left ear\".  \"It is warmish to cold, kind of red, squishy and hard, that itches her\". Denies drainage or fevers.  Nayeli, reported she is going to noelle the spot behind Yudi's ear.  They would like for her to have an appointment with an ENT provider.  Advised will route note to provider as the schedule is currently booked.  Yudi, is aware she should seek medical attention at the nearest ER/Urgent care if symptoms increase, and cause further concern, verbal understanding was expressed. She has agreed that if she is not able to be reached, her sister Nayeli may be contacted with the listed number in her chart so she can get an appointment.    "

## 2024-06-11 ENCOUNTER — PATIENT OUTREACH (OUTPATIENT)
Dept: CARE COORDINATION | Facility: OTHER | Age: 24
End: 2024-06-11

## 2024-06-11 NOTE — PROGRESS NOTES
"Attempted to contact Pt via phone - VM is full, no option for message  Several attempts made by RNCC to contact patient via phone - VM's are never returned  Pt presents to ED/UC per chart review but does not follow-up with PCP - several \"No Shows\"       Writer LVM for [facility] to touch base.  Lowell Wilcox (?)  [phone]  [facility] Office  [phone]    Writer closing CC account at this time  Writer is available to assist Patient as needed if Pt requests.    "

## 2024-06-14 DIAGNOSIS — F25.0 SCHIZOAFFECTIVE DISORDER, BIPOLAR TYPE (H): Chronic | ICD-10-CM

## 2024-06-14 DIAGNOSIS — G47.00 INSOMNIA, UNSPECIFIED TYPE: ICD-10-CM

## 2024-06-14 RX ORDER — LANOLIN ALCOHOL/MO/W.PET/CERES
3 CREAM (GRAM) TOPICAL
Qty: 30 TABLET | Refills: 0 | OUTPATIENT
Start: 2024-06-14

## 2024-06-14 RX ORDER — ARIPIPRAZOLE 5 MG/1
5 TABLET ORAL EVERY MORNING
Qty: 15 TABLET | Refills: 0 | OUTPATIENT
Start: 2024-06-14

## 2024-06-14 NOTE — TELEPHONE ENCOUNTER
Abilify      Last Written Prescription Date:  4/19/24  Last Fill Quantity: 15,   # refills: 0  Last Office Visit: 4/19/24  Future Office visit:    Next 5 appointments (look out 90 days)      Jun 24, 2024  2:00 PM  (Arrive by 1:45 PM)  Adult Preventative Visit with Sindhu Sandoval MD  [facility] [place] ([facility] [place] ) [address]  [place] [address]  [phone]             Routing refill request to provider for review/approval because:      Melatonin      Last Written Prescription Date:  4/19/24  Last Fill Quantity: 30,   # refills: 0  Last Office Visit: 4/19/24  Future Office visit:    Next 5 appointments (look out 90 days)      Jun 24, 2024  2:00 PM  (Arrive by 1:45 PM)  Adult Preventative Visit with Sindhu Sandoval MD  [facility] [place] ([facility] [place] ) [address]  [place] [address]  [phone]             Routing refill request to provider for review/approval because:

## 2024-06-15 ENCOUNTER — HOSPITAL ENCOUNTER (EMERGENCY)
Facility: HOSPITAL | Age: 24
Discharge: HOME | End: 2024-06-16
Attending: NURSE PRACTITIONER | Admitting: NURSE PRACTITIONER
Payer: COMMERCIAL

## 2024-06-15 VITALS
RESPIRATION RATE: 18 BRPM | OXYGEN SATURATION: 99 % | TEMPERATURE: 98.5 F | DIASTOLIC BLOOD PRESSURE: 74 MMHG | HEART RATE: 75 BPM | SYSTOLIC BLOOD PRESSURE: 110 MMHG

## 2024-06-15 DIAGNOSIS — F15.10 METHAMPHETAMINE ABUSE (H): ICD-10-CM

## 2024-06-15 DIAGNOSIS — F41.1 ANXIETY REACTION: ICD-10-CM

## 2024-06-15 LAB
ALBUMIN SERPL BCG-MCNC: 3.9 G/DL (ref 3.5–5.2)
ALP SERPL-CCNC: 88 U/L (ref 40–150)
ALT SERPL W P-5'-P-CCNC: 13 U/L (ref 0–50)
AMPHETAMINES UR QL SCN: ABNORMAL
ANION GAP SERPL CALCULATED.3IONS-SCNC: 8 MMOL/L (ref 7–15)
AST SERPL W P-5'-P-CCNC: 30 U/L (ref 0–45)
BARBITURATES UR QL SCN: ABNORMAL
BASOPHILS # BLD AUTO: 0 10E3/UL (ref 0–0.2)
BASOPHILS NFR BLD AUTO: 1 %
BENZODIAZ UR QL SCN: ABNORMAL
BILIRUB SERPL-MCNC: 0.7 MG/DL
BUN SERPL-MCNC: 15.8 MG/DL (ref 6–20)
BZE UR QL SCN: ABNORMAL
CALCIUM SERPL-MCNC: 9 MG/DL (ref 8.6–10)
CANNABINOIDS UR QL SCN: ABNORMAL
CHLORIDE SERPL-SCNC: 103 MMOL/L (ref 98–107)
CREAT SERPL-MCNC: 0.88 MG/DL (ref 0.51–0.95)
DEPRECATED HCO3 PLAS-SCNC: 26 MMOL/L (ref 22–29)
EGFRCR SERPLBLD CKD-EPI 2021: >90 ML/MIN/1.73M2
EOSINOPHIL # BLD AUTO: 0.1 10E3/UL (ref 0–0.7)
EOSINOPHIL NFR BLD AUTO: 1 %
ERYTHROCYTE [DISTWIDTH] IN BLOOD BY AUTOMATED COUNT: 13.2 % (ref 10–15)
FENTANYL UR QL: ABNORMAL
GLUCOSE SERPL-MCNC: 127 MG/DL (ref 70–99)
HCG UR QL: NEGATIVE
HCT VFR BLD AUTO: 38.4 % (ref 35–47)
HGB BLD-MCNC: 12.8 G/DL (ref 11.7–15.7)
HOLD SPECIMEN: NORMAL
IMM GRANULOCYTES # BLD: 0 10E3/UL
IMM GRANULOCYTES NFR BLD: 0 %
LYMPHOCYTES # BLD AUTO: 1.8 10E3/UL (ref 0.8–5.3)
LYMPHOCYTES NFR BLD AUTO: 33 %
MAGNESIUM SERPL-MCNC: 2.1 MG/DL (ref 1.7–2.3)
MCH RBC QN AUTO: 28.5 PG (ref 26.5–33)
MCHC RBC AUTO-ENTMCNC: 33.3 G/DL (ref 31.5–36.5)
MCV RBC AUTO: 86 FL (ref 78–100)
MONOCYTES # BLD AUTO: 0.4 10E3/UL (ref 0–1.3)
MONOCYTES NFR BLD AUTO: 8 %
NEUTROPHILS # BLD AUTO: 3 10E3/UL (ref 1.6–8.3)
NEUTROPHILS NFR BLD AUTO: 56 %
NRBC # BLD AUTO: 0 10E3/UL
NRBC BLD AUTO-RTO: 0 /100
OPIATES UR QL SCN: ABNORMAL
PCP QUAL URINE (ROCHE): ABNORMAL
PLATELET # BLD AUTO: 251 10E3/UL (ref 150–450)
POTASSIUM SERPL-SCNC: 3.5 MMOL/L (ref 3.4–5.3)
PROT SERPL-MCNC: 6.7 G/DL (ref 6.4–8.3)
RBC # BLD AUTO: 4.49 10E6/UL (ref 3.8–5.2)
SODIUM SERPL-SCNC: 137 MMOL/L (ref 135–145)
TROPONIN T SERPL HS-MCNC: <6 NG/L
TSH SERPL DL<=0.005 MIU/L-ACNC: 1.29 UIU/ML (ref 0.3–4.2)
WBC # BLD AUTO: 5.2 10E3/UL (ref 4–11)

## 2024-06-15 PROCEDURE — 99284 EMERGENCY DEPT VISIT MOD MDM: CPT | Performed by: NURSE PRACTITIONER

## 2024-06-15 PROCEDURE — 80053 COMPREHEN METABOLIC PANEL: CPT | Performed by: NURSE PRACTITIONER

## 2024-06-15 PROCEDURE — 81025 URINE PREGNANCY TEST: CPT | Performed by: NURSE PRACTITIONER

## 2024-06-15 PROCEDURE — 83735 ASSAY OF MAGNESIUM: CPT | Performed by: NURSE PRACTITIONER

## 2024-06-15 PROCEDURE — 80307 DRUG TEST PRSMV CHEM ANLYZR: CPT | Performed by: NURSE PRACTITIONER

## 2024-06-15 PROCEDURE — 93010 ELECTROCARDIOGRAM REPORT: CPT | Performed by: INTERNAL MEDICINE

## 2024-06-15 PROCEDURE — 99284 EMERGENCY DEPT VISIT MOD MDM: CPT

## 2024-06-15 PROCEDURE — 84443 ASSAY THYROID STIM HORMONE: CPT | Performed by: NURSE PRACTITIONER

## 2024-06-15 PROCEDURE — 93005 ELECTROCARDIOGRAM TRACING: CPT

## 2024-06-15 PROCEDURE — 85025 COMPLETE CBC W/AUTO DIFF WBC: CPT | Performed by: NURSE PRACTITIONER

## 2024-06-15 PROCEDURE — 84484 ASSAY OF TROPONIN QUANT: CPT | Performed by: NURSE PRACTITIONER

## 2024-06-15 PROCEDURE — 36415 COLL VENOUS BLD VENIPUNCTURE: CPT | Performed by: NURSE PRACTITIONER

## 2024-06-15 ASSESSMENT — ENCOUNTER SYMPTOMS
CONSTITUTIONAL NEGATIVE: 1
ALLERGIC/IMMUNOLOGIC NEGATIVE: 1
HEMATOLOGIC/LYMPHATIC NEGATIVE: 1
NEUROLOGICAL NEGATIVE: 1
MUSCULOSKELETAL NEGATIVE: 1
ENDOCRINE NEGATIVE: 1
PALPITATIONS: 1
RESPIRATORY NEGATIVE: 1
GASTROINTESTINAL NEGATIVE: 1
EYES NEGATIVE: 1
NERVOUS/ANXIOUS: 1

## 2024-06-15 ASSESSMENT — ACTIVITIES OF DAILY LIVING (ADL)
ADLS_ACUITY_SCORE: 35

## 2024-06-15 ASSESSMENT — COLUMBIA-SUICIDE SEVERITY RATING SCALE - C-SSRS
6. HAVE YOU EVER DONE ANYTHING, STARTED TO DO ANYTHING, OR PREPARED TO DO ANYTHING TO END YOUR LIFE?: YES
1. IN THE PAST MONTH, HAVE YOU WISHED YOU WERE DEAD OR WISHED YOU COULD GO TO SLEEP AND NOT WAKE UP?: YES
2. HAVE YOU ACTUALLY HAD ANY THOUGHTS OF KILLING YOURSELF IN THE PAST MONTH?: NO

## 2024-06-16 LAB
ATRIAL RATE - MUSE: 80 BPM
DIASTOLIC BLOOD PRESSURE - MUSE: NORMAL MMHG
INTERPRETATION ECG - MUSE: NORMAL
P AXIS - MUSE: 65 DEGREES
PR INTERVAL - MUSE: 118 MS
QRS DURATION - MUSE: 88 MS
QT - MUSE: 394 MS
QTC - MUSE: 454 MS
R AXIS - MUSE: 77 DEGREES
SYSTOLIC BLOOD PRESSURE - MUSE: NORMAL MMHG
T AXIS - MUSE: 47 DEGREES
VENTRICULAR RATE- MUSE: 80 BPM
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
## 2024-06-16 NOTE — ED PROVIDER NOTES
"  History     Chief Complaint   Patient presents with    Abdominal Pain    Palpitations    Withdrawal     HPI  Yudi Morales is a 23 year old individual with history of generalized anxiety disorder, schizoaffective disorder, methamphetamine use, comes in for palpitations and anxiety.  Patient states that she woke up last night and had fast heart rate.  States he was very anxious and later did become nauseous.  States that she continues to have anxiety.  Patient is wanting to go to detox for methamphetamine use but wanted to \"be checked out for the elevated heart rate and make sure it is emotional and not medical\".    Allergies:  Allergies   Allergen Reactions    Other [No Clinical Screening - See Comments] Anaphylaxis     \"Sodium Penathol\" per mother. Used in anesthesia.  Family hx of reaction.    Thiopental Anaphylaxis     Other reaction(s): Other - Describe In Comment Field    Family Hx of death with this medication    Great grandmother  on the operating table to this medication; worried about allergies to sodium      Other reaction(s): Other - Describe In Comment Field Family Hx of death with this medication    Latex Rash    Amoxicillin Trihydrate GI Disturbance    Amoxicillin-Pot Clavulanate GI Disturbance    Phenobarbital        Problem List:    Patient Active Problem List    Diagnosis Date Noted    Methamphetamine use (H) 2024     Priority: Medium    Self-injurious behavior 2024     Priority: Medium    Suicidal ideation 2023     Priority: Medium    Pituitary microadenoma (H) 2023     Priority: Medium     2023 5 x 5 mm hypoenhancing focus in the right pituitary gland  High prolactin resolved with stopping risperidone.   Cortisol and IGF-1 within normal limits       Marijuana use 2023     Priority: Medium    Tympanosclerosis of both ears 11/15/2022     Priority: Medium    Postnasal drip 11/15/2022     Priority: Medium    Primary insomnia 11/15/2022     Priority: Medium    " Constipation, unspecified constipation type 11/15/2022     Priority: Medium    History of suicide attempt 10/28/2022     Priority: Medium     2020 - tylenol overdose      Intellectual disability 10/28/2022     Priority: Medium    Schizoaffective disorder, bipolar type (H) 07/08/2021     Priority: Medium     Dr Brothers      KIRSTY (generalized anxiety disorder) 07/19/2019     Priority: Medium    Allergic rhinitis 10/04/2011     Priority: Medium        Past Medical History:    Past Medical History:   Diagnosis Date    Allergic rhinitis 10/4/2011    KIRSTY (generalized anxiety disorder) 7/19/2019    Hyperprolactinemia (H24) 11/16/2022    PTSD (post-traumatic stress disorder)     Suicidal behavior with attempted self-injury (H) 2/2/2021       Past Surgical History:    Past Surgical History:   Procedure Laterality Date    TONSILLECTOMY & ADENOIDECTOMY  2008       Family History:    No family history on file.    Social History:  Marital Status:  Single [1]  Social History     Tobacco Use    Smoking status: Every Day     Types: Vaping Device    Smokeless tobacco: Never   Vaping Use    Vaping status: Every Day    Substances: Nicotine, CBD, Flavoring    Devices: Disposable   Substance Use Topics    Alcohol use: No     Comment: only on holidays    Drug use: Yes     Types: Methamphetamines     Comment: Last use, recently, states 2 months ago 5/27        Medications:    ARIPiprazole (ABILIFY) 5 MG tablet  sertraline (ZOLOFT) 50 MG tablet  traZODone (DESYREL) 50 MG tablet  fluconazole (DIFLUCAN) 150 MG tablet  melatonin 3 MG tablet          Review of Systems   Constitutional: Negative.    HENT: Negative.     Eyes: Negative.    Respiratory: Negative.     Cardiovascular:  Positive for palpitations.   Gastrointestinal: Negative.    Endocrine: Negative.    Genitourinary: Negative.    Musculoskeletal: Negative.    Skin: Negative.    Allergic/Immunologic: Negative.    Neurological: Negative.    Hematological: Negative.     Psychiatric/Behavioral:  The patient is nervous/anxious.        Physical Exam   BP: 99/54  Pulse: 90  Temp: 98.5  F (36.9  C)  Resp: 20  SpO2: 97 %      GENERAL APPEARANCE:  The patient is a 23 year old thin, ill-appearing individual.  NECK:  Supple.  Trachea is midline.    CHEST:  Symmetric.  Non-tender to palpation.  No crepitus or deformity.  LUNGS:  Breathing is easy.  Breath sounds are equal and clear bilaterally.  No wheezes, rhonchi, or rales.  HEART:  Regular rate and rhythm with normal S1 and S2.  No murmurs, gallops, or rubs.  ABDOMEN:  No abdominal bruits or thrills present upon auscultation/palpation.  EXTREMITIES:  No cyanosis, clubbing, or edema.    NEUROLOGIC:  No focal sensory or motor deficits are noted.  Gait is normal.  Cranial nerves II through XII are intact.  Deep tendon reflexes are intact.  PSYCHIATRIC:  The patient is awake, alert, and oriented x4.  Recent and remote memory is intact.  Anxious mood and affect.  Calm and cooperative with history and physical exam.  SKIN:  Warm, dry, and well perfused.  Good turgor.  No lesions, nodules, or rashes are noted.  No bruising noted.      Comment: Discrepancies between my note and notes on behalf of the nursing team or other care providers are secondary to my findings reflecting my physical examination and questioning of the patient.  Any conflicting information provided is not in line with my examination of the patient.       ED Course     ED Course as of 06/15/24 1353   Sat Chandu 15, 2024   2005 In to see patient and history/physical completed.    2023 Labs and ECG ordered.   2035 EKG 12-lead, tracing only  No STEMI noted   2114 Amphetamine Qual Urine(!): Screen Positive   2125 Labs normal other than positive amphetamines.  Patient requesting to go to detox.  Patient cleared for detox if acceptance possible.            ECG:    ECG competed at 2032 and personally reviewed at 2035 showing sinus rhythm with sinus arrhythmia.  Ventricular rate 80 with  a QTc of 454.  Normal axis.  Normal ECG.  When compared to ECG from 10/28/2023, sinus bradycardia with sinus arrhythmia is now a sinus rhythm.  Ventricular rate increased by 27 bpm.  No other significant changes noted.         Results for orders placed or performed during the hospital encounter of 06/15/24 (from the past 24 hour(s))   EKG 12-lead, tracing only   Result Value Ref Range    Systolic Blood Pressure  mmHg    Diastolic Blood Pressure  mmHg    Ventricular Rate 80 BPM    Atrial Rate 80 BPM    TX Interval 118 ms    QRS Duration 88 ms     ms    QTc 454 ms    P Axis 65 degrees    R AXIS 77 degrees    T Axis 47 degrees    Interpretation ECG       Sinus rhythm with sinus arrhythmia  Normal ECG  When compared with ECG of 28-OCT-2023 13:50,  Vent. rate has increased BY  27 BPM  QT has lengthened     CBC with platelets differential    Narrative    The following orders were created for panel order CBC with platelets differential.  Procedure                               Abnormality         Status                     ---------                               -----------         ------                     CBC with platelets and d...[[id number]]                      Final result                 Please view results for these tests on the individual orders.   Comprehensive metabolic panel   Result Value Ref Range    Sodium 137 135 - 145 mmol/L    Potassium 3.5 3.4 - 5.3 mmol/L    Carbon Dioxide (CO2) 26 22 - 29 mmol/L    Anion Gap 8 7 - 15 mmol/L    Urea Nitrogen 15.8 6.0 - 20.0 mg/dL    Creatinine 0.88 0.51 - 0.95 mg/dL    GFR Estimate >90 >60 mL/min/1.73m2    Calcium 9.0 8.6 - 10.0 mg/dL    Chloride 103 98 - 107 mmol/L    Glucose 127 (H) 70 - 99 mg/dL    Alkaline Phosphatase 88 40 - 150 U/L    AST 30 0 - 45 U/L    ALT 13 0 - 50 U/L    Protein Total 6.7 6.4 - 8.3 g/dL    Albumin 3.9 3.5 - 5.2 g/dL    Bilirubin Total 0.7 <=1.2 mg/dL   Troponin T, High Sensitivity   Result Value Ref Range    Troponin T, High  Sensitivity <6 <=14 ng/L   Magnesium   Result Value Ref Range    Magnesium 2.1 1.7 - 2.3 mg/dL   TSH with free T4 reflex   Result Value Ref Range    TSH 1.29 0.30 - 4.20 uIU/mL   CBC with platelets and differential   Result Value Ref Range    WBC Count 5.2 4.0 - 11.0 10e3/uL    RBC Count 4.49 3.80 - 5.20 10e6/uL    Hemoglobin 12.8 11.7 - 15.7 g/dL    Hematocrit 38.4 35.0 - 47.0 %    MCV 86 78 - 100 fL    MCH 28.5 26.5 - 33.0 pg    MCHC 33.3 31.5 - 36.5 g/dL    RDW 13.2 10.0 - 15.0 %    Platelet Count 251 150 - 450 10e3/uL    % Neutrophils 56 %    % Lymphocytes 33 %    % Monocytes 8 %    % Eosinophils 1 %    % Basophils 1 %    % Immature Granulocytes 0 %    NRBCs per 100 WBC 0 <1 /100    Absolute Neutrophils 3.0 1.6 - 8.3 10e3/uL    Absolute Lymphocytes 1.8 0.8 - 5.3 10e3/uL    Absolute Monocytes 0.4 0.0 - 1.3 10e3/uL    Absolute Eosinophils 0.1 0.0 - 0.7 10e3/uL    Absolute Basophils 0.0 0.0 - 0.2 10e3/uL    Absolute Immature Granulocytes 0.0 <=0.4 10e3/uL    Absolute NRBCs 0.0 10e3/uL   Extra Tube    Narrative    The following orders were created for panel order Extra Tube.  Procedure                               Abnormality         Status                     ---------                               -----------         ------                     Extra Blue Top Tube[[id number]]                              Final result               Extra Red Top Tube[[id number]]                               Final result               Extra Heparinized Syringe[[id number]]                        Final result                 Please view results for these tests on the individual orders.   Extra Blue Top Tube   Result Value Ref Range    Hold Specimen JIC    Extra Red Top Tube   Result Value Ref Range    Hold Specimen JIC    Extra Heparinized Syringe   Result Value Ref Range    Hold Specimen OK    Urine Drug Screen    Narrative    The following orders were created for panel order Urine Drug Screen.  Procedure                                Abnormality         Status                     ---------                               -----------         ------                     Urine Drug Screen Panel[[id number]]      Abnormal            Final result                 Please view results for these tests on the individual orders.   HCG qualitative urine (UPT)   Result Value Ref Range    hCG Urine Qualitative Negative Negative   Urine Drug Screen Panel   Result Value Ref Range    Amphetamines Urine Screen Positive (A) Screen Negative    Barbituates Urine Screen Negative Screen Negative    Benzodiazepine Urine Screen Negative Screen Negative    Cannabinoids Urine Screen Negative Screen Negative    Cocaine Urine Screen Negative Screen Negative    Fentanyl Qual Urine Screen Negative Screen Negative    Opiates Urine Screen Negative Screen Negative    PCP Urine Screen Negative Screen Negative       Medications - No data to display    Assessments & Plan (with Medical Decision Making)     I have reviewed the nursing notes.    I have reviewed the findings, diagnosis, plan and need for follow up with the patient.    Summary:  Patient presents to the ER today for palpitations and anxiety.  Potential diagnosis which have been considered and evaluated include arrhythmia, withdrawal, MI/NSTEMI, electrolyte abnormality, pregnancy, as well as others. Many of these have been excluded using the various modalities and assessment as noted on the chart. At the present time, the diagnosis given seems to be the most likely anxiety.  Upon arrival, vitals signs are normal.  The patient is alert but very anxious.  Cardiac and respiratory examination normal.  ECG obtained showing no acute abnormalities.  Lab work obtained showing WBC of 5.2 with hemoglobin 12.8.  Electrolytes, renal, hepatic functions normal.  Pregnancy negative.  Drug screen positive for amphetamines.  TSH 1.29.  High-sensitivity troponin negative making ACS unlikely.  At this time patient has no acute cardiac  abnormalities.  Is positive for amphetamines.  Patient is requesting to go to detox for methamphetamine abuse.  Nursing did discuss this with detox and patient will be screened.  Again patient medically cleared for discharge from the ER.  Advised patient to return if new or worsening symptoms.  Patient verbalized understand agrees plan of care.  Patient discharged from ER.      Critical Care Time: None     Impression and plan discussed with patient. Questions answered, concerns addressed, indications for urgent re-evaluation reviewed, and  given. Patient/Parent/Caregiver agree with treatment plan and have no further questions at this time.  AVS provided at discharge.    This note was created by the Dragon Voice Dictation System. Inadvertent typographical errors, due to software recognition problems, may still exist.             New Prescriptions    No medications on file       Final diagnoses:   Anxiety reaction   Methamphetamine abuse (H)       6/15/2024   [facility]       Neel Zimmer APRN CNP  06/15/24 3508

## 2024-06-16 NOTE — DISCHARGE INSTRUCTIONS
Follow-up with your primary care provider for reevaluation.  Contact your primary care provider if you have any questions or concerns.  Do not hesitate to return to the ER if any new or worsening symptoms.     Please read the attached instructions (if any).  They highlight more specific treatments and interventions for you at home.              Thank you for letting me participate in your care and wish you a fast and uneventful recovery,    Neel HUYNH CNP    Do not hesitate to contact me with questions or concerns.  [email or web address]

## 2024-06-25 ENCOUNTER — TELEPHONE (OUTPATIENT)
Dept: BEHAVIORAL HEALTH | Facility: CLINIC | Age: 24
End: 2024-06-25
Payer: COMMERCIAL

## 2024-06-25 ENCOUNTER — HOSPITAL ENCOUNTER (INPATIENT)
Facility: HOSPITAL | Age: 24
LOS: 13 days | Discharge: HOME | End: 2024-07-08
Attending: STUDENT IN AN ORGANIZED HEALTH CARE EDUCATION/TRAINING PROGRAM | Admitting: STUDENT IN AN ORGANIZED HEALTH CARE EDUCATION/TRAINING PROGRAM
Payer: COMMERCIAL

## 2024-06-25 ENCOUNTER — APPOINTMENT (OUTPATIENT)
Dept: CT IMAGING | Facility: HOSPITAL | Age: 24
End: 2024-06-25
Attending: STUDENT IN AN ORGANIZED HEALTH CARE EDUCATION/TRAINING PROGRAM
Payer: COMMERCIAL

## 2024-06-25 DIAGNOSIS — Z72.0 TOBACCO ABUSE: ICD-10-CM

## 2024-06-25 DIAGNOSIS — F25.0 SCHIZOAFFECTIVE DISORDER, BIPOLAR TYPE (H): Chronic | ICD-10-CM

## 2024-06-25 DIAGNOSIS — F23 ACUTE PSYCHOSIS (H): ICD-10-CM

## 2024-06-25 DIAGNOSIS — F15.10 METHAMPHETAMINE USE (H): ICD-10-CM

## 2024-06-25 DIAGNOSIS — F29 PSYCHOSIS, UNSPECIFIED PSYCHOSIS TYPE (H): Primary | ICD-10-CM

## 2024-06-25 LAB
ALBUMIN UR-MCNC: 200 MG/DL
AMPHETAMINES UR QL SCN: ABNORMAL
ANION GAP SERPL CALCULATED.3IONS-SCNC: 16 MMOL/L (ref 7–15)
APAP SERPL-MCNC: <5 UG/ML (ref 10–30)
APPEARANCE UR: ABNORMAL
ATRIAL RATE - MUSE: 92 BPM
BARBITURATES UR QL SCN: ABNORMAL
BENZODIAZ UR QL SCN: ABNORMAL
BILIRUB UR QL STRIP: NEGATIVE
BUN SERPL-MCNC: 19.1 MG/DL (ref 6–20)
BZE UR QL SCN: ABNORMAL
CALCIUM SERPL-MCNC: 10 MG/DL (ref 8.6–10)
CANNABINOIDS UR QL SCN: ABNORMAL
CHLORIDE SERPL-SCNC: 101 MMOL/L (ref 98–107)
CHOLEST SERPL-MCNC: 134 MG/DL
COLOR UR AUTO: YELLOW
CREAT SERPL-MCNC: 1.05 MG/DL (ref 0.51–0.95)
DEPRECATED HCO3 PLAS-SCNC: 19 MMOL/L (ref 22–29)
DIASTOLIC BLOOD PRESSURE - MUSE: NORMAL MMHG
EGFRCR SERPLBLD CKD-EPI 2021: 77 ML/MIN/1.73M2
ERYTHROCYTE [DISTWIDTH] IN BLOOD BY AUTOMATED COUNT: 13.2 % (ref 10–15)
EST. AVERAGE GLUCOSE BLD GHB EST-MCNC: 94 MG/DL
FENTANYL UR QL: ABNORMAL
GLUCOSE SERPL-MCNC: 100 MG/DL (ref 70–99)
GLUCOSE UR STRIP-MCNC: NEGATIVE MG/DL
HBA1C MFR BLD: 4.9 %
HCG UR QL: NEGATIVE
HCT VFR BLD AUTO: 44.8 % (ref 35–47)
HDLC SERPL-MCNC: 56 MG/DL
HGB BLD-MCNC: 15.2 G/DL (ref 11.7–15.7)
HGB UR QL STRIP: NEGATIVE
HYALINE CASTS: 10 /LPF
INTERPRETATION ECG - MUSE: NORMAL
KETONES UR STRIP-MCNC: 10 MG/DL
LDLC SERPL CALC-MCNC: 63 MG/DL
LEUKOCYTE ESTERASE UR QL STRIP: ABNORMAL
MCH RBC QN AUTO: 28.4 PG (ref 26.5–33)
MCHC RBC AUTO-ENTMCNC: 33.9 G/DL (ref 31.5–36.5)
MCV RBC AUTO: 84 FL (ref 78–100)
MUCOUS THREADS #/AREA URNS LPF: PRESENT /LPF
NITRATE UR QL: POSITIVE
NONHDLC SERPL-MCNC: 78 MG/DL
OPIATES UR QL SCN: ABNORMAL
P AXIS - MUSE: 65 DEGREES
PCP QUAL URINE (ROCHE): ABNORMAL
PH UR STRIP: 6 [PH] (ref 4.7–8)
PLATELET # BLD AUTO: 317 10E3/UL (ref 150–450)
POTASSIUM SERPL-SCNC: 3.9 MMOL/L (ref 3.4–5.3)
PR INTERVAL - MUSE: 100 MS
QRS DURATION - MUSE: 80 MS
QT - MUSE: 350 MS
QTC - MUSE: 432 MS
R AXIS - MUSE: 78 DEGREES
RBC # BLD AUTO: 5.36 10E6/UL (ref 3.8–5.2)
RBC URINE: 12 /HPF
SALICYLATES SERPL-MCNC: <0.3 MG/DL
SODIUM SERPL-SCNC: 136 MMOL/L (ref 135–145)
SP GR UR STRIP: 1.03 (ref 1–1.03)
SQUAMOUS EPITHELIAL: 27 /HPF
SYSTOLIC BLOOD PRESSURE - MUSE: NORMAL MMHG
T AXIS - MUSE: 32 DEGREES
TRIGL SERPL-MCNC: 76 MG/DL
UROBILINOGEN UR STRIP-MCNC: NORMAL MG/DL
VENTRICULAR RATE- MUSE: 92 BPM
WBC # BLD AUTO: 8.7 10E3/UL (ref 4–11)
WBC URINE: >182 /HPF

## 2024-06-25 PROCEDURE — 82465 ASSAY BLD/SERUM CHOLESTEROL: CPT | Performed by: STUDENT IN AN ORGANIZED HEALTH CARE EDUCATION/TRAINING PROGRAM

## 2024-06-25 PROCEDURE — 99285 EMERGENCY DEPT VISIT HI MDM: CPT | Mod: 25

## 2024-06-25 PROCEDURE — 36415 COLL VENOUS BLD VENIPUNCTURE: CPT | Performed by: STUDENT IN AN ORGANIZED HEALTH CARE EDUCATION/TRAINING PROGRAM

## 2024-06-25 PROCEDURE — 99285 EMERGENCY DEPT VISIT HI MDM: CPT | Performed by: STUDENT IN AN ORGANIZED HEALTH CARE EDUCATION/TRAINING PROGRAM

## 2024-06-25 PROCEDURE — 250N000011 HC RX IP 250 OP 636: Mod: JZ | Performed by: STUDENT IN AN ORGANIZED HEALTH CARE EDUCATION/TRAINING PROGRAM

## 2024-06-25 PROCEDURE — 85014 HEMATOCRIT: CPT | Performed by: STUDENT IN AN ORGANIZED HEALTH CARE EDUCATION/TRAINING PROGRAM

## 2024-06-25 PROCEDURE — 124N000001 HC R&B MH

## 2024-06-25 PROCEDURE — 99221 1ST HOSP IP/OBS SF/LOW 40: CPT | Performed by: NURSE PRACTITIONER

## 2024-06-25 PROCEDURE — 70450 CT HEAD/BRAIN W/O DYE: CPT

## 2024-06-25 PROCEDURE — 99223 1ST HOSP IP/OBS HIGH 75: CPT | Mod: AI | Performed by: STUDENT IN AN ORGANIZED HEALTH CARE EDUCATION/TRAINING PROGRAM

## 2024-06-25 PROCEDURE — 250N000013 HC RX MED GY IP 250 OP 250 PS 637: Performed by: STUDENT IN AN ORGANIZED HEALTH CARE EDUCATION/TRAINING PROGRAM

## 2024-06-25 PROCEDURE — 81001 URINALYSIS AUTO W/SCOPE: CPT | Performed by: STUDENT IN AN ORGANIZED HEALTH CARE EDUCATION/TRAINING PROGRAM

## 2024-06-25 PROCEDURE — 80143 DRUG ASSAY ACETAMINOPHEN: CPT | Performed by: STUDENT IN AN ORGANIZED HEALTH CARE EDUCATION/TRAINING PROGRAM

## 2024-06-25 PROCEDURE — 83718 ASSAY OF LIPOPROTEIN: CPT | Performed by: STUDENT IN AN ORGANIZED HEALTH CARE EDUCATION/TRAINING PROGRAM

## 2024-06-25 PROCEDURE — 80179 DRUG ASSAY SALICYLATE: CPT | Performed by: STUDENT IN AN ORGANIZED HEALTH CARE EDUCATION/TRAINING PROGRAM

## 2024-06-25 PROCEDURE — 80307 DRUG TEST PRSMV CHEM ANLYZR: CPT | Performed by: STUDENT IN AN ORGANIZED HEALTH CARE EDUCATION/TRAINING PROGRAM

## 2024-06-25 PROCEDURE — 81025 URINE PREGNANCY TEST: CPT | Performed by: STUDENT IN AN ORGANIZED HEALTH CARE EDUCATION/TRAINING PROGRAM

## 2024-06-25 PROCEDURE — 80048 BASIC METABOLIC PNL TOTAL CA: CPT | Performed by: STUDENT IN AN ORGANIZED HEALTH CARE EDUCATION/TRAINING PROGRAM

## 2024-06-25 PROCEDURE — 93005 ELECTROCARDIOGRAM TRACING: CPT

## 2024-06-25 PROCEDURE — 93010 ELECTROCARDIOGRAM REPORT: CPT | Performed by: INTERNAL MEDICINE

## 2024-06-25 PROCEDURE — 83036 HEMOGLOBIN GLYCOSYLATED A1C: CPT | Performed by: STUDENT IN AN ORGANIZED HEALTH CARE EDUCATION/TRAINING PROGRAM

## 2024-06-25 RX ORDER — OLANZAPINE 10 MG/2ML
10 INJECTION, POWDER, FOR SOLUTION INTRAMUSCULAR EVERY 6 HOURS PRN
Status: DISCONTINUED | OUTPATIENT
Start: 2024-06-25 | End: 2024-07-08 | Stop reason: HOSPADM

## 2024-06-25 RX ORDER — NITROFURANTOIN 25; 75 MG/1; MG/1
100 CAPSULE ORAL EVERY 12 HOURS SCHEDULED
Status: COMPLETED | OUTPATIENT
Start: 2024-06-25 | End: 2024-06-30

## 2024-06-25 RX ORDER — LORAZEPAM 1 MG/1
2 TABLET ORAL EVERY 8 HOURS PRN
Status: DISCONTINUED | OUTPATIENT
Start: 2024-06-25 | End: 2024-07-08 | Stop reason: HOSPADM

## 2024-06-25 RX ORDER — MAGNESIUM HYDROXIDE/ALUMINUM HYDROXICE/SIMETHICONE 120; 1200; 1200 MG/30ML; MG/30ML; MG/30ML
30 SUSPENSION ORAL EVERY 4 HOURS PRN
Status: DISCONTINUED | OUTPATIENT
Start: 2024-06-25 | End: 2024-07-08 | Stop reason: HOSPADM

## 2024-06-25 RX ORDER — HALOPERIDOL 5 MG/ML
5 INJECTION INTRAMUSCULAR EVERY 8 HOURS PRN
Status: DISCONTINUED | OUTPATIENT
Start: 2024-06-25 | End: 2024-07-08 | Stop reason: HOSPADM

## 2024-06-25 RX ORDER — CEPHALEXIN 500 MG/1
500 CAPSULE ORAL ONCE
Status: COMPLETED | OUTPATIENT
Start: 2024-06-25 | End: 2024-06-25

## 2024-06-25 RX ORDER — HYDROXYZINE HYDROCHLORIDE 25 MG/1
25 TABLET, FILM COATED ORAL EVERY 6 HOURS PRN
Status: DISCONTINUED | OUTPATIENT
Start: 2024-06-25 | End: 2024-07-08 | Stop reason: HOSPADM

## 2024-06-25 RX ORDER — OLANZAPINE 10 MG/1
10 TABLET ORAL EVERY 6 HOURS PRN
Status: DISCONTINUED | OUTPATIENT
Start: 2024-06-25 | End: 2024-07-08 | Stop reason: HOSPADM

## 2024-06-25 RX ORDER — DIPHENHYDRAMINE HCL 50 MG
50 CAPSULE ORAL EVERY 8 HOURS PRN
Status: DISCONTINUED | OUTPATIENT
Start: 2024-06-25 | End: 2024-07-08 | Stop reason: HOSPADM

## 2024-06-25 RX ORDER — OLANZAPINE 5 MG/1
10 TABLET, ORALLY DISINTEGRATING ORAL ONCE
Status: COMPLETED | OUTPATIENT
Start: 2024-06-25 | End: 2024-06-25

## 2024-06-25 RX ORDER — DIPHENHYDRAMINE HYDROCHLORIDE 50 MG/ML
50 INJECTION INTRAMUSCULAR; INTRAVENOUS EVERY 8 HOURS PRN
Status: DISCONTINUED | OUTPATIENT
Start: 2024-06-25 | End: 2024-07-08 | Stop reason: HOSPADM

## 2024-06-25 RX ORDER — TRAZODONE HYDROCHLORIDE 50 MG/1
50 TABLET, FILM COATED ORAL
Status: DISCONTINUED | OUTPATIENT
Start: 2024-06-25 | End: 2024-07-08 | Stop reason: HOSPADM

## 2024-06-25 RX ORDER — HALOPERIDOL 5 MG/1
5 TABLET ORAL EVERY 8 HOURS PRN
Status: DISCONTINUED | OUTPATIENT
Start: 2024-06-25 | End: 2024-07-08 | Stop reason: HOSPADM

## 2024-06-25 RX ORDER — ACETAMINOPHEN 325 MG/1
650 TABLET ORAL EVERY 4 HOURS PRN
Status: DISCONTINUED | OUTPATIENT
Start: 2024-06-25 | End: 2024-07-08 | Stop reason: HOSPADM

## 2024-06-25 RX ORDER — AMOXICILLIN 250 MG
1 CAPSULE ORAL 2 TIMES DAILY PRN
Status: DISCONTINUED | OUTPATIENT
Start: 2024-06-25 | End: 2024-07-08 | Stop reason: HOSPADM

## 2024-06-25 RX ORDER — LORAZEPAM 2 MG/ML
2 INJECTION INTRAMUSCULAR EVERY 8 HOURS PRN
Status: DISCONTINUED | OUTPATIENT
Start: 2024-06-25 | End: 2024-07-08 | Stop reason: HOSPADM

## 2024-06-25 RX ADMIN — DIPHENHYDRAMINE HYDROCHLORIDE 50 MG: 50 INJECTION INTRAMUSCULAR; INTRAVENOUS at 12:41

## 2024-06-25 RX ADMIN — OLANZAPINE 10 MG: 5 TABLET, ORALLY DISINTEGRATING ORAL at 08:22

## 2024-06-25 RX ADMIN — CEPHALEXIN 500 MG: 500 CAPSULE ORAL at 11:22

## 2024-06-25 RX ADMIN — LORAZEPAM 2 MG: 2 INJECTION INTRAMUSCULAR; INTRAVENOUS at 12:41

## 2024-06-25 RX ADMIN — HALOPERIDOL LACTATE 5 MG: 5 INJECTION, SOLUTION INTRAMUSCULAR at 12:41

## 2024-06-25 ASSESSMENT — ACTIVITIES OF DAILY LIVING (ADL)
LAUNDRY: UNABLE TO COMPLETE
HYGIENE/GROOMING: INDEPENDENT
ADLS_ACUITY_SCORE: 29
ADLS_ACUITY_SCORE: 29
LAUNDRY: UNABLE TO COMPLETE
ADLS_ACUITY_SCORE: 29
ADLS_ACUITY_SCORE: 45
ORAL_HYGIENE: INDEPENDENT
ADLS_ACUITY_SCORE: 29
ADLS_ACUITY_SCORE: 29
HYGIENE/GROOMING: INDEPENDENT
ADLS_ACUITY_SCORE: 29
ADLS_ACUITY_SCORE: 45
DRESS: INDEPENDENT;SCRUBS (BEHAVIORAL HEALTH)
ADLS_ACUITY_SCORE: 35
ADLS_ACUITY_SCORE: 45
DRESS: SCRUBS (BEHAVIORAL HEALTH);INDEPENDENT
ADLS_ACUITY_SCORE: 35
ADLS_ACUITY_SCORE: 35
ORAL_HYGIENE: INDEPENDENT

## 2024-06-25 ASSESSMENT — COLUMBIA-SUICIDE SEVERITY RATING SCALE - C-SSRS: IS THE PATIENT NOT ABLE TO COMPLETE C-SSRS: REFUSES TO ANSWER
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
## 2024-06-25 NOTE — PLAN OF CARE
Problem: Adult Behavioral Health Plan of Care  Goal: Patient-Specific Goal (Individualization)  Description: Pt. Will eat at least 70% at each meal.  Pt. Will sleep at least 6 hours each night.  Pt. Will attend at least 50% of groups, when able to wean.  Patient will be able to participate in a logical and appropriate situation    6/25/24: Room in [facility] due to possibility of unpredictable behaviors. No wean at this time. Treatment team to reassess daily.   Outcome: Not Progressing    Disorganized during conversation. Slept majority of shift. Respirations even but shallow. Position changes frequently.  Attempted to wake patient for 2000 antibiotic. Patient made a position changed, made a grunting noise and continued to sleep. Did not administer.     Problem: Thought Process Alteration  Goal: Optimal Thought Clarity  Description: Pt. Will have a linear thought process by target date.   Outcome: Not Progressing     Problem: Seclusion/Restraint, Behavioral  Goal: Seclusion/Behavioral Restraint Goal: Absence of Harm or Injury  Outcome: Progressing     Problem: Suicide Risk  Goal: Absence of Self-Harm  Outcome: Progressing       Face to face report given with opportunity to observe patient.  Patient in bed awake.     Report given to night shift RN.     Micheline Gonsalez, RN   6/25/2024
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
## 2024-06-25 NOTE — TELEPHONE ENCOUNTER
12:01 PM PPS received call from Adrianna at [facility] receiving direct admit admitting to [facility].  12:06 PM Indicia complete. Pt added to admit board.
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
## 2024-06-25 NOTE — PLAN OF CARE
Violent/Self-Destructive Seclusion or Restraint Initiation Note    Patient behaviors justifying violent/self-destructive seclusion or restraint (describe attempted least restricted alternatives and patient's response to interventions): Pt. Refusing to remove necklace and bandana from their neck. Pt. Very dismissive of staff and agitated. Staff tried talking to Pt. Which was ineffective.    Type of restraint initiated: Physical Hold to remove bandana and rope necklace from Pt. Neck for Pt. safety  Date Started: 6/25/2024  Time Started: 1220  LIP notified (name): Sanket Bernstein  Order obtained: Yes.   Patient educated on reason for seclusion or restraints and discontinuation criteria: Yes.  Care plan updated: Yes.       Violent/Self-Destructive Seclusion or Restraint Discontinuation Note    Type of seclusion or restraint: Physical Hold  Patient's response to intervention: Pt. Agitated that items were removed, but then proceeded to lay down in bed and not respond to staff.   Date of discontinuation: 6/25/2024  Time of discontinuation: 1228  Face to face assessment completed: Yes.  Restraint monitoring minimum of every 15 minutes: Yes.  Debriefing with patient completed: Yes.  Care plan updated: Yes.

## 2024-06-25 NOTE — MEDICATION SCRIBE - ADMISSION MEDICATION HISTORY
Medication Scribe Admission Medication History    Admission medication history is complete. The information provided in this note is only as accurate as the sources available at the time of the update.    Information Source[facility]    Pertinent Information:   Unable to review medications with patient due to cognitive status. Abilify and melatonin last filled 4/19/24 - refills denied on 6/6 and 6/14 due to pt needing to be seen. Sertraline and trazodone last filled 1/19/24 30 ds. Pt likely not taking any medications at this time based on fill hx alone. Unable to question pt about any OTC medications at this time. No conflicting data from any available outside sources.     Changes made to PTA medication list:  Added: None  Deleted: diflucan-therapy should have been completed from April   Changed: None    Allergies reviewed with patient and updates made in EHR: unable to assess    Medication History Completed By: Marly Licona 6/25/2024 4:18 PM    No outpatient medications have been marked as taking for the 6/25/24 encounter (Hospital Encounter).
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
## 2024-06-25 NOTE — ED NOTES
Patient occasionally paces in room, when talking with patient she would stop mid-sentence and look to the side and make some facial expressions and would move her lips as like she was talking to someone and had some hand movements too.   Breakfast ordered

## 2024-06-25 NOTE — PLAN OF CARE
Violent/Self-Destructive Seclusion or Restraint Initiation Note    Patient behaviors justifying violent/self-destructive seclusion or restraint (describe attempted least restricted alternatives and patient's response to interventions): Continuation of previous note and part of same encounter. Another physical hold needed to give IM medication.  Type of restraint initiated: Physical hold  Date Started: 6/25/2024  Time Started: 1232  LIP notified (name): Sanketkat Vermarachel  Order obtained: Yes.   Patient educated on reason for seclusion or restraints and discontinuation criteria: Yes.  Care plan updated: Yes.       Violent/Self-Destructive Seclusion or Restraint Discontinuation Note    Type of seclusion or restraint: Physical Hold  Patient's response to intervention: Pt. Was agitated with staff. They then preceded to yell at staff and then lay in bed with the blanket over their head.   Date of discontinuation: 6/25/2024  Time of discontinuation: 1236   Face to face assessment completed: Yes.  Restraint monitoring minimum of every 15 minutes: Yes.  Debriefing with patient completed: Yes.  Care plan updated: Yes.

## 2024-06-25 NOTE — PLAN OF CARE
Seclusion/Restraint Face to Face Note  In person face to face assessment completed, including an evaluation of the patient's immediate reaction to the intervention, complete review of systems assessment, behavioral assessment and review/assessment of history, drugs and medications, recent labs, etc., and behavioral condition.  The patient experienced: No adverse physical outcome from seclusion/restraint initiation.  The intervention of restraint or seclusion needs to continue.  If face to face was completed by a trained RN, the above findings were discused with Sanket Bernstein.     Patient required physical hold at 1220 and 1232.  Face to face discussed with provider for both holds at 1236.     Kelly Timmons RN  6/25/2024  12:49 PM

## 2024-06-25 NOTE — CARE PLAN
06/25/24 1220   Seclusion or Restraint Order Upon Initiation and With Every Order   Attending Provider Notified Yes   Attending Provider's Name aSnket Bernstein   In Person Face to Face Assessment Conducted Yes-Eval of pt's immediate situation, reaction to intervention, complete review of systems assessment, behavioral assessment & review/assessment of hx, drugs & meds, recent labs, etc, behavioral condition, need to continue/terminate restraint/seclusion   RN Notified Provider of Result of Face to Face Yes   Describe adverse physical outcome None noted
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
## 2024-06-25 NOTE — PROGRESS NOTES
06/25/24 1307   Patient Belongings   Did you bring any home meds/supplements to the hospital?  Yes   Disposition of meds  Sent to security/pharmacy per site process   Patient Belongings locker;sent to security per site process   Patient Belongings Put in Hospital Secure Location (Security or Locker, etc.) cell phone/electronics;clothing;necklace;keys;wallet;other (see comments)   Belongings Search Yes   Clothing Search Yes   Second Staff .   Comment blue bandana, braided white string necklace, 2 hairties. very ripped jeans, gray hooded sweatshirt, orange tshirt     List items sent to safe: brown zipper wallet, straight talk phone card, social security card + stub, cashapp card, 7 business cards, [employer] card, part of a pen, misc receipts and papers, 5 stamps, [place] ID, 2 appt cards, temp paper ID for Ronaldo, temp paper ID for Yudi, sarmad sim card, 1 quarter, 4 leaves, 3 keys on string on karabeener, black cellphone in pink case - no damage      All other belongings put in assigned cubby in belongings room.       I have reviewed my belongings list on admission and verify that it is correct.     Patient signature_______________________________    Second staff witness (if patient unable to sign) ______________________________       I have received all my belongings at discharge.    Patient signature________________________________    Cierra   6/25/2024  1:19 PM

## 2024-06-26 PROCEDURE — 124N000001 HC R&B MH

## 2024-06-26 PROCEDURE — 250N000013 HC RX MED GY IP 250 OP 250 PS 637: Performed by: STUDENT IN AN ORGANIZED HEALTH CARE EDUCATION/TRAINING PROGRAM

## 2024-06-26 PROCEDURE — 99233 SBSQ HOSP IP/OBS HIGH 50: CPT | Mod: 95 | Performed by: STUDENT IN AN ORGANIZED HEALTH CARE EDUCATION/TRAINING PROGRAM

## 2024-06-26 PROCEDURE — 250N000013 HC RX MED GY IP 250 OP 250 PS 637: Performed by: NURSE PRACTITIONER

## 2024-06-26 RX ORDER — LORAZEPAM 1 MG/1
1 TABLET ORAL 2 TIMES DAILY
Status: DISCONTINUED | OUTPATIENT
Start: 2024-06-26 | End: 2024-06-27

## 2024-06-26 RX ORDER — PALIPERIDONE 3 MG/1
3 TABLET, EXTENDED RELEASE ORAL DAILY
Status: DISCONTINUED | OUTPATIENT
Start: 2024-06-26 | End: 2024-06-27

## 2024-06-26 RX ADMIN — LORAZEPAM 1 MG: 1 TABLET ORAL at 19:21

## 2024-06-26 RX ADMIN — NITROFURANTOIN MONOHYDRATE/MACROCRYSTALLINE 100 MG: 25; 75 CAPSULE ORAL at 07:45

## 2024-06-26 RX ADMIN — OLANZAPINE 10 MG: 10 TABLET, FILM COATED ORAL at 12:43

## 2024-06-26 RX ADMIN — OLANZAPINE 10 MG: 10 TABLET, FILM COATED ORAL at 07:45

## 2024-06-26 RX ADMIN — NITROFURANTOIN MONOHYDRATE/MACROCRYSTALLINE 100 MG: 25; 75 CAPSULE ORAL at 19:21

## 2024-06-26 ASSESSMENT — ACTIVITIES OF DAILY LIVING (ADL)
ADLS_ACUITY_SCORE: 29
DRESS: SCRUBS (BEHAVIORAL HEALTH);INDEPENDENT
ADLS_ACUITY_SCORE: 29
DRESS: INDEPENDENT;SCRUBS (BEHAVIORAL HEALTH)
ADLS_ACUITY_SCORE: 29
ADLS_ACUITY_SCORE: 29
ORAL_HYGIENE: INDEPENDENT
ADLS_ACUITY_SCORE: 29
HYGIENE/GROOMING: INDEPENDENT
ADLS_ACUITY_SCORE: 29
ORAL_HYGIENE: INDEPENDENT
ADLS_ACUITY_SCORE: 29
HYGIENE/GROOMING: INDEPENDENT
LAUNDRY: UNABLE TO COMPLETE
ADLS_ACUITY_SCORE: 29

## 2024-06-26 NOTE — PROGRESS NOTES
"CLINICAL NUTRITION SERVICES  -  ASSESSMENT NOTE    REASON FOR ASSESSMENT:  Admission Nutrition Risk Screen - unsure of weight loss      NUTRITION HISTORY  Yudi Morales is a 24 year old female admitted for psychosis. Hx includes schizoaffective disorder bipolar type, KIRSTY, intellectual disability, stimulant use disorder, cannabis use disorder. Weight has been trending down over the last year+, about 40lbs.  No current weight documented. Good appetite x 1 meal documented.    Allergies     Allergies   Allergen Reactions    Other [No Clinical Screening - See Comments] Anaphylaxis     \"Sodium Penathol\" per mother. Used in anesthesia.  Family hx of reaction.    Thiopental Anaphylaxis     Other reaction(s): Other - Describe In Comment Field    Family Hx of death with this medication    Great grandmother  on the operating table to this medication; worried about allergies to sodium      Other reaction(s): Other - Describe In Comment Field Family Hx of death with this medication    Latex Rash    Amoxicillin Trihydrate GI Disturbance    Amoxicillin-Pot Clavulanate GI Disturbance    Phenobarbital        CURRENT NUTRITION ORDERS  Diet Order:   Orders Placed This Encounter      Regular Diet Adult  Current Intake/Tolerance: 75% x 1 meal documented    ANTHROPOMETRICS  Height: 5' 7\"  Weight: 111 lb  BMI 17.4  Weight Status:  Underweight BMI <18.5  Weight History: 27% weight loss in 15 months  Wt Readings from Last 10 Encounters:   05/10/24 50.3 kg (111 lb)   24 50.7 kg (111 lb 12.8 oz)   24 54.4 kg (119 lb 14.4 oz)   10/25/23 55.2 kg (121 lb 9.6 oz)   23 57.7 kg (127 lb 3.2 oz)   23 59.4 kg (131 lb)   23 61.6 kg (135 lb 14.4 oz)   23 66.2 kg (146 lb)   23 70 kg (154 lb 6.4 oz)   23 68 kg (150 lb)        LABS  Labs reviewed    MEDICATIONS  Medications reviewed    ASSESSED NUTRITION NEEDS: 50.3kg  Estimated Energy Needs: 1510- 1760 kcals (30-35 Kcal/Kg)  Estimated Protein Needs: " 60-75 grams protein (1.2-1.5 g pro/Kg)    MALNUTRITION:  % Weight Loss:  20% in 1 year (moderate malnutrition)  % Intake:  <75% for >/= 1 month (moderate malnutrition)    Malnutrition Diagnosis: at least Moderate malnutrition  In Context of:  Chronic illness or disease  Environmental or social circumstances    NUTRITION INTERVENTIONS  Recommendations / Nutrition Prescription  Encourage intake during meal times  Offer Ensure with/between meals  Monitor weight    MONITORING AND EVALUATION:  Intake, weight, labs
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
## 2024-06-26 NOTE — PLAN OF CARE
Problem: Adult Behavioral Health Plan of Care  Goal: Patient-Specific Goal (Individualization)  Description: Pt. Will eat at least 70% at each meal.  Pt. Will sleep at least 6 hours each night.  Pt. Will attend at least 50% of groups, when able to wean.  Patient will be able to participate in a logical and appropriate situation    6/25/24: Room in [facility] due to possibility of unpredictable behaviors. No wean at this time. Treatment team to reassess daily.   Outcome: Progressing   Goal Outcome Evaluation:        Face to face shift report received from RN. Rounding completed, pt observed.Client rested in room for 7 hours with eyes closed and respirations noted.Client had no falls this shift.Face to face report will be communicated to oncoming RN.    Bg Johnson RN  6/26/2024  6:12 AM

## 2024-06-26 NOTE — PLAN OF CARE
"Face to face shift report received from PRATIK Pederson. Rounding completed, pt observed laying in bed at start of shift.  Problem: Adult Behavioral Health Plan of Care  Goal: Patient-Specific Goal (Individualization)  Description: Pt. Will eat at least 70% at each meal.  Pt. Will sleep at least 6 hours each night.  Pt. Will attend at least 50% of groups, when able to wean.  Patient will be able to participate in a logical and appropriate situation    6/25/24: Room in [facility]ICU due to possibility of unpredictable behaviors. No wean at this time. Treatment team to reassess daily.   Outcome: Progressing     Problem: Thought Process Alteration  Goal: Optimal Thought Clarity  Description: Pt. Will have a linear thought process by target date.   Outcome: Progressing     Problem: Seclusion/Restraint, Behavioral  Goal: Seclusion/Behavioral Restraint Goal: Absence of Harm or Injury  Outcome: Met     Problem: Suicide Risk  Goal: Absence of Self-Harm  Outcome: Progressing   Goal Outcome Evaluation:    Plan of Care Reviewed With: patient        Pt. Denied having any physical pain this shift. They did have some agitation and irritation this shift. Towards the start of the shift, Pt. Started pacing in the m[facility]ic[facility] lounge. They then came up to staff demanding to leave. Pt. Requested the phone to call their boyfriend to come pick them up. Pt. Then told staff that she \"would flip\" if not able to go in 3 seconds. PRN Zyprexa 10 mg given at 0745 with morning medication. Pt. Initially tried to hide medications in their hands, but did ultimately take them. Pt. Then ate 75% of breakfast and took a nap until lunch. Provider notified of patient's statements of wanting to leave.      1200 Pt. Was laying in bed and refusing to respond to staff when staff went to get vitals and bring lunch. Pt. Did ultimately agree to vitals, but was agitated and stated that they were going to punch walls and refuse to eat if they couldn't leave at this very moment. Pt. " Was viewed eating lunch in their room at 1214.    After eating lunch, Pt. Came to the door of the ICU and demanded to leave. They started pounding on both the window and door and [facility]. Code green called at 1245. PRN Zyprexa 10 mg given at 1250 PO. Pt. Then used phone to try call their boyfriend. Pt. Was upset that their phone call did not get answered. As staff attempted to leave, Pt. Made movements to try to [facility] and started screaming that she would harm and clay us all. Staff waited for Pt. To calm down and then told her to step away from door. Staff left ICU at 1300.    Pt. Then proceeded to [facility] down in [facility] and nap.      Face to face report will be communicated to oncoming RN.    Laurel Mercado RN  6/26/2024  1:42 PM
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
## 2024-06-27 PROCEDURE — 250N000013 HC RX MED GY IP 250 OP 250 PS 637: Performed by: NURSE PRACTITIONER

## 2024-06-27 PROCEDURE — 250N000013 HC RX MED GY IP 250 OP 250 PS 637: Performed by: STUDENT IN AN ORGANIZED HEALTH CARE EDUCATION/TRAINING PROGRAM

## 2024-06-27 PROCEDURE — 250N000011 HC RX IP 250 OP 636: Mod: JZ | Performed by: STUDENT IN AN ORGANIZED HEALTH CARE EDUCATION/TRAINING PROGRAM

## 2024-06-27 PROCEDURE — 99233 SBSQ HOSP IP/OBS HIGH 50: CPT | Mod: 95 | Performed by: STUDENT IN AN ORGANIZED HEALTH CARE EDUCATION/TRAINING PROGRAM

## 2024-06-27 PROCEDURE — 124N000001 HC R&B MH

## 2024-06-27 RX ORDER — PALIPERIDONE 3 MG/1
3 TABLET, EXTENDED RELEASE ORAL EVERY MORNING
Status: DISCONTINUED | OUTPATIENT
Start: 2024-06-27 | End: 2024-06-27

## 2024-06-27 RX ORDER — PALIPERIDONE 1.5 MG/1
4.5 TABLET, EXTENDED RELEASE ORAL EVERY MORNING
Status: DISCONTINUED | OUTPATIENT
Start: 2024-06-27 | End: 2024-06-27

## 2024-06-27 RX ORDER — PALIPERIDONE 6 MG/1
6 TABLET, EXTENDED RELEASE ORAL EVERY MORNING
Status: DISCONTINUED | OUTPATIENT
Start: 2024-06-28 | End: 2024-07-06

## 2024-06-27 RX ORDER — LORAZEPAM 1 MG/1
1 TABLET ORAL 3 TIMES DAILY
Status: DISCONTINUED | OUTPATIENT
Start: 2024-06-27 | End: 2024-07-04

## 2024-06-27 RX ADMIN — PALIPERIDONE 4.5 MG: 1.5 TABLET, EXTENDED RELEASE ORAL at 08:13

## 2024-06-27 RX ADMIN — LORAZEPAM 2 MG: 2 INJECTION INTRAMUSCULAR; INTRAVENOUS at 10:12

## 2024-06-27 RX ADMIN — HALOPERIDOL LACTATE 5 MG: 5 INJECTION, SOLUTION INTRAMUSCULAR at 10:12

## 2024-06-27 RX ADMIN — LORAZEPAM 1 MG: 1 TABLET ORAL at 13:34

## 2024-06-27 RX ADMIN — LORAZEPAM 1 MG: 1 TABLET ORAL at 08:13

## 2024-06-27 RX ADMIN — DIPHENHYDRAMINE HYDROCHLORIDE 50 MG: 50 INJECTION INTRAMUSCULAR; INTRAVENOUS at 10:12

## 2024-06-27 RX ADMIN — NITROFURANTOIN MONOHYDRATE/MACROCRYSTALLINE 100 MG: 25; 75 CAPSULE ORAL at 08:13

## 2024-06-27 ASSESSMENT — ACTIVITIES OF DAILY LIVING (ADL)
ADLS_ACUITY_SCORE: 29
ADLS_ACUITY_SCORE: 29
ADLS_ACUITY_SCORE: 39
ADLS_ACUITY_SCORE: 39
ADLS_ACUITY_SCORE: 29
ADLS_ACUITY_SCORE: 39
ADLS_ACUITY_SCORE: 29
ORAL_HYGIENE: PROMPTS
ADLS_ACUITY_SCORE: 29
ADLS_ACUITY_SCORE: 39
DRESS: INDEPENDENT
ADLS_ACUITY_SCORE: 39
HYGIENE/GROOMING: PROMPTS
ADLS_ACUITY_SCORE: 39
ADLS_ACUITY_SCORE: 29
HYGIENE/GROOMING: INDEPENDENT
ADLS_ACUITY_SCORE: 29
LAUNDRY: UNABLE TO COMPLETE
DRESS: INDEPENDENT
ADLS_ACUITY_SCORE: 29
ORAL_HYGIENE: INDEPENDENT

## 2024-06-27 NOTE — PROGRESS NOTES
Seclusion/Restraint Face to Face Note  In person face to face assessment completed, including an evaluation of the patient's immediate reaction to the intervention, complete review of systems assessment, behavioral assessment and review/assessment of history, drugs and medications, recent labs, etc., and behavioral condition.  The patient experienced: No adverse physical outcome from manual hold initiation.  The intervention of restraint or seclusion needs to terminated.  If face to face was completed by a trained RN, the above findings were discused with Dr. Bernstein (responsible provider).   Simeon Fortune RN  6/27/2024
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
## 2024-06-27 NOTE — CARE PLAN
06/27/24 1014   Seclusion or Restraint Order Upon Initiation and With Every Order   In Person Face to Face Assessment Conducted Yes-Eval of pt's immediate situation, reaction to intervention, complete review of systems assessment, behavioral assessment & review/assessment of hx, drugs & meds, recent labs, etc, behavioral condition, need to continue/terminate restraint/seclusion   RN Notified Provider of Result of Face to Face Yes   Describe adverse physical outcome None noted   Describe actions taken Emergency IM medications administrated   Describe follow-up needed None

## 2024-06-27 NOTE — PLAN OF CARE
Problem: Adult Behavioral Health Plan of Care  Goal: Patient-Specific Goal (Individualization)  Description: Pt. Will eat at least 70% at each meal.  Pt. Will sleep at least 6 hours each night.  Pt. Will attend at least 50% of groups, when able to wean.  Patient will be able to participate in a logical and appropriate situation    6/25/24: Room in MHICU due to possibility of unpredictable behaviors. No wean at this time. Treatment team to reassess daily.   Outcome: Progressing   Goal Outcome Evaluation:        Face to face shift report received from RN. Rounding completed, pt observed.Client rested in room for 6 hours with eyes closed and respirations noted.Client had no falls this shift. Client up and pacing BHITU. She has addressed staff about using phone to contact boyfriend but is disorganized in her thought process.Face to face report will be communicated to oncoming RN.    Bg Johnson RN  6/27/2024  6:29 AM

## 2024-06-27 NOTE — PLAN OF CARE
"Face to face end of shift report received from Rosanna PRICE RN. Rounding completed. Patient observed in lounge.     At the start of the shift pt observed walking around the [facility] lounge/hallway without any pants on and a towel between her legs. Pt informed UA \"I'm bleeding profusely out of my butthole.\" This writer went back to speak with pt. Blood visible on towel, sheets, and droplets on the floor. Pt got onto her bed on all fours requesting writer to assess anus. Informed pt that it appeared that she has gotten her menses and no blood appeared to be coming from her anus. Provided pt clean linens, scrubs, underwear, and pads. She attempted to call her boyfriend at this time, but not able to reach him. About 0930[facility] Spoke with pt regarding behaviors and encouraged her to stop. Pt did stop for a little while, but then started again. Including throwing herself at the wall. Staff had to intervene due to the level of force pt was using to throw herself into the wall. Code green was called at 1005, code 21 was called at 1007. Pt uncooperative with allowing PRN medications. Pt was placed in manual hold. Administered Haldol 5 mg, Ativan 2 mg, and Benadryl 50 mg into R gluteal. Pt was let go from manual hold. She did not have any aggressive behaviors towards staff. Code 21 was completed at 1020. Pt requesting to use the phone to attempt to call boyfriend. Informed her staff would be back shortly to allow her to make a phone. Pt responded \"If you don't get back here ASAP I'm going to flip the fuck out.\" Pt allowed to use phone- she still can't reach her boyfriend. Pt laid down in [facility] lounge afterwards. Behaviors continue to be appropriate. Pt rested for the remainder of the shift. She questions when guardian is coming and informed she will be here through the week which she was accepting of this information. Steady gait- no falls. Frequent rounding.       Problem: Adult Behavioral Health Plan " of Care  Goal: Patient-Specific Goal (Individualization)  Description: Pt. Will eat at least 70% at each meal.  Pt. Will sleep at least 6 hours each night.  Pt. Will attend at least 50% of groups, when able to wean.  Patient will be able to participate in a logical and appropriate situation    6/27/24: Room in MHICU due to possibility of unpredictable behaviors. No wean at this time. Treatment team to reassess daily.   Outcome: Not Progressing     Problem: Thought Process Alteration  Goal: Optimal Thought Clarity  Description: Pt. Will have a linear thought process by target date.   Outcome: Not Progressing     Problem: Suicide Risk  Goal: Absence of Self-Harm  Outcome: Not Progressing       Tavia Frias RN  6/27/2024  10:59 AM

## 2024-06-27 NOTE — PLAN OF CARE
"Face to face shift report received from Tavia CABALLERO RN. Rounding completed, pt observed.    Problem: Adult Behavioral Health Plan of Care  Goal: Patient-Specific Goal (Individualization)  Description: Pt. Will eat at least 70% at each meal.  Pt. Will sleep at least 6 hours each night.  Pt. Will attend at least 50% of groups, when able to wean.  Patient will be able to participate in a logical and appropriate situation    6/27/24: Room in MHICU due to possibility of unpredictable behaviors. No wean at this time. Treatment team to reassess daily.   Outcome: Progressing  Note: Shift Summary: Patient remains in a room in the MHICU r/t labile and unpredictable behavior.  [facility] pillow and blanket at the start of this shift. Eyes are closed, respirations are visible and regular. Patient did awaken for evening meal but immediately back to sleep.  Refuses to go lay in her bed and continues to lay in the floor in the MHICU.  Speech is garbled.  She was up at 1930 and asking what time of day it was.  Patient was informed that it was PM and that it is Thursday.  Patient asking again \"to go home\".  Reminded that she will be meeting with guardian on Monday and that the guardian is involved with what her discharge plan will entail.  Patient strongly encouraged to take her prescribed meds and to maintain appropriate behavior or she will be hospitalized longer to stabilize. Patient did not have much of a response and laid back down on the floor.  Allowed VS.  VSS. She has her menses so did get up to BR and changed pad with prompting.  Patient given snack and offered scheduled medications.  Patient stated that she wanted to \"call Ronaldo first\".  Patient was informed that she could use the phone after taking meds.  She refused to take the meds at this time.  Speech garbled and has a hard time remaining awake during interactions with staff.     Problem: Thought Process " Alteration  Goal: Optimal Thought Clarity  Description: Pt. Will have a linear thought process by target date.   Outcome: Not Progressing  Face to face report will be communicated to oncoming RN.    Agata Costa RN  6/27/2024

## 2024-06-27 NOTE — PLAN OF CARE
Violent/Self-Destructive Seclusion or Restraint Discontinuation Note    Type of seclusion or restraint: Manual hold  Patient's response to intervention: Tearful, agreeable to stop punching/kicking walls or  self harm  Date of discontinuation: 6/27/2024  Time of discontinuation: 1015   Face to face assessment completed: Yes.  Restraint monitoring minimum of every 15 minutes: Manual hold only lasting 1 minute  Debriefing with patient completed: Yes.  Care plan updated: Yes.

## 2024-06-27 NOTE — PLAN OF CARE
Violent/Self-Destructive Seclusion or Restraint Initiation Note    Patient behaviors justifying violent/self-destructive seclusion or restraint (describe attempted least restricted alternatives and patient's response to interventions): Pt punching, kicking, and throwing self against the wall. Pt using enough force that she required intervention to avoid harming herself. PO medications offered prior, but pt declined.  Type of restraint initiated: Manual hold  Date Started: 6/27/2024  Time Started: 1014  LIP notified (name): Dr. Sanket Bernstein  Order obtained: Provider to place order   Patient educated on reason for seclusion or restraints and discontinuation criteria: Yes.  Care plan updated: Yes.

## 2024-06-28 PROCEDURE — 99232 SBSQ HOSP IP/OBS MODERATE 35: CPT | Mod: 95 | Performed by: STUDENT IN AN ORGANIZED HEALTH CARE EDUCATION/TRAINING PROGRAM

## 2024-06-28 PROCEDURE — 250N000013 HC RX MED GY IP 250 OP 250 PS 637: Performed by: STUDENT IN AN ORGANIZED HEALTH CARE EDUCATION/TRAINING PROGRAM

## 2024-06-28 PROCEDURE — 250N000013 HC RX MED GY IP 250 OP 250 PS 637: Performed by: NURSE PRACTITIONER

## 2024-06-28 PROCEDURE — 124N000001 HC R&B MH

## 2024-06-28 RX ADMIN — HYDROXYZINE HYDROCHLORIDE 25 MG: 25 TABLET ORAL at 09:12

## 2024-06-28 RX ADMIN — LORAZEPAM 1 MG: 1 TABLET ORAL at 21:25

## 2024-06-28 RX ADMIN — PALIPERIDONE 6 MG: 6 TABLET, EXTENDED RELEASE ORAL at 09:12

## 2024-06-28 RX ADMIN — OLANZAPINE 10 MG: 10 TABLET, FILM COATED ORAL at 06:40

## 2024-06-28 RX ADMIN — LORAZEPAM 1 MG: 1 TABLET ORAL at 09:12

## 2024-06-28 RX ADMIN — NITROFURANTOIN MONOHYDRATE/MACROCRYSTALLINE 100 MG: 25; 75 CAPSULE ORAL at 21:25

## 2024-06-28 RX ADMIN — NITROFURANTOIN MONOHYDRATE/MACROCRYSTALLINE 100 MG: 25; 75 CAPSULE ORAL at 10:05

## 2024-06-28 RX ADMIN — LORAZEPAM 1 MG: 1 TABLET ORAL at 13:40

## 2024-06-28 ASSESSMENT — ACTIVITIES OF DAILY LIVING (ADL)
ADLS_ACUITY_SCORE: 39
HYGIENE/GROOMING: PROMPTS
ADLS_ACUITY_SCORE: 39

## 2024-06-28 NOTE — PLAN OF CARE
Problem: Adult Behavioral Health Plan of Care  Goal: Patient-Specific Goal (Individualization)  Description: Pt. Will eat at least 70% at each meal.  Pt. Will sleep at least 6 hours each night.  Pt. Will attend at least 50% of groups, when able to wean.  Patient will be able to participate in a logical and appropriate situation    6/27/24: Room in MHICU due to possibility of unpredictable behaviors. No wean at this time. Treatment team to reassess daily.   Outcome: Progressing   Goal Outcome Evaluation:       Face to face shift report received from RN. Rounding completed, pt observed.Client rested in room for 7 hours with eyes closed and respirations noted.Client had no falls this shift.Client agitated and disorganized this morning. She would like to come out of ITU. Client educated on the expectations for her to come out of ITU such as appropriate behavior, taking medications and being patient when making requests.Client offered and accepted Zyprexa 10 mg po at 0640 for agitation and disorganized thought process. Face to face report will be communicated to oncoming RN.    Bg Johnson RN  6/28/2024  6:29 AM
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
## 2024-06-28 NOTE — PLAN OF CARE
Face to face end of shift report received from Bg REYES RN. Rounding completed and patient observed lying in bed awake. She is requesting to come out to the open unit or to go home.         Goal Outcome Evaluation: Patient has been emotional and her mood labile. She said she really needs to go home and she cried when saying she is worried about her boyfriend. Staff helped her multiple times to find his number but staff was unable to. She couldn't remember his number. She said she thought his phone number was in her phone but when the phone was brought up for her to get numbers off of it, she said she didn't know the passcode. She said the only one who knows it is her boyfriend. Patient denied hallucinations and denied HI/SI. Patient slept off and on throughout the shift. She remained calm and did not have any outbursts after being told the criteria in order to wean. She took her meds without complaint. She did ask to know what meds were in the cup. Patient was given her AM scheduled meds and also a PRN of 25mg Atarax at 9:12. She agreed to take this and was told it would help her stay calm inside. Patient has her menses and has to be reminded to wash her hands and change her pad. Her sheets needed to be changed because she got blood on them. She was also observed to have blood on her hands. She washed them when she was prompted.     Face to face end of shift report communicated to onccherry RN.              Problem: Adult Behavioral Health Plan of Care  Goal: Patient-Specific Goal (Individualization)  Description: Pt. Will eat at least 70% at each meal.  Pt. Will sleep at least 6 hours each night.  Pt. Will attend at least 50% of groups, when able to wean.  Patient will be able to participate in a logical and appropriate situation    6/28/24: Room in MHICU due to possibility of unpredictable behaviors.   Pt able to wean for 30 minutes per shift if able to:  -Take medications as prescribed  -No self harm and/or  aggressive behaviors. Ex: pounding on doors or head banging.  -Be kind and appropriate during interactions with staff     Treatment team to reassess daily.   Outcome: Progressing     Problem: Thought Process Alteration  Goal: Optimal Thought Clarity  Description: Pt. Will have a linear thought process by target date.   Outcome: Progressing

## 2024-06-29 PROCEDURE — 250N000013 HC RX MED GY IP 250 OP 250 PS 637: Performed by: NURSE PRACTITIONER

## 2024-06-29 PROCEDURE — 99233 SBSQ HOSP IP/OBS HIGH 50: CPT | Mod: 95 | Performed by: STUDENT IN AN ORGANIZED HEALTH CARE EDUCATION/TRAINING PROGRAM

## 2024-06-29 PROCEDURE — 250N000011 HC RX IP 250 OP 636: Performed by: STUDENT IN AN ORGANIZED HEALTH CARE EDUCATION/TRAINING PROGRAM

## 2024-06-29 PROCEDURE — 124N000001 HC R&B MH

## 2024-06-29 PROCEDURE — 250N000013 HC RX MED GY IP 250 OP 250 PS 637: Performed by: STUDENT IN AN ORGANIZED HEALTH CARE EDUCATION/TRAINING PROGRAM

## 2024-06-29 RX ADMIN — NITROFURANTOIN MONOHYDRATE/MACROCRYSTALLINE 100 MG: 25; 75 CAPSULE ORAL at 20:52

## 2024-06-29 RX ADMIN — OLANZAPINE 10 MG: 10 TABLET, FILM COATED ORAL at 10:50

## 2024-06-29 RX ADMIN — LORAZEPAM 1 MG: 1 TABLET ORAL at 20:52

## 2024-06-29 RX ADMIN — PALIPERIDONE PALMITATE 234 MG: 234 INJECTION INTRAMUSCULAR at 15:11

## 2024-06-29 RX ADMIN — PALIPERIDONE 6 MG: 6 TABLET, EXTENDED RELEASE ORAL at 08:11

## 2024-06-29 RX ADMIN — NITROFURANTOIN MONOHYDRATE/MACROCRYSTALLINE 100 MG: 25; 75 CAPSULE ORAL at 08:11

## 2024-06-29 RX ADMIN — LORAZEPAM 1 MG: 1 TABLET ORAL at 14:02

## 2024-06-29 RX ADMIN — LORAZEPAM 1 MG: 1 TABLET ORAL at 08:11

## 2024-06-29 ASSESSMENT — ACTIVITIES OF DAILY LIVING (ADL)
ADLS_ACUITY_SCORE: 39
HYGIENE/GROOMING: PROMPTS
ADLS_ACUITY_SCORE: 39
DRESS: SCRUBS (BEHAVIORAL HEALTH)
ADLS_ACUITY_SCORE: 39
LAUNDRY: UNABLE TO COMPLETE
ADLS_ACUITY_SCORE: 39
ORAL_HYGIENE: PROMPTS
ADLS_ACUITY_SCORE: 39

## 2024-06-29 NOTE — CONSULTS
YUDI HANSEN.  Patient requested visit with  for the purpose of securing a discharge to go to the [facility].  Yudi indicated that she had taken methamphetamines and was disorientated and not functioning well. She did not remember exactly how she came to the [facility] unit.  She noted that she has no spiritual care background or [employer] history. She appreciated the visit by the  and wanted to know what would be happening next and when she would be discharged.  This  noted that he does not have that knowledge or authority for discharge or release.  She seemed to accept that answer and was grateful for the the visit.  She was assured that given evaluation and treatment she would be doing better with some time.  She was given a hospital prayer book.

## 2024-06-29 NOTE — PLAN OF CARE
Problem: Adult Behavioral Health Plan of Care  Goal: Patient-Specific Goal (Individualization)  Description: Pt. Will eat at least 70% at each meal.  Pt. Will sleep at least 6 hours each night.  Pt. Will attend at least 50% of groups, when able to wean.  Patient will be able to participate in a logical and appropriate situation    6/28/24: Room in MHICU due to possibility of unpredictable behaviors.   Pt able to wean for 30 minutes per shift if able to:  -Take medications as prescribed  -No self harm and/or aggressive behaviors. Ex: pounding on doors or head banging.  -Be kind and appropriate during interactions with staff     Treatment team to reassess daily.   Outcome: Progressing     Problem: Thought Process Alteration  Goal: Optimal Thought Clarity  Description: Pt. Will have a linear thought process by target date.   Outcome: Progressing     Problem: Suicide Risk  Goal: Absence of Self-Harm  Outcome: Progressing     Face to face shift report received from Patti. Rounding completed, patient laying in bed in MHICU, appeared to be sleeping, respirations noted.    Patient appeared to be sleeping for approximately 12 hours since 1730 last shift.    Patient had no reported or observed suicidal behavior or self harm this shift.      Face to face report will be communicated to oncoming RN.    Susanna Pedersen RN  6/29/2024  6:22 AM

## 2024-06-29 NOTE — PROGRESS NOTES
[facility] PSYCHIATRY  PROGRESS NOTE     SUBJECTIVE     Prior to interviewing the patient, I met with nursing and reviewed patient's clinical condition. We discussed clinical care both before and after the interview. I have reviewed the patient's clinical course by review of records including previous notes, labs, and vital signs.     Per nursing, the patient had the following behavioral events over the last 24-hours: Less agitated. Taking scheduled and prn medications.    On psychiatric interview, the patient was found in the MHICU. She notes that she is doing alright today. She notes that she came here after being chased by a pack of wolves. She notes that she was able to get them off of her and call 911. They said that she was alright. She notes feeling a lot better, stable, and feels better to return home. She notes that she would like to return home and be stable.     She notes that she feels like the medication is helping. She notes that she is not as scared. She notes that she is able to organize her thoughts better. She no longer feels like someone is trying to hurt or harm her.    The patient denies any headache, confusion, change in vision, chest pain, SOB, abdominal pain, diarrhea, or constipation. No medical concerns today. She denies any EPSE.    She is willing to get an FRIAS after discussing B/R/Se. The guardian ultimately consents to this medication.        MEDICATIONS   Scheduled Meds:  Current Facility-Administered Medications   Medication Dose Route Frequency Provider Last Rate Last Admin    LORazepam (ATIVAN) tablet 1 mg  1 mg Oral TID Sanket Bernstein DO   1 mg at 06/29/24 0811    nitroFURantoin macrocrystal-monohydrate (MACROBID) capsule 100 mg  100 mg Oral Q12H [facility] (08/20) Kate Carroll CNP   100 mg at 06/29/24 0811    paliperidone ER (INVEGA) 24 hr tablet 6 mg  6 mg Oral QAM Sanket Bernstein DO   6 mg at 06/29/24 0811     PRN Meds:.  Current Facility-Administered Medications  "  Medication Dose Route Frequency Provider Last Rate Last Admin    acetaminophen (TYLENOL) tablet 650 mg  650 mg Oral Q4H PRN Sanket Bernstein DO        alum & mag hydroxide-simethicone (MAALOX) suspension 30 mL  30 mL Oral Q4H PRN Sanket Bernstein DO        haloperidol (HALDOL) tablet 5 mg  5 mg Oral Q8H PRN Sanket Bernstein DO        And    LORazepam (ATIVAN) tablet 2 mg  2 mg Oral Q8H PRN Sanket Bernstein DO        And    diphenhydrAMINE (BENADRYL) capsule 50 mg  50 mg Oral Q8H PRN Sanket Bernstein DO        haloperidol lactate (HALDOL) injection 5 mg  5 mg Intramuscular Q8H PRN Sanket Bernstein DO   5 mg at 24 1012    And    LORazepam (ATIVAN) injection 2 mg  2 mg Intramuscular Q8H PRN Sanket Bernstein DO   2 mg at 24 1012    And    diphenhydrAMINE (BENADRYL) injection 50 mg  50 mg Intramuscular Q8H PRN Sanket Bernstein DO   50 mg at 24 1012    hydrOXYzine HCl (ATARAX) tablet 25 mg  25 mg Oral Q6H PRN Sanket Bernstein DO   25 mg at 24 0912    OLANZapine (zyPREXA) tablet 10 mg  10 mg Oral Q6H PRN Sanket Bernstein DO   10 mg at 24 0640    Or    OLANZapine (zyPREXA) injection 10 mg  10 mg Intramuscular Q6H PRN Sanket Bernstein DO        senna-docusate (SENOKOT-S/PERICOLACE) 8.6-50 MG per tablet 1 tablet  1 tablet Oral BID PRN Sanket Bernstein DO        traZODone (DESYREL) tablet 50 mg  50 mg Oral At Bedtime PRN Sanket Bernstein DO            ALLERGIES   Allergies   Allergen Reactions    Other [No Clinical Screening - See Comments] Anaphylaxis     \"Sodium Penathol\" per mother. Used in anesthesia.  Family hx of reaction.    Thiopental Anaphylaxis     Other reaction(s): Other - Describe In Comment Field    Family Hx of death with this medication    Great grandmother  on the operating table to this medication; worried about allergies to sodium      Other reaction(s): Other - Describe In Comment Field Family Hx of death with this medication    " Latex Rash    Amoxicillin Trihydrate GI Disturbance    Amoxicillin-Pot Clavulanate GI Disturbance    Phenobarbital         MENTAL STATUS EXAM   Vitals: /69 (BP Location: Right arm)   Pulse (!) 124   Temp 97.2  F (36.2  C) (Temporal)   Resp 18   LMP  (LMP Unknown)   SpO2 96%     Appearance: Alert, thin, dyed hair  Attitude: More cooperative   Eye Contact: Poor  Mood: Ok  Affect: Blunted  Speech: More regular, lisp  Psychomotor Behavior: No TD or rigidity. No tremor or akathisia.   Thought Process: Disorganized  Associations: No loose associations   Thought Content: No SI elicited. Response to internal stimuli at times. Delusional thought regarding attack by wolves with element of paranoia - some improvement  Insight: Poor  Judgment: Poor  Oriented to: Person, place  Attention Span and Concentration: Limited  Recent and Remote Memory: Limited  Language: English with appropriate syntax and vocabulary  Fund of Knowledge: Low  Muscle Strength and Tone: Grossly normal  Gait and Station: Grossly normal       LABS   No results found for this or any previous visit (from the past 24 hour(s)).      IMPRESSION     This is a 24 year old female with a PMH of schizoaffective disorder, bipolar type, KIRSTY, intellectual disability, stimulant use disorder, and cannabis use disorder who presents with psychosis occurring in the context of likely medication non-adherence and substance use (UDS positive for amphetamines) with the patient being found on the road complaining of a ramires attack with no signs of one. She has a history of multiple hospitalizations last in 2023 per records review. She was been seeing by Dr. Brothers but was likely lost to follow-up with it unclear if she is seeing anyone else. She was more stable on Abilify in the past. She currently does have a legal guardian who consented to hospitalizations. She would benefit from hospitalization to address her psychosis.     In terms of treatment, will either resume  Abilify or start Invega with plan for FRIAS.     Patient did require restraints on 6/25 and forced IM medication given her level of agitation and refusal to remove necklaces that could be used for self or other harm.     Today: Patient's psychosis is improving with medications and abstinence. She tolerated Invega with FRIAS being given today. Continuing Ativan for now. Weaning slowly to help with socialization if following behavioral plan.        DIAGNOSES     #. Schizoaffective disorder, bipolar type, multiple episodes, in acute episode  #. Generalized anxiety disorder  #. Intellectual disability   #. Stimulant (meth) use disorder, severe  #. Cannabis use disorder, unspecified       PLAN     Location: Unit 5  Legal Status: Orders Placed This Encounter      Voluntary    Per Legal Guardian- Christian     Safety Assessment:    Behavioral Orders   Procedures    Code 1 - Restrict to Unit    Routine Programming     As clinically indicated    Status 15     Every 15 minutes.      PTA psychotropic medications held:      - None, as not taking any     PTA psychotropic medications continued/changed:      -Resumed Trazodone 50 mg at bedtime prn sleep     New psychotropic medications initiated:     -Invega 3 mg daily -> 4.5 mg (6/27) -> 6 mg (6/28)  -Ativan 1 mg BID for short course -> 1 mg TID (6/27)  -Standard unit prn agents, including Zyprexa and Haldol/Benadryl/Ativan prn agitation    Today's Changes:    -Start Invega Sustenna 234 mg and then 156 mg in one week and then 117 mg in 4 weeks   -Continue oral Invega for a couple of days as a bridge  -Continue to wean, increasing time    Programming: Patient will be treated in a therapeutic milieu with appropriate individual and group therapies. Education will be provided on diagnoses, medications, and treatments.     Medical diagnoses:  Per medicine    UTI- abnormal UA. Ua positive nitrites, large leukocyte esterase, >182 wbc, ED noted it was likely contaminated but  did treat with one dose of keflex. Pt is confused and not cooperative. Will continue tx with Macrobid 100 mg BID for 5 days.    Moderate protein calorie malnutrition- secondary to psychiatric illness. Nutrition consulted. Ensure with meals.     Consult: Nutrition   Tests: None, baseline labs reassuring     Anticipated LOS: > 7 days   Disposition: IRTS vs home with outpatient services       ATTESTATION      Dr. Sanket Bernstein  Psychiatrist    Video Visit: Patient has given verbal consent for video visit?: Yes  Type of Service: video visit for mental health treatment  Reason for [place] Visit: [place]  Originating Site (patient location): [facility]  Distant Site (provider location): Remote Location  Mode of Communication: Video Conference via [employer]  Time of Service: Date: 06/29/2024 Start: 800 end: 493

## 2024-06-29 NOTE — PLAN OF CARE
"  Problem: Adult Behavioral Health Plan of Care  Goal: Patient-Specific Goal (Individualization)  Description: Pt. Will eat at least 70% at each meal.  Pt. Will sleep at least 6 hours each night.  Pt. Will attend at least 50% of groups, when able to wean.  Patient will be able to participate in a logical and appropriate situation    6/29/24: Room in MHICU due to possibility of unpredictable behaviors.   Pt able to wean for 1 hour x2 per shift if able to:  -Take medications as prescribed  -No self harm and/or aggressive behaviors. Ex: pounding on doors or head banging.  -Be kind and appropriate during interactions with staff     Treatment team to reassess daily.   Outcome: Progressing     Problem: Thought Process Alteration  Goal: Optimal Thought Clarity  Description: Pt. Will have a linear thought process by target date.   Outcome: Progressing     Problem: Suicide Risk  Goal: Absence of Self-Harm  Outcome: Progressing    Face to face shift report received from previously assigned nurse. Rounding completed, pt observed pacing around the MHICU.    Patient is met with in the MHICU. Denies pain, SI, HI, A/V hallucinations, anxiety, and depression. Patient does not wish to be here any longer. Requesting to leave, attempted to explain the 12-hour intent, although patient continues to say \"But I came in by myself, so I can just walk out the door\". Redirection utilized. Explained to patient it would be in her best interest to wait until she meets with her guardian on Monday. Patient continues to be confused but agreeable to wait until she meets with her guardian. No further needs at this time.     Patient compliant with HS medication administration. Bedding changed due to menses and leaking. Patient has been pleasant and cooperative with staff. No further needs at this time.     Face to face report communicated to onccherry CHRISTIE.    Simin Billings RN  6/29/2024  6:16 PM         "

## 2024-06-29 NOTE — PLAN OF CARE
Face to face shift report received from Susanna CHRISTIE. Rounding completed, pt observed in MHICU at the start of the shift.    Patient remains in MHICU. Weaning to open unit more frequently today and doing well. Attending groups and coloring. Alert and making needs known. Eating well for meals. Pleasant during conversation with this writer. Continues to appear disorganized at times and makes comments about feeling like she is being possessed. Compliant with scheduled medication and nursing assessment. Endorses anxiety, depression, and auditory hallucinations. Offered and accepted Zyprexa 10 mg at 1050 with adequate relief. Denies SI/HI and pain. Patient still on her menses. Given hygiene supplies and a new change of scrubs. Patient asking for boyfriends's phone number. Informed patient it is not in her chart. Patient was overheard talking with the police asking them to let her boyfriend know where she is. Talked to patient afterwards stating it is not appropriate to call the police. Patient verbalized understanding and did end up getting a call from her boyfriend. Spoke with the  this afternoon. Invega Sustenna given at 1511 in right deltoid.    Problem: Adult Behavioral Health Plan of Care  Goal: Patient-Specific Goal (Individualization)  Description: Pt. Will eat at least 70% at each meal.  Pt. Will sleep at least 6 hours each night.  Pt. Will attend at least 50% of groups, when able to wean.  Patient will be able to participate in a logical and appropriate situation    6/29/24: Room in MHICU due to possibility of unpredictable behaviors.   Pt able to wean for 30 minutes per shift if able to:  -Take medications as prescribed  -No self harm and/or aggressive behaviors. Ex: pounding on doors or head banging.  -Be kind and appropriate during interactions with staff     Treatment team to reassess daily.   Outcome: Progressing     Problem: Thought Process Alteration  Goal: Optimal Thought Clarity  Description: Pt.  Will have a linear thought process by target date.   Outcome: Progressing     Problem: Suicide Risk  Goal: Absence of Self-Harm  Outcome: Progressing     Face to face report will be communicated to oncoming RN.    Blanka Hansen RN  6/29/2024

## 2024-06-30 PROCEDURE — 250N000013 HC RX MED GY IP 250 OP 250 PS 637: Performed by: STUDENT IN AN ORGANIZED HEALTH CARE EDUCATION/TRAINING PROGRAM

## 2024-06-30 PROCEDURE — 99233 SBSQ HOSP IP/OBS HIGH 50: CPT | Mod: 95 | Performed by: STUDENT IN AN ORGANIZED HEALTH CARE EDUCATION/TRAINING PROGRAM

## 2024-06-30 PROCEDURE — 124N000001 HC R&B MH

## 2024-06-30 PROCEDURE — 250N000013 HC RX MED GY IP 250 OP 250 PS 637: Performed by: NURSE PRACTITIONER

## 2024-06-30 RX ADMIN — PALIPERIDONE 6 MG: 6 TABLET, EXTENDED RELEASE ORAL at 08:00

## 2024-06-30 RX ADMIN — TRAZODONE HYDROCHLORIDE 50 MG: 50 TABLET ORAL at 20:06

## 2024-06-30 RX ADMIN — LORAZEPAM 1 MG: 1 TABLET ORAL at 08:00

## 2024-06-30 RX ADMIN — LORAZEPAM 1 MG: 1 TABLET ORAL at 13:17

## 2024-06-30 RX ADMIN — NITROFURANTOIN MONOHYDRATE/MACROCRYSTALLINE 100 MG: 25; 75 CAPSULE ORAL at 07:59

## 2024-06-30 RX ADMIN — LORAZEPAM 1 MG: 1 TABLET ORAL at 20:06

## 2024-06-30 ASSESSMENT — ACTIVITIES OF DAILY LIVING (ADL)
ADLS_ACUITY_SCORE: 39
ORAL_HYGIENE: PROMPTS
ADLS_ACUITY_SCORE: 39
HYGIENE/GROOMING: PROMPTS
ADLS_ACUITY_SCORE: 39
LAUNDRY: UNABLE TO COMPLETE
ADLS_ACUITY_SCORE: 39
DRESS: SCRUBS (BEHAVIORAL HEALTH)
ADLS_ACUITY_SCORE: 39

## 2024-06-30 NOTE — PLAN OF CARE
Face to face shift report received from Susanna CHRISTIE. Rounding completed, pt observed sleeping at the start of the shift.    Patient in lounge weaning majority of the day. Appropriate boundaries and behaviors maintained. Alert and making needs known. Eating well for meals. Pleasant during conversation with this writer. Compliant with scheduled medication and nursing assessment. Macrobid completed. Elevated HR at 130 this morning. Asymptomatic. Improvement with scheduled medications. Endorses depression stating she is bored and wants to go home. Denies hallucinations, SI/HI, and pain. No comments made regarding Protestant or being possessed today. Fixated on being discharged next Monday. Reminded patient there is no set date for discharging. Encouraged her to continue taking medications as scheduled. Patient agrees while stating she has been feeling much better. Guardian called nurse's station today after speaking with patient. Update given. Patient states she is looking forward to her visit tonight.    Problem: Adult Behavioral Health Plan of Care  Goal: Patient-Specific Goal (Individualization)  Description: Pt. Will eat at least 70% at each meal.  Pt. Will sleep at least 6 hours each night.  Pt. Will attend at least 50% of groups, when able to wean.  Patient will be able to participate in a logical and appropriate situation    6/30/24: Room in MHICU due to possibility of unpredictable behaviors.   Pt able to wean for 1 hour x2 per shift if able to:  -Take medications as prescribed  -No self harm and/or aggressive behaviors. Ex: pounding on doors or head banging.  -Be kind and appropriate during interactions with staff     Treatment team to reassess daily.   Outcome: Progressing     Problem: Thought Process Alteration  Goal: Optimal Thought Clarity  Description: Pt. Will have a linear thought process by target date.   Outcome: Progressing     Problem: Suicide Risk  Goal: Absence of Self-Harm  Outcome: Progressing    Face  to face report will be communicated to oncoming PRATIK.    Blanka Hansen RN  6/30/2024

## 2024-06-30 NOTE — PROGRESS NOTES
[facility] PSYCHIATRY  PROGRESS NOTE     SUBJECTIVE     Prior to interviewing the patient, I met with nursing and reviewed patient's clinical condition. We discussed clinical care both before and after the interview. I have reviewed the patient's clinical course by review of records including previous notes, labs, and vital signs.     Per nursing, the patient had the following behavioral events over the last 24-hours: Less agitated. Still disorganized and psychotic. No codes with patient weaning alright. Taking scheduled and prn medications.    On psychiatric interview, the patient was found in the the lounge. She notes that she feels really good today. She notes that she is looking forward to leaving. She notes that she has to work and has to get back to things.     She denies feeling like she is possessed. She denies any ideas of reference. She notes that she feels safe in the hospital.    She notes a little pain from the shot. Otherwise, she is tolerating it well. She denies any EPSE.        MEDICATIONS   Scheduled Meds:  Current Facility-Administered Medications   Medication Dose Route Frequency Provider Last Rate Last Admin    LORazepam (ATIVAN) tablet 1 mg  1 mg Oral TID Sanket Bernstein DO   1 mg at 06/29/24 2052    nitroFURantoin macrocrystal-monohydrate (MACROBID) capsule 100 mg  100 mg Oral Q12H [facility] (08/20) Kate Carroll CNP   100 mg at 06/29/24 2052    [START ON 7/6/2024] paliperidone (INVEGA SUSTENNA) injection JOVANNI 156 mg  156 mg Intramuscular Once Sanket Bernstein DO        paliperidone ER (INVEGA) 24 hr tablet 6 mg  6 mg Oral QAM Sanket Bernstein DO   6 mg at 06/29/24 0811     PRN Meds:.  Current Facility-Administered Medications   Medication Dose Route Frequency Provider Last Rate Last Admin    acetaminophen (TYLENOL) tablet 650 mg  650 mg Oral Q4H PRN Sanket Bernstein DO        alum & mag hydroxide-simethicone (MAALOX) suspension 30 mL  30 mL Oral Q4H PRN Sanket Bernstein DO      "   haloperidol (HALDOL) tablet 5 mg  5 mg Oral Q8H PRN Sanket Bernstein, DO        And    LORazepam (ATIVAN) tablet 2 mg  2 mg Oral Q8H PRN Sanket Bernstein DO        And    diphenhydrAMINE (BENADRYL) capsule 50 mg  50 mg Oral Q8H PRN Sanket Bernstein, DO        haloperidol lactate (HALDOL) injection 5 mg  5 mg Intramuscular Q8H PRN Sanket Bernstein, DO   5 mg at 24 1012    And    LORazepam (ATIVAN) injection 2 mg  2 mg Intramuscular Q8H PRN Sanket Bernstein, DO   2 mg at 24 1012    And    diphenhydrAMINE (BENADRYL) injection 50 mg  50 mg Intramuscular Q8H PRN Sanket Bernstein, DO   50 mg at 24 1012    hydrOXYzine HCl (ATARAX) tablet 25 mg  25 mg Oral Q6H PRN Sanket Bernstein, DO   25 mg at 24 0912    nicotine polacrilex (NICORETTE) gum 4 mg  4 mg Buccal Q1H PRN Sanket Bernstein DO        OLANZapine (zyPREXA) tablet 10 mg  10 mg Oral Q6H PRN Sanket Bernstein DO   10 mg at 24 1050    Or    OLANZapine (zyPREXA) injection 10 mg  10 mg Intramuscular Q6H PRN Sanket Bernstein DO        senna-docusate (SENOKOT-S/PERICOLACE) 8.6-50 MG per tablet 1 tablet  1 tablet Oral BID PRN Sanket Bernstein DO        traZODone (DESYREL) tablet 50 mg  50 mg Oral At Bedtime PRN Sanket Bernstein, DO            ALLERGIES   Allergies   Allergen Reactions    Other [No Clinical Screening - See Comments] Anaphylaxis     \"Sodium Penathol\" per mother. Used in anesthesia.  Family hx of reaction.    Thiopental Anaphylaxis     Other reaction(s): Other - Describe In Comment Field    Family Hx of death with this medication    Great grandmother  on the operating table to this medication; worried about allergies to sodium      Other reaction(s): Other - Describe In Comment Field Family Hx of death with this medication    Latex Rash    Amoxicillin Trihydrate GI Disturbance    Amoxicillin-Pot Clavulanate GI Disturbance    Phenobarbital         MENTAL STATUS EXAM   Vitals: /72   Pulse 112 " "  Temp 98.3  F (36.8  C) (Temporal)   Resp 16   LMP  (LMP Unknown)   SpO2 97%     Appearance: Alert, thin, dyed hair  Attitude: More cooperative   Eye Contact: Poor  Mood: \"Fine\"  Affect: Blunted  Speech: More regular, lisp  Psychomotor Behavior: No TD or rigidity. No tremor or akathisia.   Thought Process: Disorganized  Associations: No loose associations   Thought Content: No SI elicited. Response to internal stimuli at times. Delusional thought regarding attack by wolves with element of paranoia - some improvement  Insight: Poor  Judgment: Poor  Oriented to: Person, place  Attention Span and Concentration: Limited  Recent and Remote Memory: Limited  Language: English with appropriate syntax and vocabulary  Fund of Knowledge: Low  Muscle Strength and Tone: Grossly normal  Gait and Station: Grossly normal       LABS   No results found for this or any previous visit (from the past 24 hour(s)).      IMPRESSION     This is a 24 year old female with a PMH of schizoaffective disorder, bipolar type, KIRSTY, intellectual disability, stimulant use disorder, and cannabis use disorder who presents with psychosis occurring in the context of likely medication non-adherence and substance use (UDS positive for amphetamines) with the patient being found on the road complaining of a ramires attack with no signs of one. She has a history of multiple hospitalizations last in 2023 per records review. She was been seeing by Dr. Brothers but was likely lost to follow-up with it unclear if she is seeing anyone else. She was more stable on Abilify in the past. She currently does have a legal guardian who consented to hospitalizations. She would benefit from hospitalization to address her psychosis.     In terms of treatment, will either resume Abilify or start Invega with plan for FRIAS.     Patient did require restraints on 6/25 and forced IM medication given her level of agitation and refusal to remove necklaces that could be used for self " or other harm.     Today: Patient's psychosis is slowly improving with medications and abstinence. She tolerated Invega with FRIAS being given yesterday. Continuing Ativan for now. Weaning slowly to help with socialization if following behavioral plan. Patient continues to be hyper-focused on talking to boyfriend (has not been answering phone) with her calling the police yesterday and being encouraged not to do this.       DIAGNOSES     #. Schizoaffective disorder, bipolar type, multiple episodes, in acute episode  #. Generalized anxiety disorder  #. Intellectual disability   #. Stimulant (meth) use disorder, severe  #. Cannabis use disorder, unspecified       PLAN     Location: Unit 5  Legal Status: Orders Placed This Encounter      Voluntary    Per Legal Guardian- [employer]     Safety Assessment:    Behavioral Orders   Procedures    Code 1 - Restrict to Unit    Routine Programming     As clinically indicated    Status 15     Every 15 minutes.      PTA psychotropic medications held:      - None, as not taking any     PTA psychotropic medications continued/changed:      -Resumed Trazodone 50 mg at bedtime prn sleep     New psychotropic medications initiated:     -Invega 3 mg daily -> 4.5 mg (6/27) -> 6 mg (6/28)  -Invega Sustenna 234 mg (6/29) and then 156 mg in one week (7/6) and then 117 mg in 4 weeks (8/5)  -Ativan 1 mg BID for short course -> 1 mg TID (6/27)  -Standard unit prn agents, including Zyprexa and Haldol/Benadryl/Ativan prn agitation    Today's Changes:    -Continue oral Invega likely until next injection as an oral bridge (stop if SE arise)  -Continue to wean, increasing time    Programming: Patient will be treated in a therapeutic milieu with appropriate individual and group therapies. Education will be provided on diagnoses, medications, and treatments.     Medical diagnoses:  Per medicine    UTI- abnormal UA. Ua positive nitrites, large leukocyte esterase, >182 wbc, ED noted it was  likely contaminated but did treat with one dose of keflex. Pt is confused and not cooperative. Will continue tx with Macrobid 100 mg BID for 5 days.    Moderate protein calorie malnutrition- secondary to psychiatric illness. Nutrition consulted. Ensure with meals.     Consult: Nutrition   Tests: None, baseline labs reassuring     Anticipated LOS: > 7 days. Anticipate discharge on 7/8 if improvement continues.   Disposition: IRTS vs home with outpatient services       ATTESTATION      Dr. Sanket Bernstein  Psychiatrist    Video Visit: Patient has given verbal consent for video visit?: Yes  Type of Service: video visit for mental health treatment  Reason for [place] Visit: [place]  Originating Site (patient location): [facility]  Distant Site (provider location): Remote Location  Mode of Communication: Video Conference via [employer]  Time of Service: Date: 06/30/2024 Start: 800 end: 620

## 2024-06-30 NOTE — PLAN OF CARE
\  Problem: Adult Behavioral Health Plan of Care  Goal: Patient-Specific Goal (Individualization)  Description: Pt. Will eat at least 70% at each meal.  Pt. Will sleep at least 6 hours each night.  Pt. Will attend at least 50% of groups, when able to wean.  Patient will be able to participate in a logical and appropriate situation    6/30/24: May wean as appropriate per nurse's discretion if able to:  -Take medications as prescribed  -No self harm and/or aggressive behaviors. Ex: pounding on doors or head banging.  -Be kind and appropriate during interactions with staff     Treatment team to reassess daily.   Outcome: Progressing     Problem: Thought Process Alteration  Goal: Optimal Thought Clarity  Description: Pt. Will have a linear thought process by target date.   Outcome: Progressing     Problem: Suicide Risk  Goal: Absence of Self-Harm  Outcome: Progressing    Face to face shift report received from previously assigned nurse. Rounding completed, pt observed in her room, resting.    Patient is met with in the hallway. Denies pain, SI, HI, A/V hallucinations, anxiety, and depression. Patient continues to fixate on leaving, redirection and reminders that she can speak with her guardian tomorrow. Patient persistent, but eventually receptive to the redirection. Appropriate boundaries maintained with peers and staff. No further needs at this time.    Patient had her visit with her significant other this evening. States it went well, patient had to be reminded that if she would not sit and visit with him, we would have to have him leave. Patient accepting of this. Appropriate boundaries with significant other needed to be reinforced and reminded, staff out in lounge to supervise.     Patient was compliant with HS medication administration. Received PRN trazodone with HS medications. No further needs at this time.     Face to face report communicated to oncoming PRATIK.    Simin Billings RN  6/30/2024  6:12 PM

## 2024-06-30 NOTE — PLAN OF CARE
Problem: Adult Behavioral Health Plan of Care  Goal: Patient-Specific Goal (Individualization)  Description: Pt. Will eat at least 70% at each meal.  Pt. Will sleep at least 6 hours each night.  Pt. Will attend at least 50% of groups, when able to wean.  Patient will be able to participate in a logical and appropriate situation    6/29/24: Room in [facility] due to possibility of unpredictable behaviors.   Pt able to wean for 1 hour x2 per shift if able to:  -Take medications as prescribed  -No self harm and/or aggressive behaviors. Ex: pounding on doors or head banging.  -Be kind and appropriate during interactions with staff     Treatment team to reassess daily.   Outcome: Progressing     Problem: Thought Process Alteration  Goal: Optimal Thought Clarity  Description: Pt. Will have a linear thought process by target date.   Outcome: Progressing     Problem: Suicide Risk  Goal: Absence of Self-Harm  Outcome: Progressing     Face to face shift report received from Simin. Rounding completed, patient laying in bed in MHICU, appeared to be sleeping, respirations noted.    Patient appeared to be sleeping for approximately 8.5 hours since 2100 last shift.    Patient had no reported or observed suicidal behavior or self harm this shift.      Face to face report will be communicated to oncoming RN.    Susanna Pedersen RN  6/30/2024  6:21 AM

## 2024-07-01 PROCEDURE — 99232 SBSQ HOSP IP/OBS MODERATE 35: CPT

## 2024-07-01 PROCEDURE — 124N000001 HC R&B MH

## 2024-07-01 PROCEDURE — 250N000013 HC RX MED GY IP 250 OP 250 PS 637: Performed by: STUDENT IN AN ORGANIZED HEALTH CARE EDUCATION/TRAINING PROGRAM

## 2024-07-01 RX ADMIN — LORAZEPAM 1 MG: 1 TABLET ORAL at 08:15

## 2024-07-01 RX ADMIN — LORAZEPAM 1 MG: 1 TABLET ORAL at 20:29

## 2024-07-01 RX ADMIN — LORAZEPAM 1 MG: 1 TABLET ORAL at 13:38

## 2024-07-01 RX ADMIN — HYDROXYZINE HYDROCHLORIDE 25 MG: 25 TABLET ORAL at 12:01

## 2024-07-01 RX ADMIN — PALIPERIDONE 6 MG: 6 TABLET, EXTENDED RELEASE ORAL at 08:15

## 2024-07-01 RX ADMIN — NICOTINE POLACRILEX 4 MG: 4 GUM, CHEWING BUCCAL at 09:49

## 2024-07-01 RX ADMIN — OLANZAPINE 10 MG: 10 TABLET, FILM COATED ORAL at 10:30

## 2024-07-01 ASSESSMENT — ACTIVITIES OF DAILY LIVING (ADL)
ADLS_ACUITY_SCORE: 39
LAUNDRY: UNABLE TO COMPLETE
ADLS_ACUITY_SCORE: 39
ORAL_HYGIENE: PROMPTS
HYGIENE/GROOMING: PROMPTS
ADLS_ACUITY_SCORE: 39
DRESS: SCRUBS (BEHAVIORAL HEALTH)
ADLS_ACUITY_SCORE: 39
DRESS: SCRUBS (BEHAVIORAL HEALTH)
ADLS_ACUITY_SCORE: 39
ORAL_HYGIENE: PROMPTS
ADLS_ACUITY_SCORE: 39
HYGIENE/GROOMING: PROMPTS
ADLS_ACUITY_SCORE: 39

## 2024-07-01 NOTE — PLAN OF CARE
"  Problem: Adult Behavioral Health Plan of Care  Goal: Patient-Specific Goal (Individualization)  Description: Pt. Will eat at least 70% at each meal.  Pt. Will sleep at least 6 hours each night.  Pt. Will attend at least 50% of groups, when able to wean.  Patient will be able to participate in a logical and appropriate situation    6/30/24: May wean as appropriate per nurse's discretion if able to:  -Take medications as prescribed  -No self harm and/or aggressive behaviors. Ex: pounding on doors or head banging.  -Be kind and appropriate during interactions with staff     Treatment team to reassess daily.   Outcome: Progressing    Alert, disorganized to situation. Denies pain.  Pt weans to open unit. Needs much redirection and reassurance throughout shift. Pt offered medication for anxiety-declines. Discuss the need for her to know when she is feeling agitated and to control impulses-and to take prn medication when needed.  Pt is very restless, pacing, calling people multiple times. Pt becomes very tearful and nods \"yes \" when asked if she is feeling agitated. Pt accepts prn Zyprexa 10 mg.  Pt repeatedly states d\"I just want to go home\".   Pt asks this writer what belongings she came into the hospital with. This writer goes over all belongings-pt then states \"I have to give my boyfriend the three keys I have immediately. They get him into my apartment. I have a cat in there.\" Pt given guardians phone number and provider notified as well. Guardian's supervisor (alejo) notified. States she believes the pt is saying this to try to get out of the hospital and the boyfriend lives in the apartment with her.   Pt requests a medication for anxiety-prn Hydroxyzine 25 mg rec'd.   Denies all criteria including: SI, SIB, HI or hallucinations.       Problem: Thought Process Alteration  Goal: Optimal Thought Clarity  Description: Pt. Will have a linear thought process by target date.   Outcome: Progressing     Problem: Suicide " Risk  Goal: Absence of Self-Harm  Outcome: Progressing  Intervention: Assess Risk to Self and Maintain Safety  Recent Flowsheet Documentation  Taken 7/1/2024 0800 by Susanna Briggs, RN  Behavior Management: impulse control promoted  Intervention: Promote Psychosocial Wellbeing  Recent Flowsheet Documentation  Taken 7/1/2024 0800 by Susanna Briggs, RN  Supportive Measures:   active listening utilized   positive reinforcement provided   verbalization of feelings encouraged   Goal Outcome Evaluation:    Plan of Care Reviewed With: patient        Face to face end of shift report communicated to oncoming shift.     Susanna Briggs RN  7/1/2024  12:22 PM
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
## 2024-07-01 NOTE — PLAN OF CARE
Problem: Adult Behavioral Health Plan of Care  Goal: Patient-Specific Goal (Individualization)  Description: Pt. Will eat at least 70% at each meal.  Pt. Will sleep at least 6 hours each night.  Pt. Will attend at least 50% of groups, when able to wean.  Patient will be able to participate in a logical and appropriate situation    6/30/24: May wean as appropriate per nurse's discretion if able to:  -Take medications as prescribed  -No self harm and/or aggressive behaviors. Ex: pounding on doors or head banging.  -Be kind and appropriate during interactions with staff     Treatment team to reassess daily.   Outcome: Progressing     Problem: Thought Process Alteration  Goal: Optimal Thought Clarity  Description: Pt. Will have a linear thought process by target date.   Outcome: Progressing     Problem: Suicide Risk  Goal: Absence of Self-Harm  Outcome: Progressing    Face to face shift report received from previously assigned nurse. Rounding completed, pt observed resting in bed with eyes closed and easy respirations.    Patient is met with in her room. Denies pain, SI, HI, A/V hallucinations, anxiety, and depression. Patient is tired this evening, resting in bed. Does not wish to speak much at this time. No further needs at this time.     Patient has continued to rest in bed throughout the shift. Compliant with HS medication administration. Did eat snack that was provided. No further needs at this time.     Face to face report communicated to oncoming RN.    Simin Billings RN  7/1/2024  6:16 PM

## 2024-07-01 NOTE — PLAN OF CARE
Problem: Adult Behavioral Health Plan of Care  Goal: Patient-Specific Goal (Individualization)  Description: Pt. Will eat at least 70% at each meal.  Pt. Will sleep at least 6 hours each night.  Pt. Will attend at least 50% of groups, when able to wean.  Patient will be able to participate in a logical and appropriate situation    6/30/24: May wean as appropriate per nurse's discretion if able to:  -Take medications as prescribed  -No self harm and/or aggressive behaviors. Ex: pounding on doors or head banging.  -Be kind and appropriate during interactions with staff     Treatment team to reassess daily.   Outcome: Progressing     Problem: Thought Process Alteration  Goal: Optimal Thought Clarity  Description: Pt. Will have a linear thought process by target date.   Outcome: Progressing     Problem: Suicide Risk  Goal: Absence of Self-Harm  Outcome: Progressing     Face to face shift report received from Simin. Rounding completed, patient laying in bed, appeared to be sleeping, respirations noted.    Patient appeared to be sleeping for approximately 9.75 hours since 2015 last shift.    Patient had no reported or observed suicidal behavior or self harm this shift.      Face to face report will be communicated to oncoming RN.    Susanna Pedersen RN  7/1/2024  6:13 AM

## 2024-07-02 PROCEDURE — 99232 SBSQ HOSP IP/OBS MODERATE 35: CPT | Mod: 95 | Performed by: STUDENT IN AN ORGANIZED HEALTH CARE EDUCATION/TRAINING PROGRAM

## 2024-07-02 PROCEDURE — 250N000013 HC RX MED GY IP 250 OP 250 PS 637: Performed by: STUDENT IN AN ORGANIZED HEALTH CARE EDUCATION/TRAINING PROGRAM

## 2024-07-02 PROCEDURE — 124N000001 HC R&B MH

## 2024-07-02 RX ADMIN — HYDROXYZINE HYDROCHLORIDE 25 MG: 25 TABLET ORAL at 15:37

## 2024-07-02 RX ADMIN — PALIPERIDONE 6 MG: 6 TABLET, EXTENDED RELEASE ORAL at 08:05

## 2024-07-02 RX ADMIN — ACETAMINOPHEN 650 MG: 325 TABLET, FILM COATED ORAL at 15:37

## 2024-07-02 RX ADMIN — ACETAMINOPHEN 650 MG: 325 TABLET, FILM COATED ORAL at 20:30

## 2024-07-02 RX ADMIN — LORAZEPAM 1 MG: 1 TABLET ORAL at 08:05

## 2024-07-02 RX ADMIN — LORAZEPAM 1 MG: 1 TABLET ORAL at 20:30

## 2024-07-02 RX ADMIN — LORAZEPAM 1 MG: 1 TABLET ORAL at 13:01

## 2024-07-02 RX ADMIN — TRAZODONE HYDROCHLORIDE 50 MG: 50 TABLET ORAL at 20:30

## 2024-07-02 RX ADMIN — OLANZAPINE 10 MG: 10 TABLET, FILM COATED ORAL at 20:36

## 2024-07-02 ASSESSMENT — ACTIVITIES OF DAILY LIVING (ADL)
ADLS_ACUITY_SCORE: 39
DRESS: SCRUBS (BEHAVIORAL HEALTH)
ADLS_ACUITY_SCORE: 39
LAUNDRY: UNABLE TO COMPLETE
ADLS_ACUITY_SCORE: 39
ORAL_HYGIENE: PROMPTS
ADLS_ACUITY_SCORE: 39
HYGIENE/GROOMING: PROMPTS
ADLS_ACUITY_SCORE: 39
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
## 2024-07-02 NOTE — PLAN OF CARE
"  Problem: Adult Behavioral Health Plan of Care  Goal: Patient-Specific Goal (Individualization)  Description: Pt. Will eat at least 70% at each meal.  Pt. Will sleep at least 6 hours each night.  Pt. Will attend at least 50% of groups, when able to wean.  Patient will be able to participate in a logical and appropriate situation    6/30/24: May wean as appropriate per nurse's discretion if able to:  -Take medications as prescribed  -No self harm and/or aggressive behaviors. Ex: pounding on doors or head banging.  -Be kind and appropriate during interactions with staff     Treatment team to reassess daily.   Outcome: Progressing     Problem: Thought Process Alteration  Goal: Optimal Thought Clarity  Description: Pt. Will have a linear thought process by target date.   Outcome: Progressing     Problem: Suicide Risk  Goal: Absence of Self-Harm  Outcome: Progressing    Face to face shift report received from previously assigned nurse. Rounding completed, pt observed walking the hallway in the MHICU.    Patient is met with in the hallway. Denies SI, HI, A/V hallucinations, and depression. Endorses pain, 5/10 of the upper left front tooth, requested and received PRN tylenol at this time. Endorses anxiety, states she is anxious about being here and wanting to leave, does state she is aware she needs to get her FRIAS before she leaves. Requested and received PRN hydroxyzine for this anxiety. Patient is pleasant and appropriate boundaries are reinforced. No further needs at this time.     Patient was compliant with HS medication administration. Endorses tooth pain once again, requested and received PRN tylenol and PRN trazodone for sleep. No further needs at this time.     Patient came to the nurses station window to check to see if staff was okay. When asked about this, patient endorsed some auditory hallucinations that were telling her everyone was dead, stating \"it freaked me out, I had to make sure you were okay\". PRN " zyprexa given at this time.     Patient has been resting in bed for the rest of the shift. No further needs at this time.     Face to face report communicated to oncoming RN.    Simin Billings RN  7/2/2024  3:58 PM

## 2024-07-02 NOTE — PLAN OF CARE
Problem: Adult Behavioral Health Plan of Care  Goal: Patient-Specific Goal (Individualization)  Description: Pt. Will eat at least 70% at each meal.  Pt. Will sleep at least 6 hours each night.  Pt. Will attend at least 50% of groups, when able to wean.  Patient will be able to participate in a logical and appropriate situation    6/30/24: May wean as appropriate per nurse's discretion if able to:  -Take medications as prescribed  -No self harm and/or aggressive behaviors. Ex: pounding on doors or head banging.  -Be kind and appropriate during interactions with staff     Treatment team to reassess daily.   Outcome: Progressing     Problem: Thought Process Alteration  Goal: Optimal Thought Clarity  Description: Pt. Will have a linear thought process by target date.   Outcome: Progressing     Problem: Suicide Risk  Goal: Absence of Self-Harm  Outcome: Progressing     Report received from previously assigned nurse.     Rounds completed. Safety checks completed every 15 minutes throughout the night. Pt noted to be resting with eyes closed and easy resp. for 7 hours. No suicidal gestures or comments noted.    Face to face end of shift report to be communicated to oncoming RN.

## 2024-07-02 NOTE — PROGRESS NOTES
AFC referrals sent to [facility], [facility], and [facility].

## 2024-07-02 NOTE — PROGRESS NOTES
[facility]  Sindhu Sandoval- 7/16 @ 11:15a  Camilla Brothers-  7/22 @ 2:20p  [facility] [address] [address]  P:[phone]

## 2024-07-02 NOTE — PROGRESS NOTES
[facility] PSYCHIATRY  PROGRESS NOTE     SUBJECTIVE     Prior to interviewing the patient, I met with nursing and reviewed patient's clinical condition. We discussed clinical care both before and after the interview. I have reviewed the patient's clinical course by review of records including previous notes, labs, and vital signs.     Per nursing, the patient had the following behavioral events over the last 24-hours: More cooperative. Doing well with weaning. Taking medications. Did receive prn Zyprexa yesterday.     On psychiatric interview, the patient is met in her room. The patient notes that she is alright today. She notes that she prefers to rest and is bored being here. She notes that she is doing fine with medications. She denies any EPSE. She notes doing alright with the FRIAS.    No acute concerns elicited. Encouraged patient with continued appropriate behaviors.        MEDICATIONS   Scheduled Meds:  Current Facility-Administered Medications   Medication Dose Route Frequency Provider Last Rate Last Admin    LORazepam (ATIVAN) tablet 1 mg  1 mg Oral TID Sanket Bernstein DO   1 mg at 07/02/24 0805    [START ON 7/6/2024] paliperidone (INVEGA SUSTENNA) injection JOVANNI 156 mg  156 mg Intramuscular Once Sanket Bernstein DO        [START ON 8/5/2024] paliperidone (INVEGA SUSTENNA) JOVANNI 117 mg  117 mg Intramuscular Q30 Days Sanket Bernstein DO        paliperidone ER (INVEGA) 24 hr tablet 6 mg  6 mg Oral QAM Sanket Bernstein DO   6 mg at 07/02/24 0805     PRN Meds:.  Current Facility-Administered Medications   Medication Dose Route Frequency Provider Last Rate Last Admin    acetaminophen (TYLENOL) tablet 650 mg  650 mg Oral Q4H PRN Sanket Bernstein DO        alum & mag hydroxide-simethicone (MAALOX) suspension 30 mL  30 mL Oral Q4H PRN Sanket Bernstein DO        haloperidol (HALDOL) tablet 5 mg  5 mg Oral Q8H PRN Sanket Bernstein DO        And    LORazepam (ATIVAN) tablet 2 mg  2 mg Oral Q8H  "PRN Sanket Bernstein, DO        And    diphenhydrAMINE (BENADRYL) capsule 50 mg  50 mg Oral Q8H PRN Sanket Bernstein DO        haloperidol lactate (HALDOL) injection 5 mg  5 mg Intramuscular Q8H PRN Sanket Bernstein, DO   5 mg at 24 1012    And    LORazepam (ATIVAN) injection 2 mg  2 mg Intramuscular Q8H PRN Sanket Bernstein, DO   2 mg at 24 1012    And    diphenhydrAMINE (BENADRYL) injection 50 mg  50 mg Intramuscular Q8H PRN Sanket Bernstein, DO   50 mg at 24 1012    hydrOXYzine HCl (ATARAX) tablet 25 mg  25 mg Oral Q6H PRN Sanket Bernstein DO   25 mg at 24 1201    nicotine polacrilex (NICORETTE) gum 4 mg  4 mg Buccal Q1H PRN Sanket Bernstein DO   4 mg at 24 0949    OLANZapine (zyPREXA) tablet 10 mg  10 mg Oral Q6H PRN Sanket Bernstein, DO   10 mg at 24 1030    Or    OLANZapine (zyPREXA) injection 10 mg  10 mg Intramuscular Q6H PRN Sanket Bernstein DO        senna-docusate (SENOKOT-S/PERICOLACE) 8.6-50 MG per tablet 1 tablet  1 tablet Oral BID PRN Sanket Bernstein DO        traZODone (DESYREL) tablet 50 mg  50 mg Oral At Bedtime PRN Sanket Bernstein, DO   50 mg at 24        ALLERGIES   Allergies   Allergen Reactions    Other [No Clinical Screening - See Comments] Anaphylaxis     \"Sodium Penathol\" per mother. Used in anesthesia.  Family hx of reaction.    Thiopental Anaphylaxis     Other reaction(s): Other - Describe In Comment Field    Family Hx of death with this medication    Great grandmother  on the operating table to this medication; worried about allergies to sodium      Other reaction(s): Other - Describe In Comment Field Family Hx of death with this medication    Latex Rash    Amoxicillin Trihydrate GI Disturbance    Amoxicillin-Pot Clavulanate GI Disturbance    Phenobarbital         MENTAL STATUS EXAM   Vitals: /66   Pulse 120   Temp 98  F (36.7  C) (Temporal)   Resp 18   Wt 54.5 kg (120 lb 3.2 oz)   LMP  (LMP " Unknown)   SpO2 98%   BMI 18.83 kg/m      Appearance: Alert, thin, dyed hair  Attitude: More cooperative   Eye Contact: Poor  Mood: Bored  Affect: mood congruent  Speech: Regular rate, lisp  Psychomotor Behavior: No TD or rigidity. No tremor or akathisia.   Thought Process: More linear   Associations: No loose associations   Thought Content: No SI elicited. Response to internal stimuli at times. Delusional thought regarding attack by wolves with element of paranoia - some improvement  Insight: Poor  Judgment: Poor  Oriented to: Person, place  Attention Span and Concentration: Limited  Recent and Remote Memory: Limited  Language: English with appropriate syntax and vocabulary  Fund of Knowledge: Low  Muscle Strength and Tone: Grossly normal  Gait and Station: Grossly normal       LABS   No results found for this or any previous visit (from the past 24 hour(s)).      IMPRESSION     This is a 24 year old female with a PMH of schizoaffective disorder, bipolar type, KIRSTY, intellectual disability, stimulant use disorder, and cannabis use disorder who presents with psychosis occurring in the context of likely medication non-adherence and substance use (UDS positive for amphetamines) with the patient being found on the road complaining of a ramires attack with no signs of one. She has a history of multiple hospitalizations last in 2023 per records review. She was been seeing by Dr. Brothers but was likely lost to follow-up with it unclear if she is seeing anyone else. She was more stable on Abilify in the past. She currently does have a legal guardian who consented to hospitalizations. She would benefit from hospitalization to address her psychosis.     In terms of treatment, will either resume Abilify or start Invega with plan for FRIAS.     Patient did require restraints on 6/25 and forced IM medication given her level of agitation and refusal to remove necklaces that could be used for self or other harm.     Today: Patient's  psychosis is slowly improving with medications and abstinence. She tolerated Invega with FRIAS being given 6/29. Continuing Ativan for now. Weaning slowly to help with socialization if following behavioral plan. Patient continues to be hyper-focused on talking to boyfriend (has not been answering phone) with her calling the police this weekend and being encouraged not to do this. Perseverating on discharge date with her meeting with guardian yesterday.       DIAGNOSES     #. Schizoaffective disorder, bipolar type, multiple episodes, in acute episode  #. Generalized anxiety disorder  #. Intellectual disability   #. Stimulant (meth) use disorder, severe  #. Cannabis use disorder, unspecified       PLAN     Location: Unit 5  Legal Status: Orders Placed This Encounter      Voluntary    Per Legal Guardian- Christian     Safety Assessment:    Behavioral Orders   Procedures    Code 1 - Restrict to Unit    Routine Programming     As clinically indicated    Status 15     Every 15 minutes.      PTA psychotropic medications held:      - None, as not taking any     PTA psychotropic medications continued/changed:      -Resumed Trazodone 50 mg at bedtime prn sleep     New psychotropic medications initiated:     -Invega 3 mg daily -> 4.5 mg (6/27) -> 6 mg (6/28)  -Invega Sustenna 234 mg (6/29) and then 156 mg in one week (7/6) and then 117 mg in 4 weeks (8/5)  -Ativan 1 mg BID for short course -> 1 mg TID (6/27)  -Standard unit prn agents, including Zyprexa and Haldol/Benadryl/Ativan prn agitation    Today's Changes:    -Continue oral Invega likely until next injection as an oral bridge (stop if SE arise)  -Continue to wean, increasing time    Programming: Patient will be treated in a therapeutic milieu with appropriate individual and group therapies. Education will be provided on diagnoses, medications, and treatments.     Medical diagnoses:  Per medicine    UTI- abnormal UA. Ua positive nitrites, large leukocyte  esterase, >182 wbc, ED noted it was likely contaminated but did treat with one dose of keflex. Pt is confused and not cooperative. Will continue tx with Macrobid 100 mg BID for 5 days.    Moderate protein calorie malnutrition- secondary to psychiatric illness. Nutrition consulted. Ensure with meals.     Consult: Nutrition   Tests: None, baseline labs reassuring     Anticipated LOS: > 7 days. Anticipate discharge on 7/8 if improvement continues.   Disposition: Home with outpatient services       ATTESTATION      Sanket Bernstein DO, MA  Psychiatrist     Video Visit: Patient has given verbal consent for video visit?: Yes  Type of Service: video visit for mental health treatment  [place] Visit: [place]  Originating Site (patient location): [facility]  Distant Site (provider location): Remote Location  Mode of Communication: Video Conference via [employer]  Time of Service: Date: 07/02/2024 Start: 930 end: 312

## 2024-07-02 NOTE — PLAN OF CARE
"Face to face shift report received from PRATIK Miles.       Problem: Adult Behavioral Health Plan of Care  Goal: Patient-Specific Goal (Individualization)  Description: Pt. Will eat at least 70% at each meal.  Pt. Will sleep at least 6 hours each night.  Pt. Will attend at least 50% of groups, when able to wean.  Patient will be able to participate in a logical and appropriate situation    6/30/24: May wean as appropriate per nurse's discretion if able to:  -Take medications as prescribed  -No self harm and/or aggressive behaviors. Ex: pounding on doors or head banging.  -Be kind and appropriate during interactions with staff     Treatment team to reassess daily.   Outcome: Progressing   Calm and cooperative. Medication compliant. Reports feeling tired this shift. Flat affect. Spends time in lounge area. Social and appropriate. No reports of pain this shift.   1301: Pt reports \"I'm feeling homesick.\" Pt tearful. Administered scheduled 1 mg Ativan     Problem: Thought Process Alteration  Goal: Optimal Thought Clarity  Description: Pt. Will have a linear thought process by target date.   Outcome: Progressing       Problem: Suicide Risk  Goal: Absence of Self-Harm  Outcome: Progressing   Free from self harm or injury this shift.     Face to face end of shift report to be communicated to oncoming RN.       "

## 2024-07-02 NOTE — PROGRESS NOTES
"CLINICAL NUTRITION SERVICES  -  ASSESSMENT NOTE    REASON FOR ASSESSMENT:        NUTRITION HISTORY  Yudi Morales is a 24 year old female admitted for psychosis. Hx includes schizoaffective disorder bipolar type, KIRSTY, intellectual disability, stimulant use disorder, cannabis use disorder. Intake has been adequate. Weight is up 9lbs in about 2 months.    Allergies     Allergies   Allergen Reactions    Other [No Clinical Screening - See Comments] Anaphylaxis     \"Sodium Penathol\" per mother. Used in anesthesia.  Family hx of reaction.    Thiopental Anaphylaxis     Other reaction(s): Other - Describe In Comment Field    Family Hx of death with this medication    Great grandmother  on the operating table to this medication; worried about allergies to sodium      Other reaction(s): Other - Describe In Comment Field Family Hx of death with this medication    Latex Rash    Amoxicillin Trihydrate GI Disturbance    Amoxicillin-Pot Clavulanate GI Disturbance    Phenobarbital        CURRENT NUTRITION ORDERS  Diet Order:   Orders Placed This Encounter      Regular Diet Adult  Current Intake/Tolerance: 13 meals with % intake (12 meals with % intake    ANTHROPOMETRICS  Height: Data Unavailable  Weight: 120 lbs 3.2 oz  Body mass index is 18.83 kg/m .  Weight Status:  Normal BMI  Weight History:   Wt Readings from Last 10 Encounters:   24 54.5 kg (120 lb 3.2 oz)   05/10/24 50.3 kg (111 lb)   24 50.7 kg (111 lb 12.8 oz)   24 54.4 kg (119 lb 14.4 oz)   10/25/23 55.2 kg (121 lb 9.6 oz)   23 57.7 kg (127 lb 3.2 oz)   23 59.4 kg (131 lb)   23 61.6 kg (135 lb 14.4 oz)   23 66.2 kg (146 lb)   23 70 kg (154 lb 6.4 oz)        LABS  Labs reviewed    MEDICATIONS  Medications reviewed    ASSESSED NUTRITION NEEDS: 50.3kg  Estimated Energy Needs: 1510- 1760 kcals (30-35 Kcal/Kg)  Estimated Protein Needs: 60-75 grams protein (1.2-1.5 g pro/Kg)     MALNUTRITION:  % Weight Loss:  20% " in 1 year (moderate malnutrition)  % Intake:  <75% for >/= 1 month (moderate malnutrition)     Malnutrition Diagnosis: at least Moderate malnutrition- improving  In Context of:  Chronic illness or disease  Environmental or social circumstances     NUTRITION INTERVENTIONS  Recommendations / Nutrition Prescription  Encourage intake during meal times  Offer Ensure with/between meals  Monitor weight     MONITORING AND EVALUATION:  Intake, weight, labs

## 2024-07-03 PROCEDURE — 99232 SBSQ HOSP IP/OBS MODERATE 35: CPT | Mod: 95 | Performed by: STUDENT IN AN ORGANIZED HEALTH CARE EDUCATION/TRAINING PROGRAM

## 2024-07-03 PROCEDURE — 124N000001 HC R&B MH

## 2024-07-03 PROCEDURE — 250N000013 HC RX MED GY IP 250 OP 250 PS 637: Performed by: STUDENT IN AN ORGANIZED HEALTH CARE EDUCATION/TRAINING PROGRAM

## 2024-07-03 RX ADMIN — HYDROXYZINE HYDROCHLORIDE 25 MG: 25 TABLET ORAL at 20:36

## 2024-07-03 RX ADMIN — ACETAMINOPHEN 650 MG: 325 TABLET, FILM COATED ORAL at 15:50

## 2024-07-03 RX ADMIN — OLANZAPINE 10 MG: 10 TABLET, FILM COATED ORAL at 16:42

## 2024-07-03 RX ADMIN — HYDROXYZINE HYDROCHLORIDE 25 MG: 25 TABLET ORAL at 15:00

## 2024-07-03 RX ADMIN — ACETAMINOPHEN 650 MG: 325 TABLET, FILM COATED ORAL at 20:36

## 2024-07-03 RX ADMIN — TRAZODONE HYDROCHLORIDE 50 MG: 50 TABLET ORAL at 20:36

## 2024-07-03 RX ADMIN — LORAZEPAM 1 MG: 1 TABLET ORAL at 08:16

## 2024-07-03 RX ADMIN — ACETAMINOPHEN 650 MG: 325 TABLET, FILM COATED ORAL at 09:09

## 2024-07-03 RX ADMIN — LORAZEPAM 1 MG: 1 TABLET ORAL at 20:36

## 2024-07-03 RX ADMIN — NICOTINE POLACRILEX 4 MG: 4 GUM, CHEWING BUCCAL at 15:50

## 2024-07-03 RX ADMIN — PALIPERIDONE 6 MG: 6 TABLET, EXTENDED RELEASE ORAL at 08:16

## 2024-07-03 RX ADMIN — LORAZEPAM 1 MG: 1 TABLET ORAL at 13:14

## 2024-07-03 ASSESSMENT — ACTIVITIES OF DAILY LIVING (ADL)
ADLS_ACUITY_SCORE: 39
DRESS: SCRUBS (BEHAVIORAL HEALTH)
LAUNDRY: UNABLE TO COMPLETE
ADLS_ACUITY_SCORE: 39
HYGIENE/GROOMING: PROMPTS
ADLS_ACUITY_SCORE: 39
ORAL_HYGIENE: PROMPTS
ADLS_ACUITY_SCORE: 39

## 2024-07-03 NOTE — PROGRESS NOTES
[facility] PSYCHIATRY  PROGRESS NOTE     SUBJECTIVE     Prior to interviewing the patient, I met with nursing and reviewed patient's clinical condition. We discussed clinical care both before and after the interview. I have reviewed the patient's clinical course by review of records including previous notes, labs, and vital signs.     Per nursing, the patient had the following behavioral events over the last 24-hours: More cooperative. Doing well with weaning. Taking medications. Did receive prn Zyprexa yesterday.     On psychiatric interview, the patient is met in her room. She notes that she is trying to sleep to pass the time. She notes that she likes to four wheel and walk. She notes that she has seen a parakeet in the woods. She notes that she has seen wolves before but prefers not to talk about them.    The patient denies any headache, confusion, change in vision, chest pain, SOB, abdominal pain, diarrhea, or constipation. No medical concerns today.    She notes that she is tolerating medications well. She has no concerns with reducing and coming off of Ativan by discharge.       MEDICATIONS   Scheduled Meds:  Current Facility-Administered Medications   Medication Dose Route Frequency Provider Last Rate Last Admin    LORazepam (ATIVAN) tablet 1 mg  1 mg Oral TID Sanket Bernstein DO   1 mg at 07/03/24 0816    [START ON 7/6/2024] paliperidone (INVEGA SUSTENNA) injection JOVANNI 156 mg  156 mg Intramuscular Once Sanket Bernstein DO        [START ON 8/5/2024] paliperidone (INVEGA SUSTENNA) JOVANNI 117 mg  117 mg Intramuscular Q30 Days Sanket Bernstein DO        paliperidone ER (INVEGA) 24 hr tablet 6 mg  6 mg Oral QAM Sanket Bernstein DO   6 mg at 07/03/24 0816     PRN Meds:.  Current Facility-Administered Medications   Medication Dose Route Frequency Provider Last Rate Last Admin    acetaminophen (TYLENOL) tablet 650 mg  650 mg Oral Q4H PRN Sanket Bernstein DO   650 mg at 07/02/24 2030    alum & mag  "hydroxide-simethicone (MAALOX) suspension 30 mL  30 mL Oral Q4H PRN Sanket Bernstein, DO        haloperidol (HALDOL) tablet 5 mg  5 mg Oral Q8H PRN Sanket Bernstein, DO        And    LORazepam (ATIVAN) tablet 2 mg  2 mg Oral Q8H PRN Sanket Bernstein, DO        And    diphenhydrAMINE (BENADRYL) capsule 50 mg  50 mg Oral Q8H PRN Sanket Bernstein, DO        haloperidol lactate (HALDOL) injection 5 mg  5 mg Intramuscular Q8H PRN Sanket Bernstein, DO   5 mg at 24 1012    And    LORazepam (ATIVAN) injection 2 mg  2 mg Intramuscular Q8H PRN Sanket Bernstein, DO   2 mg at 24 1012    And    diphenhydrAMINE (BENADRYL) injection 50 mg  50 mg Intramuscular Q8H PRN Sanket Bernstein, DO   50 mg at 24 1012    hydrOXYzine HCl (ATARAX) tablet 25 mg  25 mg Oral Q6H PRN Sanket Bernstein, DO   25 mg at 24 1537    nicotine polacrilex (NICORETTE) gum 4 mg  4 mg Buccal Q1H PRN Sanket Bernstein DO   4 mg at 24 0949    OLANZapine (zyPREXA) tablet 10 mg  10 mg Oral Q6H PRN Sanket Bernstein, DO   10 mg at 24 2036    Or    OLANZapine (zyPREXA) injection 10 mg  10 mg Intramuscular Q6H PRN Sanket Bernstein DO        senna-docusate (SENOKOT-S/PERICOLACE) 8.6-50 MG per tablet 1 tablet  1 tablet Oral BID PRN Sanket Bernstein DO        traZODone (DESYREL) tablet 50 mg  50 mg Oral At Bedtime PRN Sanket Bernstein DO   50 mg at 24 2030        ALLERGIES   Allergies   Allergen Reactions    Other [No Clinical Screening - See Comments] Anaphylaxis     \"Sodium Penathol\" per mother. Used in anesthesia.  Family hx of reaction.    Thiopental Anaphylaxis     Other reaction(s): Other - Describe In Comment Field    Family Hx of death with this medication    Great grandmother  on the operating table to this medication; worried about allergies to sodium      Other reaction(s): Other - Describe In Comment Field Family Hx of death with this medication    Latex Rash    Amoxicillin Trihydrate " GI Disturbance    Amoxicillin-Pot Clavulanate GI Disturbance    Phenobarbital         MENTAL STATUS EXAM   Vitals: /64   Pulse 102   Temp 98.4  F (36.9  C) (Temporal)   Resp 20   Wt 54.5 kg (120 lb 3.2 oz)   LMP  (LMP Unknown)   SpO2 94%   BMI 18.83 kg/m      Appearance: Alert, thin, dyed hair  Attitude: More cooperative   Eye Contact: Poor  Mood: Bored  Affect: mood congruent  Speech: Regular rate, lisp  Psychomotor Behavior: No TD or rigidity. No tremor or akathisia.   Thought Process: More linear   Associations: No loose associations   Thought Content: No SI elicited. Response to internal stimuli at times. Delusional thought regarding attack by wolves with element of paranoia - some improvement  Insight: Poor  Judgment: Poor  Oriented to: Person, place  Attention Span and Concentration: Limited  Recent and Remote Memory: Limited  Language: English with appropriate syntax and vocabulary  Fund of Knowledge: Low  Muscle Strength and Tone: Grossly normal  Gait and Station: Grossly normal       LABS   No results found for this or any previous visit (from the past 24 hour(s)).      IMPRESSION     This is a 24 year old female with a PMH of schizoaffective disorder, bipolar type, KIRSTY, intellectual disability, stimulant use disorder, and cannabis use disorder who presents with psychosis occurring in the context of likely medication non-adherence and substance use (UDS positive for amphetamines) with the patient being found on the road complaining of a ramires attack with no signs of one. She has a history of multiple hospitalizations last in 2023 per records review. She was been seeing by Dr. Brothers but was likely lost to follow-up with it unclear if she is seeing anyone else. She was more stable on Abilify in the past. She currently does have a legal guardian who consented to hospitalizations. She would benefit from hospitalization to address her psychosis.     In terms of treatment, will either resume Abilify  or start Invega with plan for FRIAS.     Patient did require restraints on 6/25 and forced IM medication given her level of agitation and refusal to remove necklaces that could be used for self or other harm.     Today: Patient's psychosis is improving. Taking medications as prescribed. Doing well weaning. Will come off of Ativan in next few days as tolerated not discharging with this medication.       DIAGNOSES     #. Schizoaffective disorder, bipolar type, multiple episodes, in acute episode  #. Generalized anxiety disorder  #. Intellectual disability   #. Stimulant (meth) use disorder, severe  #. Cannabis use disorder, unspecified       PLAN     Location: Unit 5  Legal Status: Orders Placed This Encounter      Voluntary    Per Legal Guardian- [employer]     Safety Assessment:    Behavioral Orders   Procedures    Code 1 - Restrict to Unit    Routine Programming     As clinically indicated    Status 15     Every 15 minutes.      PTA psychotropic medications held:      - None, as not taking any     PTA psychotropic medications continued/changed:      -Resumed Trazodone 50 mg at bedtime prn sleep     New psychotropic medications initiated:     -Invega 3 mg daily -> 4.5 mg (6/27) -> 6 mg (6/28)  -Invega Sustenna 234 mg (6/29) and then 156 mg in one week (7/6) and then 117 mg in 4 weeks (8/5)  -Ativan 1 mg BID for short course -> 1 mg TID (6/27)  -Standard unit prn agents, including Zyprexa and Haldol/Benadryl/Ativan prn agitation    Today's Changes:    -Continue oral Invega likely until next injection as an oral bridge (stop if SE arise)  -Reduce Ativan to 1 mg BID tomorrow (place order tomorrow) with plan to come off prior to discharge    Programming: Patient will be treated in a therapeutic milieu with appropriate individual and group therapies. Education will be provided on diagnoses, medications, and treatments.     Medical diagnoses:  Per medicine    UTI- abnormal UA. Ua positive nitrites, large  leukocyte esterase, >182 wbc, ED noted it was likely contaminated but did treat with one dose of keflex. Pt is confused and not cooperative. Will continue tx with Macrobid 100 mg BID for 5 days.    Moderate protein calorie malnutrition- secondary to psychiatric illness. Nutrition consulted. Ensure with meals.     Consult: Nutrition   Tests: None, baseline labs reassuring     Anticipated LOS: > 7 days. Discharge 7/8.  Disposition: Home with outpatient services       ATTESTATION      Sanket Bernstein DO, MA  Psychiatrist     Video Visit: Patient has given verbal consent for video visit?: Yes  Type of Service: video visit for mental health treatment  [place] Visit: [place]  Originating Site (patient location): [facility]  Distant Site (provider location): Remote Location  Mode of Communication: Video Conference via [employer]  Time of Service: Date: 07/03/2024 Start: 815 end: 300

## 2024-07-03 NOTE — PLAN OF CARE
Problem: Adult Behavioral Health Plan of Care  Goal: Patient-Specific Goal (Individualization)  Description: Pt. Will eat at least 70% at each meal.  Pt. Will sleep at least 6 hours each night.  Pt. Will attend at least 50% of groups, when able to wean.  Patient will be able to participate in a logical and appropriate situation    7/3/24: May wean as appropriate per nurse's discretion if able to:  -Take medications as prescribed  -No self harm and/or aggressive behaviors. Ex: pounding on doors or head banging.  -Be kind and appropriate during interactions with staff     Treatment team to reassess daily.   Outcome: Progressing     Problem: Thought Process Alteration  Goal: Optimal Thought Clarity  Description: Pt. Will have a linear thought process by target date.   Outcome: Progressing     Problem: Suicide Risk  Goal: Absence of Self-Harm  Outcome: Progressing    Face to face shift report received from previously assigned nurse. Rounding completed, pt observed walking in the hallway.    Patient is met with in the hallway. Denies SI, HI, A/V hallucinations, and depression. Endorses pain, 8/10 for the upper left tooth pain, PRN tylenol utilized at this time. Endorses anxiety at this time, reminded patient of recent PRN medication administration and allowing the time for medication to take effect. Patient receptive to this. Patient fixated on possible discharge next week, reminded patient that fixating on this will not help her discharge, reassured patient that she needs to continue with her improvements, if things change, she will be notified. No further needs at this time.     Patient compliant with HS medication administration. PRN tylenol for 9/10 upper left tooth pain, PRN hydroxyzine for anxiety, and PRN trazodone for sleep were also administered. No further needs at this time.     Face to face report communicated to oncoming PRATIK.    Simin Billings RN  7/3/2024  4:29 PM

## 2024-07-03 NOTE — PLAN OF CARE
Problem: Adult Behavioral Health Plan of Care  Goal: Patient-Specific Goal (Individualization)  Description: Pt. Will eat at least 70% at each meal.  Pt. Will sleep at least 6 hours each night.  Pt. Will attend at least 50% of groups, when able to wean.  Patient will be able to participate in a logical and appropriate situation    6/30/24: May wean as appropriate per nurse's discretion if able to:  -Take medications as prescribed  -No self harm and/or aggressive behaviors. Ex: pounding on doors or head banging.  -Be kind and appropriate during interactions with staff     Treatment team to reassess daily.   Outcome: Progressing     Problem: Thought Process Alteration  Goal: Optimal Thought Clarity  Description: Pt. Will have a linear thought process by target date.   Outcome: Progressing     Problem: Suicide Risk  Goal: Absence of Self-Harm  Outcome: Progressing     Face to face shift report received from Ginger. Angelina completed, pt observed laying in bed in MHICU.    Patient took morning medications as ordered, ate about 50% of breakfast. Patient weaned to the open unit after breakfast.     0909 Patient requested & received PRN tylenol 650mg PO for 10/10 tooth pain. Asked patient if she would like nurse to call for orajel, patient declined. Patient then asked to return to the MHICU to lay down and let the tylenol kick in. Upon follow up patient stated her pain was gone.    Patient ate lunch in the lounge and ate about 75% of lunch.     Met with patient walking the halls. Patient denies MH criteria, she questions about what happens to prepare to leave. Writer informed her and she asked if she could be involved in making appointments for when sh leaves so that she can learn how to do it. Informed patient that writer would let the  know. Sticky note added.    Patient took afternoon medication as ordered.    1500 - Pt requested something for anxiety, pt received PRN hydroxyzine 25mg PO.     Face to face  report will be communicated to oncoming RN.    Susanna Pedersen RN  7/3/2024  3:04 PM

## 2024-07-03 NOTE — PHARMACY
The patient was credited for the Invega Sustenna 234 mg injection administered on 6/29/24 due to receiving drug from the 's free trial program. The patient is eligible for 1 additional free unit this calendar year per program rules.     Shanna Tracy, VicenteD on 7/3/2024

## 2024-07-04 PROCEDURE — 250N000013 HC RX MED GY IP 250 OP 250 PS 637

## 2024-07-04 PROCEDURE — 99232 SBSQ HOSP IP/OBS MODERATE 35: CPT

## 2024-07-04 PROCEDURE — 124N000001 HC R&B MH

## 2024-07-04 PROCEDURE — 250N000013 HC RX MED GY IP 250 OP 250 PS 637: Performed by: STUDENT IN AN ORGANIZED HEALTH CARE EDUCATION/TRAINING PROGRAM

## 2024-07-04 RX ORDER — LORAZEPAM 1 MG/1
1 TABLET ORAL 2 TIMES DAILY
Status: DISCONTINUED | OUTPATIENT
Start: 2024-07-04 | End: 2024-07-07

## 2024-07-04 RX ADMIN — ACETAMINOPHEN 650 MG: 325 TABLET, FILM COATED ORAL at 12:49

## 2024-07-04 RX ADMIN — ACETAMINOPHEN 650 MG: 325 TABLET, FILM COATED ORAL at 17:36

## 2024-07-04 RX ADMIN — TRAZODONE HYDROCHLORIDE 50 MG: 50 TABLET ORAL at 20:17

## 2024-07-04 RX ADMIN — HYDROXYZINE HYDROCHLORIDE 25 MG: 25 TABLET ORAL at 15:33

## 2024-07-04 RX ADMIN — LORAZEPAM 1 MG: 1 TABLET ORAL at 08:37

## 2024-07-04 RX ADMIN — PALIPERIDONE 6 MG: 6 TABLET, EXTENDED RELEASE ORAL at 08:37

## 2024-07-04 RX ADMIN — NICOTINE POLACRILEX 4 MG: 4 GUM, CHEWING BUCCAL at 09:26

## 2024-07-04 RX ADMIN — NICOTINE POLACRILEX 4 MG: 4 GUM, CHEWING BUCCAL at 13:59

## 2024-07-04 RX ADMIN — LORAZEPAM 1 MG: 1 TABLET ORAL at 20:17

## 2024-07-04 RX ADMIN — ACETAMINOPHEN 650 MG: 325 TABLET, FILM COATED ORAL at 08:44

## 2024-07-04 ASSESSMENT — ACTIVITIES OF DAILY LIVING (ADL)
ADLS_ACUITY_SCORE: 39
HYGIENE/GROOMING: PROMPTS
ADLS_ACUITY_SCORE: 39
ORAL_HYGIENE: PROMPTS
DRESS: SCRUBS (BEHAVIORAL HEALTH)

## 2024-07-04 NOTE — PLAN OF CARE
Problem: Adult Behavioral Health Plan of Care  Goal: Patient-Specific Goal (Individualization)  Description: Pt. Will eat at least 70% at each meal.  Pt. Will sleep at least 6 hours each night.  Pt. Will attend at least 50% of groups, when able to wean.  Patient will be able to participate in a logical and appropriate situation    7/3/24: May wean as appropriate per nurse's discretion if able to:  -Take medications as prescribed  -No self harm and/or aggressive behaviors. Ex: pounding on doors or head banging.  -Be kind and appropriate during interactions with staff     Treatment team to reassess daily.   Outcome: Progressing     Problem: Thought Process Alteration  Goal: Optimal Thought Clarity  Description: Pt. Will have a linear thought process by target date.   Outcome: Progressing     Problem: Suicide Risk  Goal: Absence of Self-Harm  Outcome: Progressing     Report received from previously assigned nurse.     Rounds completed. Safety checks completed every 15 minutes throughout the night. Pt noted to be resting with eyes closed and easy resp. for 6.5 hours. No suicidal gestures or comments noted.    Pt did get up at one point during the night and prop her door open. Due to a peer attempting to go in rooms pt was asked if she would keep it closed. Pt was reminded that she could come out at anytime, having her door closed would just prevent peer from going into her room. Pt was agreeable.     Face to face end of shift report to be communicated to oncoming RN.

## 2024-07-04 NOTE — PLAN OF CARE
Problem: Adult Behavioral Health Plan of Care  Goal: Patient-Specific Goal (Individualization)  Description: Pt. Will eat at least 70% at each meal.  Pt. Will sleep at least 6 hours each night.  Pt. Will attend at least 50% of groups, when able to wean.  Patient will be able to participate in a logical and appropriate situation    7/3/24: May wean as appropriate per nurse's discretion if able to:  -Take medications as prescribed  -No self harm and/or aggressive behaviors. Ex: pounding on doors or head banging.  -Be kind and appropriate during interactions with staff     Treatment team to reassess daily.   Outcome: Progressing     Problem: Thought Process Alteration  Goal: Optimal Thought Clarity  Description: Pt. Will have a linear thought process by target date.   Outcome: Progressing     Problem: Suicide Risk  Goal: Absence of Self-Harm  Outcome: Progressing   Goal Outcome Evaluation:       Pt. Has been up and about on unit, was weaning from the MHICU, behavior has been cooperative and in control, taking prescribed medications, slept 6.5 hours last night, is able to make needs be known, denies hallucinations and suicidal ideation, will continue to monitor progress.  0844-  Pt. Requested/received tylenol 650 mg po for left upper tooth pain, 10 of 10.  0945- Pt. Sleeping  1249-  Pt. Requested/received tylenol 650 mg po for left upper tooth pain, 10 of 10.      Face to face end of shift report will be communicated to oncoming afternoon shift RN.     Kristina Wong RN  7/4/2024  10:44 AM

## 2024-07-04 NOTE — PROGRESS NOTES
"[facility] PSYCHIATRY  PROGRESS NOTE     SUBJECTIVE     Prior to interviewing the patient, I met with nursing and reviewed patient's clinical condition. We discussed clinical care both before and after the interview. I have reviewed the patient's clinical course by review of records including previous notes, labs, and vital signs.     Per nursing, the patient had the following behavioral events over the last 24-hours: More cooperative. Doing well with weaning. Taking medications.     On psychiatric interview, the patient is met on the open unit. She asks me if we are still planning on discharge on Monday. We discussed this possibility with ongoing assessment and input from guardian. Pt verbalized comprehension. We discussed interventions to promote stability. Pt identified that attending her healthcare appointments, receiving her Invega Sustenna injections, being with her boyfriend and making friends in the community as factors to promote stability. I did offer abstinence from substances could also provide stability, which pt agreed.     She notes her mood is \"really good\" today. She tells me she feels \"a little anxious\" with thoughts of discharge and wanting to go home. Denies depression, SI, HI or SIB. Denies AVH, no delusions appreciated.     She notes she is a little tired during the day, otherwise denies any other somatic complaint. Discussed further taper of Ativan, pt consents.      She notes that she is tolerating medications well.       MEDICATIONS   Scheduled Meds:  Current Facility-Administered Medications   Medication Dose Route Frequency Provider Last Rate Last Admin    LORazepam (ATIVAN) tablet 1 mg  1 mg Oral TID Sanket Bernstein DO   1 mg at 07/04/24 0837    [START ON 7/6/2024] paliperidone (INVEGA SUSTENNA) injection JOVANNI 156 mg  156 mg Intramuscular Once Sanket Bernstein DO        [START ON 8/5/2024] paliperidone (INVEGA SUSTENNA) JOVANNI 117 mg  117 mg Intramuscular Q30 Days Sanket Bernstein" "DO Cedrick        paliperidone ER (INVEGA) 24 hr tablet 6 mg  6 mg Oral QAM Sanket Bernstein DO   6 mg at 07/04/24 0837     PRN Meds:.  Current Facility-Administered Medications   Medication Dose Route Frequency Provider Last Rate Last Admin    acetaminophen (TYLENOL) tablet 650 mg  650 mg Oral Q4H PRN Sanket Bernstein DO   650 mg at 07/03/24 2036    alum & mag hydroxide-simethicone (MAALOX) suspension 30 mL  30 mL Oral Q4H PRN Sanket Bernstein DO        haloperidol (HALDOL) tablet 5 mg  5 mg Oral Q8H PRN Sanket Bernstein DO        And    LORazepam (ATIVAN) tablet 2 mg  2 mg Oral Q8H PRN Sanket Bernstein DO        And    diphenhydrAMINE (BENADRYL) capsule 50 mg  50 mg Oral Q8H PRN Sanket Bernstein DO        haloperidol lactate (HALDOL) injection 5 mg  5 mg Intramuscular Q8H PRN Sanket Bernstein DO   5 mg at 06/27/24 1012    And    LORazepam (ATIVAN) injection 2 mg  2 mg Intramuscular Q8H PRN Sanket Bernstein DO   2 mg at 06/27/24 1012    And    diphenhydrAMINE (BENADRYL) injection 50 mg  50 mg Intramuscular Q8H PRN Sanket Berntsein DO   50 mg at 06/27/24 1012    hydrOXYzine HCl (ATARAX) tablet 25 mg  25 mg Oral Q6H PRN Sanket Bernstein DO   25 mg at 07/03/24 2036    nicotine polacrilex (NICORETTE) gum 4 mg  4 mg Buccal Q1H PRN Sanket Bernstein DO   4 mg at 07/03/24 1550    OLANZapine (zyPREXA) tablet 10 mg  10 mg Oral Q6H PRN Sanket Bernstein DO   10 mg at 07/03/24 1642    Or    OLANZapine (zyPREXA) injection 10 mg  10 mg Intramuscular Q6H PRN Sanket Bernstein DO        senna-docusate (SENOKOT-S/PERICOLACE) 8.6-50 MG per tablet 1 tablet  1 tablet Oral BID PRN Rebman, Sanket Cedrick, DO        traZODone (DESYREL) tablet 50 mg  50 mg Oral At Bedtime PRN Sanket Bernstein, DO   50 mg at 07/03/24 2036        ALLERGIES   Allergies   Allergen Reactions    Other [No Clinical Screening - See Comments] Anaphylaxis     \"Sodium Penathol\" per mother. Used in anesthesia.  Family hx of reaction. " "   Thiopental Anaphylaxis     Other reaction(s): Other - Describe In Comment Field    Family Hx of death with this medication    Great grandmother  on the operating table to this medication; worried about allergies to sodium      Other reaction(s): Other - Describe In Comment Field Family Hx of death with this medication    Latex Rash    Amoxicillin Trihydrate GI Disturbance    Amoxicillin-Pot Clavulanate GI Disturbance    Phenobarbital         MENTAL STATUS EXAM   Vitals: /55   Pulse 94   Temp 98.3  F (36.8  C) (Temporal)   Resp 14   Wt 54.5 kg (120 lb 3.2 oz)   LMP  (LMP Unknown)   SpO2 96%   BMI 18.83 kg/m      Appearance: Alert, thin, dyed hair  Attitude: More cooperative   Eye Contact: Poor  Mood: \"really good\"  Affect: restricted range  Speech: Regular rate, lisp  Psychomotor Behavior: No TD or rigidity. No tremor or akathisia.   Thought Process: More linear   Associations: No loose associations   Thought Content: No SI elicited. Response to internal stimuli at times. No delusional thought   Insight: Poor  Judgment: Poor  Oriented to: Person, place  Attention Span and Concentration: Limited  Recent and Remote Memory: Limited  Language: English with appropriate syntax and vocabulary  Fund of Knowledge: Low  Muscle Strength and Tone: Grossly normal  Gait and Station: Grossly normal       LABS   No results found for this or any previous visit (from the past 24 hour(s)).      IMPRESSION     This is a 24 year old female with a PMH of schizoaffective disorder, bipolar type, KIRSTY, intellectual disability, stimulant use disorder, and cannabis use disorder who presents with psychosis occurring in the context of likely medication non-adherence and substance use (UDS positive for amphetamines) with the patient being found on the road complaining of a ramires attack with no signs of one. She has a history of multiple hospitalizations last in  per records review. She was been seeing by Dr. Brothers but was " likely lost to follow-up with it unclear if she is seeing anyone else. She was more stable on Abilify in the past. She currently does have a legal guardian who consented to hospitalizations. She would benefit from hospitalization to address her psychosis.     In terms of treatment, will either resume Abilify or start Invega with plan for FRIAS.     Patient did require restraints on 6/25 and forced IM medication given her level of agitation and refusal to remove necklaces that could be used for self or other harm.     Today: Patient's psychosis is improving. Taking medications as prescribed. Doing well weaning. Will come off of Ativan in next few days as tolerated not discharging with this medication. Taper Ativan to 1 mg BID with goal to discontinue prior to discharge.        DIAGNOSES     #. Schizoaffective disorder, bipolar type, multiple episodes, in acute episode  #. Generalized anxiety disorder  #. Intellectual disability   #. Stimulant (meth) use disorder, severe  #. Cannabis use disorder, unspecified       PLAN     Location: Unit 5  Legal Status: Orders Placed This Encounter      Voluntary    Per Legal Guardian- Mu-ism     Safety Assessment:    Behavioral Orders   Procedures    Code 1 - Restrict to Unit    Routine Programming     As clinically indicated    Status 15     Every 15 minutes.      PTA psychotropic medications held:      - None, as not taking any     PTA psychotropic medications continued/changed:      -Resumed Trazodone 50 mg at bedtime prn sleep     New psychotropic medications initiated:     -Invega 3 mg daily -> 4.5 mg (6/27) -> 6 mg (6/28)  -Invega Sustenna 234 mg (6/29) and then 156 mg in one week (7/6) and then 117 mg in 4 weeks (8/5)  -Ativan 1 mg BID for short course -> 1 mg TID (6/27)  -Standard unit prn agents, including Zyprexa and Haldol/Benadryl/Ativan prn agitation    Today's Changes:    -Continue oral Invega likely until next injection as an oral bridge (stop if SE  arise)  -Reduce Ativan to 1 mg BID     Programming: Patient will be treated in a therapeutic milieu with appropriate individual and group therapies. Education will be provided on diagnoses, medications, and treatments.     Medical diagnoses:  Per medicine    UTI- abnormal UA. Ua positive nitrites, large leukocyte esterase, >182 wbc, ED noted it was likely contaminated but did treat with one dose of keflex. Pt is confused and not cooperative. Will continue tx with Macrobid 100 mg BID for 5 days.    Moderate protein calorie malnutrition- secondary to psychiatric illness. Nutrition consulted. Ensure with meals.     Consult: Nutrition   Tests: None, baseline labs reassuring     Anticipated LOS: > 7 days. Discharge 7/8.  Disposition: Home with outpatient services       ATTESTATION      AMINA Maciel CNP

## 2024-07-05 PROCEDURE — 250N000013 HC RX MED GY IP 250 OP 250 PS 637: Performed by: STUDENT IN AN ORGANIZED HEALTH CARE EDUCATION/TRAINING PROGRAM

## 2024-07-05 PROCEDURE — 250N000013 HC RX MED GY IP 250 OP 250 PS 637

## 2024-07-05 PROCEDURE — 124N000001 HC R&B MH

## 2024-07-05 PROCEDURE — 99232 SBSQ HOSP IP/OBS MODERATE 35: CPT

## 2024-07-05 RX ADMIN — TRAZODONE HYDROCHLORIDE 50 MG: 50 TABLET ORAL at 20:01

## 2024-07-05 RX ADMIN — LORAZEPAM 1 MG: 1 TABLET ORAL at 08:08

## 2024-07-05 RX ADMIN — NICOTINE POLACRILEX 4 MG: 4 GUM, CHEWING BUCCAL at 12:55

## 2024-07-05 RX ADMIN — HYDROXYZINE HYDROCHLORIDE 25 MG: 25 TABLET ORAL at 17:51

## 2024-07-05 RX ADMIN — NICOTINE POLACRILEX 4 MG: 4 GUM, CHEWING BUCCAL at 10:26

## 2024-07-05 RX ADMIN — PALIPERIDONE 6 MG: 6 TABLET, EXTENDED RELEASE ORAL at 08:07

## 2024-07-05 RX ADMIN — NICOTINE POLACRILEX 4 MG: 4 GUM, CHEWING BUCCAL at 08:29

## 2024-07-05 RX ADMIN — NICOTINE POLACRILEX 4 MG: 4 GUM, CHEWING BUCCAL at 16:16

## 2024-07-05 RX ADMIN — LORAZEPAM 1 MG: 1 TABLET ORAL at 20:01

## 2024-07-05 RX ADMIN — ACETAMINOPHEN 650 MG: 325 TABLET, FILM COATED ORAL at 08:29

## 2024-07-05 RX ADMIN — ACETAMINOPHEN 650 MG: 325 TABLET, FILM COATED ORAL at 12:54

## 2024-07-05 RX ADMIN — ACETAMINOPHEN 650 MG: 325 TABLET, FILM COATED ORAL at 16:45

## 2024-07-05 RX ADMIN — OLANZAPINE 10 MG: 10 TABLET, FILM COATED ORAL at 16:45

## 2024-07-05 ASSESSMENT — ACTIVITIES OF DAILY LIVING (ADL)
ADLS_ACUITY_SCORE: 39
ADLS_ACUITY_SCORE: 39
HYGIENE/GROOMING: PROMPTS
ADLS_ACUITY_SCORE: 39
LAUNDRY: UNABLE TO COMPLETE
ADLS_ACUITY_SCORE: 39
LAUNDRY: UNABLE TO COMPLETE
DRESS: SCRUBS (BEHAVIORAL HEALTH);INDEPENDENT
ADLS_ACUITY_SCORE: 39
ADLS_ACUITY_SCORE: 39
DRESS: SCRUBS (BEHAVIORAL HEALTH);INDEPENDENT
ADLS_ACUITY_SCORE: 39
HYGIENE/GROOMING: PROMPTS
ADLS_ACUITY_SCORE: 39
ORAL_HYGIENE: PROMPTS
ADLS_ACUITY_SCORE: 39
ORAL_HYGIENE: INDEPENDENT
ADLS_ACUITY_SCORE: 39

## 2024-07-05 NOTE — PROGRESS NOTES
"[facility] PSYCHIATRY  PROGRESS NOTE     SUBJECTIVE     Prior to interviewing the patient, I met with nursing and reviewed patient's clinical condition. We discussed clinical care both before and after the interview. I have reviewed the patient's clinical course by review of records including previous notes, labs, and vital signs.     Per nursing, the patient had the following behavioral events over the last 24-hours: More cooperative. Doing well with weaning. Taking medications.     On psychiatric interview, the patient is met on the open unit. She had tried to interrupt a conversation with myself and another pt earlier, which she has done over the last couple days. I did inform her to politely wait and to not interrupt while I am talking to other patients. Pt tells me she feels like she has \"ADHD symptoms\" and cannot stop herself from interrupting others. She states she feels like she has to walk and move, however, she is able to sit still and have a brief conversation with me.    She notes her mood is \"good\" today. Continues to be focused on discharge and tells me she would like to go to detox, but first go to the gas station to get a nicotine vape. We discussed that she had been several days out from methamphetamine use and not acutely intoxicated. Pt tells me that she thinks detox would help her.     She is feeling less tired, we discussed how Invega oral will be discontinued tomorrow as well as Ativan tapered by discharge. Pt verbalized understanding.     She notes that she is tolerating medications well.       MEDICATIONS   Scheduled Meds:  Current Facility-Administered Medications   Medication Dose Route Frequency Provider Last Rate Last Admin    LORazepam (ATIVAN) tablet 1 mg  1 mg Oral BID Jamshid Rdz APRN CNP   1 mg at 07/05/24 0808    [START ON 7/6/2024] paliperidone (INVEGA SUSTENNA) injection JOVANNI 156 mg  156 mg Intramuscular Once Sanket Bernstein,         [START ON 8/5/2024] paliperidone " (INVEGA SUSTENNA) JOVANNI 117 mg  117 mg Intramuscular Q30 Days Sanket Bernstein DO        paliperidone ER (INVEGA) 24 hr tablet 6 mg  6 mg Oral QAM Sanket Bernstein DO   6 mg at 07/05/24 0807     PRN Meds:.  Current Facility-Administered Medications   Medication Dose Route Frequency Provider Last Rate Last Admin    acetaminophen (TYLENOL) tablet 650 mg  650 mg Oral Q4H PRN Snaket Bernstein DO   650 mg at 07/05/24 0829    alum & mag hydroxide-simethicone (MAALOX) suspension 30 mL  30 mL Oral Q4H PRN Sanket Bernstein DO        haloperidol (HALDOL) tablet 5 mg  5 mg Oral Q8H PRN Sanket Bernstein DO        And    LORazepam (ATIVAN) tablet 2 mg  2 mg Oral Q8H PRN Sanket Bernstein DO        And    diphenhydrAMINE (BENADRYL) capsule 50 mg  50 mg Oral Q8H PRN Sanket Bernstein DO        haloperidol lactate (HALDOL) injection 5 mg  5 mg Intramuscular Q8H PRN Sanket Bernstein DO   5 mg at 06/27/24 1012    And    LORazepam (ATIVAN) injection 2 mg  2 mg Intramuscular Q8H PRN Sanket Bernstein DO   2 mg at 06/27/24 1012    And    diphenhydrAMINE (BENADRYL) injection 50 mg  50 mg Intramuscular Q8H PRN Sanket Bernstein, DO   50 mg at 06/27/24 1012    hydrOXYzine HCl (ATARAX) tablet 25 mg  25 mg Oral Q6H PRN Sanket Bernstein DO   25 mg at 07/04/24 1533    nicotine polacrilex (NICORETTE) gum 4 mg  4 mg Buccal Q1H PRN Sanket Bernstein DO   4 mg at 07/05/24 1026    OLANZapine (zyPREXA) tablet 10 mg  10 mg Oral Q6H PRN Sanket Bernstein DO   10 mg at 07/03/24 1642    Or    OLANZapine (zyPREXA) injection 10 mg  10 mg Intramuscular Q6H PRN Sanket Bernstein DO        senna-docusate (SENOKOT-S/PERICOLACE) 8.6-50 MG per tablet 1 tablet  1 tablet Oral BID PRN Sanket Bernstein DO        traZODone (DESYREL) tablet 50 mg  50 mg Oral At Bedtime PRN Sanket Bernstein DO   50 mg at 07/04/24 2017        ALLERGIES   Allergies   Allergen Reactions    Other [No Clinical Screening - See Comments] Anaphylaxis      "\"Sodium Penathol\" per mother. Used in anesthesia.  Family hx of reaction.    Thiopental Anaphylaxis     Other reaction(s): Other - Describe In Comment Field    Family Hx of death with this medication    Great grandmother  on the operating table to this medication; worried about allergies to sodium      Other reaction(s): Other - Describe In Comment Field Family Hx of death with this medication    Latex Rash    Amoxicillin Trihydrate GI Disturbance    Amoxicillin-Pot Clavulanate GI Disturbance    Phenobarbital         MENTAL STATUS EXAM   Vitals: /68   Pulse 119   Temp 98.7  F (37.1  C) (Temporal)   Resp 16   Wt 54.5 kg (120 lb 3.2 oz)   LMP  (LMP Unknown)   SpO2 98%   BMI 18.83 kg/m      Appearance: Alert, thin, dyed hair  Attitude: More cooperative   Eye Contact: Poor  Mood: \"good\"  Affect: restricted range  Speech: Regular rate, lisp  Psychomotor Behavior: No TD or rigidity. No tremor or akathisia.   Thought Process: More linear   Associations: No loose associations   Thought Content: No SI elicited. Response to internal stimuli at times. No delusional thought   Insight: Poor  Judgment: Poor  Oriented to: Person, place  Attention Span and Concentration: Limited  Recent and Remote Memory: Limited  Language: English with appropriate syntax and vocabulary  Fund of Knowledge: Low  Muscle Strength and Tone: Grossly normal  Gait and Station: Grossly normal       LABS   No results found for this or any previous visit (from the past 24 hour(s)).      IMPRESSION     This is a 24 year old female with a PMH of schizoaffective disorder, bipolar type, KIRSTY, intellectual disability, stimulant use disorder, and cannabis use disorder who presents with psychosis occurring in the context of likely medication non-adherence and substance use (UDS positive for amphetamines) with the patient being found on the road complaining of a ramires attack with no signs of one. She has a history of multiple hospitalizations last in " 2023 per records review. She was been seeing by Dr. Brothers but was likely lost to follow-up with it unclear if she is seeing anyone else. She was more stable on Abilify in the past. She currently does have a legal guardian who consented to hospitalizations. She would benefit from hospitalization to address her psychosis.     In terms of treatment, will either resume Abilify or start Invega with plan for FRIAS.     Patient did require restraints on 6/25 and forced IM medication given her level of agitation and refusal to remove necklaces that could be used for self or other harm.     Today: Patient's psychosis is improving. Taking medications as prescribed. Doing well weaning. Will come off of Ativan in next few days as tolerated not discharging with this medication. Look to discontinue Invega oral tomorrow. Plan to discharge 7/8     DIAGNOSES     #. Schizoaffective disorder, bipolar type, multiple episodes, in acute episode  #. Generalized anxiety disorder  #. Intellectual disability   #. Stimulant (meth) use disorder, severe  #. Cannabis use disorder, unspecified       PLAN     Location: Unit 5  Legal Status: Orders Placed This Encounter      Voluntary    Per Legal Guardian- [employer]     Safety Assessment:    Behavioral Orders   Procedures    Code 1 - Restrict to Unit    Routine Programming     As clinically indicated    Status 15     Every 15 minutes.      PTA psychotropic medications held:      - None, as not taking any     PTA psychotropic medications continued/changed:      -Resumed Trazodone 50 mg at bedtime prn sleep     New psychotropic medications initiated:     -Invega 3 mg daily -> 4.5 mg (6/27) -> 6 mg (6/28)  -Invega Sustenna 234 mg (6/29) and then 156 mg in one week (7/6) and then 117 mg in 4 weeks (8/5)  -Ativan 1 mg BID for short course -> 1 mg TID (6/27)  -Standard unit prn agents, including Zyprexa and Haldol/Benadryl/Ativan prn agitation    Today's Changes:    -Continue oral Invega  likely until next injection as an oral bridge (stop if SE arise)      Programming: Patient will be treated in a therapeutic milieu with appropriate individual and group therapies. Education will be provided on diagnoses, medications, and treatments.     Medical diagnoses:  Per medicine    UTI- abnormal UA. Ua positive nitrites, large leukocyte esterase, >182 wbc, ED noted it was likely contaminated but did treat with one dose of keflex. Pt is confused and not cooperative. Will continue tx with Macrobid 100 mg BID for 5 days.    Moderate protein calorie malnutrition- secondary to psychiatric illness. Nutrition consulted. Ensure with meals.     Consult: Nutrition   Tests: None, baseline labs reassuring     Anticipated LOS: > 7 days. Discharge 7/8.  Disposition: Home with outpatient services       ATTESTATION      AMINA Maciel CNP

## 2024-07-05 NOTE — PLAN OF CARE
"CAROLYNE WESLEY RN  7/5/2024  7:38 AM  Face to face shift report received from PRATIK Miles. Rounding completed, pt observed.     Pt denies SI, HI, hallucinations, and depression.  Tylenol given @ 0829 for tooth pain.  Reports feeling restless and anxious.  Encouraged pt to use coping skills.  Pt frequently seeks out staff and has poor boundaries (interrupts staff while with other pts).    1235-Guardian Germaine called for pt update.  Germaine does not want pt to be discharged home with services on Monday.  She feels pt's living environment with her boyfriend is not appropriate.   1415-Pt attended group this afternoon.  Requested and given tylenol @1254 for tooth pain.  Pain is currently 3/10.  Pt has came to nurses station multiple times asking, \"I'm not doing nothing wrong, am I?  I'm still able to go home on Monday?\".   Reassurance given.  Pt pleasant and compliant with meds.  Cooperative with staff and peers.     Problem: Adult Behavioral Health Plan of Care  Goal: Patient-Specific Goal (Individualization)  Description: Pt. Will eat at least 70% at each meal.  Pt. Will sleep at least 6 hours each night.  Pt. Will attend at least 50% of groups, when able to wean.  Patient will be able to participate in a logical and appropriate situation    7/3/24: May wean as appropriate per nurse's discretion if able to:  -Take medications as prescribed  -No self harm and/or aggressive behaviors. Ex: pounding on doors or head banging.  -Be kind and appropriate during interactions with staff   Treatment team to reassess daily.   Outcome: Progressing     Problem: Thought Process Alteration  Goal: Optimal Thought Clarity  Description: Pt. Will have a linear thought process by target date.   Outcome: Progressing     Problem: Suicide Risk  Goal: Absence of Self-Harm  Outcome: Progressing     Face to face end of shift report communicated to oncoming shift RN.                              "

## 2024-07-05 NOTE — PLAN OF CARE
Face to face shift report received from Aileen Rounding completed, pt observed in minaya at start of shift.  Problem: Adult Behavioral Health Plan of Care  Goal: Patient-Specific Goal (Individualization)  Description: Pt. Will eat at least 70% at each meal.  Pt. Will sleep at least 6 hours each night.  Pt. Will attend at least 50% of groups, when able to wean.  Patient will be able to participate in a logical and appropriate situation    7/3/24: May wean as appropriate per nurse's discretion if able to:  -Take medications as prescribed  -No self harm and/or aggressive behaviors. Ex: pounding on doors or head banging.  -Be kind and appropriate during interactions with staff     Treatment team to reassess daily.   Outcome: Progressing     Problem: Thought Process Alteration  Goal: Optimal Thought Clarity  Description: Pt. Will have a linear thought process by target date.   Outcome: Progressing     Problem: Suicide Risk  Goal: Absence of Self-Harm  Outcome: Progressing   Goal Outcome Evaluation:        Pt. Had some physical pain in their feet and tooth this shift. PRN acetaminophen 650 mg given for this at 1645. Pt. Denied having any SI or intent to self-harm. They did have some anxiety this shift. Pt. Stated that they were scared of demonic forces harming harm while they were sleeping. Pt. Reassured. PRN Zyprexa 10 mg given at 1645 and hydroxyzine 25 mg at 1751 for this. 50% was eaten at dinner. Groups were attended this shift. PRN trazodone 50 mg given at 2001 to help with sleep.    Face to face report will be communicated to oncoming RN.    Laurel Mercado RN  7/5/2024  8:55 PM

## 2024-07-05 NOTE — PLAN OF CARE
Problem: Adult Behavioral Health Plan of Care  Goal: Patient-Specific Goal (Individualization)  Description: Pt. Will eat at least 70% at each meal.  Pt. Will sleep at least 6 hours each night.  Pt. Will attend at least 50% of groups, when able to wean.  Patient will be able to participate in a logical and appropriate situation    7/3/24: May wean as appropriate per nurse's discretion if able to:  -Take medications as prescribed  -No self harm and/or aggressive behaviors. Ex: pounding on doors or head banging.  -Be kind and appropriate during interactions with staff     Treatment team to reassess daily.   Outcome: Progressing     Problem: Thought Process Alteration  Goal: Optimal Thought Clarity  Description: Pt. Will have a linear thought process by target date.   Outcome: Progressing     Problem: Suicide Risk  Goal: Absence of Self-Harm  Outcome: Progressing     Report received from previously assigned nurse.     Rounds completed. Safety checks completed every 15 minutes throughout the night. Pt noted to be resting with eyes closed and easy resp. for 7 hours. No suicidal gestures or comments noted.    Face to face end of shift report to be communicated to oncoming RN.

## 2024-07-05 NOTE — PLAN OF CARE
Problem: Adult Behavioral Health Plan of Care  Goal: Patient-Specific Goal (Individualization)  Description: Pt. Will eat at least 70% at each meal.  Pt. Will sleep at least 6 hours each night.  Pt. Will attend at least 50% of groups, when able to wean.  Patient will be able to participate in a logical and appropriate situation    7/3/24: May wean as appropriate per nurse's discretion if able to:  -Take medications as prescribed  -No self harm and/or aggressive behaviors. Ex: pounding on doors or head banging.  -Be kind and appropriate during interactions with staff     Treatment team to reassess daily.   Outcome: Progressing     Problem: Thought Process Alteration  Goal: Optimal Thought Clarity  Description: Pt. Will have a linear thought process by target date.   Outcome: Progressing     Problem: Suicide Risk  Goal: Absence of Self-Harm  Outcome: Progressing     Pt denies SI/HI and depression. Pt endorses some AH and anxiety. 1533 PRN atarax 25mg for anxiety. Pt rates pain 10/10 in teeth, PRN tylenol 650mg at 1736. Pt is medication compliant and VSS. Pt ate 75% of dinner. Pt has a flat affect. Pt is alert and able to make needs known. Pt is using appropriate behavior with staff and peers. Pt in and out of room most of or in lounge. Pt had a visit with her boyfriend this shift. Pt made a few phone calls this shift. 2017 PRN trazodone 50mg for sleep.     Face to face report will be communicated to jose CHRISTIE.    Patti Sanchez RN  7/4/2024

## 2024-07-06 LAB — GLUCOSE BLDC GLUCOMTR-MCNC: 118 MG/DL (ref 70–99)

## 2024-07-06 PROCEDURE — 250N000013 HC RX MED GY IP 250 OP 250 PS 637

## 2024-07-06 PROCEDURE — 99232 SBSQ HOSP IP/OBS MODERATE 35: CPT

## 2024-07-06 PROCEDURE — 124N000001 HC R&B MH

## 2024-07-06 PROCEDURE — 250N000011 HC RX IP 250 OP 636: Performed by: STUDENT IN AN ORGANIZED HEALTH CARE EDUCATION/TRAINING PROGRAM

## 2024-07-06 PROCEDURE — 250N000013 HC RX MED GY IP 250 OP 250 PS 637: Performed by: STUDENT IN AN ORGANIZED HEALTH CARE EDUCATION/TRAINING PROGRAM

## 2024-07-06 RX ORDER — PALIPERIDONE 6 MG/1
6 TABLET, EXTENDED RELEASE ORAL EVERY MORNING
Status: COMPLETED | OUTPATIENT
Start: 2024-07-06 | End: 2024-07-06

## 2024-07-06 RX ADMIN — NICOTINE POLACRILEX 4 MG: 4 GUM, CHEWING BUCCAL at 15:06

## 2024-07-06 RX ADMIN — OLANZAPINE 10 MG: 10 TABLET, FILM COATED ORAL at 15:26

## 2024-07-06 RX ADMIN — PALIPERIDONE 6 MG: 6 TABLET, EXTENDED RELEASE ORAL at 08:38

## 2024-07-06 RX ADMIN — PALIPERIDONE PALMITATE 156 MG: 156 INJECTION INTRAMUSCULAR at 08:44

## 2024-07-06 RX ADMIN — LORAZEPAM 1 MG: 1 TABLET ORAL at 20:21

## 2024-07-06 RX ADMIN — TRAZODONE HYDROCHLORIDE 50 MG: 50 TABLET ORAL at 20:21

## 2024-07-06 RX ADMIN — LORAZEPAM 1 MG: 1 TABLET ORAL at 08:38

## 2024-07-06 ASSESSMENT — ACTIVITIES OF DAILY LIVING (ADL)
ADLS_ACUITY_SCORE: 39
HYGIENE/GROOMING: PROMPTS
DRESS: SCRUBS (BEHAVIORAL HEALTH)
ADLS_ACUITY_SCORE: 39
LAUNDRY: UNABLE TO COMPLETE
ADLS_ACUITY_SCORE: 39
ADLS_ACUITY_SCORE: 39
LAUNDRY: UNABLE TO COMPLETE
ADLS_ACUITY_SCORE: 39
HYGIENE/GROOMING: PROMPTS
ADLS_ACUITY_SCORE: 39
DRESS: SCRUBS (BEHAVIORAL HEALTH)
ADLS_ACUITY_SCORE: 39
ORAL_HYGIENE: PROMPTS
ADLS_ACUITY_SCORE: 39
ORAL_HYGIENE: PROMPTS

## 2024-07-06 NOTE — PROGRESS NOTES
[facility] PSYCHIATRY  PROGRESS NOTE     SUBJECTIVE     Prior to interviewing the patient, I met with nursing and reviewed patient's clinical condition. We discussed clinical care both before and after the interview. I have reviewed the patient's clinical course by review of records including previous notes, labs, and vital signs.     Per nursing, the patient had the following behavioral events over the last 24-hours: More cooperative. Doing well with weaning. Taking medications.     On psychiatric interview, the patient is met in her room. She notes she does not like to be alone. She endorses feeling slightly anxious that staff could tell her she cannot discharge Monday. Pt continues to be quite fixated on discharge. She denies depression, SI, HI or SIB. She denies AVH, no delusions appreciated. Notes she is eating and sleeping well, does report nightmares over the last few weeks. States these are not bothersome.     She notes that she is tolerating medications well, notes some restlessness, but is tolerable. Discussed oral Invega has been discontinued. Reports no issues with 2nd loading dose of Invega Sustenna today.      MEDICATIONS   Scheduled Meds:  Current Facility-Administered Medications   Medication Dose Route Frequency Provider Last Rate Last Admin    LORazepam (ATIVAN) tablet 1 mg  1 mg Oral BID Jamshid Rdz APRN CNP   1 mg at 07/06/24 0838    [START ON 8/5/2024] paliperidone (INVEGA SUSTENNA) JOVANNI 117 mg  117 mg Intramuscular Q30 Days Sanket Bernstein DO         PRN Meds:.  Current Facility-Administered Medications   Medication Dose Route Frequency Provider Last Rate Last Admin    acetaminophen (TYLENOL) tablet 650 mg  650 mg Oral Q4H PRN Sankte Bernstein DO   650 mg at 07/05/24 1645    alum & mag hydroxide-simethicone (MAALOX) suspension 30 mL  30 mL Oral Q4H PRN Sanket Bernstein DO        haloperidol (HALDOL) tablet 5 mg  5 mg Oral Q8H PRN Sanket Bernstein DO        And    LORazepam  "(ATIVAN) tablet 2 mg  2 mg Oral Q8H PRN Sanket Bernstein, DO        And    diphenhydrAMINE (BENADRYL) capsule 50 mg  50 mg Oral Q8H PRN Sanket Bernstein, DO        haloperidol lactate (HALDOL) injection 5 mg  5 mg Intramuscular Q8H PRN Sanket Bernstein, DO   5 mg at 24 1012    And    LORazepam (ATIVAN) injection 2 mg  2 mg Intramuscular Q8H PRN Sanket Bernstein, DO   2 mg at 24 1012    And    diphenhydrAMINE (BENADRYL) injection 50 mg  50 mg Intramuscular Q8H PRN Sanket Bernstein, DO   50 mg at 24 1012    hydrOXYzine HCl (ATARAX) tablet 25 mg  25 mg Oral Q6H PRN Sanket Bernstein, DO   25 mg at 24 1751    nicotine polacrilex (NICORETTE) gum 4 mg  4 mg Buccal Q1H PRN Sanket Bernstein, DO   4 mg at 24 1616    OLANZapine (zyPREXA) tablet 10 mg  10 mg Oral Q6H PRN Sanket Bernstein, DO   10 mg at 24 1645    Or    OLANZapine (zyPREXA) injection 10 mg  10 mg Intramuscular Q6H PRN Sanket Bernstein, DO        senna-docusate (SENOKOT-S/PERICOLACE) 8.6-50 MG per tablet 1 tablet  1 tablet Oral BID PRN Sanket Bernstein DO        traZODone (DESYREL) tablet 50 mg  50 mg Oral At Bedtime PRN Sanket Bernstein, DO   50 mg at 24        ALLERGIES   Allergies   Allergen Reactions    Other [No Clinical Screening - See Comments] Anaphylaxis     \"Sodium Penathol\" per mother. Used in anesthesia.  Family hx of reaction.    Thiopental Anaphylaxis     Other reaction(s): Other - Describe In Comment Field    Family Hx of death with this medication    Great grandmother  on the operating table to this medication; worried about allergies to sodium      Other reaction(s): Other - Describe In Comment Field Family Hx of death with this medication    Latex Rash    Amoxicillin Trihydrate GI Disturbance    Amoxicillin-Pot Clavulanate GI Disturbance    Phenobarbital         MENTAL STATUS EXAM   Vitals: /82   Pulse 100   Temp 97.8  F (36.6  C) (Temporal)   Resp 16   Wt 54.5 " "kg (120 lb 3.2 oz)   LMP  (LMP Unknown)   SpO2 96%   BMI 18.83 kg/m      Appearance: Alert, thin, dyed hair  Attitude: More cooperative   Eye Contact: Poor  Mood: \"anxious\"  Affect: restricted range  Speech: Regular rate, lisp  Psychomotor Behavior: No TD or rigidity. No tremor or akathisia.   Thought Process: More linear   Associations: No loose associations   Thought Content: No SI elicited. Response to internal stimuli at times. No delusional thought   Insight: Poor  Judgment: Poor  Oriented to: Person, place  Attention Span and Concentration: Limited  Recent and Remote Memory: Limited  Language: English with appropriate syntax and vocabulary  Fund of Knowledge: Low  Muscle Strength and Tone: Grossly normal  Gait and Station: Grossly normal       LABS   No results found for this or any previous visit (from the past 24 hour(s)).      IMPRESSION     This is a 24 year old female with a PMH of schizoaffective disorder, bipolar type, KIRSTY, intellectual disability, stimulant use disorder, and cannabis use disorder who presents with psychosis occurring in the context of likely medication non-adherence and substance use (UDS positive for amphetamines) with the patient being found on the road complaining of a ramires attack with no signs of one. She has a history of multiple hospitalizations last in 2023 per records review. She was been seeing by Dr. Brothers but was likely lost to follow-up with it unclear if she is seeing anyone else. She was more stable on Abilify in the past. She currently does have a legal guardian who consented to hospitalizations. She would benefit from hospitalization to address her psychosis.     In terms of treatment, will either resume Abilify or start Invega with plan for FRIAS.     Patient did require restraints on 6/25 and forced IM medication given her level of agitation and refusal to remove necklaces that could be used for self or other harm.     Today: Patient's psychosis is improving. " Taking medications as prescribed. Doing well weaning. Will come off of Ativan in next few days as tolerated not discharging with this medication. Discontinued Invega oral today. 2nd loading dose of Invega Sustenna today. Plan to discharge 7/8.     DIAGNOSES     #. Schizoaffective disorder, bipolar type, multiple episodes, in acute episode  #. Generalized anxiety disorder  #. Intellectual disability   #. Stimulant (meth) use disorder, severe  #. Cannabis use disorder, unspecified       PLAN     Location: Unit 5  Legal Status: Orders Placed This Encounter      Voluntary    Per Legal Guardian- [employer]     Safety Assessment:    Behavioral Orders   Procedures    Code 1 - Restrict to Unit    Routine Programming     As clinically indicated    Status 15     Every 15 minutes.      PTA psychotropic medications held:      - None, as not taking any     PTA psychotropic medications continued/changed:      -Resumed Trazodone 50 mg at bedtime prn sleep     New psychotropic medications initiated:     -Invega 3 mg daily -> 4.5 mg (6/27) -> 6 mg (6/28)->discontinued 7/6 d/t FRIAS  -Invega Sustenna 234 mg (6/29) and then 156 mg in one week (7/6) and then 117 mg in 4 weeks (8/5)  -Ativan 1 mg BID for short course -> 1 mg TID (6/27)  -Standard unit prn agents, including Zyprexa and Haldol/Benadryl/Ativan prn agitation    Today's Changes:    -discontinue oral Invega  -Received Invega Sustenna 156 mg, 2nd loading dose      Programming: Patient will be treated in a therapeutic milieu with appropriate individual and group therapies. Education will be provided on diagnoses, medications, and treatments.     Medical diagnoses:  Per medicine    UTI- abnormal UA. Ua positive nitrites, large leukocyte esterase, >182 wbc, ED noted it was likely contaminated but did treat with one dose of keflex. Pt is confused and not cooperative. Will continue tx with Macrobid 100 mg BID for 5 days.    Moderate protein calorie malnutrition-  secondary to psychiatric illness. Nutrition consulted. Ensure with meals.     Consult: Nutrition   Tests: None, baseline labs reassuring     Anticipated LOS: > 7 days. Discharge 7/8.  Disposition: Home with outpatient services       ATTESTATION      AMINA Maciel CNP

## 2024-07-06 NOTE — PLAN OF CARE
"  Problem: Adult Behavioral Health Plan of Care  Goal: Patient-Specific Goal (Individualization)  Description: Pt. Will eat at least 70% at each meal.  Pt. Will sleep at least 6 hours each night.  Pt. Will attend at least 50% of groups, when able to wean.  Patient will be able to participate in a logical and appropriate situation  Outcome: Progressing     Problem: Thought Process Alteration  Goal: Optimal Thought Clarity  Description: Pt. Will have a linear thought process by target date.   Outcome: Progressing     Problem: Suicide Risk  Goal: Absence of Self-Harm  Outcome: Progressing    Face to face shift report received from previously assigned nurse. Rounding completed, pt observed resting in bed with eyes closed and easy respirations.    Patient is met with in her room. Denies pain, SI, HI, A/V hallucinations, and depression. Endorses anxiety regarding her potential discharge coming up, patient is excited and wants to ensure she is \"good\" so her discharge can still happen. Appropriate boundaries maintained with staff and peers. Compliant with AM medication administration. Patient received FRIAS Invega Sustenna this morning in the right gluteal. No further needs at this time.     Patient came to the nurses station with complaints of dizziness and feeling light-headed. Asked if this could be related to her FRIAS this morning. Assured patient writer will notify provider, but it is likely not related to the long-acting injection. Provider notified. Original BP taken read as 62/31, patient assisted to the supine position - repeat vitals as follows  BP (!) 96/46 (BP Location: Right arm, Patient Position: Supine, Cuff Size: Adult Small) Comment: provider notified   Pulse 74   Temp 97.1  F (36.2  C) (Temporal)   Resp 18   Patient assisted to wheelchair and brought to her room.  - patient positioned supine on her bed with knees elevated and cold washcloth on her forehead. After approximately 5 minutes, patient reports " "feeling better, states she things she \"overheated\", asking if she can eat her lunch. Lunch brought to the patient, educated on symptoms and told to notify staff of new or worsening symptoms.     CARE CONTINUED INTO THE EVENING:    Patient reporting increasing anxiety, has been pacing, and states she is starting to hear voices at this time. Patient requested PRN zyprexa, reminded of the dizziness she experienced earlier and continued to state she feels that she needs this medication. Educated about possible side effects. Rest of nursing assessment, unchanged. No further needs at this time.     Obtained orthostatic vitals for patient, as follows:   Lyin/61,   Sittin/75,   Standin/77,     Shows an over 20 point decrease in systolic pressure reading between the sitting and standing positions. Patient was educated on these findings and the importance of getting up and changing positions slowly. No further needs at this time.     Patient compliant with HS medication administration. PRN trazodone given for sleep promotion. Patient has not had any further episodes of dizziness, remembers orthostatic hypotension education offered earlier, no further needs at this time.     Patient has been resting in bed for a majority of the evening. No further needs at this time.     Face to face report communicated to oncoming PRATIK.    Simin Billings RN  2024  9:40 AM         "

## 2024-07-07 PROCEDURE — 124N000001 HC R&B MH

## 2024-07-07 PROCEDURE — 99232 SBSQ HOSP IP/OBS MODERATE 35: CPT

## 2024-07-07 PROCEDURE — 250N000013 HC RX MED GY IP 250 OP 250 PS 637

## 2024-07-07 PROCEDURE — 250N000013 HC RX MED GY IP 250 OP 250 PS 637: Performed by: STUDENT IN AN ORGANIZED HEALTH CARE EDUCATION/TRAINING PROGRAM

## 2024-07-07 RX ORDER — LORAZEPAM 0.5 MG/1
0.5 TABLET ORAL 2 TIMES DAILY
Status: DISCONTINUED | OUTPATIENT
Start: 2024-07-07 | End: 2024-07-08 | Stop reason: HOSPADM

## 2024-07-07 RX ADMIN — LORAZEPAM 2 MG: 1 TABLET ORAL at 14:03

## 2024-07-07 RX ADMIN — HYDROXYZINE HYDROCHLORIDE 25 MG: 25 TABLET ORAL at 12:32

## 2024-07-07 RX ADMIN — OLANZAPINE 10 MG: 10 TABLET, FILM COATED ORAL at 08:58

## 2024-07-07 RX ADMIN — HALOPERIDOL 5 MG: 5 TABLET ORAL at 14:03

## 2024-07-07 RX ADMIN — LORAZEPAM 0.5 MG: 0.5 TABLET ORAL at 20:21

## 2024-07-07 RX ADMIN — LORAZEPAM 0.5 MG: 0.5 TABLET ORAL at 08:58

## 2024-07-07 RX ADMIN — DIPHENHYDRAMINE HYDROCHLORIDE 50 MG: 50 CAPSULE ORAL at 14:03

## 2024-07-07 RX ADMIN — NICOTINE POLACRILEX 4 MG: 4 GUM, CHEWING BUCCAL at 14:04

## 2024-07-07 RX ADMIN — NICOTINE POLACRILEX 4 MG: 4 GUM, CHEWING BUCCAL at 08:59

## 2024-07-07 RX ADMIN — NICOTINE POLACRILEX 4 MG: 4 GUM, CHEWING BUCCAL at 12:32

## 2024-07-07 RX ADMIN — TRAZODONE HYDROCHLORIDE 50 MG: 50 TABLET ORAL at 20:21

## 2024-07-07 ASSESSMENT — ACTIVITIES OF DAILY LIVING (ADL)
ADLS_ACUITY_SCORE: 39
ADLS_ACUITY_SCORE: 39
DRESS: SCRUBS (BEHAVIORAL HEALTH)
ADLS_ACUITY_SCORE: 39
ORAL_HYGIENE: PROMPTS
ADLS_ACUITY_SCORE: 39
HYGIENE/GROOMING: PROMPTS
ADLS_ACUITY_SCORE: 39
HYGIENE/GROOMING: PROMPTS
ADLS_ACUITY_SCORE: 39
LAUNDRY: UNABLE TO COMPLETE
LAUNDRY: UNABLE TO COMPLETE
ADLS_ACUITY_SCORE: 39
ADLS_ACUITY_SCORE: 39
DRESS: SCRUBS (BEHAVIORAL HEALTH)
ADLS_ACUITY_SCORE: 39
ORAL_HYGIENE: PROMPTS

## 2024-07-07 NOTE — PLAN OF CARE
Problem: Adult Behavioral Health Plan of Care  Goal: Patient-Specific Goal (Individualization)  Description: Pt. Will eat at least 70% at each meal.  Pt. Will sleep at least 6 hours each night.  Pt. Will attend at least 50% of groups, when able to wean.  Patient will be able to participate in a logical and appropriate situation  Outcome: Progressing    Problem: Thought Process Alteration  Goal: Optimal Thought Clarity  Description: Pt. Will have a linear thought process by target date.   Outcome: Progressing     Problem: Suicide Risk  Goal: Absence of Self-Harm  Outcome: Progressing      Report received from previously assigned nurse.     Rounds completed. Safety checks completed every 15 minutes throughout the night. Pt noted to be resting with eyes closed and easy resp. for 7 hours. No suicidal gestures or comments noted.    Face to face end of shift report to be communicated to oncoming RN.

## 2024-07-07 NOTE — PROGRESS NOTES
[facility] PSYCHIATRY  PROGRESS NOTE     SUBJECTIVE     Prior to interviewing the patient, I met with nursing and reviewed patient's clinical condition. We discussed clinical care both before and after the interview. I have reviewed the patient's clinical course by review of records including previous notes, labs, and vital signs.     Per nursing, the patient had the following behavioral events over the last 24-hours: More cooperative. Doing well with weaning. Taking medications.     On psychiatric interview, the patient is met in her room. Her main concern is that she can discharge tomorrow. She states she feels a little anxious with the thought that staff may tell her she cannot discharge. Informed pt that this is the plan currently. Pt denies depression, SI, HI or SIB. Denies AVH. No delusions present. Reviewed recommendation of abstaining from substances.     Pt denies any adverse effects noted from medication. Does report feeling dizzy/lightheaded yesterday, though this was brief and resolved. Discussed transitioning slowly at this time and inform staff if sx return. Denies SOB, CP, dizziness, lightheadedness, or any other somatic complaint at this time. Discussed that Ativan was tapered to 0.5 mg BID today and will discontinue tomorrow. Pt verbalizes understanding.      MEDICATIONS   Scheduled Meds:  Current Facility-Administered Medications   Medication Dose Route Frequency Provider Last Rate Last Admin    LORazepam (ATIVAN) tablet 0.5 mg  0.5 mg Oral BID Jamshid Rdz APRN CNP   0.5 mg at 07/07/24 0858    [START ON 8/5/2024] paliperidone (INVEGA SUSTENNA) JOVANNI 117 mg  117 mg Intramuscular Q30 Days Sanket Bernstein DO         PRN Meds:.  Current Facility-Administered Medications   Medication Dose Route Frequency Provider Last Rate Last Admin    acetaminophen (TYLENOL) tablet 650 mg  650 mg Oral Q4H PRN Sanket Bernstein DO   650 mg at 07/05/24 1645    alum & mag hydroxide-simethicone (MAALOX)  "suspension 30 mL  30 mL Oral Q4H PRN Sanket Bernstein, DO        haloperidol (HALDOL) tablet 5 mg  5 mg Oral Q8H PRN Sanket Bernstein DO        And    LORazepam (ATIVAN) tablet 2 mg  2 mg Oral Q8H PRN Sanket Bernstein, DO        And    diphenhydrAMINE (BENADRYL) capsule 50 mg  50 mg Oral Q8H PRN Sanket Bernstein, DO        haloperidol lactate (HALDOL) injection 5 mg  5 mg Intramuscular Q8H PRN Sanket Bernstein, DO   5 mg at 24 1012    And    LORazepam (ATIVAN) injection 2 mg  2 mg Intramuscular Q8H PRN Sanket Bernstein, DO   2 mg at 24 1012    And    diphenhydrAMINE (BENADRYL) injection 50 mg  50 mg Intramuscular Q8H PRN Sanket Bernstein, DO   50 mg at 24 1012    hydrOXYzine HCl (ATARAX) tablet 25 mg  25 mg Oral Q6H PRN Sanket Bernstein, DO   25 mg at 24 1751    nicotine polacrilex (NICORETTE) gum 4 mg  4 mg Buccal Q1H PRN Sanket Bernstein DO   4 mg at 24 0859    OLANZapine (zyPREXA) tablet 10 mg  10 mg Oral Q6H PRN Sanket Bernstein, DO   10 mg at 24 0858    Or    OLANZapine (zyPREXA) injection 10 mg  10 mg Intramuscular Q6H PRN Sanket Bernstein DO        senna-docusate (SENOKOT-S/PERICOLACE) 8.6-50 MG per tablet 1 tablet  1 tablet Oral BID PRN Sanket Bernstein DO        traZODone (DESYREL) tablet 50 mg  50 mg Oral At Bedtime PRN Sanket Bernstein DO   50 mg at 24        ALLERGIES   Allergies   Allergen Reactions    Other [No Clinical Screening - See Comments] Anaphylaxis     \"Sodium Penathol\" per mother. Used in anesthesia.  Family hx of reaction.    Thiopental Anaphylaxis     Other reaction(s): Other - Describe In Comment Field    Family Hx of death with this medication    Great grandmother  on the operating table to this medication; worried about allergies to sodium      Other reaction(s): Other - Describe In Comment Field Family Hx of death with this medication    Latex Rash    Amoxicillin Trihydrate GI Disturbance    " "Amoxicillin-Pot Clavulanate GI Disturbance    Phenobarbital         MENTAL STATUS EXAM   Vitals: /62   Pulse 86   Temp 97.3  F (36.3  C) (Temporal)   Resp 16   Wt 54.5 kg (120 lb 3.2 oz)   LMP  (LMP Unknown)   SpO2 95%   BMI 18.83 kg/m      Appearance: Alert, thin, dyed hair  Attitude: More cooperative   Eye Contact: Poor  Mood: \"ok\"  Affect: restricted range  Speech: Regular rate, lisp  Psychomotor Behavior: No TD or rigidity. No tremor or akathisia.   Thought Process: More linear   Associations: No loose associations   Thought Content: No SI elicited. Response to internal stimuli at times. No delusional thought   Insight: Poor  Judgment: Poor  Oriented to: Person, place  Attention Span and Concentration: Limited  Recent and Remote Memory: Limited  Language: English with appropriate syntax and vocabulary  Fund of Knowledge: Low  Muscle Strength and Tone: Grossly normal  Gait and Station: Grossly normal       LABS   Recent Results (from the past 24 hour(s))   Glucose by meter    Collection Time: 07/06/24 12:06 PM   Result Value Ref Range    GLUCOSE BY METER POCT 118 (H) 70 - 99 mg/dL         IMPRESSION     This is a 24 year old female with a PMH of schizoaffective disorder, bipolar type, KIRSTY, intellectual disability, stimulant use disorder, and cannabis use disorder who presents with psychosis occurring in the context of likely medication non-adherence and substance use (UDS positive for amphetamines) with the patient being found on the road complaining of a ramires attack with no signs of one. She has a history of multiple hospitalizations last in 2023 per records review. She was been seeing by Dr. Brothers but was likely lost to follow-up with it unclear if she is seeing anyone else. She was more stable on Abilify in the past. She currently does have a legal guardian who consented to hospitalizations. She would benefit from hospitalization to address her psychosis.     In terms of treatment, will either " resume Abilify or start Invega with plan for FRIAS.     Patient did require restraints on 6/25 and forced IM medication given her level of agitation and refusal to remove necklaces that could be used for self or other harm.     Today: Patient's psychosis is improving. Taking medications as prescribed. Will come off of Ativan in next few days as tolerated not discharging with this medication. Tapered to 0.5 mg BID today. Discontinued Invega oral today. Appears she may have borderline orthostatic changes, could be medication related. Oral Invega has been discontinued. Reviewed VS, staff monitoring. Plan to discharge 7/8.     DIAGNOSES     #. Schizoaffective disorder, bipolar type, multiple episodes, in acute episode  #. Generalized anxiety disorder  #. Intellectual disability   #. Stimulant (meth) use disorder, severe  #. Cannabis use disorder, unspecified       PLAN     Location: Unit 5  Legal Status: Orders Placed This Encounter      Voluntary    Per Legal Guardian- [employer]     Safety Assessment:    Behavioral Orders   Procedures    Code 1 - Restrict to Unit    Routine Programming     As clinically indicated    Status 15     Every 15 minutes.      PTA psychotropic medications held:      - None, as not taking any     PTA psychotropic medications continued/changed:      -Resumed Trazodone 50 mg at bedtime prn sleep     New psychotropic medications initiated:     -Invega 3 mg daily -> 4.5 mg (6/27) -> 6 mg (6/28)->discontinued 7/6 d/t FRIAS  -Invega Sustenna 234 mg (6/29) and then 156 mg in one week (7/6) and then 117 mg in 4 weeks (8/5)  -Ativan 1 mg BID for short course -> 1 mg TID (6/27)->0.5 mg BID 7/7  -Standard unit prn agents, including Zyprexa and Haldol/Benadryl/Ativan prn agitation    Today's Changes:    -Decrease Ativan to 0.5 mg BID, discontinue tomorrow  -plan to discontinue tomorrow 7/8    Programming: Patient will be treated in a therapeutic milieu with appropriate individual and group  therapies. Education will be provided on diagnoses, medications, and treatments.     Medical diagnoses:  Per medicine    UTI- abnormal UA. Ua positive nitrites, large leukocyte esterase, >182 wbc, ED noted it was likely contaminated but did treat with one dose of keflex. Pt is confused and not cooperative. Will continue tx with Macrobid 100 mg BID for 5 days.    Moderate protein calorie malnutrition- secondary to psychiatric illness. Nutrition consulted. Ensure with meals.     Consult: Nutrition   Tests: None, baseline labs reassuring     Anticipated LOS: > 7 days. Discharge 7/8.  Disposition: Home with outpatient services       ATTESTATION      AMINA Maciel CNP

## 2024-07-07 NOTE — PLAN OF CARE
"  Problem: Adult Behavioral Health Plan of Care  Goal: Patient-Specific Goal (Individualization)  Description: Pt. Will eat at least 70% at each meal.  Pt. Will sleep at least 6 hours each night.  Pt. Will attend at least 50% of groups, when able to wean.  Patient will be able to participate in a logical and appropriate situation  Outcome: Progressing     Problem: Thought Process Alteration  Goal: Optimal Thought Clarity  Description: Pt. Will have a linear thought process by target date.   Outcome: Progressing     Problem: Suicide Risk  Goal: Absence of Self-Harm  Outcome: Progressing    Face to face shift report received from previously assigned nurse. Rounding completed, pt observed resting in bed with eyes closed and easy respirations.    Patient is met with in the lounge. Denies pain, SI, HI, and depression. Endorses anxiety, requesting PRN for this, patient did disclose that she had a \"bad dream\" and states she is still worried about it, did not wish to speak about it at this time. PRN zyprexa given with AM medications. Appropriate with boundaries with staff and peers. No further needs at this time.    Patient preoccupied due to her significant other not answering her phone calls. Patient continues to pace, requesting staff to call him, and growing slightly agitated with not being able to get a hold of him. Reminded patient that he may be busy, utilized therapeutic communication and PRN hydroxyzine to help with anxiety. No further needs at this time.      Patient up to the nurses station, requesting more information regarding her possible discharge tomorrow. Patient mentioned that she does not want to go to foster care, states \"If I go and it has anything to do with going back to foster care, I am going to kill myself\". Patient requesting to call her guardian via the \"emergency guardian line\". Writer informed patient that we do not know where she is going to go, and she needs to just focus on staying better. " "Told patient to not think about that, to focus on keeping a positive mindset. Patient then said, \"I will stop thinking like that, but I will jump off a bridge if I go back to foster care\". Patient growing agitated while speaking about this.  BP: 117/63 - PRN 2 mg ativan, 5 mg haldol, 50 mg benadryl given at this time as other PRN medications have already been utilized due to the increasing agitation and anxiety regarding discharge tomorrow. Patient apologized for behaviors. No further needs at this time.      CARE CONTINUED INTO THE EVENING:    Patient has calmed after PRN medication, continues to be fixated with discharge and contacting her significant other, although is calmer and pleasant in conversation. Other assessment pieces are unchanged from this mornings assessment. No further needs at this time.     Patient has been resting in bed and in the lounge, going between the two areas throughout the shift. Continues to be fixated on her significant other not answering, though has become less intense when talking about this. Compliant with HS medication administration. No further needs at this time.     Face to face report communicated to oncoming RN.    Simin Billings RN  7/7/2024  9:52 AM      "

## 2024-07-08 VITALS
WEIGHT: 120.2 LBS | DIASTOLIC BLOOD PRESSURE: 63 MMHG | RESPIRATION RATE: 18 BRPM | HEART RATE: 104 BPM | TEMPERATURE: 97.3 F | BODY MASS INDEX: 18.83 KG/M2 | OXYGEN SATURATION: 92 % | SYSTOLIC BLOOD PRESSURE: 113 MMHG

## 2024-07-08 PROCEDURE — 250N000013 HC RX MED GY IP 250 OP 250 PS 637: Performed by: STUDENT IN AN ORGANIZED HEALTH CARE EDUCATION/TRAINING PROGRAM

## 2024-07-08 PROCEDURE — 250N000013 HC RX MED GY IP 250 OP 250 PS 637

## 2024-07-08 PROCEDURE — 99239 HOSP IP/OBS DSCHRG MGMT >30: CPT | Mod: 95 | Performed by: STUDENT IN AN ORGANIZED HEALTH CARE EDUCATION/TRAINING PROGRAM

## 2024-07-08 RX ORDER — TRAZODONE HYDROCHLORIDE 50 MG/1
50 TABLET, FILM COATED ORAL
Qty: 60 TABLET | Refills: 0 | Status: ON HOLD | OUTPATIENT
Start: 2024-07-08 | End: 2024-09-12

## 2024-07-08 RX ADMIN — LORAZEPAM 0.5 MG: 0.5 TABLET ORAL at 08:26

## 2024-07-08 RX ADMIN — HYDROXYZINE HYDROCHLORIDE 25 MG: 25 TABLET ORAL at 06:00

## 2024-07-08 ASSESSMENT — ACTIVITIES OF DAILY LIVING (ADL)
ADLS_ACUITY_SCORE: 29
ADLS_ACUITY_SCORE: 39
HYGIENE/GROOMING: INDEPENDENT
ADLS_ACUITY_SCORE: 39
DRESS: SCRUBS (BEHAVIORAL HEALTH);INDEPENDENT
ADLS_ACUITY_SCORE: 39
ORAL_HYGIENE: INDEPENDENT
LAUNDRY: UNABLE TO COMPLETE
ADLS_ACUITY_SCORE: 39
ADLS_ACUITY_SCORE: 39

## 2024-07-08 NOTE — PLAN OF CARE
SHIFT SUMMARY:  Observed resting with eyes closed, respirations even, unlabored and WNL. Able to reposition self and make needs known. 15-minute checks in place. Slept approximately 5 hours.     Problem: Adult Behavioral Health Plan of Care  Goal: Patient-Specific Goal (Individualization)  Description: Pt. Will eat at least 70% at each meal.  Pt. Will sleep at least 6 hours each night.  Pt. Will attend at least 50% of groups, when able to wean.  Patient will be able to participate in a logical and appropriate situation    Outcome: Progressing   Goal Outcome Evaluation:

## 2024-07-08 NOTE — DISCHARGE SUMMARY
[facility] PSYCHIATRY  DISCHARGE SUMMARY     DISCHARGE DATA     Yudi Morales MRN# [id number]   Age: 24 year old YOB: 2000     Date of Admission: 6/25/2024  Date of Discharge: 7/8/2024  Discharge Provider: Sanket Bernstein DO       REASON FOR ADMISSION     This is a 24 year old female with a PMH of schizoaffective disorder, bipolar type, KIRSTY, intellectual disability, stimulant use disorder, and cannabis use disorder who presents with psychosis occurring in the context of likely medication non-adherence and substance use (UDS positive for amphetamines) with the patient being found on the road complaining of a ramires attack with no signs of one. She has a history of multiple hospitalizations last in 2023 per records review. She was been seeing by Dr. Brothers but was likely lost to follow-up with it unclear if she is seeing anyone else. She was more stable on Abilify in the past. She currently does have a legal guardian who consented to hospitalizations. She would benefit from hospitalization to address her psychosis.     In terms of treatment, will either resume Abilify or start Invega with plan for FRIAS.     Patient did require restraints on 6/25 and forced IM medication given her level of agitation and refusal to remove necklaces that could be used for self or other harm.        DISCHARGE DIAGNOSES     #. Schizoaffective disorder, bipolar type, multiple episodes, in acute episode  #. Generalized anxiety disorder  #. Intellectual disability   #. Stimulant (meth) use disorder, severe  #. Cannabis use disorder, unspecified       CONSULTS     Nutrition        HOSPITAL COURSE   Psychiatric Course:    Legal status: Orders Placed This Encounter      Voluntary    Patient was admitted to unit 5 due to the aforementioned presentation. The patient was placed under 15 minute checks to ensure patient safety. The patient participated in unit programming and groups as able.    Ms. Morales did require  seclusion/restraint during hospitalization.    We reviewed with Ms. Morales current and past medication trials including duration, dose, response and side effects. During this hospitalization, the following changes to the patient's psychotropic medications were made:    PTA psychotropic medications held:      -None, as not taking any     PTA psychotropic medications continued/changed:      -Resumed Trazodone 50 mg at bedtime prn sleep    New medication initiated and stopped:    -Ativan up to 1 mg TID being used briefly for agitation  -Invega up to 6 mg on 7/6 due to switching to FRIAS      New psychotropic medications initiated:     -Invega Sustenna 234 mg (6/29) and then 156 mg in one week (7/6) and then 117 mg in 4 weeks (8/5)    The patient was not on any medications prior to admission. She was started on Invega and Ativan to address her psychosis and agitation. She tolerated these medications well apart from sedation and some intermittent dizziness. Her agitation quickly improved with her psychosis taking longer to resolve. The patient spent some time in the MHICU being weaned out with time doing well on the general unit. The patient was switched to Invega Sustenna given her history of non-adherence with tolerating this well with her next injection on 8/5/24.     With these changes and supports the patient noticed improvement in their symptoms and felt sufficiently ready for discharge. Spoke with her legal guardian who felt she had improved with information on discharge being provided.    As a result, Yudi Morales was discharged. At the time of discharge, Yudi Morales was determined to not be a danger to self or others. At the current time of discharge, the patient does not meet criteria for involuntary hospitalization. On the day of discharge, the patient reports that they do not have suicidal or homicidal ideation. Steps taken to minimize risk include: assessing patient s behavior and thought process daily  during hospital stay, discharging patient with adequate plan for follow up for mental and physical health and discussing safety plan of returning to the hospital should the patient ever have thoughts of harming themselves or others. Therefore, based on all available evidence including the factors cited above, the patient does not appear to be at imminent risk for self-harm, and is appropriate for outpatient level of care. However, if patient uses substances or is medication non-adherent, their risk of decompensation and SI will be elevated. This was discussed with the patient.       DISCHARGE MEDICATIONS     Current Discharge Medication List        START taking these medications    Details   nicotine polacrilex (NICORETTE) 4 MG gum Place 1 each (4 mg) inside cheek every hour as needed for nicotine withdrawal symptoms  Qty: 220 each, Refills: 1    Associated Diagnoses: Tobacco abuse      paliperidone (INVEGA SUSTENNA) 117 MG/0.75ML JOVANNI Inject 0.75 mLs (117 mg) into the muscle every 30 days  Qty: 0.75 mL, Refills: 2    Associated Diagnoses: Psychosis, unspecified psychosis type (H)           CONTINUE these medications which have CHANGED    Details   traZODone (DESYREL) 50 MG tablet Take 1 tablet (50 mg) by mouth nightly as needed for sleep (may repeat after 60 minutes)  Qty: 60 tablet, Refills: 0    Associated Diagnoses: Schizoaffective disorder, bipolar type (H)           CONTINUE these medications which have NOT CHANGED    Details   melatonin 3 MG tablet Take 1 tablet (3 mg) by mouth nightly as needed for sleep  Qty: 30 tablet, Refills: 0    Associated Diagnoses: Insomnia, unspecified type           STOP taking these medications       ARIPiprazole (ABILIFY) 5 MG tablet Comments:   Reason for Stopping:         sertraline (ZOLOFT) 50 MG tablet Comments:   Reason for Stopping:                MENTAL STATUS EXAM   Vitals: /63   Pulse 104   Temp 97.3  F (36.3  C) (Temporal)   Resp 18   Wt 54.5 kg (120 lb 3.2  oz)   LMP  (LMP Unknown)   SpO2 92%   BMI 18.83 kg/m      Appearance: Alert, thin, dyed hair  Attitude: More cooperative   Eye Contact: Poor  Mood: Better  Affect: restricted range  Speech: Regular rate, lisp  Psychomotor Behavior: No TD or rigidity. No tremor or akathisia.   Thought Process: More linear   Associations: No loose associations   Thought Content: No SI elicited. Response to internal stimuli at times. No delusional thought   Insight: Adequate  Judgment: Adequate  Oriented to: Person, place  Attention Span and Concentration: Limited  Recent and Remote Memory: Some limitations   Language: English with appropriate syntax and vocabulary  Fund of Knowledge: Low  Muscle Strength and Tone: Grossly normal  Gait and Station: Grossly normal       DISCHARGE PLAN     1.  Education given regarding diagnostic and treatment options with risks, benefits and alternatives with adequate verbalization of understanding.  2.  Discharge to home. Upon detailed review of risk factors, patient amenable for release.   3.  Continue aforementioned medications and associated medication changes with follow-up by outpatient provider.  4.  Crisis management planning in place.    5.  Nursing and  to review further discharge recommendations.   6.  Patient is being discharged with the following appointments:    Health Care Follow-up:      [facility]  Sindhu Sandoval- 7/16 @ 11:15a  Camilla Brothers-  7/22 @ 2:20p  [address] [place] [id number]  P:[phone]       [facility]   Long-acting injectable- 8/5/24  [address] [facility] [address]  [phone]    7. General discharge instructions:       Reason for your hospital stay    Psychosis.     Follow-up and recommended labs and tests     Follow-up with your health care provider as documented in the AVS. Recommended follow-up labs/tests include the following: yearly antipsychotic labs     Activity    Your activity upon discharge: activity as tolerated      Diet    Follow this diet upon discharge: Orders Placed This Encounter      Regular Diet Adult           DISCHARGE SERVICES PROVIDED     60 minutes spent on discharge services, including:  Final examination of patient.  Review and discussion of hospital stay.  Instructions for continued outpatient care/goals.  Preparation of discharge records.  Preparation of medications refills and new prescriptions.  Preparation of applicable referral forms.        ATTESTATION     Dr. Sanket Bernstein  Psychiatrist     VIDEO VISIT    Patient has given verbal consent for video visit?: Yes     Video- Visit Details  Type of service:  video visit for mental health treatment.  Time of service:  Date:  07/08/2024  Video Start Time: 900 AM  Video End Time: 930 AM    Reason for video visit[place]  Originating Site (patient location):  [facility]  Distant Site (provider location):  Remote location  Mode of Communication:  Video Conference via [employer] THIS ADMISSION     Results for orders placed or performed during the hospital encounter of 06/25/24   Head CT w/o contrast     Status: None    Narrative    PROCEDURE: CT HEAD W/O CONTRAST   6/25/2024 7:52 AM    HISTORY:Female, age,  24 years, , , Altered Mental Status (reports  fall, low suspicion fall actually happened, psychiatric issue more  likely, rule out bleed)    COMPARISON:No relevant prior imaging.    TECHNIQUE: CT of the brain without contrast. Axial; sagittal and  coronal reconstructed images were reviewed. If present, MIP and/or 3-D  images were constructed by the technologist.    FINDINGS: Ventricles and sulci are normal in size and shape. Gray and  white matter demonstrate normal differentiation.    There is no evidence of mass, mass effect or midline shift. No  evidence of acute hemorrhage. No acute fracture.       Impression    IMPRESSION:   No acute intracranial abnormality.  No acute fracture.     Unremarkable examination.      This  facility minimizes radiation dose by adjusting the mA and/or kV  according to each patient size.      This CT scan was performed using one or more the following dose  reduction techniques:    -Automated exposure control,  -Adjustment of the mA and/or kV according to patient's size, and/or,  -Use of iterative reconstruction technique.      MARYLOU OCAPMO MD         SYSTEM ID:  [id number]   CBC with platelets     Status: Abnormal   Result Value Ref Range    WBC Count 8.7 4.0 - 11.0 10e3/uL    RBC Count 5.36 (H) 3.80 - 5.20 10e6/uL    Hemoglobin 15.2 11.7 - 15.7 g/dL    Hematocrit 44.8 35.0 - 47.0 %    MCV 84 78 - 100 fL    MCH 28.4 26.5 - 33.0 pg    MCHC 33.9 31.5 - 36.5 g/dL    RDW 13.2 10.0 - 15.0 %    Platelet Count 317 150 - 450 10e3/uL   Basic metabolic panel     Status: Abnormal   Result Value Ref Range    Sodium 136 135 - 145 mmol/L    Potassium 3.9 3.4 - 5.3 mmol/L    Chloride 101 98 - 107 mmol/L    Carbon Dioxide (CO2) 19 (L) 22 - 29 mmol/L    Anion Gap 16 (H) 7 - 15 mmol/L    Urea Nitrogen 19.1 6.0 - 20.0 mg/dL    Creatinine 1.05 (H) 0.51 - 0.95 mg/dL    GFR Estimate 77 >60 mL/min/1.73m2    Calcium 10.0 8.6 - 10.0 mg/dL    Glucose 100 (H) 70 - 99 mg/dL   UA with Microscopic reflex to Culture     Status: Abnormal    Specimen: Urine, NOS   Result Value Ref Range    Color Urine Yellow Colorless, Straw, Light Yellow, Yellow    Appearance Urine Slightly Cloudy (A) Clear    Glucose Urine Negative Negative mg/dL    Bilirubin Urine Negative Negative    Ketones Urine 10 (A) Negative mg/dL    Specific Gravity Urine 1.033 1.003 - 1.035    Blood Urine Negative Negative    pH Urine 6.0 4.7 - 8.0    Protein Albumin Urine 200 (A) Negative mg/dL    Urobilinogen Urine Normal Normal, 2.0 mg/dL    Nitrite Urine Positive (A) Negative    Leukocyte Esterase Urine Large (A) Negative    Mucus Urine Present (A) None Seen /LPF    RBC Urine 12 (H) <=2 /HPF    WBC Urine >182 (H) <=5 /HPF    Squamous Epithelials Urine 27 (H) <=1 /HPF     Hyaline Casts Urine 10 (H) <=2 /LPF   HCG qualitative urine     Status: Normal   Result Value Ref Range    hCG Urine Qualitative Negative Negative   Acetaminophen level     Status: Abnormal   Result Value Ref Range    Acetaminophen <5.0 (L) 10.0 - 30.0 ug/mL   Salicylate level     Status: Normal   Result Value Ref Range    Salicylate <0.3   mg/dL   Urine Drug Screen Panel     Status: Abnormal   Result Value Ref Range    Amphetamines Urine Screen Positive (A) Screen Negative    Barbituates Urine Screen Negative Screen Negative    Benzodiazepine Urine Screen Negative Screen Negative    Cannabinoids Urine Screen Negative Screen Negative    Cocaine Urine Screen Negative Screen Negative    Fentanyl Qual Urine Screen Negative Screen Negative    Opiates Urine Screen Negative Screen Negative    PCP Urine Screen Negative Screen Negative   Hemoglobin A1c     Status: None   Result Value Ref Range    Estimated Average Glucose 94 mg/dL    Hemoglobin A1C 4.9 <5.7 %   Lipid Profile     Status: Normal   Result Value Ref Range    Cholesterol 134 <200 mg/dL    Triglycerides 76 <150 mg/dL    Direct Measure HDL 56 >=50 mg/dL    LDL Cholesterol Calculated 63 <=100 mg/dL    Non HDL Cholesterol 78 <130 mg/dL    Narrative    Cholesterol  Desirable:  <200 mg/dL    Triglycerides  Normal:  Less than 150 mg/dL  Borderline High:  150-199 mg/dL  High:  200-499 mg/dL  Very High:  Greater than or equal to 500 mg/dL    Direct Measure HDL  Female:  Greater than or equal to 50 mg/dL   Male:  Greater than or equal to 40 mg/dL    LDL Cholesterol  Desirable:  <100mg/dL  Above Desirable:  100-129 mg/dL   Borderline High:  130-159 mg/dL   High:  160-189 mg/dL   Very High:  >= 190 mg/dL    Non HDL Cholesterol  Desirable:  130 mg/dL  Above Desirable:  130-159 mg/dL  Borderline High:  160-189 mg/dL  High:  190-219 mg/dL  Very High:  Greater than or equal to 220 mg/dL   Glucose by meter     Status: Abnormal   Result Value Ref Range    GLUCOSE BY METER POCT  118 (H) 70 - 99 mg/dL   EKG 12 lead     Status: None   Result Value Ref Range    Systolic Blood Pressure  mmHg    Diastolic Blood Pressure  mmHg    Ventricular Rate 92 BPM    Atrial Rate 92 BPM    MA Interval 100 ms    QRS Duration 80 ms     ms    QTc 432 ms    P Axis 65 degrees    R AXIS 78 degrees    T Axis 32 degrees    Interpretation ECG       Sinus rhythm with short MA  Otherwise normal ECG  When compared with ECG of 15-DAYAN-2024 20:32,  No significant change was found  Confirmed by MD Juan M, Darrel ([id number]) on 6/25/2024 11:46:17 AM     Urine Drug Screen     Status: Abnormal    Narrative    The following orders were created for panel order Urine Drug Screen.  Procedure                               Abnormality         Status                     ---------                               -----------         ------                     Urine Drug Screen Panel[[id number]]      Abnormal            Final result                 Please view results for these tests on the individual orders.

## 2024-07-08 NOTE — PROGRESS NOTES
Pt is discharging at the recommendation of the treatment team. Pt is discharging to home transported by herself and boyfriend. Pt denies having any thoughts of hurting themself or anyone else. Pt denies anxiety or depression. Pt has follow up with PCP and medication management. Discharge instructions, including; demographic sheet, psychiatric evaluation, discharge summary, and AVS were faxed to these next level of care providers.

## 2024-07-08 NOTE — PLAN OF CARE
Discharge Note    Patient Discharged to home on 7/8/2024 11:20 AM via No transportation concerns accompanied to door by [facility] staff.     Patient informed of discharge instructions in AVS. patient verbalizes understanding and denies having any questions pertaining to AVS. Patient stable at time of discharge. Patient denies SI, HI, and thoughts of self harm at time of discharge. All personal belongings returned to patient. Discharge prescriptions sent to [facility] via electronic communication. Psych evaluation, history and physical, AVS, and discharge summary faxed to next level of care- per .       Patient calm and cooperative today.  Excited for discharge and states she feels ready to go home.  No delusional or paranoid statements noted.  Took all medications as prescribed.  No complaints of pain.  Vs WNL.      Kelly Timmons RN  7/8/2024  12:00 PM    Problem: Suicide Risk  Goal: Absence of Self-Harm  Outcome: Adequate for Care Transition     Problem: Thought Process Alteration  Goal: Optimal Thought Clarity  Description: Pt. Will have a linear thought process by target date.   Outcome: Adequate for Care Transition     Problem: Adult Behavioral Health Plan of Care  Goal: Patient-Specific Goal (Individualization)  Description: Pt. Will eat at least 70% at each meal.  Pt. Will sleep at least 6 hours each night.  Pt. Will attend at least 50% of groups, when able to wean.  Patient will be able to participate in a logical and appropriate situation    Outcome: Adequate for Care Transition

## 2024-07-08 NOTE — PROGRESS NOTES
Sw talked with pt's guardian about discharge. She was curious about MNChoice assessment and if it was completed, however pt refused to do it. Sw is unable to answer question for pt. Sw did let her know that referrals for [facility] were sent and that a list will be sent to her for follow up.

## 2024-07-09 ENCOUNTER — TELEPHONE (OUTPATIENT)
Dept: BEHAVIORAL HEALTH | Facility: HOSPITAL | Age: 24
End: 2024-07-09

## 2024-07-09 NOTE — TELEPHONE ENCOUNTER
[place]: Post-Hospital Discharge Note     Situation   Post hospital discharge call placed 07/09/24.      Yudi did not answer. A message was left.  Messages are left with a call back number should they need to reach staff with any questions, need for additional resources, or need assistance setting up a post hospitalization follow up appointments, if unable to do so independently.    Inpatient Mental Health Admission Information:  Admission Date: 6/25/24  Admission Reason: This is a 24 year old female with a PMH of schizoaffective disorder, bipolar type, KIRSTY, intellectual disability, stimulant use disorder, and cannabis use disorder who presents with psychosis occurring in the context of likely medication non-adherence and substance use (UDS positive for amphetamines) with the patient being found on the road complaining of a ramires attack with no signs of one. She has a history of multiple hospitalizations last in 2023 per records review. She was been seeing by Dr. Brothers but was likely lost to follow-up with it unclear if she is seeing anyone else. She was more stable on Abilify in the past. She currently does have a legal guardian who consented to hospitalizations. She would benefit from hospitalization to address her psychosis.     In terms of treatment, will either resume Abilify or start Invega with plan for FRIAS.     Patient did require restraints on 6/25 and forced IM medication given her level of agitation and refusal to remove necklaces that could be used for self or other harm.    Inpatient Mental Health Discharge Information:  Discharge Date: 7/8/24  Discharged to: home/ self care  Discharge Diagnosis:  Schizoaffective disorder, bipolar type, multiple episodes, in acute episode  #. Generalized anxiety disorder  #. Intellectual disability   #. Stimulant (meth) use disorder, severe  #. Cannabis use disorder, unspecified    Background    The following information is obtained from the hospital after  "visit summary and inpatient provider notes.     \"Psychiatric Course:     Legal status: Orders Placed This Encounter      Voluntary     Patient was admitted to unit 5 due to the aforementioned presentation. The patient was placed under 15 minute checks to ensure patient safety. The patient participated in unit programming and groups as able.     Ms. Morales did require seclusion/restraint during hospitalization.     We reviewed with Ms. Morales current and past medication trials including duration, dose, response and side effects. During this hospitalization, the following changes to the patient's psychotropic medications were made:     PTA psychotropic medications held:      -None, as not taking any     PTA psychotropic medications continued/changed:      -Resumed Trazodone 50 mg at bedtime prn sleep     New medication initiated and stopped:     -Ativan up to 1 mg TID being used briefly for agitation  -Invega up to 6 mg on 7/6 due to switching to FRIAS      New psychotropic medications initiated:      -Invega Sustenna 234 mg (6/29) and then 156 mg in one week (7/6) and then 117 mg in 4 weeks (8/5)     The patient was not on any medications prior to admission. She was started on Invega and Ativan to address her psychosis and agitation. She tolerated these medications well apart from sedation and some intermittent dizziness. Her agitation quickly improved with her psychosis taking longer to resolve. The patient spent some time in the MHICU being weaned out with time doing well on the general unit. The patient was switched to Invega Sustenna given her history of non-adherence with tolerating this well with her next injection on 8/5/24. With these changes and supports the patient noticed improvement in their symptoms and felt sufficiently ready for discharge. Spoke with her legal guardian who felt she had improved with information on discharge being provided.     As a result, Yudi Morales was discharged. At the time of " "discharge, Yudi Morales was determined to not be a danger to self or others. At the current time of discharge, the patient does not meet criteria for involuntary hospitalization. On the day of discharge, the patient reports that they do not have suicidal or homicidal ideation. Steps taken to minimize risk include: assessing patient s behavior and thought process daily during hospital stay, discharging patient with adequate plan for follow up for mental and physical health and discussing safety plan of returning to the hospital should the patient ever have thoughts of harming themselves or others. Therefore, based on all available evidence including the factors cited above, the patient does not appear to be at imminent risk for self-harm, and is appropriate for outpatient level of care. However, if patient uses substances or is medication non-adherent, their risk of decompensation and SI will be elevated. This was discussed with the patient.\"        Post Discharge Assessment   How have your symptoms been since being discharged from the hospital? Message left to call with any questions or concerns  Do you have your discharge instructions/after visit summary? NA  Do you have any questions related to your discharge instructions? NA    Discharge Medication Assessment   Medications were reviewed in full on discharge, including: Medications to be started, medications to be stopped, medications to be continued from preadmission and any side effects.      Prescriptions were e-scribed or sent to their preferred pharmacy at discharge and were able to be filled: NA  Do you have any questions about your medications? NA    Outpatient Plan/Future Appointments  Discharge follow up appointment scheduled within 14 days of discharging from hospital? Yes   Health Care Follow-up:      [facility]  Sindhu Sandoval- 7/16 @ 11:15a  Camilla Brothers-  7/22 @ 2:20p  [facility] [address] [address]  P:[phone]       [facility]   Long-acting injectable- 8/5/24  [address] [place] [address]  [phone]      Further information, barriers, or follow up this writer has addressed: N/A

## 2024-07-14 ENCOUNTER — DOCUMENTATION ONLY (OUTPATIENT)
Dept: OTHER | Facility: CLINIC | Age: 24
End: 2024-07-14
Payer: COMMERCIAL

## 2024-07-17 ENCOUNTER — HOSPITAL ENCOUNTER (EMERGENCY)
Facility: HOSPITAL | Age: 24
Discharge: HOME | End: 2024-07-17
Attending: PHYSICIAN ASSISTANT | Admitting: PHYSICIAN ASSISTANT
Payer: COMMERCIAL

## 2024-07-17 VITALS
HEART RATE: 109 BPM | RESPIRATION RATE: 16 BRPM | SYSTOLIC BLOOD PRESSURE: 121 MMHG | TEMPERATURE: 97 F | OXYGEN SATURATION: 98 % | DIASTOLIC BLOOD PRESSURE: 83 MMHG

## 2024-07-17 DIAGNOSIS — J02.9 PHARYNGITIS: ICD-10-CM

## 2024-07-17 LAB — GROUP A STREP BY PCR: NOT DETECTED

## 2024-07-17 PROCEDURE — 99283 EMERGENCY DEPT VISIT LOW MDM: CPT | Performed by: PHYSICIAN ASSISTANT

## 2024-07-17 PROCEDURE — 250N000013 HC RX MED GY IP 250 OP 250 PS 637: Performed by: PHYSICIAN ASSISTANT

## 2024-07-17 PROCEDURE — 87651 STREP A DNA AMP PROBE: CPT | Performed by: PHYSICIAN ASSISTANT

## 2024-07-17 PROCEDURE — 99283 EMERGENCY DEPT VISIT LOW MDM: CPT

## 2024-07-17 RX ORDER — IBUPROFEN 600 MG/1
600 TABLET, FILM COATED ORAL ONCE
Status: COMPLETED | OUTPATIENT
Start: 2024-07-17 | End: 2024-07-17

## 2024-07-17 RX ADMIN — IBUPROFEN 600 MG: 600 TABLET, FILM COATED ORAL at 19:33

## 2024-07-17 ASSESSMENT — COLUMBIA-SUICIDE SEVERITY RATING SCALE - C-SSRS
1. IN THE PAST MONTH, HAVE YOU WISHED YOU WERE DEAD OR WISHED YOU COULD GO TO SLEEP AND NOT WAKE UP?: NO
6. HAVE YOU EVER DONE ANYTHING, STARTED TO DO ANYTHING, OR PREPARED TO DO ANYTHING TO END YOUR LIFE?: YES
2. HAVE YOU ACTUALLY HAD ANY THOUGHTS OF KILLING YOURSELF IN THE PAST MONTH?: NO

## 2024-07-17 ASSESSMENT — ACTIVITIES OF DAILY LIVING (ADL): ADLS_ACUITY_SCORE: 35

## 2024-07-17 ASSESSMENT — ENCOUNTER SYMPTOMS: FEVER: 0

## 2024-07-17 NOTE — ED TRIAGE NOTES
Patient presents with c/o feeling that there is something in her throat when she swallows for a few days. Reports pain when swallowing.

## 2024-07-18 NOTE — ED PROVIDER NOTES
"  History     Chief Complaint   Patient presents with    Pharyngitis     The history is provided by the patient.     Yudi Morales is a 24 year old female who presented to the emergency department for evaluation of a sore throat with increasing pain with swallowing.  No drooling.  No fevers.    Allergies:  Allergies   Allergen Reactions    Other [No Clinical Screening - See Comments] Anaphylaxis     \"Sodium Penathol\" per mother. Used in anesthesia.  Family hx of reaction.    Thiopental Anaphylaxis     Other reaction(s): Other - Describe In Comment Field    Family Hx of death with this medication    Great grandmother  on the operating table to this medication; worried about allergies to sodium      Other reaction(s): Other - Describe In Comment Field Family Hx of death with this medication    Latex Rash    Amoxicillin Trihydrate GI Disturbance    Amoxicillin-Pot Clavulanate GI Disturbance    Phenobarbital        Problem List:    Patient Active Problem List    Diagnosis Date Noted    Acute psychosis (H) 2024     Priority: Medium    Methamphetamine use (H) 2024     Priority: Medium    Self-injurious behavior 2024     Priority: Medium    Suicidal ideation 2023     Priority: Medium    Pituitary microadenoma (H) 2023     Priority: Medium     2023 5 x 5 mm hypoenhancing focus in the right pituitary gland  High prolactin resolved with stopping risperidone.   Cortisol and IGF-1 within normal limits       Marijuana use 2023     Priority: Medium    Tympanosclerosis of both ears 11/15/2022     Priority: Medium    Postnasal drip 11/15/2022     Priority: Medium    Primary insomnia 11/15/2022     Priority: Medium    Constipation, unspecified constipation type 11/15/2022     Priority: Medium    History of suicide attempt 10/28/2022     Priority: Medium     2020 - tylenol overdose      Intellectual disability 10/28/2022     Priority: Medium    Schizoaffective disorder, bipolar type (H) " 07/08/2021     Priority: Medium     Dr Brothers      KIRSTY (generalized anxiety disorder) 07/19/2019     Priority: Medium    Allergic rhinitis 10/04/2011     Priority: Medium        Past Medical History:    Past Medical History:   Diagnosis Date    Allergic rhinitis 10/4/2011    KIRSTY (generalized anxiety disorder) 7/19/2019    Hyperprolactinemia (H24) 11/16/2022    PTSD (post-traumatic stress disorder)     Suicidal behavior with attempted self-injury (H) 2/2/2021       Past Surgical History:    Past Surgical History:   Procedure Laterality Date    TONSILLECTOMY & ADENOIDECTOMY  2008       Family History:    No family history on file.    Social History:  Marital Status:  Single [1]  Social History     Tobacco Use    Smoking status: Every Day     Types: Vaping Device    Smokeless tobacco: Never   Vaping Use    Vaping status: Every Day    Substances: Nicotine, CBD, Flavoring    Devices: Disposable   Substance Use Topics    Alcohol use: No     Comment: Denies    Drug use: Yes     Types: Methamphetamines     Comment: denies any recent usage        Medications:    melatonin 3 MG tablet  nicotine polacrilex (NICORETTE) 4 MG gum  [START ON 8/5/2024] paliperidone (INVEGA SUSTENNA) 117 MG/0.75ML JOVANNI  traZODone (DESYREL) 50 MG tablet          Review of Systems   Constitutional:  Negative for fever.   HENT:          See HPI       Physical Exam   BP: 121/83  Pulse: 109  Temp: 97  F (36.1  C)  Resp: 16  SpO2: 98 %      Physical Exam  Vitals and nursing note reviewed.   Constitutional:       General: She is not in acute distress.     Appearance: Normal appearance. She is normal weight. She is not ill-appearing, toxic-appearing or diaphoretic.   HENT:      Mouth/Throat:      Comments: Mild soft palate petechiae without evidence of significant erythema.  She has undergone tonsillectomy.  Normal speech with no drooling or other concerning features.  No trismus.  Neck:      Comments: Very minimal right anterior cervical tenderness.   There is no significant swelling or areas of fluctuance.  She has normal range of motion of the neck.  Cardiovascular:      Rate and Rhythm: Regular rhythm.   Pulmonary:      Effort: Pulmonary effort is normal.      Breath sounds: Normal breath sounds.   Skin:     General: Skin is warm and dry.      Capillary Refill: Capillary refill takes less than 2 seconds.   Neurological:      General: No focal deficit present.      Mental Status: She is alert and oriented to person, place, and time.         ED Course        Procedures              Critical Care time:  none               Results for orders placed or performed during the hospital encounter of 07/17/24 (from the past 24 hour(s))   Group A Streptococcus PCR Throat Swab    Specimen: Throat; Swab   Result Value Ref [facility]    Group A strep by PCR Not Detected Not Detected    Narrative    The Xpert Xpress Strep A test, performed on the [employer], is a rapid, qualitative in vitro diagnostic test for the detection of Streptococcus pyogenes (Group A ß-hemolytic Streptococcus, Strep A) in throat swab specimens from patients with signs and symptoms of pharyngitis. The Xpert Xpress Strep A test can be used as an aid in the diagnosis of Group A Streptococcal pharyngitis. The assay is not intended to monitor treatment for Group A Streptococcus infections. The Xpert Xpress Strep A test utilizes an automated real-time polymerase chain reaction (PCR) to detect Streptococcus pyogenes DNA.       Medications   ibuprofen (ADVIL/MOTRIN) tablet 600 mg (600 mg Oral $Given 7/17/24 1933)       Assessments & Plan (with Medical Decision Making)   24-year-old female with pharyngitis.  Mild.  No high risk or red flag features.  Strep is negative.  She has undergone tonsillectomy in the past.  No fevers.  No drooling.  She has a 2 L bottle of soda and Oreos in her bag.  I believe she can be safely discharged with close follow-up.  Return precautions provided.    This  document was prepared using a combination of typing and voice generated software.  While every attempt was made for accuracy, spelling and grammatical errors may exist.     I have reviewed the nursing notes.    I have reviewed the findings, diagnosis, plan and need for follow up with the patient.           Medical Decision Making  The patient's presentation was of low complexity (an acute and uncomplicated illness or injury).    The patient's evaluation involved:  ordering and/or review of 1 test(s) in this encounter (strep)    The patient's management necessitated moderate risk (prescription drug management including medications given in the ED).        New Prescriptions    No medications on file       Final diagnoses:   Pharyngitis       7/17/2024   [facility]       Daija Stanford PA-C  07/17/24 2009

## 2024-07-18 NOTE — DISCHARGE INSTRUCTIONS
Ibuprofen and Tylenol as needed.    Follow-up in the clinic for persistent symptoms.    Return here as needed.

## 2024-07-19 NOTE — PHARMACY
The patient was credited for the Invega Sustenna 156 mg injection administered on 7/6/24 due to receiving drug from the 's free trial program. The patient is not eligible for additional free units this calendar year per program rules.     Vicente RuthD on July 19, 2024

## 2024-08-26 ENCOUNTER — TELEPHONE (OUTPATIENT)
Dept: BEHAVIORAL HEALTH | Facility: CLINIC | Age: 24
End: 2024-08-26
Payer: COMMERCIAL

## 2024-08-26 ENCOUNTER — HOSPITAL ENCOUNTER (INPATIENT)
Facility: HOSPITAL | Age: 24
LOS: 18 days | Discharge: NURSING, REHAB OR LONG-TERM CARE | End: 2024-09-13
Attending: PHYSICIAN ASSISTANT | Admitting: STUDENT IN AN ORGANIZED HEALTH CARE EDUCATION/TRAINING PROGRAM
Payer: COMMERCIAL

## 2024-08-26 DIAGNOSIS — F41.1 GAD (GENERALIZED ANXIETY DISORDER): Primary | Chronic | ICD-10-CM

## 2024-08-26 DIAGNOSIS — Z91.148 NONCOMPLIANCE WITH MEDICATION REGIMEN: ICD-10-CM

## 2024-08-26 DIAGNOSIS — G47.00 INSOMNIA, UNSPECIFIED TYPE: ICD-10-CM

## 2024-08-26 DIAGNOSIS — F25.0 SCHIZOAFFECTIVE DISORDER, BIPOLAR TYPE (H): Chronic | ICD-10-CM

## 2024-08-26 DIAGNOSIS — F29 PSYCHOSIS, UNSPECIFIED PSYCHOSIS TYPE (H): ICD-10-CM

## 2024-08-26 DIAGNOSIS — F17.210 CIGARETTE NICOTINE DEPENDENCE WITHOUT COMPLICATION: ICD-10-CM

## 2024-08-26 LAB
ALBUMIN SERPL BCG-MCNC: 4.8 G/DL (ref 3.5–5.2)
ALP SERPL-CCNC: 89 U/L (ref 40–150)
ALT SERPL W P-5'-P-CCNC: 13 U/L (ref 0–50)
ANION GAP SERPL CALCULATED.3IONS-SCNC: 17 MMOL/L (ref 7–15)
AST SERPL W P-5'-P-CCNC: 23 U/L (ref 0–45)
BASOPHILS # BLD AUTO: 0 10E3/UL (ref 0–0.2)
BASOPHILS NFR BLD AUTO: 0 %
BILIRUB SERPL-MCNC: 1.1 MG/DL
BUN SERPL-MCNC: 16 MG/DL (ref 6–20)
CALCIUM SERPL-MCNC: 9.8 MG/DL (ref 8.8–10.4)
CHLORIDE SERPL-SCNC: 101 MMOL/L (ref 98–107)
CREAT SERPL-MCNC: 1.07 MG/DL (ref 0.51–0.95)
EGFRCR SERPLBLD CKD-EPI 2021: 75 ML/MIN/1.73M2
EOSINOPHIL # BLD AUTO: 0 10E3/UL (ref 0–0.7)
EOSINOPHIL NFR BLD AUTO: 1 %
ERYTHROCYTE [DISTWIDTH] IN BLOOD BY AUTOMATED COUNT: 12.8 % (ref 10–15)
ETHANOL SERPL-MCNC: <0.01 G/DL
GLUCOSE SERPL-MCNC: 109 MG/DL (ref 70–99)
HCG SERPL QL: NEGATIVE
HCO3 SERPL-SCNC: 24 MMOL/L (ref 22–29)
HCT VFR BLD AUTO: 43.5 % (ref 35–47)
HGB BLD-MCNC: 14.7 G/DL (ref 11.7–15.7)
HOLD SPECIMEN: NORMAL
IMM GRANULOCYTES # BLD: 0 10E3/UL
IMM GRANULOCYTES NFR BLD: 0 %
LYMPHOCYTES # BLD AUTO: 1.9 10E3/UL (ref 0.8–5.3)
LYMPHOCYTES NFR BLD AUTO: 31 %
MAGNESIUM SERPL-MCNC: 2.1 MG/DL (ref 1.7–2.3)
MCH RBC QN AUTO: 28.4 PG (ref 26.5–33)
MCHC RBC AUTO-ENTMCNC: 33.8 G/DL (ref 31.5–36.5)
MCV RBC AUTO: 84 FL (ref 78–100)
MONOCYTES # BLD AUTO: 0.7 10E3/UL (ref 0–1.3)
MONOCYTES NFR BLD AUTO: 11 %
NEUTROPHILS # BLD AUTO: 3.5 10E3/UL (ref 1.6–8.3)
NEUTROPHILS NFR BLD AUTO: 57 %
NRBC # BLD AUTO: 0 10E3/UL
NRBC BLD AUTO-RTO: 0 /100
PLATELET # BLD AUTO: 306 10E3/UL (ref 150–450)
POTASSIUM SERPL-SCNC: 3.5 MMOL/L (ref 3.4–5.3)
PROT SERPL-MCNC: 8 G/DL (ref 6.4–8.3)
RBC # BLD AUTO: 5.17 10E6/UL (ref 3.8–5.2)
SODIUM SERPL-SCNC: 142 MMOL/L (ref 135–145)
TSH SERPL DL<=0.005 MIU/L-ACNC: 2.48 UIU/ML (ref 0.3–4.2)
WBC # BLD AUTO: 6.1 10E3/UL (ref 4–11)

## 2024-08-26 PROCEDURE — 82077 ASSAY SPEC XCP UR&BREATH IA: CPT | Performed by: PHYSICIAN ASSISTANT

## 2024-08-26 PROCEDURE — 250N000013 HC RX MED GY IP 250 OP 250 PS 637: Performed by: PHYSICIAN ASSISTANT

## 2024-08-26 PROCEDURE — 85025 COMPLETE CBC W/AUTO DIFF WBC: CPT | Performed by: PHYSICIAN ASSISTANT

## 2024-08-26 PROCEDURE — 84443 ASSAY THYROID STIM HORMONE: CPT | Performed by: PHYSICIAN ASSISTANT

## 2024-08-26 PROCEDURE — 83735 ASSAY OF MAGNESIUM: CPT | Performed by: PHYSICIAN ASSISTANT

## 2024-08-26 PROCEDURE — 96374 THER/PROPH/DIAG INJ IV PUSH: CPT

## 2024-08-26 PROCEDURE — 258N000003 HC RX IP 258 OP 636: Performed by: PHYSICIAN ASSISTANT

## 2024-08-26 PROCEDURE — 250N000011 HC RX IP 250 OP 636: Performed by: PHYSICIAN ASSISTANT

## 2024-08-26 PROCEDURE — 124N000001 HC R&B MH

## 2024-08-26 PROCEDURE — 99285 EMERGENCY DEPT VISIT HI MDM: CPT | Mod: 25

## 2024-08-26 PROCEDURE — 99284 EMERGENCY DEPT VISIT MOD MDM: CPT | Performed by: PHYSICIAN ASSISTANT

## 2024-08-26 PROCEDURE — 36415 COLL VENOUS BLD VENIPUNCTURE: CPT | Performed by: PHYSICIAN ASSISTANT

## 2024-08-26 PROCEDURE — 80053 COMPREHEN METABOLIC PANEL: CPT | Performed by: PHYSICIAN ASSISTANT

## 2024-08-26 PROCEDURE — 84703 CHORIONIC GONADOTROPIN ASSAY: CPT | Performed by: PHYSICIAN ASSISTANT

## 2024-08-26 RX ORDER — TRAZODONE HYDROCHLORIDE 50 MG/1
50 TABLET, FILM COATED ORAL
Status: DISCONTINUED | OUTPATIENT
Start: 2024-08-26 | End: 2024-09-13 | Stop reason: HOSPADM

## 2024-08-26 RX ORDER — OLANZAPINE 10 MG/2ML
10 INJECTION, POWDER, FOR SOLUTION INTRAMUSCULAR 3 TIMES DAILY PRN
Status: DISCONTINUED | OUTPATIENT
Start: 2024-08-26 | End: 2024-09-13 | Stop reason: HOSPADM

## 2024-08-26 RX ORDER — OLANZAPINE 10 MG/1
10 TABLET ORAL 3 TIMES DAILY PRN
Status: DISCONTINUED | OUTPATIENT
Start: 2024-08-26 | End: 2024-09-13 | Stop reason: HOSPADM

## 2024-08-26 RX ORDER — ACETAMINOPHEN 325 MG/1
650 TABLET ORAL EVERY 4 HOURS PRN
Status: DISCONTINUED | OUTPATIENT
Start: 2024-08-26 | End: 2024-09-13 | Stop reason: HOSPADM

## 2024-08-26 RX ORDER — LORAZEPAM 2 MG/ML
1 INJECTION INTRAMUSCULAR ONCE
Status: COMPLETED | OUTPATIENT
Start: 2024-08-26 | End: 2024-08-26

## 2024-08-26 RX ORDER — OLANZAPINE 5 MG/1
10 TABLET, ORALLY DISINTEGRATING ORAL ONCE
Status: COMPLETED | OUTPATIENT
Start: 2024-08-26 | End: 2024-08-26

## 2024-08-26 RX ORDER — HYDROXYZINE HYDROCHLORIDE 25 MG/1
25 TABLET, FILM COATED ORAL EVERY 6 HOURS PRN
Status: DISCONTINUED | OUTPATIENT
Start: 2024-08-26 | End: 2024-08-27

## 2024-08-26 RX ADMIN — LORAZEPAM 1 MG: 2 INJECTION INTRAMUSCULAR; INTRAVENOUS at 11:55

## 2024-08-26 RX ADMIN — OLANZAPINE 10 MG: 5 TABLET, ORALLY DISINTEGRATING ORAL at 12:37

## 2024-08-26 RX ADMIN — SODIUM CHLORIDE, POTASSIUM CHLORIDE, SODIUM LACTATE AND CALCIUM CHLORIDE 1000 ML: 600; 310; 30; 20 INJECTION, SOLUTION INTRAVENOUS at 11:56

## 2024-08-26 ASSESSMENT — ENCOUNTER SYMPTOMS
APPETITE CHANGE: 1
BACK PAIN: 1
WEAKNESS: 1
NECK STIFFNESS: 1
CHILLS: 1
ABDOMINAL PAIN: 1
FATIGUE: 1
NECK PAIN: 1
COUGH: 1
FEVER: 1
DIZZINESS: 1
ACTIVITY CHANGE: 1
SHORTNESS OF BREATH: 1

## 2024-08-26 ASSESSMENT — ACTIVITIES OF DAILY LIVING (ADL)
DRESSING/BATHING_DIFFICULTY: NO
DIFFICULTY_COMMUNICATING: NO
ADLS_ACUITY_SCORE: 29
CHANGE_IN_FUNCTIONAL_STATUS_SINCE_ONSET_OF_CURRENT_ILLNESS/INJURY: NO
DIFFICULTY_EATING/SWALLOWING: NO
ADLS_ACUITY_SCORE: 29
CONCENTRATING,_REMEMBERING_OR_MAKING_DECISIONS_DIFFICULTY: YES
WEAR_GLASSES_OR_BLIND: NO
ADLS_ACUITY_SCORE: 29
ADLS_ACUITY_SCORE: 29
DOING_ERRANDS_INDEPENDENTLY_DIFFICULTY: NO
ADLS_ACUITY_SCORE: 35
TOILETING_ISSUES: NO
ADLS_ACUITY_SCORE: 29
WALKING_OR_CLIMBING_STAIRS_DIFFICULTY: NO
ADLS_ACUITY_SCORE: 29
ADLS_ACUITY_SCORE: 29
HEARING_DIFFICULTY_OR_DEAF: NO
ADLS_ACUITY_SCORE: 29
ADLS_ACUITY_SCORE: 35
ADLS_ACUITY_SCORE: 29
FALL_HISTORY_WITHIN_LAST_SIX_MONTHS: NO
ADLS_ACUITY_SCORE: 35

## 2024-08-26 NOTE — ED PROVIDER NOTES
"  History     Chief Complaint   Patient presents with    Dizziness    Bradycardia    Abdominal Pain    Vaginal Problem    Nausea     The history is provided by the patient.     Yudi Morales is a 24 year old female who presented to the emergency department ambulatory for evaluation of a multitude of concerns including bradycardia, tachycardia, abdominal pain, nausea, fevers, headaches, visual concerns, vaginal bleeding, urinary discomfort, etc.  The patient has a history most significant for relatively active methamphetamine abuse as well as a previous admission for acute psychosis secondary to the mentioned.    Allergies:  Allergies   Allergen Reactions    Other [No Clinical Screening - See Comments] Anaphylaxis     \"Sodium Penathol\" per mother. Used in anesthesia.  Family hx of reaction.    Thiopental Anaphylaxis     Other reaction(s): Other - Describe In Comment Field    Family Hx of death with this medication    Great grandmother  on the operating table to this medication; worried about allergies to sodium      Other reaction(s): Other - Describe In Comment Field Family Hx of death with this medication    Latex Rash    Amoxicillin Trihydrate GI Disturbance    Amoxicillin-Pot Clavulanate GI Disturbance    Phenobarbital        Problem List:    Patient Active Problem List    Diagnosis Date Noted    Psychosis, unspecified psychosis type (H) 2024     Priority: Medium    Acute psychosis (H) 2024     Priority: Medium    Methamphetamine use (H) 2024     Priority: Medium    Self-injurious behavior 2024     Priority: Medium    Suicidal ideation 2023     Priority: Medium    Pituitary microadenoma (H) 2023     Priority: Medium     2023 5 x 5 mm hypoenhancing focus in the right pituitary gland  High prolactin resolved with stopping risperidone.   Cortisol and IGF-1 within normal limits       Marijuana use 2023     Priority: Medium    Tympanosclerosis of both ears 11/15/2022    "  Priority: Medium    Postnasal drip 11/15/2022     Priority: Medium    Primary insomnia 11/15/2022     Priority: Medium    Constipation, unspecified constipation type 11/15/2022     Priority: Medium    History of suicide attempt 10/28/2022     Priority: Medium     2020 - tylenol overdose      Intellectual disability 10/28/2022     Priority: Medium    Schizoaffective disorder, bipolar type (H) 07/08/2021     Priority: Medium     Dr Brothers      KIRSTY (generalized anxiety disorder) 07/19/2019     Priority: Medium    Allergic rhinitis 10/04/2011     Priority: Medium        Past Medical History:    Past Medical History:   Diagnosis Date    Allergic rhinitis 10/4/2011    KIRSTY (generalized anxiety disorder) 7/19/2019    Hyperprolactinemia (H24) 11/16/2022    PTSD (post-traumatic stress disorder)     Suicidal behavior with attempted self-injury (H) 2/2/2021       Past Surgical History:    Past Surgical History:   Procedure Laterality Date    TONSILLECTOMY & ADENOIDECTOMY  2008       Family History:    No family history on file.    Social History:  Marital Status:  Single [1]  Social History     Tobacco Use    Smoking status: Every Day     Types: Vaping Device    Smokeless tobacco: Never   Vaping Use    Vaping status: Every Day    Substances: Nicotine, CBD, Flavoring    Devices: Disposable   Substance Use Topics    Alcohol use: No     Comment: Denies    Drug use: Yes     Types: Methamphetamines     Comment: denies any recent usage        Medications:    melatonin 3 MG tablet  nicotine polacrilex (NICORETTE) 4 MG gum  paliperidone (INVEGA SUSTENNA) 117 MG/0.75ML JOVANNI  traZODone (DESYREL) 50 MG tablet          Review of Systems   Constitutional:  Positive for activity change, appetite change, chills, fatigue and fever.   Respiratory:  Positive for cough and shortness of breath.    Cardiovascular:  Positive for chest pain.   Gastrointestinal:  Positive for abdominal pain.   Genitourinary:  Positive for pelvic pain and urgency.    Musculoskeletal:  Positive for back pain, neck pain and neck stiffness.   Neurological:  Positive for dizziness and weakness.       Physical Exam   BP: 130/87  Pulse: (!) 125  Temp: 98.4  F (36.9  C)  Resp: 16  SpO2: 98 %      Physical Exam  Vitals and nursing note reviewed.   Constitutional:       Appearance: Normal appearance. She is normal weight.      Comments: Unkept 24-year-old female ambulatory and pacing in the exam room in no apparent distress.   Cardiovascular:      Rate and Rhythm: Regular rhythm. Tachycardia present.   Pulmonary:      Effort: Pulmonary effort is normal.   Skin:     General: Skin is warm and dry.      Capillary Refill: Capillary refill takes less than 2 seconds.   Neurological:      General: No focal deficit present.      Mental Status: She is alert.   Psychiatric:      Comments: She is anxious and has some circumstantial speech.         ED Course        Procedures              Critical Care time:  none               Results for orders placed or performed during the hospital encounter of 08/26/24 (from the past 24 hour(s))   CBC with platelets differential    Narrative    The following orders were created for panel order CBC with platelets differential.  Procedure                               Abnormality         Status                     ---------                               -----------         ------                     CBC with platelets and d...[[id number]]                      Final result                 Please view results for these tests on the individual orders.   Comprehensive metabolic panel   Result Value Ref [facility]    Sodium 142 135 - 145 mmol/L    Potassium 3.5 3.4 - 5.3 mmol/L    Carbon Dioxide (CO2) 24 22 - 29 mmol/L    Anion Gap 17 (H) 7 - 15 mmol/L    Urea Nitrogen 16.0 6.0 - 20.0 mg/dL    Creatinine 1.07 (H) 0.51 - 0.95 mg/dL    GFR Estimate 75 >60 mL/min/1.73m2    Calcium 9.8 8.8 - 10.4 mg/dL    Chloride 101 98 - 107 mmol/L    Glucose 109 (H) 70 - 99 mg/dL     Alkaline Phosphatase 89 40 - 150 U/L    AST 23 0 - 45 U/L    ALT 13 0 - 50 U/L    Protein Total 8.0 6.4 - 8.3 g/dL    Albumin 4.8 3.5 - 5.2 g/dL    Bilirubin Total 1.1 <=1.2 mg/dL   Magnesium   Result Value Ref [facility]    Magnesium 2.1 1.7 - 2.3 mg/dL   TSH with free T4 reflex   Result Value Ref [facility]    TSH 2.48 0.30 - 4.20 uIU/mL   Ethyl Alcohol Level   Result Value Ref [facility]    Alcohol ethyl <0.01 <=0.01 g/dL   CBC with platelets and differential   Result Value Ref [facility]    WBC Count 6.1 4.0 - 11.0 10e3/uL    RBC Count 5.17 3.80 - 5.20 10e6/uL    Hemoglobin 14.7 11.7 - 15.7 g/dL    Hematocrit 43.5 35.0 - 47.0 %    MCV 84 78 - 100 fL    MCH 28.4 26.5 - 33.0 pg    MCHC 33.8 31.5 - 36.5 g/dL    RDW 12.8 10.0 - 15.0 %    Platelet Count 306 150 - 450 10e3/uL    % Neutrophils 57 %    % Lymphocytes 31 %    % Monocytes 11 %    % Eosinophils 1 %    % Basophils 0 %    % Immature Granulocytes 0 %    NRBCs per 100 WBC 0 <1 /100    Absolute Neutrophils 3.5 1.6 - 8.3 10e3/uL    Absolute Lymphocytes 1.9 0.8 - 5.3 10e3/uL    Absolute Monocytes 0.7 0.0 - 1.3 10e3/uL    Absolute Eosinophils 0.0 0.0 - 0.7 10e3/uL    Absolute Basophils 0.0 0.0 - 0.2 10e3/uL    Absolute Immature Granulocytes 0.0 <=0.4 10e3/uL    Absolute NRBCs 0.0 10e3/uL   Extra Tube    Narrative    The following orders were created for panel order Extra Tube.  Procedure                               Abnormality         Status                     ---------                               -----------         ------                     Extra Blue Top Tube[[id number]]                              Final result               Extra Red Top Tube[[id number]]                               Final result               Extra Heparinized Syringe[[id number]]                        Final result                 Please view results for these tests on the individual orders.   Extra Blue Top Tube   Result Value Ref [facility]    Hold Specimen JI    Extra Red Top Tube   Result Value Ref [facility]     Hold Specimen JIC    Extra Heparinized Syringe   Result Value Ref [facility]    Hold Specimen JIC    HCG qualitative Blood   Result Value Ref [facility]    hCG Serum Qualitative Negative Negative       Medications   lactated ringers BOLUS 1,000 mL (1,000 mLs Intravenous $New Bag 8/26/24 1156)   LORazepam (ATIVAN) injection 1 mg (1 mg Intravenous $Given 8/26/24 1155)   OLANZapine zydis (zyPREXA) ODT tab 10 mg (10 mg Oral $Given 8/26/24 1237)       Assessments & Plan (with Medical Decision Making)   24-year-old female with what appears to be active psychosis for unknown duration.  She is noncompliant with her antipsychotic medication regimen.  She has a history of episodic methamphetamine abuse.  Presents to our emergency department with a multitude of concerns as well as delusional thought.  Some thoughts of harming herself.  Auditory hallucinations.  I do not believe she is suffering from an emergent medical condition.  She would fit any reasonable indication for admission to our [facility].  Seems to have a similar presentation as June.  She was lost in follow-up after discharge.  Patient was graciously accepted by Jamshid Rdz NP.     This document was prepared using a combination of typing and voice generated software.  While every attempt was made for accuracy, spelling and grammatical errors may exist.   I have reviewed the nursing notes.    I have reviewed the findings, diagnosis, plan and need for follow up with the patient.           Medical Decision Making  The patient's presentation was of moderate complexity (a chronic illness mild to moderate exacerbation, progression, or side effect of treatment).    The patient's evaluation involved:  ordering and/or review of 3+ test(s) in this encounter (multiple labs)    The patient's management necessitated moderate risk (prescription drug management including medications given in the ED) and high risk (a decision regarding hospitalization).        New  Prescriptions    No medications on file       Final diagnoses:   Psychosis, unspecified psychosis type (H)   Noncompliance with medication regimen       8/26/2024   [facility]       Daija Stanford PA-C  08/26/24 1868

## 2024-08-26 NOTE — PLAN OF CARE
Problem: Adult Behavioral Health Plan of Care  Goal: Patient-Specific Goal (Individualization)  Description: Patient will accept medications as prescribed.  Patient will attend groups and participate in the unit activities  Patient will take part in her discharge meeting and comply with plan.  Outcome: Progressing  Note: ADMISSION NOTE    Reason for admission Didn't feel her heart beating, wanted a pregnancy test..  Safety concerns vulnerable adult.  Risk for or history of violence Wandering unit with no clothes on.   Full skin assessment: done    Patient arrived on unit from [facility] ED accompanied by Security and ED staff on 8/26/2024  2:04 PM.   Status on arrival: via wh/ch  /55   Pulse 57   Temp 97  F (36.1  C) (Temporal)   Resp 23   SpO2 99%   Patient given tour of unit and Welcome to  unit papers given to patient, wanding completed, belongings inventoried, and admission assessment completed.   Patient's legal status on arrival is voluntary. Appropriate legal rights discussed with and copy given to patient. Patient Bill of Rights discussed with and copy given to patient.   Patient denies SI, HI, and thoughts of self harm and contracts for safety while on unit.      Patient arrived sleeping in a bent over manner in a /. Patient assisted with changing into scubs and patient wanded. Patient allowed vs and patient keeps eyes closed throughout attempt to do an assessment. Patient per history has used meth in the past. Urine tox screen is needed. Patient came in thinking that she couldn't feel her heart beating and wasn't sure it was beating. Patient under guardianship with [facility] , message left for guardian. Full skin assessment completed and patient skin condition without problem. Patient settled into bed by writer.     Rosanna Hall RN  8/26/2024  2:32 PM            Problem: Thought Process Alteration  Goal: Optimal Thought Clarity  Outcome: Progressing   Goal Outcome  Evaluation:

## 2024-08-26 NOTE — ED TRIAGE NOTES
Pt reports to being lightheaded and nauseated.  Feels like heartrate was low and took a run to make it go up. Pt smells strongly of body odor, no shoes and vaping.  Pt also pain in uterus and kidney and has not had period for awhile. Pt wants a pregnancy test and pap smear.

## 2024-08-26 NOTE — TELEPHONE ENCOUNTER
1:14 PM Intake received call from [facility] Pt accepted to [facility] and will admit [facility]. Pt added to admit board.  2:11 PM Indicia complete.

## 2024-08-26 NOTE — ED NOTES
"Pt presents stating \"feel it right here, I can't feel my heart beating.\" Pt placed hand below left breast. Pt requesting to be put on the monitors. Pt is anxious, pacing in the room before staff entered. Pt cooperative with staff. During IV start pt states \"I need my medication for the voices.\" Pt reports that she has been hearing voices and they \"have been telling me bad things.\" Writer asked patient if they were telling her to hurt herself \"yes.\" Writer asked pt if she has a plan she replied \"yes and no.\" Pt unable to elaborate \"I'm going to burn my book.\" Pt unable to focus on questions being asked. Pt denies use of street drugs. She reports that she hasn't taken any mental health medications for a couple of weeks. Provider updated.     No need for 1:1 per provider.  "

## 2024-08-26 NOTE — PROGRESS NOTES
08/26/24 1421   Patient Belongings   Did you bring any home meds/supplements to the hospital?  No   Patient Belongings locker;sent to security per site process;other (see comments)   Patient Belongings Put in Hospital Secure Location (Security or Locker, etc.) clothing;cell phone/electronics;other (see comments)   Belongings Search Yes   Clothing Search Yes   Second Staff .   Comment pair tan pants, grey t-shirt, black and yellow colored vape     List items sent to safe: Black cellphone with lots of scratches and some cracks  All other belongings put in assigned cubby in belongings room.       I have reviewed my belongings list on admission and verify that it is correct.     Patient signature_______________________________    Second staff witness (if patient unable to sign) ______________________________       I have received all my belongings at discharge.    Patient signature________________________________    Keith   8/26/2024  2:21 PM

## 2024-08-26 NOTE — MEDICATION SCRIBE - ADMISSION MEDICATION HISTORY
"Medication Scribe Admission Medication History    Admission medication history is complete. The information provided in this note is only as accurate as the sources available at the time of the update.    Information Source(s): Patient's pharmacy and CareEverywhere/SureScripts via phone    Pertinent Information:   Unable to review medications with patient. Patient has blanket over her head with knees to her chest and is refusing to acknowledge writer or answer any questions at this time, even after writer tried to rouse by gently placing their hand on her knee. Writer has made 3 separate attempts.   Per ER note, patient has not taken any mental health medications for \"a few weeks.\"  Invega Sustenna: This has not been started PTA - never picked up from pharmacy  Writer was unable to question patient about any OTC medications at this time.   No conflicting date from any available outside sources.    Changes made to PTA medication list:  Added: None  Deleted: None  Changed: None    Allergies reviewed with patient and updates made in EHR: unable to assess    Medication History Completed By: Asuncion Dhillon 8/26/2024 2:02 PM    PTA Med List   Medication Sig Last Dose    melatonin 3 MG tablet Take 1 tablet (3 mg) by mouth nightly as needed for sleep Unknown    nicotine polacrilex (NICORETTE) 4 MG gum Place 1 each (4 mg) inside cheek every hour as needed for nicotine withdrawal symptoms Unknown    traZODone (DESYREL) 50 MG tablet Take 1 tablet (50 mg) by mouth nightly as needed for sleep (may repeat after 60 minutes) Unknown       "

## 2024-08-27 LAB
ALBUMIN UR-MCNC: 20 MG/DL
AMPHETAMINES UR QL SCN: ABNORMAL
APPEARANCE UR: ABNORMAL
BACTERIA #/AREA URNS HPF: ABNORMAL /HPF
BARBITURATES UR QL SCN: ABNORMAL
BENZODIAZ UR QL SCN: ABNORMAL
BILIRUB UR QL STRIP: NEGATIVE
BZE UR QL SCN: ABNORMAL
CANNABINOIDS UR QL SCN: ABNORMAL
COLOR UR AUTO: YELLOW
FENTANYL UR QL: ABNORMAL
GLUCOSE UR STRIP-MCNC: NEGATIVE MG/DL
HGB UR QL STRIP: ABNORMAL
KETONES UR STRIP-MCNC: 10 MG/DL
LEUKOCYTE ESTERASE UR QL STRIP: ABNORMAL
MUCOUS THREADS #/AREA URNS LPF: PRESENT /LPF
NITRATE UR QL: NEGATIVE
OPIATES UR QL SCN: ABNORMAL
PCP QUAL URINE (ROCHE): ABNORMAL
PH UR STRIP: 5.5 [PH] (ref 4.7–8)
RBC URINE: 3 /HPF
SP GR UR STRIP: 1.03 (ref 1–1.03)
SQUAMOUS EPITHELIAL: 15 /HPF
UROBILINOGEN UR STRIP-MCNC: NORMAL MG/DL
WBC URINE: 17 /HPF

## 2024-08-27 PROCEDURE — 250N000013 HC RX MED GY IP 250 OP 250 PS 637: Performed by: NURSE PRACTITIONER

## 2024-08-27 PROCEDURE — 81001 URINALYSIS AUTO W/SCOPE: CPT | Performed by: NURSE PRACTITIONER

## 2024-08-27 PROCEDURE — 124N000001 HC R&B MH

## 2024-08-27 PROCEDURE — 80307 DRUG TEST PRSMV CHEM ANLYZR: CPT | Performed by: NURSE PRACTITIONER

## 2024-08-27 PROCEDURE — 99223 1ST HOSP IP/OBS HIGH 75: CPT | Mod: AI | Performed by: NURSE PRACTITIONER

## 2024-08-27 PROCEDURE — 250N000013 HC RX MED GY IP 250 OP 250 PS 637

## 2024-08-27 RX ORDER — LORAZEPAM 1 MG/1
1 TABLET ORAL EVERY 6 HOURS PRN
Status: DISCONTINUED | OUTPATIENT
Start: 2024-08-27 | End: 2024-09-13 | Stop reason: HOSPADM

## 2024-08-27 RX ORDER — PALIPERIDONE 3 MG/1
3 TABLET, EXTENDED RELEASE ORAL DAILY
Status: DISCONTINUED | OUTPATIENT
Start: 2024-08-27 | End: 2024-08-28

## 2024-08-27 RX ORDER — HYDROXYZINE HYDROCHLORIDE 25 MG/1
50 TABLET, FILM COATED ORAL EVERY 6 HOURS PRN
Status: DISCONTINUED | OUTPATIENT
Start: 2024-08-27 | End: 2024-09-13 | Stop reason: HOSPADM

## 2024-08-27 RX ORDER — LORAZEPAM 2 MG/ML
1 INJECTION INTRAMUSCULAR EVERY 6 HOURS PRN
Status: DISCONTINUED | OUTPATIENT
Start: 2024-08-27 | End: 2024-09-13 | Stop reason: HOSPADM

## 2024-08-27 RX ADMIN — ACETAMINOPHEN 650 MG: 325 TABLET, FILM COATED ORAL at 08:39

## 2024-08-27 RX ADMIN — PALIPERIDONE 3 MG: 3 TABLET, EXTENDED RELEASE ORAL at 10:41

## 2024-08-27 RX ADMIN — ACETAMINOPHEN 650 MG: 325 TABLET, FILM COATED ORAL at 21:18

## 2024-08-27 RX ADMIN — HYDROXYZINE HYDROCHLORIDE 50 MG: 25 TABLET ORAL at 21:17

## 2024-08-27 RX ADMIN — OLANZAPINE 10 MG: 10 TABLET, FILM COATED ORAL at 11:53

## 2024-08-27 RX ADMIN — LORAZEPAM 1 MG: 1 TABLET ORAL at 21:18

## 2024-08-27 RX ADMIN — LORAZEPAM 1 MG: 1 TABLET ORAL at 15:34

## 2024-08-27 RX ADMIN — OLANZAPINE 10 MG: 10 TABLET, FILM COATED ORAL at 15:33

## 2024-08-27 RX ADMIN — TRAZODONE HYDROCHLORIDE 50 MG: 50 TABLET ORAL at 20:27

## 2024-08-27 ASSESSMENT — ACTIVITIES OF DAILY LIVING (ADL)
HYGIENE/GROOMING: INDEPENDENT
ADLS_ACUITY_SCORE: 29
ADLS_ACUITY_SCORE: 29
ADLS_ACUITY_SCORE: 39
ADLS_ACUITY_SCORE: 39
ADLS_ACUITY_SCORE: 29
ADLS_ACUITY_SCORE: 39
HYGIENE/GROOMING: INDEPENDENT;PROMPTS
ADLS_ACUITY_SCORE: 29
ADLS_ACUITY_SCORE: 39
DRESS: SCRUBS (BEHAVIORAL HEALTH)
ADLS_ACUITY_SCORE: 29
ORAL_HYGIENE: INDEPENDENT
ADLS_ACUITY_SCORE: 29
ADLS_ACUITY_SCORE: 39
LAUNDRY: UNABLE TO COMPLETE
ADLS_ACUITY_SCORE: 29

## 2024-08-27 NOTE — PLAN OF CARE
Problem: Thought Process Alteration  Goal: Optimal Thought Clarity  Outcome: Progressing     Problem: Adult Behavioral Health Plan of Care  Goal: Patient-Specific Goal (Individualization)  Description: Patient will accept medications as prescribed.  Patient will attend groups and participate in the unit activities  Patient will take part in her discharge meeting and comply with plan.  Outcome: Progressing     Face to Face report received from PRATIK Singh. Rounding completed. Patient observed in bed appearing to sleep with even & unlabored respirations for most of the night. 15 minute safety checks performed throughout the night. Patient did not report falls or self-injury.      Face to face end of shift report communicated to day shift RN.     Ivett Carrera RN  8/27/2024  5:51 AM

## 2024-08-27 NOTE — DISCHARGE INSTRUCTIONS
Behavioral Discharge Planning and Instructions    Summary: This is a 24 year old female with a PMH of schizoaffective disorder- bipolar type, KIRSTY, intellectual disability, stimulant use disorder, and cannabis use disorder who presents to the ED initially noting multiple odd somatic complaints, such as her heart no longer beating     Main Diagnosis:     #. Schizoaffective disorder, bipolar type  #. Generalized anxiety disorder  #. Intellectual disability  #. Stimulant (methamphetamine) use disorder, severe  #. Medication noncompliance    Health Care Follow-up:       Jose Manuel Herron  Religion   [phone]   Guardian will help set up psychiatry closer to the group home.     [facility]   [phone] ext: [phone]    Attend all scheduled appointments with your outpatient providers. Call at least 24 hours in advance if you need to reschedule an appointment to ensure continued access to your outpatient providers.     Major Treatments, Procedures and Findings:  You were provided with: a psychiatric assessment, assessed for medical stability, medication evaluation and/or management, group therapy, family therapy, individual therapy, CD evaluation/assessment, milieu management, and medical interventions    Symptoms to Report: feeling more aggressive, increased confusion, losing more sleep, mood getting worse, or thoughts of suicide    Early warning signs can include: increased depression or anxiety sleep disturbances increased thoughts or behaviors of suicide or self-harm  increased unusual thinking, such as paranoia or hearing voices    Safety and Wellness:  Take all medicines as directed.  Make no changes unless your doctor suggests them.      Follow treatment recommendations.  Refrain from alcohol and non-prescribed drugs.  Ask your support system to help you reduce your access to items that could harm yourself or others. Items could include:  Firearms  Medicines (both prescribed and  "over-the-counter)  Knives and other sharp objects  Ropes and like materials  Car keys  If there is a concern for safety, call 911. If there is a concern for safety, call 911.    Resources:   Crisis Intervention: 498.174.8347 or 926-999-7791 (TTY: 904.285.3396).  Call anytime for help.  National [place] on Mental Illness (www.[place].carlos.org): 709.396.9843 or 292-856-4845.  [place] Association for Children's Mental Health (www.macmh.org): 444.826.4601.  Alcoholics Anonymous (www.alcoholics-anonymous.org): Check your phone book for your local chapter.  Suicide Awareness Voices of Education (SAVE) (www.save.org): 472-954-KYAE (2424)  National Suicide Prevention Line (www.mentalhealthmn.org): 255-925-OYKX (8607)  Mental Health Consumer/Survivor Network of [place] (www.mhcsn.net): 825.343.8937 or 737-936-9131  Mental Health Association of [place] (www.mentalhealth.org): 296.526.8261 or 513-671-6394  Self- Management and Recovery Training., [employer]-- Toll free: 278.761.4210  www.[employer]  Text 4 Life: txt \"LIFE\" to 62870 for immediate support and crisis intervention  Crisis text line: Text \"[place]\" to 535951. Free, confidential, 24/7.  Crisis Intervention: 689.860.9673 or 935-591-4076. Call anytime for help.     General Medication Instructions:   See your medication sheet(s) for instructions.   Take all medicines as directed.  Make no changes unless your doctor suggests them.   Go to all your doctor visits.  Be sure to have all your required lab tests. This way, your medicines can be refilled on time.  Do not use any drugs not prescribed by your doctor.  Avoid alcohol.    Advance Directives:   Scanned document on file with [facility]? No scanned doc  Is document scanned? Pt states no documents  Honoring Choices Your Rights Handout: Informed and given  Was more information offered? Pt declined    The Treatment team has appreciated the opportunity to work with you. If you have any questions or concerns about your recent admission, you can " contact the unit which can receive your call 24 hours a day, 7 days a week. They will be able to get in touch with a Provider if needed. The unit number is [phone] .

## 2024-08-27 NOTE — PLAN OF CARE
"Social Service Psychosocial Assessment     Presenting Problem: Pt came in thinking that he heart was not beating and that she wanted a pregnancy test.     Per pt: \"I don't know why I am here, I came into ER for light headedness, dizziness and my heart was not okay. I fell asleep down there, I did not ask to be here. I woke up, up here\"    Marital Status: Single      Spouse / Children: No according to H&P pt has a child but put them up for adoption.      Psychiatric TX HX: Pt was last on our unit 06/25/24-07/08/24. Hx of hospitalizations. Last being in 2023.      Suicide Risk Assessment: Hx of SIB, not admitted for SI, pt denies SI today. States \"I feel really good, I just want to go home\"     Access to Lethal Means (explain): Denies      Family Psych HX: Unknown       A & Ox: x4       Medication Adherence: See H&P     Medical Issues: See H&P       Visual -Motor Functioning: Good     Communication Skills /Needs: Good     Ethnicity: White           Spirituality/Lutheran Affiliation: No Lutheran      Clergy Request: No       History: None reported      Living Situation: Living with SO     ADL s: Independent       Education: Dropped out of school at 18 years old.      Financial Situation: General assistance- ssi     Occupation: Unemployed      Leisure & Recreation: \"anything and everything but not magic\"     Childhood History: According to H&P, pt was raised in [facility] [place]. Pt has 4 siblings.      Trauma Abuse HX: pt denies       Relationship / Sexuality: Has a boyfriend     Substance Use/ Abuse: Hx of meth use and THC use.  UDS positive for amphetamines      Chemical Dependency Treatment HX: Hx of treatment. States she is in [facility] and has an meeting tomorrow @ 9:00am.      Legal Issues: Denies      Significant Life Events: pt denies     Strengths: Ability to communicate needs, in a safe environment, has insurance     Challenges /Limitation: Poor coping skills, current mental health " "symptoms, lack of insight     Patient Support Contact (Include name, relationship, number, and summary of conversation):  Legal guardian through [employer] .      Interventions:          Medical/Dental Care- PCP- Noreen JOEL Evaluation/Rule 25/Aftercare- Would benefit      Medication Management- Dr. Brothers      Individual Therapy- Would benefit     Insurance Coverage-UCARE/UCARE PMAP      Financial Assistance- General Assistance- SSI     Suicide Risk Assessment-Hx of SIB, not admitted for SI, pt denies SI today. States \"I feel really good, I just want to go home\"     High Risk Safety Plan- Talk to supports; Call crisis lines; Go to local ER if feeling suicidal.  "

## 2024-08-27 NOTE — PLAN OF CARE
"  Problem: Adult Behavioral Health Plan of Care  Goal: Patient-Specific Goal (Individualization)  Description: Patient will accept medications as prescribed.  Patient will attend groups and participate in the unit activities  Patient will take part in her discharge meeting and comply with plan.  Outcome: Progressing     Problem: Thought Process Alteration  Goal: Optimal Thought Clarity  Outcome: Progressing    Face to face shift report received from previously assigned nurse. Rounding completed, pt observed pacing in the hallway.    Patient immediately at the nurses station, demanding to leave. Reminded patient that she is not able to leave, she needs to speak with her  and work with the providers to determine her plan for discharge. Patient argumentative, states \"I do not belong here. Can I get a restraining order? I want one for this hospital\". Informed patient that this is not possible, she brought herself to the ER and it was determined that she needs to be here due to her mental state. Patient continues to be argumentative, pacing, and escalating in behaviors. Appropriate boundaries reinforced, PRN medications offered, patient initially refusing, but continues to grow more angry with being here and writer. Patient informed that due to her behaviors she will need to take PRN medication and may need to be transferred to the MHICU because this is not appropriate for the unit. Patient accepted PRN zyprexa and ativan PO at this time. Continues to pace, states \"I better be out of here by tomorrow, I have to get out of here. Being here makes me want to hurt myself\". Patient then states that \"If I don't get out of here in 3 days, I am going to flip and you are all going to regret it\". Notified patient that this behavior needs to stop, boundaries reinforced again. Patient then states \"I don't need to be here, that's all I am going to say\", then walks away from writer.     Patient denies pain and depression. " "Patient appears to be responding to auditory hallucinations when seen pacing in the hallway. Endorses SI regarding being here, patient states she will not do anything because \"it will keep me here longer\". Patient doesn't directly deny HI, although states she will flip out if she doesn't get out of here soon. Patient visibly anxious - continues to pace. Patient was given final boundary regarding behaviors, patient agreeable to this and understands the boundary. No further needs at this time.     Patient has been resting in her room a good portion of the evening. Requested and received PRN for sleep, PRN trazodone was given at this time. Patient later requested for more medication to help her sleep and she is feeling restless and slightly agitation, also requesting something for pain. PRN tylenol was given for 10/10 upper right half tooth pain, PRN ativan and atarax given for her anxiety and sleep promotion. No further needs at this time.     Face to face report communicated to oncoming RN.    Simin Billings RN  8/27/2024  4:04 PM           "

## 2024-08-27 NOTE — PLAN OF CARE
Problem: Adult Behavioral Health Plan of Care  Goal: Patient-Specific Goal (Individualization)  Description: Patient will accept medications as prescribed.  Patient will attend groups and participate in the unit activities  Patient will take part in her discharge meeting and comply with plan.  Outcome: Progressing  Note:     1100 Patient awake and up on the unit. Patient appears confused and asks repeatedly when she can leave. Patient states that she doesn't know how she got here. Patient denies having any issues with falls and gait is steady. Patient ate well for breakfast meal. Patient denies physical problems but requested and given Tylenol 650 mg po for pain in the stomach which she rates a 3/10. Patient exhibits a great deal of need and repeatedly asks questions.     1153 Patient pacing and agitated, states she is not suppose to be here. 1-1  time with patient and explained her process. Patient states she needs to be discharged at least by tomorrow. States that she needs to report to [facility] tomorrow afternoon and that she will be kicked out if she doesn't. Patient tells writer that she has taken meth but nothing else that she knows of. Given Zyprexa 10 mg po for high level anxiety.    1415 Patient continues to be anxious and pacing. Patient concern about getting out of here. Request from patient's father to have patient released despite having a guardian. Patient has been almost 1-1 all throughout the day due to anxiety.    Face to face end of shift report communicated to 3-11 shift RN.     Rosanna Hall RN  8/27/2024  2:17 PM       Problem: Thought Process Alteration  Goal: Optimal Thought Clarity  Outcome: Progressing        Goal Outcome Evaluation:    Plan of Care Reviewed With: patient

## 2024-08-27 NOTE — PLAN OF CARE
"  Problem: Adult Behavioral Health Plan of Care  Goal: Patient-Specific Goal (Individualization)  Description: Patient will accept medications as prescribed.  Patient will attend groups and participate in the unit activities  Patient will take part in her discharge meeting and comply with plan.  Outcome: Not Progressing  Note: Face to face start of shift report received from RN.  Rounding complete, patient in bed sleeping at this time.     Samantha Soriano RN  8/26/2024  3:37 PM    1600:  Patient's Guardian up on unit to visit patient, paperwork placed in chart.  Guardian thankful for phone call.  Patient does not recall this visit.      Patient asleep on unit until 2100.  Patient does not respond to staff upon all checks. Does not eat dinner.  Patient reports \"I need to go home now, why am I here\"  Patient requesting phone numbers.  \"I have something very important at home, I need to leave\"    Patient's VS obtained, noted high pulse as patient is agitated at this time.    Requested patient to provide UA , \"um no I am not doing that\"    Patient requests staff come get her when phone back on unit.     2115:  Patient in bed sleeping.     Goal Outcome Evaluation:         Face to face end of shift report communicated to oncoming shift.     Samantha Soriano RN  8/26/2024  11:05 PM                    "

## 2024-08-27 NOTE — H&P
"[facility] PSYCHIATRY   HISTORY AND PHYSICAL     ADMISSION DATA     Yudi Morales MRN# [id number]   Age: 24 year old YOB: 2000     Date of Admission: 8/26/2024  Primary Physician: Sindhu Sandoval        CHIEF COMPLAINT   Admitted for psychosis       HISTORY OF PRESENT ILLNESS     Yudi is a 24 year old female who presented to the [facility] ED for evaluation of patient reporting multiple somatic complaints. Later found to be noncompliant with psychiatric medications and likely experiencing psychosis. Patient reported believing that her heart had stopped beating. Patient presented to the ED smelling strongly of body odor and was not wearing shoes. She was reporting multiple complaints such as kidney pain, uterus pain, requesting pregnancy test, bradycardia, vaginal bleeding, nausea, fevers, visual concerns, etc. The patient has a known history of schizoaffective disorder- bipolar type and amphetamine abuse. In ED patient did report hearing voices that stated \"they have been telling me bad things\". When asked if they were telling her to hurt herself she replies \"yes\". When asked if she had a plan to replied \"yes and no\". Had difficulty focusing on questions asked. Patient denied using any drugs recently. She reported it had been several weeks since she had taken any medications. Patient was in agreement to voluntary admission to behavioral health for further evaluation and treatment. Patient does have guardian, who is in agreement with admission.     Per patient:  Patient notes today that she is not sure why she is here. \"I thought I was going to go to the nurses office because I was feeling lightheaded and not stay all night\". Patient does report she had felt dizzy and lightheaded at home, noting that this is why she went to the hospital in the first place. She denies feeling dizzy or lightheaded at this time. She does note that she has not been eating or drinking well, though does not elaborate as " "to why. She does report that she is hearing voices \"here and there\". She reports that she cannot tell what they are saying. \"I tell them I don't want to hear you, but they don't listen. Wouldn't it be funny if they could listen to the headphones too?\"     Patient reports that she did not receive her next Invega Sustenna injection after leaving the hospital in early July. Would have been due on 8/5/24. Patient missed her follow-up PCP appointment and also missed her psychiatry appointment with Dr. Brothers. When asked why she did not attend these appointments she notes \"I didn't have a ride. I forgot\". The patient reports that she felt that the Sustenna IM was \"too much\" and she currently does not wish to continue with this. She notes that she is willing to restart the Invega oral tablet today \"I need something for the voices\".        PSYCHIATRIC HISTORY     Has been hospitalized before, last here at [facility] from 6/25/24-7/8/24. History of SI, SIB. Historical diagnoses include schizoaffective disorder- bipolar type, KIRSTY, PTSD, intellectual disability, substance abuse. Not currently working with any outpatient MH services. Does have a guardian through [facility] . Previous medications include Zoloft, Abilify, Risperdal, Lamictal, Melatonin, Invega, Trazodone as well as likely others. Not taking medications at home prior to admit.       SUBSTANCE USE HISTORY   History   Drug Use    Types: Methamphetamines     Comment: denies any recent usage       Social History    Substance and Sexual Activity      Alcohol use: No        Comment: Denies      History   Smoking Status    Every Day    Types: Vaping Device   Smokeless Tobacco    Never     Has a history of methamphetamine, marijuana use. Has not yet provided a UDS. Patient denies that she was using any alcohol or drugs prior to admission, though did tell nurse she had been using meth. History of CD treatment in the past.        SOCIAL HISTORY   Social History "     Socioeconomic History    Marital status: Single     Spouse name: Not on file    Number of children: Not on file    Years of education: Not on file    Highest education level: Not on file   Occupational History    Not on file   Tobacco Use    Smoking status: Every Day     Types: Vaping Device    Smokeless tobacco: Never   Vaping Use    Vaping status: Every Day    Substances: Nicotine, CBD, Flavoring    Devices: Disposable   Substance and Sexual Activity    Alcohol use: No     Comment: Denies    Drug use: Yes     Types: Methamphetamines     Comment: denies any recent usage    Sexual activity: Yes     Partners: Male     Birth control/protection: Injection   Other Topics Concern    Not on file   Social History Narrative        Briana Thompson     Social Determinants of Health     Financial Resource Strain: Not on file   Food Insecurity: Not on file   Transportation Needs: Not on file   Physical Activity: Not on file   Stress: Not on file   Social Connections: Not on file   Interpersonal Safety: Low Risk  (8/26/2024)    Interpersonal Safety     Do you feel physically and emotionally safe where you currently live?: Yes     Within the past 12 months, have you been hit, slapped, kicked or otherwise physically hurt by someone?: No     Within the past 12 months, have you been humiliated or emotionally abused in other ways by your partner or ex-partner?: No   Housing Stability: Not on file     From previous H&P: Pt tells me she was raised in the [place], [place] area with her parents. She does not know if they are her biologic parents. She has 4 siblings. She dropped out of high school at age 18, maybe sophomore or carlos a year. She did not pursue a GED. She has never been . She has a boyfriend. She states she had a child, but put them up for adoption. She denies any known legal issues. Legal guardian through [facility] .     Patient reports staying at the [facility] apartments. Reports having  significant other Ronaldo as a support.        FAMILY HISTORY   No family history on file.      PAST MEDICAL HISTORY   Past Medical History:   Diagnosis Date    Allergic rhinitis 10/4/2011    KIRSTY (generalized anxiety disorder) 7/19/2019    Hyperprolactinemia (H24) 11/16/2022    PTSD (post-traumatic stress disorder)     Suicidal behavior with attempted self-injury (H) 2/2/2021       Past Surgical History:   Procedure Laterality Date    TONSILLECTOMY & ADENOIDECTOMY  2008       Other [no clinical screening - see comments], Thiopental, Latex, Amoxicillin trihydrate, Amoxicillin-pot clavulanate, and Phenobarbital     MEDICATIONS   Prior to Admission medications    Medication Sig Start Date End Date Taking? Authorizing Provider   melatonin 3 MG tablet Take 1 tablet (3 mg) by mouth nightly as needed for sleep 4/19/24  Yes Sindhu Sandoval MD   nicotine polacrilex (NICORETTE) 4 MG gum Place 1 each (4 mg) inside cheek every hour as needed for nicotine withdrawal symptoms 7/8/24  Yes Sanket Bernstein DO   traZODone (DESYREL) 50 MG tablet Take 1 tablet (50 mg) by mouth nightly as needed for sleep (may repeat after 60 minutes) 7/8/24  Yes Sanket Bernstein DO   paliperidone (INVEGA SUSTENNA) 117 MG/0.75ML JOVANNI Inject 0.75 mLs (117 mg) into the muscle every 30 days  Patient not taking: Reported on 8/26/2024 8/5/24   Jamshid Rdz APRN CNP        PHYSICAL EXAM/ROS     I have reviewed the physical exam as documented by Daija Stanford PA-C in the ED and agree with findings and assessment and have no additional findings to add at this time. The review of systems is negative other than noted in the HPI.       LABS   Recent Results (from the past 24 hour(s))   Comprehensive metabolic panel    Collection Time: 08/26/24 11:55 AM   Result Value Ref [facility]    Sodium 142 135 - 145 mmol/L    Potassium 3.5 3.4 - 5.3 mmol/L    Carbon Dioxide (CO2) 24 22 - 29 mmol/L    Anion Gap 17 (H) 7 - 15 mmol/L    Urea Nitrogen 16.0 6.0 - 20.0 mg/dL     Creatinine 1.07 (H) 0.51 - 0.95 mg/dL    GFR Estimate 75 >60 mL/min/1.73m2    Calcium 9.8 8.8 - 10.4 mg/dL    Chloride 101 98 - 107 mmol/L    Glucose 109 (H) 70 - 99 mg/dL    Alkaline Phosphatase 89 40 - 150 U/L    AST 23 0 - 45 U/L    ALT 13 0 - 50 U/L    Protein Total 8.0 6.4 - 8.3 g/dL    Albumin 4.8 3.5 - 5.2 g/dL    Bilirubin Total 1.1 <=1.2 mg/dL   Magnesium    Collection Time: 08/26/24 11:55 AM   Result Value Ref [facility]    Magnesium 2.1 1.7 - 2.3 mg/dL   TSH with free T4 reflex    Collection Time: 08/26/24 11:55 AM   Result Value Ref [facility]    TSH 2.48 0.30 - 4.20 uIU/mL   Ethyl Alcohol Level    Collection Time: 08/26/24 11:55 AM   Result Value Ref [facility]    Alcohol ethyl <0.01 <=0.01 g/dL   CBC with platelets and differential    Collection Time: 08/26/24 11:55 AM   Result Value Ref [facility]    WBC Count 6.1 4.0 - 11.0 10e3/uL    RBC Count 5.17 3.80 - 5.20 10e6/uL    Hemoglobin 14.7 11.7 - 15.7 g/dL    Hematocrit 43.5 35.0 - 47.0 %    MCV 84 78 - 100 fL    MCH 28.4 26.5 - 33.0 pg    MCHC 33.8 31.5 - 36.5 g/dL    RDW 12.8 10.0 - 15.0 %    Platelet Count 306 150 - 450 10e3/uL    % Neutrophils 57 %    % Lymphocytes 31 %    % Monocytes 11 %    % Eosinophils 1 %    % Basophils 0 %    % Immature Granulocytes 0 %    NRBCs per 100 WBC 0 <1 /100    Absolute Neutrophils 3.5 1.6 - 8.3 10e3/uL    Absolute Lymphocytes 1.9 0.8 - 5.3 10e3/uL    Absolute Monocytes 0.7 0.0 - 1.3 10e3/uL    Absolute Eosinophils 0.0 0.0 - 0.7 10e3/uL    Absolute Basophils 0.0 0.0 - 0.2 10e3/uL    Absolute Immature Granulocytes 0.0 <=0.4 10e3/uL    Absolute NRBCs 0.0 10e3/uL   Extra Blue Top Tube    Collection Time: 08/26/24 11:55 AM   Result Value Ref [facility]    Hold Specimen JIC    Extra Red Top Tube    Collection Time: 08/26/24 11:55 AM   Result Value Ref [facility]    Hold Specimen JIC    Extra Heparinized Syringe    Collection Time: 08/26/24 11:55 AM   Result Value Ref [facility]    Hold Specimen JIC    HCG qualitative Blood    Collection Time: 08/26/24  "11:55 AM   Result Value Ref [facility]    hCG Serum Qualitative Negative Negative         MENTAL STATUS EXAM   Vitals: BP 98/53   Pulse 91   Temp 98.1  F (36.7  C) (Temporal)   Resp 16   Wt 54.8 kg (120 lb 14.4 oz)   SpO2 97%   BMI 18.94 kg/m      Appearance:  awake, alert, dressed in hospital scrubs, unkempt, and disheveled   Attitude:  cooperative  Eye Contact:  fair  Mood:  anxious  Affect:  intensity is blunted  Speech:  clear, coherent  Psychomotor Behavior:  no evidence of tardive dyskinesia, dystonia, or tics  Thought Process:  concrete, slightly disorganized  Associations:  no loose associations  Thought Content:  no evidence of suicidal ideation or homicidal ideation, reports AH \"here and there\" today  Insight:  limited  Judgment:  limited  Oriented to:  time, person, and place  Attention Span and Concentration:  fair  Recent and Remote Memory:  limited  Fund of Knowledge: low-normal  Muscle Strength and Tone: normal  Gait and Station: Normal       ASSESSMENT     This is a 24 year old female with a PMH of schizoaffective disorder- bipolar type, KIRSTY, intellectual disability, stimulant use disorder, and cannabis use disorder who presents to the ED initially noting multiple odd somatic complaints, such as her heart no longer beating. Later reporting auditory hallucinations. Was in agreement to admission to get restarted back on medication. Patient has not taken any medication since she left the hospital in early July. She missed her post hospital follow up and did not attend her psychiatry appointment. She did not get her Invega Sustenna injection that was due 8/5. Patient reports she does not have rides to appointments and just forgets to go. Patient does report some ongoing hallucinations today, denies any active SI. She is not interested in continuing the Invega Sustenna FRIAS, noting that it was \"too much\". She is willing to restart the oral Invega today, noting that she does want the hallucinations to " stop. Patient does have guardian in place, who agrees with hospitalization for stabilization. Patient does not seem to have any insight into need for stabilization, is not able to verbalize how she will attend any future appointments and has history of medication noncompliance when outside of the hospital setting.          DIAGNOSIS     #. Schizoaffective disorder, bipolar type  #. Generalized anxiety disorder  #. Intellectual disability  #. Stimulant (methamphetamine) use disorder, severe  #. Medication noncompliance       PLAN     Location: Unit 5  Legal Status: Orders Placed This Encounter      Voluntary    Safety Assessment:    Behavioral Orders   Procedures    Code 1 - Restrict to Unit    Discontinue 1:1 attendant for suicide risk     Order Specific Question:   I have performed an in person assessment of the patient     Answer:   Based on this assessment the patient no longer requires a one on one attendant at this point in time.     Order Specific Question:   Rationale     Answer:   Patient States able to remain safe in hospital    Routine Programming     As clinically indicated    Status 15     Every 15 minutes.      PTA psychotropic medications held:     -Invega Sustenna 117 mg IM- pt missed last dose, not interested in taking    PTA psychotropic medications continued/changed:     -restart Invega 3 mg daily    New medications initiated:     -standard unit PRN medications    Programming: Patient will be treated in a therapeutic milieu with appropriate individual and group therapies. Education will be provided on diagnoses, medications, and treatments.     Medical diagnoses:  Per medicine    Consult: none today  Tests: utox ordered    Anticipated LOS: 3-5 days  Disposition: unclear, home with services vs higher level of care    Justification for hospitalization: reasons for hospitalization include potential safety risk to self or others within the last week, decreased functioning in outpatient setting and in  the setting of no outpatient management, need for highly structured inpatient management for stabilization of psychiatric symptoms, need for psychiatric medication initiation and stabilization.       ATTESTATION      Carol Horton, NP

## 2024-08-28 PROCEDURE — 99221 1ST HOSP IP/OBS SF/LOW 40: CPT | Performed by: NURSE PRACTITIONER

## 2024-08-28 PROCEDURE — 99232 SBSQ HOSP IP/OBS MODERATE 35: CPT | Performed by: NURSE PRACTITIONER

## 2024-08-28 PROCEDURE — 250N000013 HC RX MED GY IP 250 OP 250 PS 637

## 2024-08-28 PROCEDURE — 124N000001 HC R&B MH

## 2024-08-28 PROCEDURE — 250N000013 HC RX MED GY IP 250 OP 250 PS 637: Performed by: NURSE PRACTITIONER

## 2024-08-28 RX ORDER — PALIPERIDONE 3 MG/1
6 TABLET, EXTENDED RELEASE ORAL DAILY
Status: DISCONTINUED | OUTPATIENT
Start: 2024-08-29 | End: 2024-09-13 | Stop reason: HOSPADM

## 2024-08-28 RX ADMIN — OLANZAPINE 10 MG: 10 TABLET, FILM COATED ORAL at 16:19

## 2024-08-28 RX ADMIN — HYDROXYZINE HYDROCHLORIDE 50 MG: 25 TABLET ORAL at 14:57

## 2024-08-28 RX ADMIN — LORAZEPAM 1 MG: 1 TABLET ORAL at 14:57

## 2024-08-28 RX ADMIN — PALIPERIDONE 3 MG: 3 TABLET, EXTENDED RELEASE ORAL at 10:25

## 2024-08-28 RX ADMIN — OLANZAPINE 10 MG: 10 TABLET, FILM COATED ORAL at 12:16

## 2024-08-28 ASSESSMENT — ACTIVITIES OF DAILY LIVING (ADL)
ADLS_ACUITY_SCORE: 29
ADLS_ACUITY_SCORE: 29
HYGIENE/GROOMING: INDEPENDENT
ADLS_ACUITY_SCORE: 29
ORAL_HYGIENE: INDEPENDENT
ADLS_ACUITY_SCORE: 29
ADLS_ACUITY_SCORE: 29
ORAL_HYGIENE: INDEPENDENT
ADLS_ACUITY_SCORE: 29
LAUNDRY: UNABLE TO COMPLETE
ADLS_ACUITY_SCORE: 29
ADLS_ACUITY_SCORE: 29
DRESS: SCRUBS (BEHAVIORAL HEALTH);INDEPENDENT
HYGIENE/GROOMING: INDEPENDENT
ADLS_ACUITY_SCORE: 29
DRESS: INDEPENDENT;SCRUBS (BEHAVIORAL HEALTH)

## 2024-08-28 NOTE — PROGRESS NOTES
Spoke with guardian about pt, she not okay with the boyfriend coming to visit. And does not know that she has a brother or who that could be. The JEANNA should be void per guardian.     Referral sent to [facility] and [facility].     Emailed pt guardian an update that referrals were sent as well as pt is requesting a in person visit. Waiting for response back.

## 2024-08-28 NOTE — PLAN OF CARE
"Problem: Adult Behavioral Health Plan of Care  Goal: Patient-Specific Goal (Individualization)  Description: Patient will accept medications as prescribed.  Patient will attend groups and participate in the unit activities  Patient will take part in her discharge meeting and comply with plan.  8-28-24 - Pt is a no wean. Treatment team to re-assess daily.   Outcome: Not Progressing  Note: 1600 - Pt was moved to a room in the -ICU d/t multiple attempts to call the  while on the open unit. Pt was initially argumentative and resistant to moving, but she did end up walking without hands on. Pt reported that she was calling the  to tell them that we are holding her \"hostage\" here. Pt was agitated and tearful, stating \"I don't want to go to foster care. Bring me to detox in [place]. I'm claustrophobic. Let me go home.\" Pt hovering by and pushing on exit doors and repeatedly pounding on windows.     1619 - Pt received 10 mg of PRN Zyprexa for agitation. Pt was told that she needs to stop pounding as it is disruptive to other pt's. Pt made a call to her boyfriend Ronaldo and left a message for her guardian.     1635 - Pt was observed in the -ICU lounge laying on the floor with her pillow and blanket.     1730 - Pt ate 25% of supper.     1855 - Pt presented as much calmer. Her speech was somewhat slurred. She attempted to call her boyfriend and her guardian, but neither answered. She requested and was given another blanket. She went to bed to lay down.     2041 - Pt in bed and appears to be sleeping.     2142 - Pt observed in the -ICU lounge laying on the floor with her pillow and blanket.     2245 - Pt was prompted to go to bed to lay down.     Face to face end of shift report will be communicated to oncoming RN.     Problem: Thought Process Alteration  Goal: Optimal Thought Clarity  Outcome: Not Progressing            "

## 2024-08-28 NOTE — PLAN OF CARE
Problem: Thought Process Alteration  Goal: Optimal Thought Clarity  Outcome: Progressing     Problem: Adult Behavioral Health Plan of Care  Goal: Patient-Specific Goal (Individualization)  Description: Patient will accept medications as prescribed.  Patient will attend groups and participate in the unit activities  Patient will take part in her discharge meeting and comply with plan.  Outcome: Progressing     Face to Face report received from PRATIK Duval. Rounding completed. Patient observed in bed appearing to sleep with even & unlabored respirations for most of the night. 15 minute safety checks performed throughout the night. Patient did not report falls or self-injury.     Face to face end of shift report communicated to day shift RN.     Ivett Carrera RN  8/28/2024  6:03 AM

## 2024-08-28 NOTE — H&P
[facility]    History and Physical  Medical Services       Date of Admission:  8/26/2024  Date of Service (when I saw the patient): 08/28/24    Assessment & Plan     Principal Problem:    Psychosis, unspecified psychosis type (H)    Active Problems:    Noncompliance with medication regimen    Abnormal UA- moderate leukocyte esterase, few bacteria, mucus, rbc, ketones present. She is denying urinary symptoms, vaginal discharge, pain. Likely contaminated. Nursing to continue to monitor and consult for new symptoms.     Labs reviewed- UDS positive amphetamines, benzodiazepines,  HCG negative, alcohol negative, TSH wnl, magnesium wnl, cbc, cmp unremarkable.     Pt medically stable, no acute medical concerns. Chronic medical problems stable. Will sign off. Please consult for any new medical issues or concerns.      Code Status: Full Code    Kate Carroll CNP    Primary Care Physician   Sindhu Sandoval    Chief Complaint   Psych evaluation     History is obtained from the patient and electronic health record    History of Present Illness   (Per ED) Yudi Morales is a 24 year old female who presented to the emergency department ambulatory for evaluation of a multitude of concerns including bradycardia, tachycardia, abdominal pain, nausea, fevers, headaches, visual concerns, vaginal bleeding, urinary discomfort, etc.  The patient has a history most significant for relatively active methamphetamine abuse as well as a previous admission for acute psychosis secondary to the mentioned.     Past Medical History    I have reviewed this patient's medical history and updated it with pertinent information if needed.   Past Medical History:   Diagnosis Date    Allergic rhinitis 10/4/2011    KIRSTY (generalized anxiety disorder) 7/19/2019    Hyperprolactinemia (H24) 11/16/2022    PTSD (post-traumatic stress disorder)     Suicidal behavior with attempted self-injury (H) 2/2/2021       Past Surgical History   I have reviewed  "this patient's surgical history and updated it with pertinent information if needed.  Past Surgical History:   Procedure Laterality Date    TONSILLECTOMY & ADENOIDECTOMY         Prior to Admission Medications   Prior to Admission Medications   Prescriptions Last Dose Informant Patient Reported? Taking?   melatonin 3 MG tablet Unknown  No Yes   Sig: Take 1 tablet (3 mg) by mouth nightly as needed for sleep   nicotine polacrilex (NICORETTE) 4 MG gum Unknown  No Yes   Sig: Place 1 each (4 mg) inside cheek every hour as needed for nicotine withdrawal symptoms   paliperidone (INVEGA SUSTENNA) 117 MG/0.75ML JOVANNI Not Taking  No No   Sig: Inject 0.75 mLs (117 mg) into the muscle every 30 days   Patient not taking: Reported on 2024   traZODone (DESYREL) 50 MG tablet Unknown  No Yes   Sig: Take 1 tablet (50 mg) by mouth nightly as needed for sleep (may repeat after 60 minutes)      Facility-Administered Medications: None     Allergies   Allergies   Allergen Reactions    Other [No Clinical Screening - See Comments] Anaphylaxis     \"Sodium Penathol\" per mother. Used in anesthesia.  Family hx of reaction.    Thiopental Anaphylaxis     Other reaction(s): Other - Describe In Comment Field    Family Hx of death with this medication    Great grandmother  on the operating table to this medication; worried about allergies to sodium      Other reaction(s): Other - Describe In Comment Field Family Hx of death with this medication    Latex Rash    Amoxicillin Trihydrate GI Disturbance    Amoxicillin-Pot Clavulanate GI Disturbance    Phenobarbital        Social History   I have reviewed this patient's social history and updated it with pertinent information if needed. Yudi Morales  reports that she has been smoking vaping device. She has never used smokeless tobacco. She reports current drug use. Drug: Methamphetamines. She reports that she does not drink alcohol.    Family History   I have reviewed this patient's family " history and updated it with pertinent information if needed.   No family history on file.    Review of Systems   Review of systems is limited by patient factors - abnormal mental status    Physical Exam   Temp: 97.9  F (36.6  C) Temp src: Temporal BP: 125/74 Pulse: 72   Resp: 16 SpO2: 97 % O2 Device: None (Room air)    Vital Signs with [facility]  Temp:  [97.4  F (36.3  C)-97.9  F (36.6  C)] 97.9  F (36.6  C)  Pulse:  [] 72  Resp:  [14-18] 16  BP: (109-125)/(72-74) 125/74  SpO2:  [97 %-98 %] 97 %  120 lbs 14.4 oz    Constitutional: awake, alert, cooperative, no apparent distress, and appears stated age, disheveled, vitals stable    Eyes: Lids and lashes normal, pupils equal, round and reactive to light, extra ocular muscles intact, sclera clear, conjunctiva normal  ENT: Normocephalic, without obvious abnormality, atraumatic, external ears without lesions, oral pharynx with moist mucous membranes, no erythema or exudates  Hematologic / Lymphatic: no cervical lymphadenopathy  Respiratory: No increased work of breathing, good air exchange, clear to auscultation bilaterally, no crackles or wheezing  Cardiovascular: Normal apical impulse, regular rate and rhythm, normal S1 and S2, no S3 or S4, and no murmur noted  GI: normal bowel sounds, soft, non-distended, non-tender, no masses palpated, no hepatosplenomegally  Genitounirinary: deferred  Skin: no redness, warmth, or swelling and no rashes  Musculoskeletal: There is no redness, warmth, or swelling of the joints.  Full range of motion noted.    Neurologic: Awake, alert, oriented to name, place and time.  Cranial nerves II-XII are grossly intact.   Neuropsychiatric: General: blunted, disorganized, calm, and poor eye contact    Data   Data reviewed today:   Recent Labs   Lab 08/26/24  1155   WBC 6.1   HGB 14.7   MCV 84         POTASSIUM 3.5   CHLORIDE 101   CO2 24   BUN 16.0   CR 1.07*   ANIONGAP 17*   ELIZABETH 9.8   *   ALBUMIN 4.8   PROTTOTAL 8.0    BILITOTAL 1.1   ALKPHOS 89   ALT 13   AST 23       No results found for this or any previous visit (from the past 24 hour(s)).
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
## 2024-08-28 NOTE — PLAN OF CARE
Face to face end of shift report received from Ivett LEMUS RN.  P observed awake in room.        Problem: Adult Behavioral Health Plan of Care  Goal: Patient-Specific Goal (Individualization)  Description: Patient will accept medications as prescribed.  Patient will attend groups and participate in the unit activities  Patient will take part in her discharge meeting and comply with plan.  Outcome: Progressing     Problem: Thought Process Alteration  Goal: Optimal Thought Clarity  Outcome: Progressing   Goal Outcome Evaluation:       Pt napped until lunch.  She refused breakfast was cooperative with scheduled am medications. Pt has been seeking staff out frequently and asking the same questions over and over again.  Pt states she feel frustrated with still being here.  Has been pacing between her room and the lounge.  Writer did offer her PRN Zyprexa 10 mg at 1216 for agitation. Pt denies pain. Pt later laid back down and has been napping this afternoon.      1457- PRN ativan 1 mg and atarax 50 mg given for anxiety per pt requested.  Pt unhappy with her discharge plan.       Face to face end of shift report communicated to oncoming shift .     Zarina Hinds RN  8/28/2024

## 2024-08-29 PROCEDURE — 250N000013 HC RX MED GY IP 250 OP 250 PS 637

## 2024-08-29 PROCEDURE — 250N000013 HC RX MED GY IP 250 OP 250 PS 637: Performed by: NURSE PRACTITIONER

## 2024-08-29 PROCEDURE — 99232 SBSQ HOSP IP/OBS MODERATE 35: CPT | Performed by: NURSE PRACTITIONER

## 2024-08-29 PROCEDURE — 124N000001 HC R&B MH

## 2024-08-29 RX ADMIN — OLANZAPINE 10 MG: 10 TABLET, FILM COATED ORAL at 15:42

## 2024-08-29 RX ADMIN — LORAZEPAM 1 MG: 1 TABLET ORAL at 19:14

## 2024-08-29 RX ADMIN — PALIPERIDONE 6 MG: 6 TABLET, EXTENDED RELEASE ORAL at 08:05

## 2024-08-29 ASSESSMENT — ACTIVITIES OF DAILY LIVING (ADL)
ADLS_ACUITY_SCORE: 29
DRESS: SCRUBS (BEHAVIORAL HEALTH);INDEPENDENT
ADLS_ACUITY_SCORE: 29
HYGIENE/GROOMING: INDEPENDENT
ADLS_ACUITY_SCORE: 29
ORAL_HYGIENE: INDEPENDENT
ADLS_ACUITY_SCORE: 29
ORAL_HYGIENE: INDEPENDENT
ADLS_ACUITY_SCORE: 29
LAUNDRY: UNABLE TO COMPLETE
ADLS_ACUITY_SCORE: 29
DRESS: SCRUBS (BEHAVIORAL HEALTH);INDEPENDENT
ADLS_ACUITY_SCORE: 29
ADLS_ACUITY_SCORE: 29
HYGIENE/GROOMING: INDEPENDENT
ADLS_ACUITY_SCORE: 29

## 2024-08-29 NOTE — PLAN OF CARE
"Problem: Adult Behavioral Health Plan of Care  Goal: Patient-Specific Goal (Individualization)  Description: Patient will accept medications as prescribed.  Patient will attend groups and participate in the unit activities  Patient will take part in her discharge meeting and comply with plan.  8-29-24 - Pt is a no wean d/t calling police. Treatment team to re-assess daily.   Outcome: Progressing  Note: Pt attempted to call her guardian and boyfriend at the start of the shift. Neither answered. She was restless and pacing. She endorsed anxiety, depression, and voices. When asked what the voices were saying, she stated \"I don't want to talk about it. Can I have the voices pill?\" Pt received 10 mg of PRN Zyprexa at 1542. Pt verbalized that she feels \"scared\" being in the MH-ICU because she is \"claustrophobic.\" Pt asked if she could come out to the open unit. Informed her that treatment team did not approve for her to wean today, but that they will re-assess if she can tomorrow morning. Pt asked writer to stay with her. Informed her that I could not stay with her, but that we would check on her every 15 minutes.     Pt pacing and frequently knocking, stating that she is \"lightheaded\" and needs a \"heart scan\". Vitals obtained. Slight tachycardia with a pulse of 111. Pt repeatedly requesting to be transferred to a medical floor.     1914 - Pt received 1 mg of PRN Ativan for anxiety. Medication effective at calming pt down.     Face to face end of shift report will be communicated to oncoming RN.     Problem: Thought Process Alteration  Goal: Optimal Thought Clarity  Outcome: Progressing     Goal Outcome Evaluation:    Plan of Care Reviewed With: patient                   "

## 2024-08-29 NOTE — PLAN OF CARE
Problem: Thought Process Alteration  Goal: Optimal Thought Clarity  Outcome: Progressing     Problem: Adult Behavioral Health Plan of Care  Goal: Patient-Specific Goal (Individualization)  Description: Patient will accept medications as prescribed.  Patient will attend groups and participate in the unit activities  Patient will take part in her discharge meeting and comply with plan.  8-28-24 - Pt is a no wean d/t calling police. Treatment team to re-assess daily.   Outcome: Progressing       Face to Face report received from PRATIK Marte. Rounding completed. Patient observed in bed appearing to sleep with even & unlabored respirations for most of the night. 15 minute safety checks performed throughout the night. Patient did not report falls or self-injury.     Face to face end of shift report communicated to day shift RN.     Ivett Carrera RN  8/29/2024  6:09 AM

## 2024-08-29 NOTE — PROGRESS NOTES
"Pt guardian emailed back and stated  \"I am out of [place] and not scheduled for a visit now for a few weeks as I was there on the day she was admitted unless there is an emergency. I know she is upset that I will not consent to discharge last night and that I want her to go to a group home.\"  "

## 2024-08-29 NOTE — PROGRESS NOTES
"[facility] PSYCHIATRY  PROGRESS NOTE     SUBJECTIVE     Prior to interviewing the patient, I met with nursing and reviewed patient's clinical condition. We discussed clinical care both before and after the interview. I have reviewed the patient's clinical course by review of records including previous notes, labs, and vital signs.     Per nursing, the patient had the following behavioral events over the last 24-hours: moved to MHICU after calling police multiple times last night. Pounding on windows. Was able to be redirected.    On psychiatric interview, patient is found resting in bed. She immediately asks to leave. Did review that she would not be discharging today, to which she pulls the blankets back up over her head. She states \"I'm not going to foster care. Can't you get that through your head?\" She is encouraged to get in touch with her guardian today to discuss her options. She declines to answer any other questions asked and ignores any further attempts at conversation.      MEDICATIONS   Scheduled Meds:  Current Facility-Administered Medications   Medication Dose Route Frequency Provider Last Rate Last Admin    paliperidone ER (INVEGA) 24 hr tablet 6 mg  6 mg Oral Daily Carol Horton, NP   6 mg at 08/29/24 0805     PRN Meds:.  Current Facility-Administered Medications   Medication Dose Route Frequency Provider Last Rate Last Admin    acetaminophen (TYLENOL) tablet 650 mg  650 mg Oral Q4H PRN Jamshid Rdz APRN CNP   650 mg at 08/27/24 2118    hydrOXYzine HCl (ATARAX) tablet 50 mg  50 mg Oral Q6H PRN Carol Horton, NP   50 mg at 08/28/24 1457    LORazepam (ATIVAN) tablet 1 mg  1 mg Oral Q6H PRN Carol Horton, NP   1 mg at 08/28/24 1457    Or    LORazepam (ATIVAN) injection 1 mg  1 mg Intramuscular Q6H PRN Carol Horton NP        nicotine (NICORETTE) gum 2 mg  2 mg Buccal Q1H PRN Jamshid Rdz APRN CNP        OLANZapine (zyPREXA) tablet 10 mg  10 mg Oral TID PRN Jamshid Rdz APRN CNP   " "10 mg at 24 1619    Or    OLANZapine (zyPREXA) injection 10 mg  10 mg Intramuscular TID PRN Jamshid Rdz APRN CNP        traZODone (DESYREL) tablet 50 mg  50 mg Oral At Bedtime PRN Carol Horton NP   50 mg at 24        ALLERGIES   Allergies   Allergen Reactions    Other [No Clinical Screening - See Comments] Anaphylaxis     \"Sodium Penathol\" per mother. Used in anesthesia.  Family hx of reaction.    Thiopental Anaphylaxis     Other reaction(s): Other - Describe In Comment Field    Family Hx of death with this medication    Great grandmother  on the operating table to this medication; worried about allergies to sodium      Other reaction(s): Other - Describe In Comment Field Family Hx of death with this medication    Latex Rash    Amoxicillin Trihydrate GI Disturbance    Amoxicillin-Pot Clavulanate GI Disturbance    Phenobarbital         MENTAL STATUS EXAM   Vitals: /74   Pulse 64   Temp 98.1  F (36.7  C) (Temporal)   Resp 12   Wt 54.8 kg (120 lb 14.4 oz)   SpO2 94%   BMI 18.94 kg/m      Appearance:  awake, alert, dressed in hospital scrubs, appeared as age stated, and slightly unkempt  Attitude:  guarded, dismissive  Eye Contact:  good  Mood: mildly irritable, anxious  Affect:  mood congruent  Speech:  clear, coherent  Psychomotor Behavior:  no evidence of tardive dyskinesia, dystonia, or tics, restless  Thought Process:  concrete  Associations:  no loose associations  Thought Content:  no evidence of suicidal ideation or homicidal ideation, denies AH today  Insight:  poor  Judgment:  poor  Oriented to:  time, person, and place  Attention Span and Concentration:  fair  Recent and Remote Memory:  fair  Fund of Knowledge: low-normal  Muscle Strength and Tone: normal  Gait and Station: Normal       LABS   No results found for this or any previous visit (from the past 24 hour(s)).      IMPRESSION     This is a 24 year old female with a PMH of schizoaffective disorder- bipolar type, " "KIRSTY, intellectual disability, stimulant use disorder, and cannabis use disorder who presents to the ED initially noting multiple odd somatic complaints, such as her heart no longer beating. Later reporting auditory hallucinations. Was in agreement to admission to get restarted back on medication. Patient has not taken any medication since she left the hospital in early July. She missed her post hospital follow up and did not attend her psychiatry appointment. She did not get her Invega Sustenna injection that was due 8/5. Patient reports she does not have rides to appointments and just forgets to go. Patient does report some ongoing hallucinations today, denies any active SI. She is not interested in continuing the Invega Sustenna FRIAS, noting that it was \"too much\". She is willing to restart the oral Invega today, noting that she does want the hallucinations to stop. Patient does have guardian in place, who agrees with hospitalization for stabilization. Patient does not seem to have any insight into need for stabilization, is not able to verbalize how she will attend any future appointments and has history of medication noncompliance when outside of the hospital setting.       [facility] calling [facility] several times reporting she is being held hostage. Banging on windows. Did willingly take medication and was redirectable after. Has limited insight into why she cannot discharge today. Patient vulnerable due to intellectual disability and mental illness, unable to care for self independently- not taking medication, missing all scheduled appts, using meth. Guardian in support of patient going to  setting, referrals have been sent and guardian working on funding. Will continue Invega today, has been in agreement to take.     DIAGNOSES     #. Schizoaffective disorder, bipolar type  #. Generalized anxiety disorder  #. Intellectual disability  #. Stimulant (methamphetamine) use disorder, " severe  #. Medication noncompliance       PLAN     Location: Unit 5  Legal Status: Orders Placed This Encounter      Voluntary  Has guardian    Safety Assessment:    Behavioral Orders   Procedures    Code 1 - Restrict to Unit    Discontinue 1:1 attendant for suicide risk     Order Specific Question:   I have performed an in person assessment of the patient     Answer:   Based on this assessment the patient no longer requires a one on one attendant at this point in time.     Order Specific Question:   Rationale     Answer:   Patient States able to remain safe in hospital    Routine Programming     As clinically indicated    Status 15     Every 15 minutes.      PTA psychotropic medications stopped:     -Invega Sustenna 117 mg IM- pt missed last dose, not interested in taking     PTA psychotropic medications continued/changed:     -restart Invega 3 mg daily-> 6 mg daily on 8/29    New medications tried and stopped:     -None    New medications initiated:     -standard unit PRN medications    Today's Changes:    - no changes today  - look into GH options, referrals sent    Programming: Patient will be treated in a therapeutic milieu with appropriate individual and group therapies. Education will be provided on diagnoses, medications, and treatments.     Medical diagnoses:  Per medicine    Consult: None  Tests: None    Anticipated LOS: > 7 days  Disposition: likely GH or other structured setting       ATTESTATION      Carol Horton NP

## 2024-08-29 NOTE — PLAN OF CARE
"  Problem: Adult Behavioral Health Plan of Care  Goal: Patient-Specific Goal (Individualization)  Description: Patient will accept medications as prescribed.  Patient will attend groups and participate in the unit activities  Patient will take part in her discharge meeting and comply with plan.  8-29-24 - Pt is a no wean d/t calling police. Treatment team to re-assess daily.   Outcome: Progressing    Minimally cooperative with assessment. Reports some anxiety and frustration with being in hospital. Becomes upset and says that she doesn't want to go to a group home and that its unfair. Able to maintain appropriate behavior. Cooperative with medication without issues. Was able to make a few phone calls without success. Asked to be moved out several times but was told she needed to remain in MHICU at this time. She was ok with this but stated \"I'm just bored\". Knocking repeatedly on window asking same questions.     Problem: Thought Process Alteration  Goal: Optimal Thought Clarity  Outcome: Progressing     Face to face report given with opportunity to observe patient. Patient in room awake.     Report given to evening shift RN.     Micheline Gonsalez RN   8/29/2024      "

## 2024-08-30 PROCEDURE — 250N000013 HC RX MED GY IP 250 OP 250 PS 637: Performed by: NURSE PRACTITIONER

## 2024-08-30 PROCEDURE — 124N000001 HC R&B MH

## 2024-08-30 PROCEDURE — 99232 SBSQ HOSP IP/OBS MODERATE 35: CPT | Performed by: NURSE PRACTITIONER

## 2024-08-30 PROCEDURE — 250N000013 HC RX MED GY IP 250 OP 250 PS 637

## 2024-08-30 RX ADMIN — PALIPERIDONE 6 MG: 6 TABLET, EXTENDED RELEASE ORAL at 08:19

## 2024-08-30 RX ADMIN — ACETAMINOPHEN 650 MG: 325 TABLET, FILM COATED ORAL at 16:38

## 2024-08-30 RX ADMIN — TRAZODONE HYDROCHLORIDE 50 MG: 50 TABLET ORAL at 20:09

## 2024-08-30 RX ADMIN — HYDROXYZINE HYDROCHLORIDE 50 MG: 25 TABLET ORAL at 09:06

## 2024-08-30 RX ADMIN — HYDROXYZINE HYDROCHLORIDE 50 MG: 25 TABLET ORAL at 20:09

## 2024-08-30 RX ADMIN — OLANZAPINE 10 MG: 10 TABLET, FILM COATED ORAL at 13:47

## 2024-08-30 ASSESSMENT — ACTIVITIES OF DAILY LIVING (ADL)
ADLS_ACUITY_SCORE: 29

## 2024-08-30 NOTE — PROGRESS NOTES
"[facility] PSYCHIATRY  PROGRESS NOTE     SUBJECTIVE     Prior to interviewing the patient, I met with nursing and reviewed patient's clinical condition. We discussed clinical care both before and after the interview. I have reviewed the patient's clinical course by review of records including previous notes, labs, and vital signs.     Per nursing, the patient had the following behavioral events over the last 24-hours: starting to wean today with care plan in place. Less irritable today.    On psychiatric interview, patient is found weaning to the open unit. She asks to be moved to a different room. Reviewed that staff monitor her behavior over the weekend and if she does well with following unit rules, this can be discussed next week. She reports feeling like \"I'm a dog in a cage\". She notes that she does not like being in the hospital. When asked about hearing voices today she notes \"I sort of hear them\", though does not elaborate any further. She reports not sleeping well, though nursing noted her to be sleeping much of the night. Has tolerated the Invega, denies side effects. She ends conversation by just walking away from writer.     MEDICATIONS   Scheduled Meds:  Current Facility-Administered Medications   Medication Dose Route Frequency Provider Last Rate Last Admin    paliperidone ER (INVEGA) 24 hr tablet 6 mg  6 mg Oral Daily Carol Horton NP   6 mg at 08/30/24 0819     PRN Meds:.  Current Facility-Administered Medications   Medication Dose Route Frequency Provider Last Rate Last Admin    acetaminophen (TYLENOL) tablet 650 mg  650 mg Oral Q4H PRN Jamshid Rdz APRN CNP   650 mg at 08/30/24 1638    hydrOXYzine HCl (ATARAX) tablet 50 mg  50 mg Oral Q6H PRN Carol Horton NP   50 mg at 08/30/24 0906    LORazepam (ATIVAN) tablet 1 mg  1 mg Oral Q6H PRN Carol Horton NP   1 mg at 08/29/24 1914    Or    LORazepam (ATIVAN) injection 1 mg  1 mg Intramuscular Q6H PRN Carol Horton NP        " "nicotine (NICORETTE) gum 2 mg  2 mg Buccal Q1H PRN Jamshid Rdz APRN CNP        OLANZapine (zyPREXA) tablet 10 mg  10 mg Oral TID PRN Jamshid Rdz APRN CNP   10 mg at 24 1347    Or    OLANZapine (zyPREXA) injection 10 mg  10 mg Intramuscular TID PRN Jamshid Rdz APRN CNP        traZODone (DESYREL) tablet 50 mg  50 mg Oral At Bedtime PRN Carol Horton NP   50 mg at 24        ALLERGIES   Allergies   Allergen Reactions    Other [No Clinical Screening - See Comments] Anaphylaxis     \"Sodium Penathol\" per mother. Used in anesthesia.  Family hx of reaction.    Thiopental Anaphylaxis     Other reaction(s): Other - Describe In Comment Field    Family Hx of death with this medication    Great grandmother  on the operating table to this medication; worried about allergies to sodium      Other reaction(s): Other - Describe In Comment Field Family Hx of death with this medication    Latex Rash    Amoxicillin Trihydrate GI Disturbance    Amoxicillin-Pot Clavulanate GI Disturbance    Phenobarbital         MENTAL STATUS EXAM   Vitals: /72   Pulse 96   Temp 98.2  F (36.8  C) (Temporal)   Resp 20   Wt 54.8 kg (120 lb 14.4 oz)   SpO2 98%   BMI 18.94 kg/m      Appearance:  awake, alert, dressed in hospital scrubs, appeared as age stated, and slightly unkempt  Attitude:  guarded, dismissive  Eye Contact:  fair  Mood: mildly irritable, anxious  Affect:  mood congruent  Speech:  clear, coherent  Psychomotor Behavior:  no evidence of tardive dyskinesia, dystonia, or tics, restless  Thought Process:  concrete  Associations:  no loose associations  Thought Content:  no evidence of suicidal ideation or homicidal ideation, AH are improving per patient  Insight:  poor  Judgment:  poor  Oriented to:  time, person, and place  Attention Span and Concentration:  fair  Recent and Remote Memory:  fair  Fund of Knowledge: low-normal  Muscle Strength and Tone: normal  Gait and Station: Normal       LABS   No " "results found for this or any previous visit (from the past 24 hour(s)).      IMPRESSION     This is a 24 year old female with a PMH of schizoaffective disorder- bipolar type, KIRSTY, intellectual disability, stimulant use disorder, and cannabis use disorder who presents to the ED initially noting multiple odd somatic complaints, such as her heart no longer beating. Later reporting auditory hallucinations. Was in agreement to admission to get restarted back on medication. Patient has not taken any medication since she left the hospital in early July. She missed her post hospital follow up and did not attend her psychiatry appointment. She did not get her Invega Sustenna injection that was due 8/5. Patient reports she does not have rides to appointments and just forgets to go. Patient does report some ongoing hallucinations today, denies any active SI. She is not interested in continuing the Invega Sustenna FRIAS, noting that it was \"too much\". She is willing to restart the oral Invega today, noting that she does want the hallucinations to stop. Patient does have guardian in place, who agrees with hospitalization for stabilization. Patient does not seem to have any insight into need for stabilization, is not able to verbalize how she will attend any future appointments and has history of medication noncompliance when outside of the hospital setting.       Today: starting to wean for short periods with care plan in place. Does get easily overstimulated. Has been less agitated today. Is tolerating Invega. Guardian working on funding for placement, referrals being sent out for . Patient is quite vulnerable and not able to care for self independently.       DIAGNOSES     #. Schizoaffective disorder, bipolar type  #. Generalized anxiety disorder  #. Intellectual disability  #. Stimulant (methamphetamine) use disorder, severe  #. Medication noncompliance       PLAN     Location: Unit 5  Legal Status: Orders Placed This " Encounter      Voluntary  Has guardian    Safety Assessment:    Behavioral Orders   Procedures    Code 1 - Restrict to Unit    Discontinue 1:1 attendant for suicide risk     Order Specific Question:   I have performed an in person assessment of the patient     Answer:   Based on this assessment the patient no longer requires a one on one attendant at this point in time.     Order Specific Question:   Rationale     Answer:   Patient States able to remain safe in hospital    Routine Programming     As clinically indicated    Status 15     Every 15 minutes.      PTA psychotropic medications stopped:     -Invega Sustenna 117 mg IM- pt missed last dose, not interested in taking     PTA psychotropic medications continued/changed:     -restart Invega 3 mg daily-> 6 mg daily on 8/29    New medications tried and stopped:     -None    New medications initiated:     -standard unit PRN medications    Today's Changes:    - no changes today  - look into GH options, referrals sent    Programming: Patient will be treated in a therapeutic milieu with appropriate individual and group therapies. Education will be provided on diagnoses, medications, and treatments.     Medical diagnoses:  Per medicine    Consult: None  Tests: None    Anticipated LOS: > 7 days  Disposition: likely GH or other structured setting       ATTESTATION      Carol Horton NP

## 2024-08-30 NOTE — PLAN OF CARE
Problem: Thought Process Alteration  Goal: Optimal Thought Clarity  Outcome: Progressing     Problem: Adult Behavioral Health Plan of Care  Goal: Patient-Specific Goal (Individualization)  Description: Patient will accept medications as prescribed.  Patient will attend groups and participate in the unit activities  Patient will take part in her discharge meeting and comply with plan.  8-29-24 - Pt is a no wean d/t calling police. Treatment team to re-assess daily.   Outcome: Progressing     Face to Face report received from PRATIK Marte. Rounding completed. Patient observed in bed appearing to sleep with even & unlabored respirations for most of the night. 15 minute safety checks performed throughout the night. Patient did not report falls or self-injury.     Face to face end of shift report communicated to day shift RN.     Ivett Carrera RN  8/30/2024  6:11 AM

## 2024-08-30 NOTE — PLAN OF CARE
Problem: Adult Behavioral Health Plan of Care  Goal: Patient-Specific Goal (Individualization)  Description: Patient will accept medications as prescribed.  Patient will attend groups and participate in the unit activities  Patient will take part in her discharge meeting and comply with plan.    8-30-24 -Goals for weaning to the open unit.  Patient will ask staff to dial a phone for all phone calls. Patient will not spit while on the open unit. She may use a tissue and dispose of properly if she has phlegm. Patient will limit request of staff to hourly unless she has an emergency. Pt may wean to open unit for 1 hour 2 times on day shift and 2 times on evening shift. During times on the open unit she will be directly supervised by staff to facilitate her in meeting above goals. If patient is overwhelmed and not able to reach goals she can return to the MHICU for decreased stimulation. Treatment team to re-assess daily.   8/30/2024 1102 by Levi Biswas, RN  Outcome: Progressing  Note: She is sleeping in her room. She says that she did not sleep well last night. She talks of having auditory hallucinations just after receiving her invega 6mg dose. She is given hydroxizine 50mg for increased anxiety at 0906. She asks about going to the open unit today and this was discussed in treatment team and a specific care plan with goals for her is being put in place (noted above). Care plan will be started today she will be offered to go to the open unit after lunch. This morning she has remained in bed, she did eat breakfast and offers no other complaints.  8/30/2024 1046 by Levi Biswas, RN  Outcome: Progressing     Problem: Thought Process Alteration  Goal: Optimal Thought Clarity  Outcome: Progressing  Note: She admits to auditory hallucinations. She has been isolating in her room all morning. She will be encouraged to go to the open unit after lunch and work on meeting her goals. She is taking her medications as prescribed.

## 2024-08-31 PROCEDURE — 99232 SBSQ HOSP IP/OBS MODERATE 35: CPT | Performed by: NURSE PRACTITIONER

## 2024-08-31 PROCEDURE — 250N000013 HC RX MED GY IP 250 OP 250 PS 637

## 2024-08-31 PROCEDURE — 250N000013 HC RX MED GY IP 250 OP 250 PS 637: Performed by: NURSE PRACTITIONER

## 2024-08-31 PROCEDURE — 124N000001 HC R&B MH

## 2024-08-31 RX ADMIN — PALIPERIDONE 6 MG: 6 TABLET, EXTENDED RELEASE ORAL at 08:19

## 2024-08-31 RX ADMIN — NICOTINE POLACRILEX 2 MG: 2 GUM, CHEWING ORAL at 16:12

## 2024-08-31 RX ADMIN — OLANZAPINE 10 MG: 10 TABLET, FILM COATED ORAL at 14:43

## 2024-08-31 RX ADMIN — LORAZEPAM 1 MG: 1 TABLET ORAL at 16:04

## 2024-08-31 ASSESSMENT — ACTIVITIES OF DAILY LIVING (ADL)
HYGIENE/GROOMING: INDEPENDENT
ADLS_ACUITY_SCORE: 29
LAUNDRY: UNABLE TO COMPLETE
ADLS_ACUITY_SCORE: 29
DRESS: INDEPENDENT;SCRUBS (BEHAVIORAL HEALTH)
ADLS_ACUITY_SCORE: 29
ORAL_HYGIENE: INDEPENDENT
ADLS_ACUITY_SCORE: 29

## 2024-08-31 NOTE — PLAN OF CARE
Problem: Adult Behavioral Health Plan of Care  Goal: Patient-Specific Goal (Individualization)  Description: Patient will accept medications as prescribed.  Patient will attend groups and participate in the unit activities  Patient will take part in her discharge meeting and comply with plan.    8-31-24 -Goals for weaning to the open unit.  Patient will ask staff to dial a phone for all phone calls. Patient will not spit while on the open unit. She may use a tissue and dispose of properly if she has phlegm. Patient will limit request of staff to hourly unless she has an emergency. Pt may wean to open unit for 1 hour 2 times on day shift and 2 times on evening shift. During times on the open unit she will be directly supervised by staff to facilitate her in meeting above goals. If patient is overwhelmed and not able to reach goals she can return to the MHICU for decreased stimulation. Treatment team to re-assess daily.   Outcome: Progressing     Problem: Thought Process Alteration  Goal: Optimal Thought Clarity  Outcome: Progressing   Goal Outcome Evaluation:    Plan of Care Reviewed With: patient          Pt is pleasant and cooperative with writer.  She seeks staff out frequently. Denies anxiety, depression, SI, HI, pain, and hallucinations.  Pt napped during the morning. Pt has weaned out to the open unit in the afternoon and did well with this. Appropriate boundaries reinforced. Cooperative with all scheduled medications.  Makes needs known.      Face to face end of shift report communicated to onccherry RN.     Zarina Hinds RN  8/31/2024

## 2024-08-31 NOTE — PROGRESS NOTES
"[facility] PSYCHIATRY  PROGRESS NOTE     SUBJECTIVE     Prior to interviewing the patient, I met with nursing and reviewed patient's clinical condition. We discussed clinical care both before and after the interview. I have reviewed the patient's clinical course by review of records including previous notes, labs, and vital signs.     Per nursing, the patient had the following behavioral events over the last 24-hours: remains in the MHICU, becomes overstimulated easily. No pounding on windows.    On psychiatric interview, patient is found  walking about in the MHICU. She was willing to sit down and speak with me. She requests going back home to her apartment, when told \"no\" she readily accepts this stating \"ok\". Repeats question several times always redirecting.Is taking her medication. Denies feelings of depression or anxiety although does appear anxious. Denies SI/HI, no AH/VH.     MEDICATIONS   Scheduled Meds:  Current Facility-Administered Medications   Medication Dose Route Frequency Provider Last Rate Last Admin    paliperidone ER (INVEGA) 24 hr tablet 6 mg  6 mg Oral Daily Carol Horton NP   6 mg at 08/31/24 0819     PRN Meds:.  Current Facility-Administered Medications   Medication Dose Route Frequency Provider Last Rate Last Admin    acetaminophen (TYLENOL) tablet 650 mg  650 mg Oral Q4H PRN Jamshid Rdz APRN CNP   650 mg at 08/30/24 1638    hydrOXYzine HCl (ATARAX) tablet 50 mg  50 mg Oral Q6H PRN Carol Horton NP   50 mg at 08/30/24 2009    LORazepam (ATIVAN) tablet 1 mg  1 mg Oral Q6H PRN Carol Horton NP   1 mg at 08/29/24 1914    Or    LORazepam (ATIVAN) injection 1 mg  1 mg Intramuscular Q6H PRN Carol Horton NP        nicotine (NICORETTE) gum 2 mg  2 mg Buccal Q1H PRN Jamshid Rdz APRN CNP        OLANZapine (zyPREXA) tablet 10 mg  10 mg Oral TID PRN Jamshid Rdz APRN CNP   10 mg at 08/30/24 1347    Or    OLANZapine (zyPREXA) injection 10 mg  10 mg Intramuscular TID PRN " "Jamshid Rdz, AMINA CNP        traZODone (DESYREL) tablet 50 mg  50 mg Oral At Bedtime PRN Carol Horton, NP   50 mg at 24        ALLERGIES   Allergies   Allergen Reactions    Other [No Clinical Screening - See Comments] Anaphylaxis     \"Sodium Penathol\" per mother. Used in anesthesia.  Family hx of reaction.    Thiopental Anaphylaxis     Other reaction(s): Other - Describe In Comment Field    Family Hx of death with this medication    Great grandmother  on the operating table to this medication; worried about allergies to sodium      Other reaction(s): Other - Describe In Comment Field Family Hx of death with this medication    Latex Rash    Amoxicillin Trihydrate GI Disturbance    Amoxicillin-Pot Clavulanate GI Disturbance    Phenobarbital         MENTAL STATUS EXAM   Vitals: /69   Pulse 114   Temp 97.8  F (36.6  C) (Temporal)   Resp 16   Wt 54.8 kg (120 lb 14.4 oz)   SpO2 96%   BMI 18.94 kg/m      Appearance:  awake, alert, dressed in hospital scrubs, appeared as age stated, and slightly unkempt  Attitude:  cooperative  Eye Contact:  good  Mood: mildly irritable, anxious  Affect:  mood congruent  Speech:  clear, coherent  Psychomotor Behavior:  no evidence of tardive dyskinesia, dystonia, or tics, restless  Thought Process:  concrete  Associations:  no loose associations  Thought Content:  no evidence of suicidal ideation or homicidal ideation, denies AH today  Insight:  poor  Judgment:  poor  Oriented to:  time, person, and place  Attention Span and Concentration:  fair  Recent and Remote Memory:  fair  Fund of Knowledge: low-normal  Muscle Strength and Tone: normal  Gait and Station: Normal       LABS   No results found for this or any previous visit (from the past 24 hour(s)).      IMPRESSION     This is a 24 year old female with a PMH of schizoaffective disorder- bipolar type, KIRSTY, intellectual disability, stimulant use disorder, and cannabis use disorder who presents to the ED " "initially noting multiple odd somatic complaints, such as her heart no longer beating. Later reporting auditory hallucinations. Was in agreement to admission to get restarted back on medication. Patient has not taken any medication since she left the hospital in early July. She missed her post hospital follow up and did not attend her psychiatry appointment. She did not get her Invega Sustenna injection that was due 8/5. Patient reports she does not have rides to appointments and just forgets to go. Patient does report some ongoing hallucinations today, denies any active SI. She is not interested in continuing the Invega Sustenna FRIAS, noting that it was \"too much\". She is willing to restart the oral Invega today, noting that she does want the hallucinations to stop. Patient does have guardian in place, who agrees with hospitalization for stabilization. Patient does not seem to have any insight into need for stabilization, is not able to verbalize how she will attend any future appointments and has history of medication noncompliance when outside of the hospital setting.       Today: moved to [facility] last night after calling police several times reporting she is being held hostage. Banging on windows. Did willingly take medication and was redirectable after. Has limited insight into why she cannot discharge today. Patient vulnerable due to intellectual disability and mental illness, unable to care for self independently- not taking medication, missing all scheduled appts, using meth. Guardian in support of patient going to  setting, referrals have been sent and guardian working on funding. Will continue Invega today, has been in agreement to take.     DIAGNOSES     #. Schizoaffective disorder, bipolar type  #. Generalized anxiety disorder  #. Intellectual disability  #. Stimulant (methamphetamine) use disorder, severe  #. Medication noncompliance       PLAN     Location: Unit 5  Legal Status: Orders Placed This " Encounter      Voluntary  Has guardian    Safety Assessment:    Behavioral Orders   Procedures    Code 1 - Restrict to Unit    Discontinue 1:1 attendant for suicide risk     Order Specific Question:   I have performed an in person assessment of the patient     Answer:   Based on this assessment the patient no longer requires a one on one attendant at this point in time.     Order Specific Question:   Rationale     Answer:   Patient States able to remain safe in hospital    Routine Programming     As clinically indicated    Status 15     Every 15 minutes.      PTA psychotropic medications stopped:     -Invega Sustenna 117 mg IM- pt missed last dose, not interested in taking     PTA psychotropic medications continued/changed:     -restart Invega 3 mg daily-> 6 mg daily on 8/29    New medications tried and stopped:     -None    New medications initiated:     -standard unit PRN medications    Today's Changes:    - no changes today  - may wean for short periods of time during times of less stimulation.    Programming: Patient will be treated in a therapeutic milieu with appropriate individual and group therapies. Education will be provided on diagnoses, medications, and treatments.     Medical diagnoses:  Per medicine    Consult: None  Tests: None    Anticipated LOS: > 7 days  Disposition: likely  or other structured setting       ATTESTATION      Shanice Pedersen NP

## 2024-08-31 NOTE — PLAN OF CARE
"SHIFT SUMMARY:  Per request, assisted with calling her guardian. Remains in the MHICU, except to wean for one hour, twice a shift with direct supervision by a UA. Repeatedly asking when she will discharge. Experiencing auditory hallucinations. Anxious and cooperative.    Administered PRN PO acetaminophen 650 mg related to \"a headache\" - effective.    At HS, administered PRN PO trazadone 50 mg per order for sleep promotion; Also administered PRN PO hydroxyzine 50 mg related to anxiety.     CARE CONTINUES INTO NOC SHIFT:  Observed resting with eyes closed, respirations even, unlabored and WNL. Able to reposition self and make needs known. 15-minute checks in place. Slept approximately 5.25 hours.       Problem: Adult Behavioral Health Plan of Care  Goal: Patient-Specific Goal (Individualization)  Description: Patient will accept medications as prescribed.  Patient will attend groups and participate in the unit activities  Patient will take part in her discharge meeting and comply with plan.    8-30-24 -Goals for weaning to the open unit.  Patient will ask staff to dial a phone for all phone calls. Patient will not spit while on the open unit. She may use a tissue and dispose of properly if she has phlegm. Patient will limit request of staff to hourly unless she has an emergency. Pt may wean to open unit for 1 hour 2 times on day shift and 2 times on evening shift. During times on the open unit she will be directly supervised by staff to facilitate her in meeting above goals. If patient is overwhelmed and not able to reach goals she can return to the MHICU for decreased stimulation. Treatment team to re-assess daily.   Outcome: Progressing     Problem: Thought Process Alteration  Goal: Optimal Thought Clarity  Outcome: Progressing   Goal Outcome Evaluation:                        "

## 2024-08-31 NOTE — PLAN OF CARE
Problem: Adult Behavioral Health Plan of Care  Goal: Patient-Specific Goal (Individualization)  Description: Patient will accept medications as prescribed.  Patient will attend groups and participate in the unit activities  Patient will take part in her discharge meeting and comply with plan.    8-30-24 -Goals for weaning to the open unit.  Patient will ask staff to dial a phone for all phone calls. Patient will not spit while on the open unit. She may use a tissue and dispose of properly if she has phlegm. Patient will limit request of staff to hourly unless she has an emergency. Pt may wean to open unit for 2 hour 2 times on day shift and 2 times on evening shift. During times on the open unit she will be directly supervised by staff to facilitate her in meeting above goals. If patient is overwhelmed and not able to reach goals she can return to the MHICU for decreased stimulation. Treatment team to re-assess daily.   Outcome: Progressing  Note: Face to face start of shift report received from RN.  Rounding complete, patient in MHICU at this time.     Samantha Soriano RN  8/31/2024  3:38 PM    Patient weans to the open unit.  Makes phone calls with staff assistance.   Patient requests medication for anxiety.  PRN:  Ativan 1 mg given @ 1604.  Patient requires redirection with hourly requests, compliant with this after redirection given.    Patient eats 100% of dinner.     Patient takes medications as prescribed/needed.  Questions when she will be discharged and get to go home.   Weaned to open unit approximately one hour and half.    Patient resting in bed, tearful.  Remains in bed the remainder of this shift.         Goal Outcome Evaluation:       Face to face end of shift report communicated to oncoming shift.     Samantha Soriano RN  8/31/2024  11:07 PM

## 2024-09-01 PROCEDURE — 124N000001 HC R&B MH

## 2024-09-01 PROCEDURE — 250N000013 HC RX MED GY IP 250 OP 250 PS 637

## 2024-09-01 PROCEDURE — 250N000013 HC RX MED GY IP 250 OP 250 PS 637: Performed by: NURSE PRACTITIONER

## 2024-09-01 PROCEDURE — 99232 SBSQ HOSP IP/OBS MODERATE 35: CPT | Performed by: NURSE PRACTITIONER

## 2024-09-01 RX ADMIN — TRAZODONE HYDROCHLORIDE 50 MG: 50 TABLET ORAL at 19:57

## 2024-09-01 RX ADMIN — PALIPERIDONE 6 MG: 6 TABLET, EXTENDED RELEASE ORAL at 08:00

## 2024-09-01 RX ADMIN — OLANZAPINE 10 MG: 10 TABLET, FILM COATED ORAL at 13:05

## 2024-09-01 RX ADMIN — HYDROXYZINE HYDROCHLORIDE 50 MG: 25 TABLET ORAL at 21:42

## 2024-09-01 RX ADMIN — OLANZAPINE 10 MG: 10 TABLET, FILM COATED ORAL at 19:57

## 2024-09-01 RX ADMIN — NICOTINE POLACRILEX 2 MG: 2 GUM, CHEWING ORAL at 14:40

## 2024-09-01 RX ADMIN — LORAZEPAM 1 MG: 1 TABLET ORAL at 19:02

## 2024-09-01 ASSESSMENT — ACTIVITIES OF DAILY LIVING (ADL)
ORAL_HYGIENE: INDEPENDENT
ADLS_ACUITY_SCORE: 29
HYGIENE/GROOMING: INDEPENDENT
ADLS_ACUITY_SCORE: 29
ADLS_ACUITY_SCORE: 29
LAUNDRY: UNABLE TO COMPLETE
ADLS_ACUITY_SCORE: 29
DRESS: INDEPENDENT;SCRUBS (BEHAVIORAL HEALTH)
ADLS_ACUITY_SCORE: 29

## 2024-09-01 NOTE — PLAN OF CARE
Face to face end of shift report received from Sandee JARRETT RN.  [place] [facility] [place]      Problem: Adult Behavioral Health Plan of Care  Goal: Patient-Specific Goal (Individualization)  Description: Patient will accept medications as prescribed.  Patient will attend groups and participate in the unit activities  Patient will take part in her discharge meeting and comply with plan.    8-31-24 -Goals for weaning to the open unit.  Patient will ask staff to dial a phone for all phone calls. Patient will not spit while on the open unit. She may use a tissue and dispose of properly if she has phlegm. Patient will limit request of staff to hourly unless she has an emergency. Pt may wean to open unit for 2 hour 2 times on day shift and 2 times on evening shift. During times on the open unit she will be directly supervised by staff to facilitate her in meeting above goals. If patient is overwhelmed and not able to reach goals she can return to the MHICU for decreased stimulation. Treatment team to re-assess daily.   Outcome: Progressing     Problem: Thought Process Alteration  Goal: Optimal Thought Clarity  Outcome: Progressing   Goal Outcome Evaluation:    Plan of Care Reviewed With: patient        Pt is pleasant and cooperative with writer.  Pt slept most of the am.  Pt did get up and eat breakfast but went back to bed.  Pt admits to ongoing anxiety.  Requested PRN Zyprexa 10 mg at 1305.  Pt weaned to the open unit and did well with this.  Pt cooperative with all scheduled medications.  Makes needs known.        Face to face end of shift report communicated to jose CHRISTIE.     Zarina Hinds RN  9/1/2024

## 2024-09-01 NOTE — PLAN OF CARE
"  Problem: Adult Behavioral Health Plan of Care  Goal: Patient-Specific Goal (Individualization)  Description: Patient will accept medications as prescribed.  Patient will attend groups and participate in the unit activities  Patient will take part in her discharge meeting and comply with plan.    9-1-24 -Goals for weaning to the open unit.  Patient will ask staff to dial a phone for all phone calls. Patient will not spit while on the open unit. She may use a tissue and dispose of properly if she has phlegm. Patient will limit request of staff to hourly unless she has an emergency. Pt may wean as appropriate.If patient is overwhelmed and not able to reach goals she can return to the MHICU for decreased stimulation. Treatment team to re-assess daily.   Outcome: Progressing  Note: Face to face received from RN.  Rounding complete, patient in MHICU lounge at this time.     Samantha Soriano RN  9/1/2024  3:39 PM      Patient remains in MHICU through shift change.  Out to open unit after.  Behaviors remain appropriate.    Patient compliant with all guidelines addressed in care plan.  Requests to eat dinner in MHICU without prompting.       Patient eats 100% of dinner.   Patient denies SI/HI, and pain. Endorses AH/VH.    1900:  Patient requesting medication for anxiety.    PRN:  Ativan 1 mg given.    Patient in group room, works on a project painting for 45 minutes, does not interrupt peers, sits the entire time working.    1950:  back at nurses window \"I need something for the voices\"  \"Can I have a larger dose of the voice pill and I want a bigger dose of the trazodone\"   Informed patient these medications have been beneficial at the current dose and she can speak with the provider tomorrow regarding a higher dose.  \"Can I just have my sleep medication now too?  I'm going to go to bed\"  PRN:  Zyprexa 10 mg and Trazodone 50 mg given @ 1956.  Patient requests to return to MHICU to go to bed.      Patient remains " "cooperative throughout this shift with hourly requests, requires redirection, is easily redirected.     Patient endorses anxiety relating to going to sleep.  \"It's scary, I have trouble\"  PRN:  hydroxyzine 50 mg @ 2142        Goal Outcome Evaluation:       Face to face end of shift report communicated to oncoming shift.     Samantha Soriano RN  9/1/2024  11:01 PM                      "

## 2024-09-01 NOTE — PROGRESS NOTES
[facility] PSYCHIATRY  PROGRESS NOTE     SUBJECTIVE     Prior to interviewing the patient, I met with nursing and reviewed patient's clinical condition. We discussed clinical care both before and after the interview. I have reviewed the patient's clinical course by review of records including previous notes, labs, and vital signs.     Per nursing, the patient had the following behavioral events over the last 24-hours: remains in the MHICU, becomes overstimulated easily. No pounding on windows.    On psychiatric interview, patient was in her room within the MHICU. Last evening pt was weaned onto the open unit which she tolerated well. Staff does dial the phone for her when making calls as she had previously connected the police. Pt is given meals in her room as while eating she will eat with her fingers, placing large amounts of food into her mouth. Pt does redirect fairly easily. She was willing to sit down and speak with me..Is taking her medication. Denies feelings of depression or anxiety although does appear anxious. Denies SI/HI, no AH/VH.     MEDICATIONS   Scheduled Meds:  Current Facility-Administered Medications   Medication Dose Route Frequency Provider Last Rate Last Admin    paliperidone ER (INVEGA) 24 hr tablet 6 mg  6 mg Oral Daily Carol Horton, NP   6 mg at 09/01/24 0800     PRN Meds:.  Current Facility-Administered Medications   Medication Dose Route Frequency Provider Last Rate Last Admin    acetaminophen (TYLENOL) tablet 650 mg  650 mg Oral Q4H PRN Jamshid Rdz APRN CNP   650 mg at 08/30/24 1638    hydrOXYzine HCl (ATARAX) tablet 50 mg  50 mg Oral Q6H PRN Carol Horton, NP   50 mg at 08/30/24 2009    LORazepam (ATIVAN) tablet 1 mg  1 mg Oral Q6H PRN Sol Carol, NP   1 mg at 08/31/24 1604    Or    LORazepam (ATIVAN) injection 1 mg  1 mg Intramuscular Q6H PRN Carol Horton NP        nicotine (NICORETTE) gum 2 mg  2 mg Buccal Q1H PRN Jamshid Rdz APRN CNP   2 mg at 08/31/24  "1612    OLANZapine (zyPREXA) tablet 10 mg  10 mg Oral TID PRN Jamshid Rdz APRN CNP   10 mg at 24 1443    Or    OLANZapine (zyPREXA) injection 10 mg  10 mg Intramuscular TID PRN Jamshid Rdz APRN CNP        traZODone (DESYREL) tablet 50 mg  50 mg Oral At Bedtime PRN Carol Horton, NP   50 mg at 24        ALLERGIES   Allergies   Allergen Reactions    Other [No Clinical Screening - See Comments] Anaphylaxis     \"Sodium Penathol\" per mother. Used in anesthesia.  Family hx of reaction.    Thiopental Anaphylaxis     Other reaction(s): Other - Describe In Comment Field    Family Hx of death with this medication    Great grandmother  on the operating table to this medication; worried about allergies to sodium      Other reaction(s): Other - Describe In Comment Field Family Hx of death with this medication    Latex Rash    Amoxicillin Trihydrate GI Disturbance    Amoxicillin-Pot Clavulanate GI Disturbance    Phenobarbital         MENTAL STATUS EXAM   Vitals: /52   Pulse 75   Temp 98.1  F (36.7  C) (Temporal)   Resp 16   Wt 54.8 kg (120 lb 14.4 oz)   SpO2 96%   BMI 18.94 kg/m      Appearance:  awake, alert, dressed in hospital scrubs, appeared as age stated, and slightly unkempt  Attitude:  cooperative  Eye Contact:  good  Mood: mildly irritable, anxious  Affect:  mood congruent  Speech:  clear, coherent  Psychomotor Behavior:  no evidence of tardive dyskinesia, dystonia, or tics, restless  Thought Process:  concrete  Associations:  no loose associations  Thought Content:  no evidence of suicidal ideation or homicidal ideation, denies AH today  Insight:  poor  Judgment:  poor  Oriented to:  time, person, and place  Attention Span and Concentration:  fair  Recent and Remote Memory:  fair  Fund of Knowledge: low-normal  Muscle Strength and Tone: normal  Gait and Station: Normal       LABS   No results found for this or any previous visit (from the past 24 hour(s)).      IMPRESSION " "    This is a 24 year old female with a PMH of schizoaffective disorder- bipolar type, KIRSTY, intellectual disability, stimulant use disorder, and cannabis use disorder who presents to the ED initially noting multiple odd somatic complaints, such as her heart no longer beating. Later reporting auditory hallucinations. Was in agreement to admission to get restarted back on medication. Patient has not taken any medication since she left the hospital in early July. She missed her post hospital follow up and did not attend her psychiatry appointment. She did not get her Invega Sustenna injection that was due 8/5. Patient reports she does not have rides to appointments and just forgets to go. Patient does report some ongoing hallucinations today, denies any active SI. She is not interested in continuing the Invega Sustenna FRIAS, noting that it was \"too much\". She is willing to restart the oral Invega today, noting that she does want the hallucinations to stop. Patient does have guardian in place, who agrees with hospitalization for stabilization. Patient does not seem to have any insight into need for stabilization, is not able to verbalize how she will attend any future appointments and has history of medication noncompliance when outside of the hospital setting.       Today: no changes.     DIAGNOSES     #. Schizoaffective disorder, bipolar type  #. Generalized anxiety disorder  #. Intellectual disability  #. Stimulant (methamphetamine) use disorder, severe  #. Medication noncompliance       PLAN     Location: Unit 5  Legal Status: Orders Placed This Encounter      Voluntary  Has guardian    Safety Assessment:    Behavioral Orders   Procedures    Code 1 - Restrict to Unit    Discontinue 1:1 attendant for suicide risk     Order Specific Question:   I have performed an in person assessment of the patient     Answer:   Based on this assessment the patient no longer requires a one on one attendant at this point in time.     " Order Specific Question:   Rationale     Answer:   Patient States able to remain safe in hospital    Routine Programming     As clinically indicated    Status 15     Every 15 minutes.      PTA psychotropic medications stopped:     -Invega Sustenna 117 mg IM- pt missed last dose, not interested in taking     PTA psychotropic medications continued/changed:     -restart Invega 3 mg daily-> 6 mg daily on 8/29    New medications tried and stopped:     -None    New medications initiated:     -standard unit PRN medications    Today's Changes:    - no changes today  - may wean for short periods of time during times of less stimulation.    Programming: Patient will be treated in a therapeutic milieu with appropriate individual and group therapies. Education will be provided on diagnoses, medications, and treatments.     Medical diagnoses:  Per medicine    Consult: None  Tests: None    Anticipated LOS: > 7 days  Disposition: likely  or other structured setting       ATTESTATION      Shanice Pedersen NP

## 2024-09-02 PROCEDURE — 250N000013 HC RX MED GY IP 250 OP 250 PS 637: Performed by: NURSE PRACTITIONER

## 2024-09-02 PROCEDURE — 124N000001 HC R&B MH

## 2024-09-02 PROCEDURE — 250N000013 HC RX MED GY IP 250 OP 250 PS 637

## 2024-09-02 RX ADMIN — NICOTINE POLACRILEX 2 MG: 2 GUM, CHEWING ORAL at 17:23

## 2024-09-02 RX ADMIN — OLANZAPINE 10 MG: 10 TABLET, FILM COATED ORAL at 20:09

## 2024-09-02 RX ADMIN — ACETAMINOPHEN 650 MG: 325 TABLET, FILM COATED ORAL at 17:21

## 2024-09-02 RX ADMIN — TRAZODONE HYDROCHLORIDE 50 MG: 50 TABLET ORAL at 20:09

## 2024-09-02 RX ADMIN — PALIPERIDONE 6 MG: 6 TABLET, EXTENDED RELEASE ORAL at 09:46

## 2024-09-02 RX ADMIN — LORAZEPAM 1 MG: 1 TABLET ORAL at 17:16

## 2024-09-02 ASSESSMENT — ACTIVITIES OF DAILY LIVING (ADL)
ADLS_ACUITY_SCORE: 29

## 2024-09-02 NOTE — PLAN OF CARE
Problem: Adult Behavioral Health Plan of Care  Goal: Patient-Specific Goal (Individualization)  Description: Patient will accept medications as prescribed.  Patient will attend groups and participate in the unit activities  Patient will take part in her discharge meeting and comply with plan.    9-1-24 -Goals for weaning to the open unit.  Patient will ask staff to dial a phone for all phone calls. Patient will not spit while on the open unit. She may use a tissue and dispose of properly if she has phlegm. Patient will limit request of staff to hourly unless she has an emergency. Pt may wean as appropriate.If patient is overwhelmed and not able to reach goals she can return to the MHICU for decreased stimulation. Treatment team to re-assess daily.   Outcome: Progressing   Goal Outcome Evaluation:        Patient in bed and appears to be asleep. Eyes closed. Regular and non labored respirations noted. 15 minute safety and PRN checks done throughout the night. Position changes noted.   Patient slept about 6 hours.     End of shift report will be given to PRATIK QUINONEZ.

## 2024-09-02 NOTE — PLAN OF CARE
"  Problem: Adult Behavioral Health Plan of Care  Goal: Plan of Care Review  Outcome: Progressing  Goal: Patient-Specific Goal (Individualization)  Description: Patient will accept medications as prescribed.  Patient will attend groups and participate in the unit activities  Patient will take part in her discharge meeting and comply with plan.    9-1-24 -Goals for weaning to the open unit.  Patient will ask staff to dial a phone for all phone calls. Patient will not spit while on the open unit. She may use a tissue and dispose of properly if she has phlegm. Patient will limit request of staff to hourly unless she has an emergency. Pt may wean as appropriate.If patient is overwhelmed and not able to reach goals she can return to the MHICU for decreased stimulation. Treatment team to re-assess daily.   Outcome: Progressing  Goal: Adheres to Safety Considerations for Self and Others  Outcome: Progressing  Goal: Absence of New-Onset Illness or Injury  Outcome: Progressing  Goal: Optimized Coping Skills in Response to Life Stressors  Outcome: Progressing  Goal: Develops/Participates in Therapeutic [place] to Support Successful Transition  Outcome: Progressing     Problem: Thought Process Alteration  Goal: Optimal Thought Clarity  Outcome: Progressing   Goal Outcome Evaluation:  Face to face end of shift report received from RN. Patient rounding completed, in bed.   September 2, 2024 7:59 AM    Patient denies pain. Patient reports mood as \"okay\" and mentions being very tired today, evident by being in bed the majority of this shift. Patient has not wanted to wean or spend time in MCICU lounge thus far this shift. Patient denies AH/VH, SI/HI, or any harmful thoughts towards self or others at this time, agrees to notify staff if anything changes. Patient does not report any concerns or complaints.   September 2, 2024 10:57 AM    Patient has continued to not want to wean or spend time in MHICU lounge. Patient has not " reported any concerns or complaints.   September 2, 2024 1:58 PM    Patient has spent time weaning in the lounge making several phone calls, she has done well following her care plan to have staff dial the phone. Patient is wishing to go home and I explained she is here voluntarily but it is up to her guardian, not her and she was not very understanding of this. Patient attempted to call guardian and guardian on call with no luck. This writer sat down with patient to help her write out a letter explaining her stance on the situation that she plans to give to the guardian and doctor. This writer and patient also spent time working on her WRAP packet that the patient found very helpful.    Prn ativan given for increased anxiety, see MAR. Patient reported right sided flank pain, ache in nature, no other symptoms, gave prn tylenol, see MAR.  September 2, 2024 5:24 PM    Patient given prn zyprexa per request, see MAR. Patient also received prn trazodone prior to bedtime, see MAR. Patient has been spending time in open lounge, tolerating well and following her care plan. Patient states she has been having nightmares related to her PTSD and hopes tonight that she won't. Patient does not report any other concerns or complaints at this time.   September 2, 2024 8:47 PM

## 2024-09-03 PROCEDURE — 250N000013 HC RX MED GY IP 250 OP 250 PS 637: Performed by: NURSE PRACTITIONER

## 2024-09-03 PROCEDURE — 124N000001 HC R&B MH

## 2024-09-03 PROCEDURE — 250N000013 HC RX MED GY IP 250 OP 250 PS 637

## 2024-09-03 PROCEDURE — 99232 SBSQ HOSP IP/OBS MODERATE 35: CPT | Mod: 95 | Performed by: PSYCHIATRY & NEUROLOGY

## 2024-09-03 RX ADMIN — OLANZAPINE 10 MG: 10 TABLET, FILM COATED ORAL at 19:26

## 2024-09-03 RX ADMIN — OLANZAPINE 10 MG: 10 TABLET, FILM COATED ORAL at 12:57

## 2024-09-03 RX ADMIN — LORAZEPAM 1 MG: 1 TABLET ORAL at 20:07

## 2024-09-03 RX ADMIN — NICOTINE POLACRILEX 2 MG: 2 GUM, CHEWING ORAL at 13:01

## 2024-09-03 RX ADMIN — PALIPERIDONE 6 MG: 6 TABLET, EXTENDED RELEASE ORAL at 08:12

## 2024-09-03 RX ADMIN — LORAZEPAM 1 MG: 1 TABLET ORAL at 13:25

## 2024-09-03 RX ADMIN — HYDROXYZINE HYDROCHLORIDE 50 MG: 25 TABLET ORAL at 17:29

## 2024-09-03 RX ADMIN — NICOTINE POLACRILEX 2 MG: 2 GUM, CHEWING ORAL at 20:07

## 2024-09-03 ASSESSMENT — ACTIVITIES OF DAILY LIVING (ADL)
ADLS_ACUITY_SCORE: 29
DRESS: INDEPENDENT
ADLS_ACUITY_SCORE: 29
HYGIENE/GROOMING: INDEPENDENT
ADLS_ACUITY_SCORE: 29
ORAL_HYGIENE: INDEPENDENT
ADLS_ACUITY_SCORE: 29

## 2024-09-03 NOTE — PROGRESS NOTES
"[facility] PSYCHIATRY  PROGRESS NOTE     SUBJECTIVE     Prior to interviewing the patient, I met with nursing and reviewed patient's clinical condition. We discussed clinical care both before and after the interview. I have reviewed the patient's clinical course by review of records including previous notes, labs, and vital signs.     Per nursing, the patient had the following behavioral events over the last 24-hours: weaning appropriately    On psychiatric interview, patient was met on open unit. She states she is \"Good\". She states \"can I go home?\". She denies SI, denies HI. She states that she feels \"normal\".  She reports that she is okay going to another group home and is not opposed to going home before this happens. She was encouraged to continue to work with her guardian.       MEDICATIONS   Scheduled Meds:  Current Facility-Administered Medications   Medication Dose Route Frequency Provider Last Rate Last Admin    paliperidone ER (INVEGA) 24 hr tablet 6 mg  6 mg Oral Daily Carol Horton, NP   6 mg at 09/03/24 0812     PRN Meds:.  Current Facility-Administered Medications   Medication Dose Route Frequency Provider Last Rate Last Admin    acetaminophen (TYLENOL) tablet 650 mg  650 mg Oral Q4H PRN Jamshid Rdz APRN CNP   650 mg at 09/02/24 1721    hydrOXYzine HCl (ATARAX) tablet 50 mg  50 mg Oral Q6H PRN Carol Horton, NP   50 mg at 09/01/24 2142    LORazepam (ATIVAN) tablet 1 mg  1 mg Oral Q6H PRN Carol Horton, NP   1 mg at 09/02/24 1716    Or    LORazepam (ATIVAN) injection 1 mg  1 mg Intramuscular Q6H PRN Carol Horton, NP        nicotine (NICORETTE) gum 2 mg  2 mg Buccal Q1H PRN Jamshid Rdz APRN CNP   2 mg at 09/02/24 1723    OLANZapine (zyPREXA) tablet 10 mg  10 mg Oral TID PRN Jamshid Rdz APRN CNP   10 mg at 09/02/24 2009    Or    OLANZapine (zyPREXA) injection 10 mg  10 mg Intramuscular TID PRN Jamshid Rdz APRN CNP        traZODone (DESYREL) tablet 50 mg  50 mg Oral At Bedtime " "Carol Whyte, NP   50 mg at 24        ALLERGIES   Allergies   Allergen Reactions    Other [No Clinical Screening - See Comments] Anaphylaxis     \"Sodium Penathol\" per mother. Used in anesthesia.  Family hx of reaction.    Thiopental Anaphylaxis     Other reaction(s): Other - Describe In Comment Field    Family Hx of death with this medication    Great grandmother  on the operating table to this medication; worried about allergies to sodium      Other reaction(s): Other - Describe In Comment Field Family Hx of death with this medication    Latex Rash    Amoxicillin Trihydrate GI Disturbance    Amoxicillin-Pot Clavulanate GI Disturbance    Phenobarbital         MENTAL STATUS EXAM   Vitals: BP (!) 105/49   Pulse (!) 129   Temp 98.3  F (36.8  C) (Temporal)   Resp 16   Wt 54.8 kg (120 lb 14.4 oz)   SpO2 96%   BMI 18.94 kg/m      Appearance:  awake, alert, dressed in hospital scrubs, appeared as age stated, and slightly unkempt  Attitude:  cooperative  Eye Contact:  good  Mood: mildly irritable, anxious  Affect:  mood congruent  Speech:  clear, coherent  Psychomotor Behavior:  no evidence of tardive dyskinesia, dystonia, or tics, restless  Thought Process:  concrete  Associations:  no loose associations  Thought Content:  no evidence of suicidal ideation or homicidal ideation, denies AH today  Insight:  poor  Judgment:  poor  Oriented to:  time, person, and place  Attention Span and Concentration:  fair  Recent and Remote Memory:  fair  Fund of Knowledge: low-normal  Muscle Strength and Tone: normal  Gait and Station: Normal       LABS   No results found for this or any previous visit (from the past 24 hour(s)).      IMPRESSION     This is a 24 year old female with a PMH of schizoaffective disorder- bipolar type, KIRSTY, intellectual disability, stimulant use disorder, and cannabis use disorder who presents to the ED initially noting multiple odd somatic complaints, such as her heart no longer " "beating. Later reporting auditory hallucinations. Was in agreement to admission to get restarted back on medication. Patient has not taken any medication since she left the hospital in early July. She missed her post hospital follow up and did not attend her psychiatry appointment. She did not get her Invega Sustenna injection that was due 8/5. Patient reports she does not have rides to appointments and just forgets to go. Patient does report some ongoing hallucinations today, denies any active SI. She is not interested in continuing the Invega Sustenna FRIAS, noting that it was \"too much\". She is willing to restart the oral Invega today, noting that she does want the hallucinations to stop. Patient does have guardian in place, who agrees with hospitalization for stabilization. Patient does not seem to have any insight into need for stabilization, is not able to verbalize how she will attend any future appointments and has history of medication noncompliance when outside of the hospital setting.       Today: appears to be at baseline. Denies SI. Denies HI. Taking medications and willing to take moving forward.      DIAGNOSES     #. Schizoaffective disorder, bipolar type  #. Generalized anxiety disorder  #. Intellectual disability  #. Stimulant (methamphetamine) use disorder, severe  #. Medication noncompliance       PLAN     Location: Unit 5  Legal Status: Orders Placed This Encounter      Voluntary  Has guardian    Safety Assessment:    Behavioral Orders   Procedures    Code 1 - Restrict to Unit    Discontinue 1:1 attendant for suicide risk     Order Specific Question:   I have performed an in person assessment of the patient     Answer:   Based on this assessment the patient no longer requires a one on one attendant at this point in time.     Order Specific Question:   Rationale     Answer:   Patient States able to remain safe in hospital    Routine Programming     As clinically indicated    Status 15     Every 15 " minutes.      PTA psychotropic medications stopped:     -Invega Sustenna 117 mg IM- pt missed last dose, not interested in taking     PTA psychotropic medications continued/changed:     -restart Invega 3 mg daily-> 6 mg daily on 8/29    New medications tried and stopped:     -None    New medications initiated:     -standard unit PRN medications    Today's Changes:  -no changes     Programming: Patient will be treated in a therapeutic milieu with appropriate individual and group therapies. Education will be provided on diagnoses, medications, and treatments.     Medical diagnoses:  Per medicine    Consult: None  Tests: None    Anticipated LOS: > 7 days  Disposition: likely  or other structured setting       ATTESTATION      Binh Salcido MD  [facility]  Type of Service: video visit for mental health treatment  Reason for Video Visit: COVID-19 [place]  Originating Site (patient location): [facility]  Distant Site (provider location): Remote Location  Mode of Communication: Video Conference via [employer]  Time of Service: Date: 09/03/2024 , Start: 08:00,  Stop: 08:30

## 2024-09-03 NOTE — PLAN OF CARE
"Face to face shift report received from Zarina CASTRO RN. Rounding completed, pt observed.    Problem: Adult Behavioral Health Plan of Care  Goal: Patient-Specific Goal (Individualization)  Description: Patient will accept medications as prescribed.  Patient will attend groups and participate in the unit activities  Patient will take part in her discharge meeting and comply with plan.    9-1-24 -Goals for weaning to the open unit.  Patient will ask staff to dial a phone for all phone calls. Patient will not spit while on the open unit. She may use a tissue and dispose of properly if she has phlegm. Patient will limit request of staff to hourly unless she has an emergency. Pt may wean as appropriate.If patient is overwhelmed and not able to reach goals she can return to the MHICU for decreased stimulation. Treatment team to re-assess daily.   Outcome: Progressing  Note: Shift Summary:  Patient was in bed in the MHICU at the start of this shift.  Patient moved to room 522-1 r/t emergent unit need.  Patient anxious but able to follow direction and her care plan.  Patient did request something for \"bad anxiety\".  Offered and accepted Zyprexa 10 mg po at 1926 with minimal effect .  Ativan 1 mg po at 2007 with better effect. Patient has poor hygiene.  Requests staff frequently but no aggression or inappropriate behavior observed.     Problem: Thought Process Alteration  Goal: Optimal Thought Clarity  Description: Patient will have reality based conversation    Outcome: Progressing  Face to face report will be communicated to oncoming RN.    Agata Costa RN  9/3/2024                            "

## 2024-09-03 NOTE — PLAN OF CARE
Face to face end of shift report received from Simeon CHRISTIE. Rounding completed. Patient observed in bed appears asleep.     Problem: Adult Behavioral Health Plan of Care  Goal: Patient-Specific Goal (Individualization)  Description: Patient will accept medications as prescribed.  Patient will attend groups and participate in the unit activities  Patient will take part in her discharge meeting and comply with plan.    9-1-24 -Goals for weaning to the open unit.  Patient will ask staff to dial a phone for all phone calls. Patient will not spit while on the open unit. She may use a tissue and dispose of properly if she has phlegm. Patient will limit request of staff to hourly unless she has an emergency. Pt may wean as appropriate.If patient is overwhelmed and not able to reach goals she can return to the MHICU for decreased stimulation. Treatment team to re-assess daily.   Outcome: Progressing     Problem: Thought Process Alteration  Goal: Optimal Thought Clarity  Description: Patient will have reality based conversation    Outcome: Progressing   Goal Outcome Evaluation:         No observed episodes of SIB. Patient slept (7) hours. Face to face end of shift report communicated to oncoming shift.     Jerry Narvaez RN  9/3/2024  0606 AM

## 2024-09-03 NOTE — PROGRESS NOTES
Emailed pt guardian about needing a solid plan. Waiting for a response back .    Spoke with [facility]- pt has CADI waiver through [facility]. CM said that once she gets approved for a group home she will submit the request for additional services through her CADI.     Referral sent to DOUG, Jessica marroquin, [facility], [facility], and [facility] group.

## 2024-09-03 NOTE — PLAN OF CARE
"  Problem: Adult Behavioral Health Plan of Care  Goal: Plan of Care Review  Outcome: Progressing  Goal: Patient-Specific Goal (Individualization)  Description: Patient will accept medications as prescribed.  Patient will attend groups and participate in the unit activities  Patient will take part in her discharge meeting and comply with plan.    9-1-24 -Goals for weaning to the open unit.  Patient will ask staff to dial a phone for all phone calls. Patient will not spit while on the open unit. She may use a tissue and dispose of properly if she has phlegm. Patient will limit request of staff to hourly unless she has an emergency. Pt may wean as appropriate.If patient is overwhelmed and not able to reach goals she can return to the MHICU for decreased stimulation. Treatment team to re-assess daily.   Outcome: Progressing  Goal: Adheres to Safety Considerations for Self and Others  Outcome: Progressing  Goal: Absence of New-Onset Illness or Injury  Outcome: Progressing  Goal: Optimized Coping Skills in Response to Life Stressors  Outcome: Progressing  Goal: Develops/Participates in Therapeutic [place] to Support Successful Transition  Outcome: Progressing     Problem: Thought Process Alteration  Goal: Optimal Thought Clarity  Description: Patient will have reality based conversation    Outcome: Progressing   Goal Outcome Evaluation:I  Face to face end of shift report received from RN. Patient rounding completed, in bed, appears asleep.  September 3, 2024 7:48 AM    Patient denies pain. Patient reports mood as \"good, but sleepy.\" Patient denies AH/VH, SI/HI, or any harmful thoughts towards self or others at this time, agrees to notify staff if anything changes. Patient is calm, cooperative, pleasant, and med compliant. Patient woke up for breakfast, but returned to sleep. Patient does not report any concerns or complaints at this time.   September 3, 2024 8:15 AM                                     "

## 2024-09-04 ENCOUNTER — DOCUMENTATION ONLY (OUTPATIENT)
Dept: OTHER | Facility: CLINIC | Age: 24
End: 2024-09-04
Payer: COMMERCIAL

## 2024-09-04 PROCEDURE — 99232 SBSQ HOSP IP/OBS MODERATE 35: CPT | Mod: 95 | Performed by: PSYCHIATRY & NEUROLOGY

## 2024-09-04 PROCEDURE — 250N000013 HC RX MED GY IP 250 OP 250 PS 637: Performed by: NURSE PRACTITIONER

## 2024-09-04 PROCEDURE — 250N000013 HC RX MED GY IP 250 OP 250 PS 637

## 2024-09-04 PROCEDURE — 124N000001 HC R&B MH

## 2024-09-04 RX ADMIN — ACETAMINOPHEN 650 MG: 325 TABLET, FILM COATED ORAL at 00:14

## 2024-09-04 RX ADMIN — NICOTINE POLACRILEX 2 MG: 2 GUM, CHEWING ORAL at 15:59

## 2024-09-04 RX ADMIN — TRAZODONE HYDROCHLORIDE 50 MG: 50 TABLET ORAL at 00:17

## 2024-09-04 RX ADMIN — TRAZODONE HYDROCHLORIDE 50 MG: 50 TABLET ORAL at 19:50

## 2024-09-04 RX ADMIN — PALIPERIDONE 6 MG: 6 TABLET, EXTENDED RELEASE ORAL at 08:13

## 2024-09-04 RX ADMIN — HYDROXYZINE HYDROCHLORIDE 50 MG: 25 TABLET ORAL at 19:50

## 2024-09-04 RX ADMIN — ACETAMINOPHEN 650 MG: 325 TABLET, FILM COATED ORAL at 17:20

## 2024-09-04 RX ADMIN — NICOTINE POLACRILEX 2 MG: 2 GUM, CHEWING ORAL at 18:18

## 2024-09-04 RX ADMIN — LORAZEPAM 1 MG: 1 TABLET ORAL at 16:13

## 2024-09-04 RX ADMIN — OLANZAPINE 10 MG: 10 TABLET, FILM COATED ORAL at 17:20

## 2024-09-04 ASSESSMENT — ACTIVITIES OF DAILY LIVING (ADL)
ADLS_ACUITY_SCORE: 29
ADLS_ACUITY_SCORE: 29
DRESS: INDEPENDENT;SCRUBS (BEHAVIORAL HEALTH)
ORAL_HYGIENE: INDEPENDENT
ADLS_ACUITY_SCORE: 29
HYGIENE/GROOMING: INDEPENDENT
ADLS_ACUITY_SCORE: 29
ORAL_HYGIENE: INDEPENDENT
DRESS: INDEPENDENT
ADLS_ACUITY_SCORE: 29
ADLS_ACUITY_SCORE: 29
LAUNDRY: UNABLE TO COMPLETE
ADLS_ACUITY_SCORE: 29
HYGIENE/GROOMING: INDEPENDENT

## 2024-09-04 NOTE — PLAN OF CARE
Face to face end of shift report received from Agata CHRISTIE. Rounding completed. Patient observed in bed appears asleep.     Problem: Adult Behavioral Health Plan of Care  Goal: Patient-Specific Goal (Individualization)  Description: Patient will accept medications as prescribed.  Patient will attend groups and participate in the unit activities  Patient will take part in her discharge meeting and comply with plan.    9/3/2024 2351 by Jerry Narvaez RN  Outcome: Progressing     Problem: Thought Process Alteration  Goal: Optimal Thought Clarity  Description: Patient will have reality based conversation    9/3/2024 2351 by Jerry Narvaez RN  Outcome: Progressing   Goal Outcome Evaluation:         0014 Patient c/o mouth pain rated 10/10, given tylenol 650 mg.    No observed episodes of SIB this shift. Patient slept (6.75) hours. Face to face end of shift report communicated to oncoming shift.     Jerry Narvaez RN  9/4/2024  0636 AM

## 2024-09-04 NOTE — PLAN OF CARE
Face to face report received from Jerry CHRISTIE. Pt. Observed.    Problem: Adult Behavioral Health Plan of Care  Goal: Patient-Specific Goal (Individualization)  Description: Patient will accept medications as prescribed.  Patient will attend groups and participate in the unit activities  Patient will take part in her discharge meeting and comply with plan.    Outcome: Progressing  Pt. Denies HI, SI, anxiety, depression, hallucinations and pain. Pt. In agreement to update staff to thoughts feelings of wanting to harm self or others. Pt. Cooperative with medications and nursing assessment. Pt. Out to lounge, social with staff. In agreement to attend unit programing and shower. Pt. Eating WDL. Pt. Offers no c/o issues with bowel and bladder.      Face to face end of shift report communicated to oncoming RN.     Temi Brush RN  9/4/2024

## 2024-09-04 NOTE — PROGRESS NOTES
"[facility] PSYCHIATRY  PROGRESS NOTE     SUBJECTIVE     Prior to interviewing the patient, I met with nursing and reviewed patient's clinical condition. We discussed clinical care both before and after the interview. I have reviewed the patient's clinical course by review of records including previous notes, labs, and vital signs.     Per nursing, the patient had the following behavioral events over the last 24-hours: okay    On psychiatric interview, patient was met on open unit. She states she is \"bored\". She cites not difficulties with her medications. She states today she is not opposed to getting connected to CD treatment as she understands that she needs to stop her methamphetamine use.  She cites no difficulties with her medications.   She reports she is sleeping and eating without difficulty.      MEDICATIONS   Scheduled Meds:  Current Facility-Administered Medications   Medication Dose Route Frequency Provider Last Rate Last Admin    paliperidone ER (INVEGA) 24 hr tablet 6 mg  6 mg Oral Daily Carol Horton NP   6 mg at 09/04/24 0813     PRN Meds:.  Current Facility-Administered Medications   Medication Dose Route Frequency Provider Last Rate Last Admin    acetaminophen (TYLENOL) tablet 650 mg  650 mg Oral Q4H PRN Jamshid Rdz APRN CNP   650 mg at 09/04/24 0014    hydrOXYzine HCl (ATARAX) tablet 50 mg  50 mg Oral Q6H PRN Carol Horton NP   50 mg at 09/03/24 1729    LORazepam (ATIVAN) tablet 1 mg  1 mg Oral Q6H PRN Carol Horton NP   1 mg at 09/03/24 2007    Or    LORazepam (ATIVAN) injection 1 mg  1 mg Intramuscular Q6H PRN Carol Horton NP        nicotine (NICORETTE) gum 2 mg  2 mg Buccal Q1H PRN Jamshid Rdz APRN CNP   2 mg at 09/03/24 2007    OLANZapine (zyPREXA) tablet 10 mg  10 mg Oral TID PRN Jamshid Rdz APRN CNP   10 mg at 09/03/24 1926    Or    OLANZapine (zyPREXA) injection 10 mg  10 mg Intramuscular TID PRN Jamshid Rdz APRN CNP        traZODone (DESYREL) tablet 50 mg  " "50 mg Oral At Bedtime PRN Carol Horton NP   50 mg at 24 0017        ALLERGIES   Allergies   Allergen Reactions    Other [No Clinical Screening - See Comments] Anaphylaxis     \"Sodium Penathol\" per mother. Used in anesthesia.  Family hx of reaction.    Thiopental Anaphylaxis     Other reaction(s): Other - Describe In Comment Field    Family Hx of death with this medication    Great grandmother  on the operating table to this medication; worried about allergies to sodium      Other reaction(s): Other - Describe In Comment Field Family Hx of death with this medication    Latex Rash    Amoxicillin Trihydrate GI Disturbance    Amoxicillin-Pot Clavulanate GI Disturbance    Phenobarbital         MENTAL STATUS EXAM   Vitals: /68   Pulse 111   Temp 98.7  F (37.1  C) (Temporal)   Resp 16   Wt 54.8 kg (120 lb 14.4 oz)   SpO2 97%   BMI 18.94 kg/m      Appearance:  awake, alert, dressed in hospital scrubs, appeared as age stated, and slightly unkempt  Attitude:  cooperative  Eye Contact:  good  Mood: \"good\"  Affect:  mood congruent  Speech:  clear, coherent  Psychomotor Behavior:  no evidence of tardive dyskinesia, dystonia, or tics, restless  Thought Process:  concrete  Associations:  no loose associations  Thought Content:  no evidence of suicidal ideation or homicidal ideation, denies AH today  Insight: fair   Judgment:  fair   Oriented to:  time, person, and place  Attention Span and Concentration:  fair  Recent and Remote Memory:  fair  Fund of Knowledge: low-normal  Muscle Strength and Tone: normal  Gait and Station: Normal       LABS   No results found for this or any previous visit (from the past 24 hour(s)).      IMPRESSION     This is a 24 year old female with a PMH of schizoaffective disorder- bipolar type, KIRSTY, intellectual disability, stimulant use disorder, and cannabis use disorder who presents to the ED initially noting multiple odd somatic complaints, such as her heart no longer " "beating. Later reporting auditory hallucinations. Was in agreement to admission to get restarted back on medication. Patient has not taken any medication since she left the hospital in early July. She missed her post hospital follow up and did not attend her psychiatry appointment. She did not get her Invega Sustenna injection that was due 8/5. Patient reports she does not have rides to appointments and just forgets to go. Patient does report some ongoing hallucinations today, denies any active SI. She is not interested in continuing the Invega Sustenna FRIAS, noting that it was \"too much\". She is willing to restart the oral Invega today, noting that she does want the hallucinations to stop. Patient does have guardian in place, who agrees with hospitalization for stabilization. Patient does not seem to have any insight into need for stabilization, is not able to verbalize how she will attend any future appointments and has history of medication noncompliance when outside of the hospital setting.       Today: appears to be at baseline. Denies SI. Denies HI. Taking medications and willing to take moving forward - is willing to get connected with CD resources.      DIAGNOSES     #. Schizoaffective disorder, bipolar type  #. Generalized anxiety disorder  #. Intellectual disability  #. Stimulant (methamphetamine) use disorder, severe  #. Medication noncompliance       PLAN     Location: Unit 5  Legal Status: Orders Placed This Encounter      Voluntary  Has guardian    Safety Assessment:    Behavioral Orders   Procedures    Code 1 - Restrict to Unit    Discontinue 1:1 attendant for suicide risk     Order Specific Question:   I have performed an in person assessment of the patient     Answer:   Based on this assessment the patient no longer requires a one on one attendant at this point in time.     Order Specific Question:   Rationale     Answer:   Patient States able to remain safe in hospital    Routine Programming     As " clinically indicated    Status 15     Every 15 minutes.      PTA psychotropic medications stopped:     -Invega Sustenna 117 mg IM- pt missed last dose, not interested in taking     PTA psychotropic medications continued/changed:     -restart Invega 3 mg daily-> 6 mg daily on 8/29    New medications tried and stopped:     -None    New medications initiated:     -standard unit PRN medications    Today's Changes:  -no changes     Programming: Patient will be treated in a therapeutic milieu with appropriate individual and group therapies. Education will be provided on diagnoses, medications, and treatments.     Medical diagnoses:  Per medicine    Consult: None  Tests: None    Anticipated LOS: > 7 days  Disposition: likely  or other structured setting       ATTESTATION      Binh Salcido MD  [facility]  Type of Service: video visit for mental health treatment  Reason for Video Visit: COVID[place]  Originating Site (patient location): [facility]  Distant Site (provider location): Remote Location  Mode of Communication: Video Conference via [employer]  Time of Service: Date: 09/04/2024 , Start: 08:00,  Stop: 08:30

## 2024-09-04 NOTE — PROGRESS NOTES
Pt guardian would like a call from provider, did update provider on this. Emailed guardian as well about working with pt CM for PCA services if pt discharges home vs. A group home.     Referrals for group homes sent to, [place] [facility] [facility] [employer] [facility] [facility] [employer], [facility] [employer], and [facility].     Pt will have a showing virtually @ 1:00pm with [employer] [facility][note 2] on 9/5/24.

## 2024-09-04 NOTE — PLAN OF CARE
"Face to face shift report received from Temi REYES RN. Rounding completed, pt observed.    Problem: Adult Behavioral Health Plan of Care  Goal: Patient-Specific Goal (Individualization)  Description: Patient will accept medications as prescribed.  Patient will attend groups and participate in the unit activities  Patient will take part in her discharge meeting and comply with plan.    Outcome: Progressing  Note: Shift Summary:  Patient was up at nurses station door immediately at the start of this shift.  Patient constantly at door asking about discharge.  Nurse informed her that she will not be discharging today and that she had some type of virtual meeting tomorrow.  Patient encouraged to speak to SW about her placement options.  Patient stated that she sleeps in and does not see the SW.  Encouraged her to get up and stay up in the AM so she can get answers to her questions.  She has used the phone appropriately this shift and has asked to use it before dialing.  At one point she came to station to inform staff that \"my aunt  so my sister says they cn come pick me up\".  Nurse informed her that she is not discharging today.  After evening meal, patient complained of lower right tooth aching and that it broke. Accepted Tylenol 650 mg po at 1720 with relief.  Complained of terrible anxiety.  Accepted Zyprexa 10 mg po at 1720 with minimal effect.  Trazodone 50 mg at HS for sleep aid.  Patient requested nurse to walk her to her room. Patient has been resting in bed with eyes closed.     Problem: Thought Process Alteration  Goal: Optimal Thought Clarity  Description: Patient will have reality based conversation    Outcome: Progressing  Face to face report will be communicated to oncoming RN.    Agata Costa RN  2024                     "

## 2024-09-05 ENCOUNTER — APPOINTMENT (OUTPATIENT)
Dept: OCCUPATIONAL THERAPY | Facility: HOSPITAL | Age: 24
End: 2024-09-05
Payer: COMMERCIAL

## 2024-09-05 PROCEDURE — 97165 OT EVAL LOW COMPLEX 30 MIN: CPT | Mod: GO

## 2024-09-05 PROCEDURE — 99232 SBSQ HOSP IP/OBS MODERATE 35: CPT | Mod: 95 | Performed by: PSYCHIATRY & NEUROLOGY

## 2024-09-05 PROCEDURE — 250N000013 HC RX MED GY IP 250 OP 250 PS 637: Performed by: NURSE PRACTITIONER

## 2024-09-05 PROCEDURE — 250N000013 HC RX MED GY IP 250 OP 250 PS 637

## 2024-09-05 PROCEDURE — 97535 SELF CARE MNGMENT TRAINING: CPT | Mod: GO

## 2024-09-05 PROCEDURE — 124N000001 HC R&B MH

## 2024-09-05 RX ADMIN — NICOTINE POLACRILEX 2 MG: 2 GUM, CHEWING ORAL at 18:22

## 2024-09-05 RX ADMIN — TRAZODONE HYDROCHLORIDE 50 MG: 50 TABLET ORAL at 19:20

## 2024-09-05 RX ADMIN — PALIPERIDONE 6 MG: 6 TABLET, EXTENDED RELEASE ORAL at 08:23

## 2024-09-05 RX ADMIN — ACETAMINOPHEN 650 MG: 325 TABLET, FILM COATED ORAL at 12:24

## 2024-09-05 RX ADMIN — HYDROXYZINE HYDROCHLORIDE 50 MG: 25 TABLET ORAL at 08:23

## 2024-09-05 RX ADMIN — TRAZODONE HYDROCHLORIDE 50 MG: 50 TABLET ORAL at 20:57

## 2024-09-05 RX ADMIN — NICOTINE POLACRILEX 2 MG: 2 GUM, CHEWING ORAL at 19:21

## 2024-09-05 RX ADMIN — LORAZEPAM 1 MG: 1 TABLET ORAL at 18:14

## 2024-09-05 RX ADMIN — HYDROXYZINE HYDROCHLORIDE 50 MG: 25 TABLET ORAL at 17:10

## 2024-09-05 ASSESSMENT — ACTIVITIES OF DAILY LIVING (ADL)
ADLS_ACUITY_SCORE: 29
HYGIENE/GROOMING: INDEPENDENT
ADLS_ACUITY_SCORE: 29

## 2024-09-05 NOTE — PROGRESS NOTES
Group home referrals sent to [facility], [facility], [facility], [facility].    Pt had interview with [facility] and she was excited that it was closer to her payee and, she likes the home and what it has to offer. The  is reaching out to the guardian and CM for next steps.

## 2024-09-05 NOTE — PLAN OF CARE
Face to face report received from alberto CHRISTIE. Pt. Observed.    Problem: Adult Behavioral Health Plan of Care  Goal: Patient-Specific Goal (Individualization)  Description: Patient will accept medications as prescribed.  Patient will attend groups and participate in the unit activities  Patient will take part in her discharge meeting and comply with plan.    Outcome: Progressing  Pt. Denies HI, SI, depression, hallucinations and pain. Pt. In agreement to update staff to thoughts feelings of wanting to harm self or others. Pt. Cooperative with medications and nursing assessment. Pt. Out to lounge, social with staff. In agreement to attend unit programing and shower. Pt. Eating WDL. Pt. Offers no c/o issues with bowel and bladder.     Pt. Requesting PRN for anxiety. Administered PRN Hydroxyzine 50 mg po @ 0823 with effective results.       Face to face end of shift report communicated to jose CHRISTIE.     Temi Brush RN  9/5/2024

## 2024-09-05 NOTE — CONSULTS
"Inpatient CD Consult. Prior to meeting with patient met with treatment team and learned patient has a legal guardian and referrals have been sent by social work to group homes for placement. Patient is scheduled for a virtual group home showing today. An Occupational Therapy Consult has been ordered by the provider.     Met with patient on the open unit in the report room. Meeting started at 1:00 pm and lasted approximately 15 minutes. Patient stated a desire \"to go to detox\" and \"then go home to my apartment\" because \"I want different scenery\" and \"I have been here a long time\" and reported having \"hallucinations\" as symptom of withdrawal.     Inquired about drug and alcohol use. Patient reported using up to 1 gram of methamphetamine per occasion with the frequency of use being one occasion every 3-4 days. Last used methamphetamine prior to admission on 8/26/2024. Patient denied alcohol and other drug use with the exception of a nicotine vape. Patient reported she \"misses vaping\" while in the hospital. Patient commented on \"not wanting to use\" since admitting evidencing a decrease in cravings since admission.    Patient would not qualify for detox admission as last use and associated symptoms of withdrawal do not meet requirements for admission. [facility] can address any withdrawal symptoms. Informed patient as such. Discussed with patient chemical dependency treatment. Patient asked about and was provided additional information about the programming verbally. Patient declined interest and stated she would \"like to go home as soon as possible.\" Patient informed a placement would need guardian approval and acknowledged. Patient repeated desire to return \"home after 10 days like last time\" that she was in the hospital.      Conducted chart review. Patient insight and judgement per progress notes is poor to fair and fund of knowledge is low-normal. Records indicate patient is unable to read or " write. OT consult was provider ordered. Discussed results verbally with Occupational Therapist and also reviewed the assessment which notes:     Patients scoring varies between testing. Patient does appear to perform better with hands on type testing vs pen/paper. Given history and above testing results would recommend a group home setting with a referral for [facility] or [facility] () to potentially work towards a lesser restrictive environment. Per assessments do not believe that patient would do well in a treatment setting at this time.     Patient does not appear well suited to success in a CD treatment program at this time. [facility] recommends the patient discharge to an appropriate group home placement which is approved by guardian and treatment team, abstain from alcohol and all other mood altering substances except those prescribed by a licensed provider, and remain in contact with and follow all recommendations of her guardian.

## 2024-09-05 NOTE — PROGRESS NOTES
"Behavioral Health Occupational Therapy Eval      Name: Yudi Morales MRN# [id number]   Age: 24 year old YOB: 2000     Date of Consultation: September 5, 2024  Primary care provider: Sindhu Sandoval    Referring Physician: Binh Salcido MD.   Orders: Eval and Treat  Medical Diagnosis:   #. Schizoaffective disorder, bipolar type  #. Generalized anxiety disorder  #. Intellectual disability  #. Stimulant (methamphetamine) use disorder, severe  #. Medication noncompliance  Onset of Illness/Injury: OT orders obtained due to concern about ability to manage a CD treatment placement.     Prior Level of Function: Patient reports that she has a rep payee. Patient currently has a gaurdian. Has SSDI for income. Prior to hospitalization: ADL report indep. IADL: grocery shopping would walk and take the bus back. Meal prep patient reports no problems (at time of admission patient had reported that she wasn't eating or drinking well). Unsure of accuracy of patients report. Per chart history patient had an IEP in school and quit school @ 18 yrs old. Patient does state that she uses meth at times. Has a history of prior inpatient hospitalization. Missing MD appointments \"I didn't have a ride. I forgot\".    Current Level of Function: Patient continues to ask mutiple staff about discharge to home. Staff have consistently stated that the guardian needs to be in agreement with the discharge plan. Completed the following assessments: ACLS, MOCA v7.3, and Parker quick assessment for word identification.     Parker quick assessment for word identification: scored at a first grade level for word identification.     ACLs completed with a score of 5.4/5.8 At this level it is indicated that the person may live alone and work in a job with a wide margin of error. 14% standby cognitive assistance is needed to anticipate hazards and prevent industrial accidents. Individual preferences for improving the appearance for " activities can by honored. At this level the following is recommended as ways of preventing common safety problems. At this level this person is at greater than average risk for having these problems at this time. With a special diet, monitor food intake and encourage compliance. Prevent poor compliance with taking medication by placing into a daily pill box marked for day and time. Provide a weekly or a monthly income and assist with long term finances.     MOCA version 7.3   Visuospatial/ Executive Functioning: 3/5  Trail making (alternating letters and numbers): 1/1  Shape copy: 0/1  Clock drawin/3  Naming: 3/3  Immediate Recall (not scored): na  Patient accurately verbalized 5/5 non-related words  Attention:  0/6  Number repetition: 0/1  Number reversal: 0/1  Sustained attention: Patient identifying 10/11 targets with x4 false positives (4 errors)  Serial subtraction: Patient completed serial 7 subtractions 0/5 calculations  Language: 0/3  Sentence repetition: 0/2 when asked to repeat a sentence word-for-word  Divergent naming: Patient named 6 items beginning with the letter /b/ within one minute  Abstract Thinkin/2  Similarities between 2 items (abstract thinking): 1/2  Delayed Recall: 0/5  Patient recalled  0/5 words after approx 5 minute delay without cues.  Orientation: /  Patient oriented to month, year, place, and city but was not oriented date and day.      TOTAL SCORE: 11/20    Patients scoring varies between testing. Patient does appear to perform better with hands on type testing vs pen/paper. Given history and above testing results would recommend a group home setting with a referral for [facility] or [facility] () to potentially work towards a lesser restrictive environment. Per assessments do not believe that patient would do well in a treatment setting at this time.       Patient/Family Goal: Patient wants to discharge to home.       Past Medical History:   Past Medical  History:   Diagnosis Date    Allergic rhinitis 10/4/2011    KIRSTY (generalized anxiety disorder) 7/19/2019    Hyperprolactinemia (H24) 11/16/2022    PTSD (post-traumatic stress disorder)     Suicidal behavior with attempted self-injury (H) 2/2/2021       Past Surgical History:  Past Surgical History:   Procedure Laterality Date    TONSILLECTOMY & ADENOIDECTOMY  2008       Medications:   Current Facility-Administered Medications   Medication Dose Route Frequency Provider Last Rate Last Admin    acetaminophen (TYLENOL) tablet 650 mg  650 mg Oral Q4H PRN Jamshid Rdz APRN CNP   650 mg at 09/05/24 1224    hydrOXYzine HCl (ATARAX) tablet 50 mg  50 mg Oral Q6H PRN Granyuan, Carol, NP   50 mg at 09/05/24 0823    LORazepam (ATIVAN) tablet 1 mg  1 mg Oral Q6H PRN Granyuan, Carol, NP   1 mg at 09/04/24 1613    Or    LORazepam (ATIVAN) injection 1 mg  1 mg Intramuscular Q6H PRN Sol Carol, NP        nicotine (NICORETTE) gum 2 mg  2 mg Buccal Q1H PRN Jamshid Rdz APRN CNP   2 mg at 09/04/24 1818    OLANZapine (zyPREXA) tablet 10 mg  10 mg Oral TID PRN Jamshid Rdz APRN CNP   10 mg at 09/04/24 1720    Or    OLANZapine (zyPREXA) injection 10 mg  10 mg Intramuscular TID PRN Jamshid Rdz APRN CNP        paliperidone ER (INVEGA) 24 hr tablet 6 mg  6 mg Oral Daily Granberg, Carol, NP   6 mg at 09/05/24 0823    traZODone (DESYREL) tablet 50 mg  50 mg Oral At Bedtime PRN Granyuan Carol, NP   50 mg at 09/04/24 1950       Reason for OT Referral:  Mental Health History: Pt was last on our unit 06/25/24-07/08/24. Hx of hospitalizations. Last being in 2023.   Signs/Symptoms of compliant: Concern regarding patient's ability to participate in treatment.   Aggravating factors/Current Life Stressors: substance use,   Current Services:  and rep payee    Personal Information:  Family Structure: single, has a boyfriend.   Living Arrangement: apartment. Thanh would like group home placement.    Finances:  SSDI  Medication Management:    Support System: LISA, boyfriensully        Self Care:   Nutrition: I do okay  Sleep Pattern: I do okay  Exercise: I do okay  Coping Skills: I do okay    Cognition:  Orientation:  time, place, and person  Memory: Impaired   Attention: Diminished  Judgement/Insight: Diminished      Goals:   Complete testing.     Planned Interventions: cognitive testing    Clinical Impressions:  Criteria for Skilled Therapeutic Intervention Met: yes  OT Diagnosis: cognitive impairment  Influenced by the following impairments:   Functional limitations due to impairment: Patient has a history of cognitive impairment.  Clinical presentation: Stable/Uncomplicated  Clinical presentation rationale: Referrals being sent out for group homes.   Clinical Decision making (complexity): Low Complexity  Predicted Duration of Therapy Intervention (days/wks): eval and treat only.   Risks and Benefits of therapy have been explained: Yes  Patient, Family & other staff in agreement with plan of care: Yes  Comments: OT to discharge. Discussed findings with LADC.     Total Evaluation Time: 20 plus 15 tx.

## 2024-09-05 NOTE — PLAN OF CARE
Face to face end of shift report received from Agata CHRISTIE. Rounding completed. Patient observed in bed appears asleep.     Problem: Adult Behavioral Health Plan of Care  Goal: Patient-Specific Goal (Individualization)  Description: Patient will accept medications as prescribed.  Patient will attend groups and participate in the unit activities  Patient will take part in her discharge meeting and comply with plan.    Outcome: Progressing     Problem: Thought Process Alteration  Goal: Optimal Thought Clarity  Description: Patient will have reality based conversation    Outcome: Progressing   Goal Outcome Evaluation:         No observed episodes of SIB this shift. Patient slept (7) hours. Face to face end of shift report communicated to oncoming shift.     Jerry Narvaez RN  9/5/2024  0619 AM

## 2024-09-06 PROCEDURE — 99232 SBSQ HOSP IP/OBS MODERATE 35: CPT | Mod: 95 | Performed by: PSYCHIATRY & NEUROLOGY

## 2024-09-06 PROCEDURE — 250N000013 HC RX MED GY IP 250 OP 250 PS 637

## 2024-09-06 PROCEDURE — 250N000013 HC RX MED GY IP 250 OP 250 PS 637: Performed by: NURSE PRACTITIONER

## 2024-09-06 PROCEDURE — 124N000001 HC R&B MH

## 2024-09-06 RX ADMIN — OLANZAPINE 10 MG: 10 TABLET, FILM COATED ORAL at 17:12

## 2024-09-06 RX ADMIN — PALIPERIDONE 6 MG: 6 TABLET, EXTENDED RELEASE ORAL at 08:26

## 2024-09-06 RX ADMIN — HYDROXYZINE HYDROCHLORIDE 50 MG: 25 TABLET ORAL at 18:06

## 2024-09-06 RX ADMIN — ACETAMINOPHEN 650 MG: 325 TABLET, FILM COATED ORAL at 17:12

## 2024-09-06 RX ADMIN — LORAZEPAM 1 MG: 1 TABLET ORAL at 13:54

## 2024-09-06 RX ADMIN — HYDROXYZINE HYDROCHLORIDE 50 MG: 25 TABLET ORAL at 10:48

## 2024-09-06 RX ADMIN — TRAZODONE HYDROCHLORIDE 50 MG: 50 TABLET ORAL at 02:29

## 2024-09-06 ASSESSMENT — ACTIVITIES OF DAILY LIVING (ADL)
HYGIENE/GROOMING: INDEPENDENT
ADLS_ACUITY_SCORE: 29
ORAL_HYGIENE: INDEPENDENT
ADLS_ACUITY_SCORE: 29
DRESS: INDEPENDENT
ADLS_ACUITY_SCORE: 29

## 2024-09-06 NOTE — PLAN OF CARE
Problem: Adult Behavioral Health Plan of Care  Goal: Patient-Specific Goal (Individualization)  Description: Patient will accept medications as prescribed.  Patient will attend groups and participate in the unit activities  Patient will take part in her discharge meeting and comply with plan.    Outcome: Progressing     Problem: Thought Process Alteration  Goal: Optimal Thought Clarity  Description: Patient will have reality based conversation    Outcome: Progressing     Pt denies SI/HI, AH/VH, pain, and depression. Pt endorses high anxiety. Pt is medication compliant and VSS. Pt ate 100% of dinner. Pt has a bright affect. Pt is alert. Pt is using appropriate behavior with staff and peers. Pt frequently at nurses station with requests or questions for staff. Pt spent most of shift walking in lounge or watching tv. Pt went to some groups. 1710 PRN atarax 50mg for anxiety. 1814 PRN ativan 1mg for anxiety. 1920 PRN trazodone 50mg for sleep. 2057 PRN trazodone 50mg for sleep.     Face to face report will be communicated to oncoming RN.    Patti Sanchez RN  9/5/2024     Pt AAO x 4, VS stable. NPO at midnight. Pt with no complaints. 2L NC for comfort. No significant events to report during this shift. Side rails up x 2, bed locked and low. Call light within reach.       Problem: Adult Inpatient Plan of Care  Goal: Plan of Care Review  Outcome: Ongoing, Progressing  Goal: Patient-Specific Goal (Individualized)  Outcome: Ongoing, Progressing  Goal: Absence of Hospital-Acquired Illness or Injury  Outcome: Ongoing, Progressing  Intervention: Prevent Skin Injury  Flowsheets (Taken 3/21/2024 0632)  Body Position: position changed independently  Skin Protection: adhesive use limited  Intervention: Prevent and Manage VTE (Venous Thromboembolism) Risk  Flowsheets (Taken 3/21/2024 0632)  VTE Prevention/Management:   bleeding precations maintained   bleeding risk assessed  Range of Motion: active ROM (range of motion) encouraged  Intervention: Prevent Infection  Flowsheets (Taken 3/21/2024 0632)  Infection Prevention: hand hygiene promoted  Goal: Optimal Comfort and Wellbeing  Outcome: Ongoing, Progressing  Intervention: Monitor Pain and Promote Comfort  Flowsheets (Taken 3/21/2024 0632)  Pain Management Interventions: care clustered  Goal: Readiness for Transition of Care  Outcome: Ongoing, Progressing

## 2024-09-06 NOTE — PLAN OF CARE
Face to face end of shift report received from Patti CHRISTIE. Rounding completed. Patient observed in bed appears asleep.     Problem: Adult Behavioral Health Plan of Care  Goal: Patient-Specific Goal (Individualization)  Description: Patient will accept medications as prescribed.  Patient will attend groups and participate in the unit activities  Patient will take part in her discharge meeting and comply with plan.    Outcome: Progressing     Problem: Thought Process Alteration  Goal: Optimal Thought Clarity  Description: Patient will have reality based conversation    Outcome: Progressing   Goal Outcome Evaluation:         0229 Trazadone 50 mg given for sleeplessness.    No observed episodes of SIB this shift. Patient slept (6.5) hours. Face to face end of shift report communicated to oncoming shift.     Jerry Narvaez RN  9/6/2024  0612 AM

## 2024-09-06 NOTE — PROGRESS NOTES
"[facility] PSYCHIATRY  PROGRESS NOTE     SUBJECTIVE     Prior to interviewing the patient, I met with nursing and reviewed patient's clinical condition. We discussed clinical care both before and after the interview. I have reviewed the patient's clinical course by review of records including previous notes, labs, and vital signs.     Per nursing, the patient had the following behavioral events over the last 24-hours: okay    On psychiatric interview, patient was met on open unit. When asked how she is doing, she states \"good\".  She states she enjoyed visiting with new group home virtually yesterday. When asked if she was having any difficulties yesterday, she states \"no\". She then states she was a little worried about whether or not the group home liked her and whether or not it is a good location for her.   Reassurance provided and she was encouraged to work with her guardian. Denies SI.      MEDICATIONS   Scheduled Meds:  Current Facility-Administered Medications   Medication Dose Route Frequency Provider Last Rate Last Admin    paliperidone ER (INVEGA) 24 hr tablet 6 mg  6 mg Oral Daily Carol Horton, NP   6 mg at 09/06/24 0826     PRN Meds:.  Current Facility-Administered Medications   Medication Dose Route Frequency Provider Last Rate Last Admin    acetaminophen (TYLENOL) tablet 650 mg  650 mg Oral Q4H PRN Jamshid Rdz APRN CNP   650 mg at 09/05/24 1224    hydrOXYzine HCl (ATARAX) tablet 50 mg  50 mg Oral Q6H PRN Carol Horton, NP   50 mg at 09/06/24 1048    LORazepam (ATIVAN) tablet 1 mg  1 mg Oral Q6H PRN Carol Horton NP   1 mg at 09/05/24 1814    Or    LORazepam (ATIVAN) injection 1 mg  1 mg Intramuscular Q6H PRN Carol Horton NP        nicotine (NICORETTE) gum 2 mg  2 mg Buccal Q1H PRN Jamshid Rdz APRN CNP   2 mg at 09/05/24 1921    OLANZapine (zyPREXA) tablet 10 mg  10 mg Oral TID PRN Jamshid Rdz APRN CNP   10 mg at 09/04/24 1720    Or    OLANZapine (zyPREXA) injection 10 mg  10 " "mg Intramuscular TID PRN Jamshid Rdz APRN CNP        traZODone (DESYREL) tablet 50 mg  50 mg Oral At Bedtime PRN Carol Horton NP   50 mg at 24 0229        ALLERGIES   Allergies   Allergen Reactions    Other [No Clinical Screening - See Comments] Anaphylaxis     \"Sodium Penathol\" per mother. Used in anesthesia.  Family hx of reaction.    Thiopental Anaphylaxis     Other reaction(s): Other - Describe In Comment Field    Family Hx of death with this medication    Great grandmother  on the operating table to this medication; worried about allergies to sodium      Other reaction(s): Other - Describe In Comment Field Family Hx of death with this medication    Latex Rash    Amoxicillin Trihydrate GI Disturbance    Amoxicillin-Pot Clavulanate GI Disturbance    Phenobarbital         MENTAL STATUS EXAM   Vitals: /77   Pulse 104   Temp 97.3  F (36.3  C) (Temporal)   Resp 16   Wt 54.8 kg (120 lb 14.4 oz)   SpO2 96%   BMI 18.94 kg/m      Appearance:  awake, alert, dressed in hospital scrubs, appeared as age stated, and slightly unkempt  Attitude:  cooperative  Eye Contact:  good  Mood: \"good\"  Affect:  mood congruent  Speech:  clear, coherent  Psychomotor Behavior:  no evidence of tardive dyskinesia, dystonia, or tics, restless  Thought Process:  concrete  Associations:  no loose associations  Thought Content:  no evidence of suicidal ideation or homicidal ideation, denies AH today  Insight: fair   Judgment:  fair   Oriented to:  time, person, and place  Attention Span and Concentration:  fair  Recent and Remote Memory:  fair  Fund of Knowledge: low-normal  Muscle Strength and Tone: normal  Gait and Station: Normal       LABS   No results found for this or any previous visit (from the past 24 hour(s)).      IMPRESSION     This is a 24 year old female with a PMH of schizoaffective disorder- bipolar type, KIRSTY, intellectual disability, stimulant use disorder, and cannabis use disorder who presents to " "the ED initially noting multiple odd somatic complaints, such as her heart no longer beating. Later reporting auditory hallucinations. Was in agreement to admission to get restarted back on medication. Patient has not taken any medication since she left the hospital in early July. She missed her post hospital follow up and did not attend her psychiatry appointment. She did not get her Invega Sustenna injection that was due 8/5. Patient reports she does not have rides to appointments and just forgets to go. Patient does report some ongoing hallucinations today, denies any active SI. She is not interested in continuing the Invega Sustenna FRIAS, noting that it was \"too much\". She is willing to restart the oral Invega today, noting that she does want the hallucinations to stop. Patient does have guardian in place, who agrees with hospitalization for stabilization. Patient does not seem to have any insight into need for stabilization, is not able to verbalize how she will attend any future appointments and has history of medication noncompliance when outside of the hospital setting.       Today:group home interview went well yesterday. No changes to medications     DIAGNOSES     #. Schizoaffective disorder, bipolar type  #. Generalized anxiety disorder  #. Intellectual disability  #. Stimulant (methamphetamine) use disorder, severe  #. Medication noncompliance       PLAN     Location: Unit 5  Legal Status: Orders Placed This Encounter      Voluntary  Has guardian    Safety Assessment:    Behavioral Orders   Procedures    Code 1 - Restrict to Unit    Discontinue 1:1 attendant for suicide risk     Order Specific Question:   I have performed an in person assessment of the patient     Answer:   Based on this assessment the patient no longer requires a one on one attendant at this point in time.     Order Specific Question:   Rationale     Answer:   Patient States able to remain safe in hospital    Routine Programming     As " clinically indicated    Status 15     Every 15 minutes.      PTA psychotropic medications stopped:     -Invega Sustenna 117 mg IM- pt missed last dose, not interested in taking     PTA psychotropic medications continued/changed:     -restart Invega 3 mg daily-> 6 mg daily on 8/29    New medications tried and stopped:     -None    New medications initiated:     -standard unit PRN medications    Today's Changes:  -no medication changes    Programming: Patient will be treated in a therapeutic milieu with appropriate individual and group therapies. Education will be provided on diagnoses, medications, and treatments.     Medical diagnoses:  Per medicine    Consult: None  Tests: None    Anticipated LOS: > 7 days  Disposition: likely  or other structured setting       ATTESTATION      Binh Salcido MD  [facility]  Type of Service: video visit for mental health treatment  Reason for Video Visit: COVID-19 [place]  Originating Site (patient location): [facility]  Distant Site (provider location): Remote Location  Mode of Communication: Video Conference via [employer]  Time of Service: Date: 09/06/2024 , Start:09:00 end: 09:30

## 2024-09-06 NOTE — PROGRESS NOTES
Occupational Therapy Discharge Summary    Reason for therapy discharge:    All goals and outcomes met, no further needs identified.    Progress towards therapy goal(s). See goals on Care Plan in [facility] electronic health record for goal details.  Goals met    Therapy recommendation(s):    No further therapy is recommended.

## 2024-09-06 NOTE — PROGRESS NOTES
Emailed pt guardian and update her on how pt group home showing went and the manager there will be reaching out the the CM and guardian for next steps.     Reached out to CM as pt is requesting her friend Chandan to visit her, waiting to hear back on her thoughts and if she is okay with this.

## 2024-09-06 NOTE — PLAN OF CARE
"Face to face shift report received from Simeon LEMUS RN. Rounding completed, pt observed.    Problem: Adult Behavioral Health Plan of Care  Goal: Patient-Specific Goal (Individualization)  Description: Patient will accept medications as prescribed.  Patient will attend groups and participate in the unit activities  Patient will take part in her discharge meeting and comply with plan.    Outcome: Progressing  Note: Shift Summary:  Patient was resting in bed at the start of this shift.  Patient up right before evening meal.  She is cooperative but anxious.  She requested \"something strong for anxiety and voices\".  Offered and accepted Zyprexa 10 mg po at 1712 along with Tylenol 650 mg po for c/o lower right tooth pain with good effect.  Patient requested Hydroxyzine 50 mg po at 1812 for continued anxiety.  Calmer by 1930 and able to sit still.  She has asked permission to make phone calls and has been appropriate on the phone.     Problem: Thought Process Alteration  Goal: Optimal Thought Clarity  Description: Patient will have reality based conversation    Outcome: Progressing  Face to face report will be communicated to oncoming RN.    Agata Costa RN  9/6/2024                            "

## 2024-09-06 NOTE — PLAN OF CARE
"  Problem: Adult Behavioral Health Plan of Care  Goal: Plan of Care Review  Outcome: Progressing  Goal: Patient-Specific Goal (Individualization)  Description: Patient will accept medications as prescribed.  Patient will attend groups and participate in the unit activities  Patient will take part in her discharge meeting and comply with plan.    Outcome: Progressing  Goal: Adheres to Safety Considerations for Self and Others  Outcome: Progressing  Goal: Absence of New-Onset Illness or Injury  Outcome: Progressing  Goal: Optimized Coping Skills in Response to Life Stressors  Outcome: Progressing  Goal: Develops/Participates in Therapeutic [place] to Support Successful Transition  Outcome: Progressing     Problem: Thought Process Alteration  Goal: Optimal Thought Clarity  Description: Patient will have reality based conversation    Outcome: Progressing   Goal Outcome Evaluation:  Face to face end of shift report received from RN. Patient rounding completed, appears asleep in bed, unlabored respirations noted.   September 6, 2024 7:36 AM    Patient denies pain. Patient reports mood as \"I'm okay.\" Patient is eating breakfast and still drowsy from just waking up and plans to return to sleep after eating. Patient denies AH/VH, SI/HI, or any harmful thoughts towards self or others at this time, agrees to notify staff if anything changes. Patient does not report any concerns or complaints at this time.  September 6, 2024 8:32 AM    Patient requesting medication to help with her PTSD night terrors that she states have been happening more frequently, this writer messaged the doctor.  September 6, 2024 1:05 PM    Patient has not attended groups but then reports being bored and does not try activities suggested to her. Continues to seek out staff repeatedly with poor boundaries even with reinforced education. Patient asked about a friend Robert being able to visit but it was declined by her guardian and she was informed of this " decision.   September 6, 2024 1:25 PM

## 2024-09-07 PROCEDURE — 250N000013 HC RX MED GY IP 250 OP 250 PS 637

## 2024-09-07 PROCEDURE — 124N000001 HC R&B MH

## 2024-09-07 PROCEDURE — 250N000013 HC RX MED GY IP 250 OP 250 PS 637: Performed by: NURSE PRACTITIONER

## 2024-09-07 PROCEDURE — 99232 SBSQ HOSP IP/OBS MODERATE 35: CPT | Performed by: NURSE PRACTITIONER

## 2024-09-07 RX ADMIN — ACETAMINOPHEN 650 MG: 325 TABLET, FILM COATED ORAL at 09:14

## 2024-09-07 RX ADMIN — OLANZAPINE 10 MG: 10 TABLET, FILM COATED ORAL at 13:00

## 2024-09-07 RX ADMIN — HYDROXYZINE HYDROCHLORIDE 50 MG: 25 TABLET ORAL at 16:52

## 2024-09-07 RX ADMIN — ACETAMINOPHEN 650 MG: 325 TABLET, FILM COATED ORAL at 16:53

## 2024-09-07 RX ADMIN — NICOTINE POLACRILEX 2 MG: 2 GUM, CHEWING ORAL at 13:11

## 2024-09-07 RX ADMIN — PALIPERIDONE 6 MG: 6 TABLET, EXTENDED RELEASE ORAL at 07:48

## 2024-09-07 RX ADMIN — LORAZEPAM 1 MG: 1 TABLET ORAL at 08:44

## 2024-09-07 RX ADMIN — HYDROXYZINE HYDROCHLORIDE 50 MG: 25 TABLET ORAL at 07:48

## 2024-09-07 RX ADMIN — TRAZODONE HYDROCHLORIDE 50 MG: 50 TABLET ORAL at 20:22

## 2024-09-07 RX ADMIN — NICOTINE POLACRILEX 2 MG: 2 GUM, CHEWING ORAL at 11:51

## 2024-09-07 ASSESSMENT — ACTIVITIES OF DAILY LIVING (ADL)
ADLS_ACUITY_SCORE: 29
ADLS_ACUITY_SCORE: 29
ORAL_HYGIENE: INDEPENDENT
ADLS_ACUITY_SCORE: 29
DRESS: SCRUBS (BEHAVIORAL HEALTH)
HYGIENE/GROOMING: INDEPENDENT
DRESS: SCRUBS (BEHAVIORAL HEALTH);INDEPENDENT
HYGIENE/GROOMING: INDEPENDENT
ADLS_ACUITY_SCORE: 29
LAUNDRY: UNABLE TO COMPLETE
ADLS_ACUITY_SCORE: 29
ADLS_ACUITY_SCORE: 29
ORAL_HYGIENE: INDEPENDENT
ADLS_ACUITY_SCORE: 29
LAUNDRY: UNABLE TO COMPLETE
ADLS_ACUITY_SCORE: 29

## 2024-09-07 NOTE — PLAN OF CARE
Problem: Adult Behavioral Health Plan of Care  Goal: Patient-Specific Goal (Individualization)  Description: Patient will accept medications as prescribed.  Patient will attend groups and participate in the unit activities  Patient will take part in her discharge meeting and comply with plan.  Outcome: Progressing     Problem: Thought Process Alteration  Goal: Optimal Thought Clarity  Description: Patient will have reality based conversation  Outcome: Progressing    Face to face shift report received from previously assigned nurse. Rounding completed, pt observed watching television in the lounge.    Patient is met with in the lounge. Denies pain, SI, HI, and depression. Endorses auditory hallucinations, states they are they but she is ignoring them, declines PRN medication at this time. Endorses anxiety, states she is worried about the next steps as she wants to go back to her apartment. Therapeutic listening and reassurance provided, patient accepting of her potential next steps for discharge. Patient is pleasant in conversation. No further needs at this time.     PRN trazodone administered for sleep promotion. No further needs at this time.     Face to face report communicated to oncoming RN.    Simin Billings RN  9/7/2024  4:24 PM

## 2024-09-07 NOTE — PROGRESS NOTES
"[facility] PSYCHIATRY  PROGRESS NOTE     SUBJECTIVE     Prior to interviewing the patient, I met with nursing and reviewed patient's clinical condition. We discussed clinical care both before and after the interview. I have reviewed the patient's clinical course by review of records including previous notes, labs, and vital signs.     Per nursing, the patient had the following behavioral events over the last 24-hours: none    [facility] She notes that she is \"fine\" today. She asks if there is any news about the group home, did review that we would likely not get any more updates until Monday, which she acknowledges. She reports not sleeping well, though nursing notes indicate she sleeps roughly 7 hours at night. Patient is encouraged to attend group programming, though currently declines. She denies any side effects from Invega. Patient denies any hallucinations or SI today.        MEDICATIONS   Scheduled Meds:  Current Facility-Administered Medications   Medication Dose Route Frequency Provider Last Rate Last Admin    paliperidone ER (INVEGA) 24 hr tablet 6 mg  6 mg Oral Daily Carol Horton NP   6 mg at 09/07/24 0748     PRN Meds:.  Current Facility-Administered Medications   Medication Dose Route Frequency Provider Last Rate Last Admin    acetaminophen (TYLENOL) tablet 650 mg  650 mg Oral Q4H PRN Jamshid Rdz APRN CNP   650 mg at 09/07/24 0914    hydrOXYzine HCl (ATARAX) tablet 50 mg  50 mg Oral Q6H PRN Carol Horton, NP   50 mg at 09/07/24 0748    LORazepam (ATIVAN) tablet 1 mg  1 mg Oral Q6H PRN Carol Horton NP   1 mg at 09/07/24 0844    Or    LORazepam (ATIVAN) injection 1 mg  1 mg Intramuscular Q6H PRN Carol Horton NP        nicotine (NICORETTE) gum 2 mg  2 mg Buccal Q1H PRN Jamshid Rdz APRN CNP   2 mg at 09/05/24 1921    OLANZapine (zyPREXA) tablet 10 mg  10 mg Oral TID PRN Jamshid Rdz APRN CNP   10 mg at 09/06/24 1712    Or    " "OLANZapine (zyPREXA) injection 10 mg  10 mg Intramuscular TID PRN Jamshid Rdz APRN CNP        traZODone (DESYREL) tablet 50 mg  50 mg Oral At Bedtime PRN Carol Horton NP   50 mg at 24 0229        ALLERGIES   Allergies   Allergen Reactions    Other [No Clinical Screening - See Comments] Anaphylaxis     \"Sodium Penathol\" per mother. Used in anesthesia.  Family hx of reaction.    Thiopental Anaphylaxis     Other reaction(s): Other - Describe In Comment Field    Family Hx of death with this medication    Great grandmother  on the operating table to this medication; worried about allergies to sodium      Other reaction(s): Other - Describe In Comment Field Family Hx of death with this medication    Latex Rash    Amoxicillin Trihydrate GI Disturbance    Amoxicillin-Pot Clavulanate GI Disturbance    Phenobarbital         MENTAL STATUS EXAM   Vitals: /67 (BP Location: Right arm)   Pulse 90   Temp 97.2  F (36.2  C) (Temporal)   Resp 16   Wt 54.8 kg (120 lb 14.4 oz)   SpO2 97%   BMI 18.94 kg/m      Appearance:  awake, alert, dressed in hospital scrubs, appeared as age stated, and slightly unkempt  Attitude:  cooperative  Eye Contact:  good  Mood: \"good\"  Affect:  mood congruent  Speech:  clear, coherent  Psychomotor Behavior:  no evidence of tardive dyskinesia, dystonia, or tics, restless  Thought Process:  concrete  Associations:  no loose associations  Thought Content:  no evidence of suicidal ideation or homicidal ideation, denies AH today  Insight: limited   Judgment:  fair   Oriented to:  time, person, and place  Attention Span and Concentration:  fair  Recent and Remote Memory:  fair  Fund of Knowledge: low-normal  Muscle Strength and Tone: normal  Gait and Station: Normal       LABS   No results found for this or any previous visit (from the past 24 hour(s)).      IMPRESSION     This is a 24 year old female with a PMH of schizoaffective disorder- bipolar type, KIRSTY, intellectual disability, " "stimulant use disorder, and cannabis use disorder who presents to the ED initially noting multiple odd somatic complaints, such as her heart no longer beating. Later reporting auditory hallucinations. Was in agreement to admission to get restarted back on medication. Patient has not taken any medication since she left the hospital in early July. She missed her post hospital follow up and did not attend her psychiatry appointment. She did not get her Invega Sustenna injection that was due 8/5. Patient reports she does not have rides to appointments and just forgets to go. Patient does report some ongoing hallucinations today, denies any active SI. She is not interested in continuing the Invega Sustenna FRIAS, noting that it was \"too much\". She is willing to restart the oral Invega today, noting that she does want the hallucinations to stop. Patient does have guardian in place, who agrees with hospitalization for stabilization. Patient does not seem to have any insight into need for stabilization, is not able to verbalize how she will attend any future appointments and has history of medication noncompliance when outside of the hospital setting.       Today: patient notes that she is doing fine. Had interview with group home this past week, is hopeful to hear back early this coming week. Tolerating Invega.     DIAGNOSES     #. Schizoaffective disorder, bipolar type  #. Generalized anxiety disorder  #. Intellectual disability  #. Stimulant (methamphetamine) use disorder, severe  #. Medication noncompliance       PLAN     Location: Unit 5  Legal Status: Orders Placed This Encounter      Voluntary  Has guardian    Safety Assessment:    Behavioral Orders   Procedures    Code 1 - Restrict to Unit    Discontinue 1:1 attendant for suicide risk     Order Specific Question:   I have performed an in person assessment of the patient     Answer:   Based on this assessment the patient no longer requires a one on one attendant at " this point in time.     Order Specific Question:   Rationale     Answer:   Patient States able to remain safe in hospital    Routine Programming     As clinically indicated    Status 15     Every 15 minutes.      PTA psychotropic medications stopped:     -Invega Sustenna 117 mg IM- pt missed last dose, not interested in taking     PTA psychotropic medications continued/changed:     -restart Invega 3 mg daily-> 6 mg daily on 8/29    New medications tried and stopped:     -None    New medications initiated:     -standard unit PRN medications    Today's Changes:  -no medication changes    Programming: Patient will be treated in a therapeutic milieu with appropriate individual and group therapies. Education will be provided on diagnoses, medications, and treatments.     Medical diagnoses:  Per medicine    Consult: None  Tests: None    Anticipated LOS: > 7 days  Disposition: likely  or other structured setting       ATTESTATION      Carol Horton, CNP

## 2024-09-07 NOTE — PLAN OF CARE
Face to face end of shift report received from Agata CHRISTIE. Rounding completed. Patient observed in bed appears asleep.     Problem: Adult Behavioral Health Plan of Care  Goal: Patient-Specific Goal (Individualization)  Description: Patient will accept medications as prescribed.  Patient will attend groups and participate in the unit activities  Patient will take part in her discharge meeting and comply with plan.    Outcome: Progressing     Problem: Thought Process Alteration  Goal: Optimal Thought Clarity  Description: Patient will have reality based conversation    Outcome: Progressing   Goal Outcome Evaluation:         No observed episodes of SIB this shift. Patient slept (7) hours. Face to face end of shift report communicated to oncoming shift.     Jerry Narvaez RN  9/7/2024  0615 AM

## 2024-09-07 NOTE — PLAN OF CARE
Face to face shift report received from PRATIK Edwards. Rounding completed, pt observed in hallway at start of shift.  Problem: Adult Behavioral Health Plan of Care  Goal: Patient-Specific Goal (Individualization)  Description: Patient will accept medications as prescribed.  Patient will attend groups and participate in the unit activities  Patient will take part in her discharge meeting and comply with plan.    Outcome: Progressing     Problem: Thought Process Alteration  Goal: Optimal Thought Clarity  Description: Patient will have reality based conversation    Outcome: Progressing   Goal Outcome Evaluation:          Pt. Had some physical pain in their tooth this shift.PRN acetaminophen 650 mg given for this at 0914. They also mentioned having high levels of anxiety this shift. PRN hydroxyzine 50 mg given at 0748, Ativan 1 mg at 0844 and Zyprexa 10 mg at 1300 for this. Pt. Was up at the nurse's station frequently this shift making requests. They asked for a room change, stating that they did not like their current one and would maybe prefer one with a roommate. Pt. Told that this would not happen at this time. Pt. Also asked for PRN melatonin at 0730. Pt. Told that this was an evening PRN and not given until around 8:00 PM. Pt. Also asked staff multiple times on updates about the groups home and was gently reminded that an update would not happen until possibly Monday. Pt. Later came to nurse's station stating that they had just had a bloody nose and that this happens when they are stressed. Pt. Stated that they were anxious about going to the group home, due to not seeing their boyfriend. They stated that they would rather got live in their apartment. Vital signs WNL. 1:1 time spent talking with Pt. No groups were attended this shift. 75% was eaten at breakfast and 100% at lunch.         Pt. States that their boyfriend has their wallet with money and S[employer]S. card and vape. That stated that they would like to somehow get  this before discharge.    Face to face report will be communicated to oncoming RN.    Laurel Mercado RN  9/7/2024  1:31 PM

## 2024-09-08 LAB — HCG UR QL: NEGATIVE

## 2024-09-08 PROCEDURE — 250N000013 HC RX MED GY IP 250 OP 250 PS 637

## 2024-09-08 PROCEDURE — 250N000013 HC RX MED GY IP 250 OP 250 PS 637: Performed by: NURSE PRACTITIONER

## 2024-09-08 PROCEDURE — 99231 SBSQ HOSP IP/OBS SF/LOW 25: CPT | Performed by: NURSE PRACTITIONER

## 2024-09-08 PROCEDURE — 124N000001 HC R&B MH

## 2024-09-08 PROCEDURE — 81025 URINE PREGNANCY TEST: CPT | Performed by: NURSE PRACTITIONER

## 2024-09-08 RX ORDER — AMOXICILLIN 250 MG
1 CAPSULE ORAL 2 TIMES DAILY PRN
Status: DISCONTINUED | OUTPATIENT
Start: 2024-09-08 | End: 2024-09-13 | Stop reason: HOSPADM

## 2024-09-08 RX ADMIN — NICOTINE POLACRILEX 2 MG: 2 GUM, CHEWING ORAL at 08:39

## 2024-09-08 RX ADMIN — LORAZEPAM 1 MG: 1 TABLET ORAL at 18:33

## 2024-09-08 RX ADMIN — TRAZODONE HYDROCHLORIDE 50 MG: 50 TABLET ORAL at 20:08

## 2024-09-08 RX ADMIN — HYDROXYZINE HYDROCHLORIDE 50 MG: 25 TABLET ORAL at 15:55

## 2024-09-08 RX ADMIN — NICOTINE POLACRILEX 2 MG: 2 GUM, CHEWING ORAL at 18:33

## 2024-09-08 RX ADMIN — SENNOSIDES AND DOCUSATE SODIUM 1 TABLET: 8.6; 5 TABLET ORAL at 16:59

## 2024-09-08 RX ADMIN — LORAZEPAM 1 MG: 1 TABLET ORAL at 08:39

## 2024-09-08 RX ADMIN — ACETAMINOPHEN 650 MG: 325 TABLET, FILM COATED ORAL at 15:55

## 2024-09-08 RX ADMIN — PALIPERIDONE 6 MG: 6 TABLET, EXTENDED RELEASE ORAL at 08:39

## 2024-09-08 RX ADMIN — NICOTINE POLACRILEX 2 MG: 2 GUM, CHEWING ORAL at 14:18

## 2024-09-08 RX ADMIN — NICOTINE POLACRILEX 2 MG: 2 GUM, CHEWING ORAL at 11:46

## 2024-09-08 RX ADMIN — OLANZAPINE 10 MG: 10 TABLET, FILM COATED ORAL at 20:08

## 2024-09-08 ASSESSMENT — ACTIVITIES OF DAILY LIVING (ADL)
ADLS_ACUITY_SCORE: 29
ADLS_ACUITY_SCORE: 29
HYGIENE/GROOMING: INDEPENDENT
ADLS_ACUITY_SCORE: 29
LAUNDRY: UNABLE TO COMPLETE
LAUNDRY: UNABLE TO COMPLETE
DRESS: INDEPENDENT;SCRUBS (BEHAVIORAL HEALTH)
ADLS_ACUITY_SCORE: 29
ORAL_HYGIENE: INDEPENDENT
ADLS_ACUITY_SCORE: 29
HYGIENE/GROOMING: INDEPENDENT
ADLS_ACUITY_SCORE: 29
DRESS: SCRUBS (BEHAVIORAL HEALTH)
ORAL_HYGIENE: INDEPENDENT
ADLS_ACUITY_SCORE: 29

## 2024-09-08 NOTE — PROGRESS NOTES
"[facility] PSYCHIATRY  PROGRESS NOTE     SUBJECTIVE     Prior to interviewing the patient, I met with nursing and reviewed patient's clinical condition. We discussed clinical care both before and after the interview. I have reviewed the patient's clinical course by review of records including previous notes, labs, and vital signs.     Per nursing, the patient had the following behavioral events over the last 24-hours: none    On psychiatric interview, patient was seen napping in the [facility] area. She reports that she is \"tired\" today. She asks if there is any updates on when she will [facility]". Did remind patient it is Sunday, any updates would happen on tomorrow when SW is back. Does appear patient slept about 7 hours last night. She denies any suicidal thoughts. She is encouraged to attend group programming today and not to nap during the day, as this is likely affecting her sleep at night.         MEDICATIONS   Scheduled Meds:  Current Facility-Administered Medications   Medication Dose Route Frequency Provider Last Rate Last Admin    paliperidone ER (INVEGA) 24 hr tablet 6 mg  6 mg Oral Daily Carol Horton, NP   6 mg at 09/08/24 0839     PRN Meds:.  Current Facility-Administered Medications   Medication Dose Route Frequency Provider Last Rate Last Admin    acetaminophen (TYLENOL) tablet 650 mg  650 mg Oral Q4H PRN Jamshid Rdz APRN CNP   650 mg at 09/07/24 1653    hydrOXYzine HCl (ATARAX) tablet 50 mg  50 mg Oral Q6H PRN GranSaturnino boldenle, NP   50 mg at 09/07/24 1652    LORazepam (ATIVAN) tablet 1 mg  1 mg Oral Q6H PRN Craol Horton, NP   1 mg at 09/08/24 0839    Or    LORazepam (ATIVAN) injection 1 mg  1 mg Intramuscular Q6H PRN GranSaturnino boldenle, NP        nicotine (NICORETTE) gum 2 mg  2 mg Buccal Q1H PRN Jamshid Rdz APRN CNP   2 mg at 09/08/24 0839    OLANZapine (zyPREXA) tablet 10 mg  10 mg Oral TID PRN Jamshid Rdz APRN CNP   10 mg at 09/07/24 1300    Or    OLANZapine " "(zyPREXA) injection 10 mg  10 mg Intramuscular TID PRN Jamshid Rdz APRN CNP        traZODone (DESYREL) tablet 50 mg  50 mg Oral At Bedtime PRN Carol Horton NP   50 mg at 24        ALLERGIES   Allergies   Allergen Reactions    Other [No Clinical Screening - See Comments] Anaphylaxis     \"Sodium Penathol\" per mother. Used in anesthesia.  Family hx of reaction.    Thiopental Anaphylaxis     Other reaction(s): Other - Describe In Comment Field    Family Hx of death with this medication    Great grandmother  on the operating table to this medication; worried about allergies to sodium      Other reaction(s): Other - Describe In Comment Field Family Hx of death with this medication    Latex Rash    Amoxicillin Trihydrate GI Disturbance    Amoxicillin-Pot Clavulanate GI Disturbance    Phenobarbital         MENTAL STATUS EXAM   Vitals: BP 98/72   Pulse 77   Temp 98.1  F (36.7  C) (Temporal)   Resp 16   Wt 59.3 kg (130 lb 12.8 oz)   SpO2 96%   BMI 20.49 kg/m      Appearance:  awake, alert, dressed in hospital scrubs, appeared as age stated, and slightly unkempt  Attitude:  cooperative  Eye Contact:  good  Mood: \"fine\"  Affect:  mood congruent  Speech:  clear, coherent  Psychomotor Behavior:  no evidence of tardive dyskinesia, dystonia, or tics, restless  Thought Process:  concrete  Associations:  no loose associations  Thought Content:  no evidence of suicidal ideation or homicidal ideation, denies AH today  Insight: limited   Judgment:  fair   Oriented to:  time, person, and place  Attention Span and Concentration:  fair  Recent and Remote Memory:  fair  Fund of Knowledge: low-normal  Muscle Strength and Tone: normal  Gait and Station: Normal       LABS   No results found for this or any previous visit (from the past 24 hour(s)).      IMPRESSION     This is a 24 year old female with a PMH of schizoaffective disorder- bipolar type, KIRSTY, intellectual disability, stimulant use disorder, and cannabis " "use disorder who presents to the ED initially noting multiple odd somatic complaints, such as her heart no longer beating. Later reporting auditory hallucinations. Was in agreement to admission to get restarted back on medication. Patient has not taken any medication since she left the hospital in early July. She missed her post hospital follow up and did not attend her psychiatry appointment. She did not get her Invega Sustenna injection that was due 8/5. Patient reports she does not have rides to appointments and just forgets to go. Patient does report some ongoing hallucinations today, denies any active SI. She is not interested in continuing the Invega Sustenna FRIAS, noting that it was \"too much\". She is willing to restart the oral Invega today, noting that she does want the hallucinations to stop. Patient does have guardian in place, who agrees with hospitalization for stabilization. Patient does not seem to have any insight into need for stabilization, is not able to verbalize how she will attend any future appointments and has history of medication noncompliance when outside of the hospital setting.       Today: patient notes that she is doing fine. Had interview with group home this past week, is hopeful to hear back early this coming week. Tolerating Invega.     DIAGNOSES     #. Schizoaffective disorder, bipolar type  #. Generalized anxiety disorder  #. Intellectual disability  #. Stimulant (methamphetamine) use disorder, severe  #. Medication noncompliance       PLAN     Location: Unit 5  Legal Status: Orders Placed This Encounter      Voluntary  Has guardian    Safety Assessment:    Behavioral Orders   Procedures    Code 1 - Restrict to Unit    Discontinue 1:1 attendant for suicide risk     Order Specific Question:   I have performed an in person assessment of the patient     Answer:   Based on this assessment the patient no longer requires a one on one attendant at this point in time.     Order Specific " Question:   Rationale     Answer:   Patient States able to remain safe in hospital    Routine Programming     As clinically indicated    Status 15     Every 15 minutes.      PTA psychotropic medications stopped:     -Invega Sustenna 117 mg IM- pt missed last dose, not interested in taking     PTA psychotropic medications continued/changed:     -restart Invega 3 mg daily-> 6 mg daily on 8/29    New medications tried and stopped:     -None    New medications initiated:     -standard unit PRN medications    Today's Changes:  -no medication changes    Programming: Patient will be treated in a therapeutic milieu with appropriate individual and group therapies. Education will be provided on diagnoses, medications, and treatments.     Medical diagnoses:  Per medicine    Consult: None  Tests: None    Anticipated LOS: > 7 days  Disposition: likely  or other structured setting       ATTESTATION      Carol Horton, CNP

## 2024-09-08 NOTE — PLAN OF CARE
Face to face shift report received from PRATIK Edwards. Rounding completed, pt observed.  Problem: Adult Behavioral Health Plan of Care  Goal: Patient-Specific Goal (Individualization)  Description: Patient will accept medications as prescribed.  Patient will attend groups and participate in the unit activities  Patient will take part in her discharge meeting and comply with plan.    Outcome: Progressing     Problem: Thought Process Alteration  Goal: Optimal Thought Clarity  Description: Patient will have reality based conversation    Outcome: Progressing   Goal Outcome Evaluation:    Plan of Care Reviewed With: patient        Pt. Denied having any physical pain this shift. They also denied having any SI or intent to self-harm. Pt. Did have some anxiety this shift. PRN Ativan 1 mg given for this at 0839. Pt. Stated that they had had some weird dreams last night and were hoping to get a medication to help with this. Pt. Spent most of the morning before lunch napping in the lounge off and on. Pt. Was encouraged to nap in bed if they were very tired, as that would be comfier. 100% was eaten at breakfast and 100% at lunch. No groups were attended this shift.       Face to face report will be communicated to oncoming RN.    Laurel Mercado RN  9/8/2024  2:04 PM

## 2024-09-08 NOTE — PLAN OF CARE
Face to face end of shift report received from Simin CHRISTIE. Rounding completed. Patient observed in bed appears asleep.      Problem: Adult Behavioral Health Plan of Care  Goal: Patient-Specific Goal (Individualization)  Description: Patient will accept medications as prescribed.  Patient will attend groups and participate in the unit activities  Patient will take part in her discharge meeting and comply with plan.    Outcome: Progressing     Problem: Thought Process Alteration  Goal: Optimal Thought Clarity  Description: Patient will have reality based conversation    Outcome: Progressing   Goal Outcome Evaluation:         No observed episodes of SIB this shift. Patient slept (7) hours. Face to face end of shift report communicated to oncoming shift.     Jerry Narvaez RN  9/8/2024  0608 AM

## 2024-09-08 NOTE — PLAN OF CARE
Problem: Adult Behavioral Health Plan of Care  Goal: Patient-Specific Goal (Individualization)  Description: Patient will accept medications as prescribed.  Patient will attend groups and participate in the unit activities  Patient will take part in her discharge meeting and comply with plan.  Outcome: Progressing     Problem: Thought Process Alteration  Goal: Optimal Thought Clarity  Description: Patient will have reality based conversation  Outcome: Progressing    Face to face shift report received from previously assigned nurse. Rounding completed, pt observed pacing in the hallway and the lounge area.    Patient is met with in the lounge. Denies SI, HI, A/V hallucinations, and depression. Endorses pain, 10/10, in her abdomen, believes she is constipated, requesting a stool softener. Provider notified as none ordered at this time, new order placed, patient received PRN senna at this time. Endorses anxiety, states she is really anxious about hearing back from the group home as to if she has been accepted. PRN hydroxyzine administered at this time as well as reassurance being provided. Patient denies A/V hallucinations, but then states she is ignoring them again, intermittently being seen as preoccupied with these. No further needs at this time.     UA collected and sent to lab - HCG resulted negative.    Patient requested and received PRN ativan for increased anxiety. No further needs at this time.     PRN trazodone and zyprexa administered for sleep promotion and patients increasing agitation regarding vivid dreams and fear of sleeping due to these. No further needs at this time.     Face to face report communicated to oncoming RN.    Simin Billings RN  9/8/2024  4:23 PM

## 2024-09-09 PROCEDURE — 99232 SBSQ HOSP IP/OBS MODERATE 35: CPT

## 2024-09-09 PROCEDURE — 250N000013 HC RX MED GY IP 250 OP 250 PS 637

## 2024-09-09 PROCEDURE — 250N000013 HC RX MED GY IP 250 OP 250 PS 637: Performed by: NURSE PRACTITIONER

## 2024-09-09 PROCEDURE — 124N000001 HC R&B MH

## 2024-09-09 RX ADMIN — HYDROXYZINE HYDROCHLORIDE 50 MG: 25 TABLET ORAL at 14:40

## 2024-09-09 RX ADMIN — OLANZAPINE 10 MG: 10 TABLET, FILM COATED ORAL at 13:15

## 2024-09-09 RX ADMIN — LORAZEPAM 1 MG: 1 TABLET ORAL at 11:59

## 2024-09-09 RX ADMIN — OLANZAPINE 10 MG: 10 TABLET, FILM COATED ORAL at 20:43

## 2024-09-09 RX ADMIN — LORAZEPAM 1 MG: 1 TABLET ORAL at 19:17

## 2024-09-09 RX ADMIN — NICOTINE POLACRILEX 2 MG: 2 GUM, CHEWING ORAL at 19:05

## 2024-09-09 RX ADMIN — ACETAMINOPHEN 650 MG: 325 TABLET, FILM COATED ORAL at 20:43

## 2024-09-09 RX ADMIN — NICOTINE POLACRILEX 2 MG: 2 GUM, CHEWING ORAL at 12:16

## 2024-09-09 RX ADMIN — TRAZODONE HYDROCHLORIDE 50 MG: 50 TABLET ORAL at 20:40

## 2024-09-09 RX ADMIN — PALIPERIDONE 6 MG: 6 TABLET, EXTENDED RELEASE ORAL at 08:21

## 2024-09-09 ASSESSMENT — ACTIVITIES OF DAILY LIVING (ADL)
ADLS_ACUITY_SCORE: 29

## 2024-09-09 NOTE — PROGRESS NOTES
Reached out to [facility] to see if they got a hold of the CM and guardian. Waiting to hear back.    Pt was accepted to this group home, they are hoping for next week placement.

## 2024-09-09 NOTE — PLAN OF CARE
"  Problem: Adult Behavioral Health Plan of Care  Goal: Patient-Specific Goal (Individualization)  Description: Patient will accept medications as prescribed.  Patient will attend groups and participate in the unit activities  Patient will take part in her discharge meeting and comply with plan.    Outcome: Progressing  Note: Face to face start of shift report received from RN.  Rounding complete, patient in bed at this time.     Samantha Soriano RN  9/9/2024  7:43 AM    Patient in bed sleeping.  Initially refuses breakfast, brought breakfast to patient, patient eats 50%.  Patient remains in bed throughout the morning.    PRN:  Ativan 1 mg @ 1159 for anxiety    Patient up on unit after eating lunch in her room, patient eats 25%.    Patient out in lounge after lunch.    Patient continues to endorse anxiety.  PRN:  Zyprexa 10 mg @ 1315 for agitation.     PRN:  hydroxyzine 25 mg @ 1440 continued anxiety.      Patient makes phone calls.    Spends rest of this shift in lounge.    Care continued for evening shift.      Patient questions when she will be discharging and if her boyfriend can drop off some stuff for her for discharge.  Informed patient this would not be allowed, patient agreeable with this answer returns to making phone calls.     PRN:  Zyprexa 10 mg and Tylenol 650 mg @ 2043 for \"the voices\" and right sided tooth pain.         Goal Outcome Evaluation:       Face to face end of shift report communicated to oncoming shift.     Samantha Soriano RN  9/9/2024  9:22 PM                      "

## 2024-09-09 NOTE — PLAN OF CARE
Face to face end of shift report received from Simin CHRISTIE. Rounding completed. Patient observed in bed appears asleep.     Problem: Adult Behavioral Health Plan of Care  Goal: Patient-Specific Goal (Individualization)  Description: Patient will accept medications as prescribed.  Patient will attend groups and participate in the unit activities  Patient will take part in her discharge meeting and comply with plan.    Outcome: Progressing     Problem: Thought Process Alteration  Goal: Optimal Thought Clarity  Description: Patient will have reality based conversation    Outcome: Progressing   Goal Outcome Evaluation:         No observed episodes of SIB this shift. Patient slept (7) hours. Face to face end of shift report communicated to oncoming shift.     Jerry Narvaez RN  9/9/2024  0607 AM

## 2024-09-09 NOTE — PROGRESS NOTES
[facility] PSYCHIATRY  PROGRESS NOTE     SUBJECTIVE     Prior to interviewing the patient, I met with nursing and reviewed patient's clinical condition. We discussed clinical care both before and after the interview. I have reviewed the patient's clinical course by review of records including previous notes, labs, and vital signs.     Per nursing, the patient had the following behavioral events over the last 24-hours: none. Anxious about hearing back from the group home.     On psychiatric interview, patient is met on the open unit. She tells me she is terrible today. She notes she is anxious about going into adult foster care. She would prefer to go home, but tells me her guardian would prefer foster care. She tells me she is worried about her pets and that she had heard she was to move from her apartment. She is concern about her belongings. Pt tells me she has not been able to reach her guardian by phone. She notes she has called the guardian on call service and states she was directed to her assigned guardian.     Denies any noted adverse effects from her medication.      MEDICATIONS   Scheduled Meds:  Current Facility-Administered Medications   Medication Dose Route Frequency Provider Last Rate Last Admin    paliperidone ER (INVEGA) 24 hr tablet 6 mg  6 mg Oral Daily Carol Horton, NP   6 mg at 09/09/24 0821     PRN Meds:.  Current Facility-Administered Medications   Medication Dose Route Frequency Provider Last Rate Last Admin    acetaminophen (TYLENOL) tablet 650 mg  650 mg Oral Q4H PRN Jamshid Rdz APRN CNP   650 mg at 09/08/24 1555    hydrOXYzine HCl (ATARAX) tablet 50 mg  50 mg Oral Q6H PRN Carol Horton, NP   50 mg at 09/08/24 1555    LORazepam (ATIVAN) tablet 1 mg  1 mg Oral Q6H PRN Carol Horton, NP   1 mg at 09/09/24 1159    Or    LORazepam (ATIVAN) injection 1 mg  1 mg Intramuscular Q6H PRN Carol Horton NP        nicotine (NICORETTE) gum 2 mg  2 mg Buccal Q1H PRN Jamshid Rdz  "AMINA CNP   2 mg at 24 1216    OLANZapine (zyPREXA) tablet 10 mg  10 mg Oral TID PRN Jamshid Rdz APRN CNP   10 mg at 24 1315    Or    OLANZapine (zyPREXA) injection 10 mg  10 mg Intramuscular TID PRN Jamshid Rdz APRN CNP        senna-docusate (SENOKOT-S/PERICOLACE) 8.6-50 MG per tablet 1 tablet  1 tablet Oral BID PRN Lori Kwon APRN CNP   1 tablet at 24 1659    traZODone (DESYREL) tablet 50 mg  50 mg Oral At Bedtime PRN Carol Horton NP   50 mg at 24        ALLERGIES   Allergies   Allergen Reactions    Other [No Clinical Screening - See Comments] Anaphylaxis     \"Sodium Penathol\" per mother. Used in anesthesia.  Family hx of reaction.    Thiopental Anaphylaxis     Other reaction(s): Other - Describe In Comment Field    Family Hx of death with this medication    Great grandmother  on the operating table to this medication; worried about allergies to sodium      Other reaction(s): Other - Describe In Comment Field Family Hx of death with this medication    Latex Rash    Amoxicillin Trihydrate GI Disturbance    Amoxicillin-Pot Clavulanate GI Disturbance    Phenobarbital         MENTAL STATUS EXAM   Vitals: BP 97/53   Pulse 72   Temp 98.3  F (36.8  C) (Temporal)   Resp 14   Wt 59.3 kg (130 lb 12.8 oz)   SpO2 96%   BMI 20.49 kg/m      Appearance:  awake, alert, dressed in hospital scrubs, appeared as age stated, and slightly unkempt  Attitude:  cooperative  Eye Contact:  good  Mood: \"terrible\"  Affect:  Anxious  Speech:  clear, coherent  Psychomotor Behavior:  no evidence of tardive dyskinesia, dystonia, or tics, restless  Thought Process:  concrete  Associations:  no loose associations  Thought Content:  no evidence of suicidal ideation or homicidal ideation, denies AH today  Insight: limited   Judgment:  fair   Oriented to:  time, person, and place  Attention Span and Concentration:  fair  Recent and Remote Memory:  fair  Fund of Knowledge: low-normal  Muscle " "Strength and Tone: normal  Gait and Station: Normal       LABS   Recent Results (from the past 24 hour(s))   HCG qualitative urine    Collection Time: 09/08/24  4:07 PM   Result Value Ref Range    hCG Urine Qualitative Negative Negative         IMPRESSION     This is a 24 year old female with a PMH of schizoaffective disorder- bipolar type, KIRSTY, intellectual disability, stimulant use disorder, and cannabis use disorder who presents to the ED initially noting multiple odd somatic complaints, such as her heart no longer beating. Later reporting auditory hallucinations. Was in agreement to admission to get restarted back on medication. Patient has not taken any medication since she left the hospital in early July. She missed her post hospital follow up and did not attend her psychiatry appointment. She did not get her Invega Sustenna injection that was due 8/5. Patient reports she does not have rides to appointments and just forgets to go. Patient does report some ongoing hallucinations today, denies any active SI. She is not interested in continuing the Invega Sustenna FRIAS, noting that it was \"too much\". She is willing to restart the oral Invega today, noting that she does want the hallucinations to stop. Patient does have guardian in place, who agrees with hospitalization for stabilization. Patient does not seem to have any insight into need for stabilization, is not able to verbalize how she will attend any future appointments and has history of medication noncompliance when outside of the hospital setting.       Today: Anxious about group home/foster setting. Had interview with group home this past week, is hopeful to hear back early this coming week. Tolerating Invega.     DIAGNOSES     #. Schizoaffective disorder, bipolar type  #. Generalized anxiety disorder  #. Intellectual disability  #. Stimulant (methamphetamine) use disorder, severe  #. Medication noncompliance       PLAN     Location: Unit 5  Legal Status: " Orders Placed This Encounter      Voluntary  Has guardian    Safety Assessment:    Behavioral Orders   Procedures    Code 1 - Restrict to Unit    Discontinue 1:1 attendant for suicide risk     Order Specific Question:   I have performed an in person assessment of the patient     Answer:   Based on this assessment the patient no longer requires a one on one attendant at this point in time.     Order Specific Question:   Rationale     Answer:   Patient States able to remain safe in hospital    Routine Programming     As clinically indicated    Status 15     Every 15 minutes.      PTA psychotropic medications stopped:     -Invega Sustenna 117 mg IM- pt missed last dose, not interested in taking     PTA psychotropic medications continued/changed:     -restart Invega 3 mg daily-> 6 mg daily on 8/29    New medications tried and stopped:     -None    New medications initiated:     -standard unit PRN medications    Today's Changes:    -no medication changes    Programming: Patient will be treated in a therapeutic milieu with appropriate individual and group therapies. Education will be provided on diagnoses, medications, and treatments.     Medical diagnoses:  Per medicine    Consult: None  Tests: None    Anticipated LOS: > 7 days  Disposition: likely  or other structured setting       TREATMENT TEAM CARE PLAN     Progress: Symptoms improved.    Continued Stay Criteria/Rationale: Ongoing treatment and safe discharge planning.    Medical/Physical: See above.    Precautions: See above.     Plan: Continue inpatient care with unit support and medication management.    Rationale for change in precautions or plan: NA due to no change.    Participants: AMINA Maciel CNP, Nursing, SW, OT.    The patient's care was discussed with the treatment team and chart notes were reviewed.        ATTESTATION      AMINA Maciel CNP

## 2024-09-10 PROCEDURE — 124N000001 HC R&B MH

## 2024-09-10 PROCEDURE — 250N000013 HC RX MED GY IP 250 OP 250 PS 637: Performed by: NURSE PRACTITIONER

## 2024-09-10 PROCEDURE — 250N000013 HC RX MED GY IP 250 OP 250 PS 637

## 2024-09-10 PROCEDURE — 99232 SBSQ HOSP IP/OBS MODERATE 35: CPT | Performed by: NURSE PRACTITIONER

## 2024-09-10 RX ADMIN — HYDROXYZINE HYDROCHLORIDE 50 MG: 25 TABLET ORAL at 11:19

## 2024-09-10 RX ADMIN — PALIPERIDONE 6 MG: 6 TABLET, EXTENDED RELEASE ORAL at 08:11

## 2024-09-10 RX ADMIN — NICOTINE POLACRILEX 2 MG: 2 GUM, CHEWING ORAL at 12:40

## 2024-09-10 RX ADMIN — NICOTINE POLACRILEX 2 MG: 2 GUM, CHEWING ORAL at 11:20

## 2024-09-10 RX ADMIN — LORAZEPAM 1 MG: 1 TABLET ORAL at 20:07

## 2024-09-10 RX ADMIN — ACETAMINOPHEN 650 MG: 325 TABLET, FILM COATED ORAL at 17:48

## 2024-09-10 RX ADMIN — TRAZODONE HYDROCHLORIDE 50 MG: 50 TABLET ORAL at 20:07

## 2024-09-10 RX ADMIN — ACETAMINOPHEN 650 MG: 325 TABLET, FILM COATED ORAL at 13:29

## 2024-09-10 RX ADMIN — HYDROXYZINE HYDROCHLORIDE 50 MG: 25 TABLET ORAL at 20:07

## 2024-09-10 RX ADMIN — LORAZEPAM 1 MG: 1 TABLET ORAL at 12:40

## 2024-09-10 ASSESSMENT — ACTIVITIES OF DAILY LIVING (ADL)
ADLS_ACUITY_SCORE: 29
ORAL_HYGIENE: INDEPENDENT
ADLS_ACUITY_SCORE: 29
DRESS: SCRUBS (BEHAVIORAL HEALTH);INDEPENDENT
HYGIENE/GROOMING: INDEPENDENT
ADLS_ACUITY_SCORE: 29

## 2024-09-10 NOTE — PROGRESS NOTES
[facility] PSYCHIATRY  PROGRESS NOTE     SUBJECTIVE     Prior to interviewing the patient, I met with nursing and reviewed patient's clinical condition. We discussed clinical care both before and after the interview. I have reviewed the patient's clinical course by review of records including previous notes, labs, and vital signs.     Per nursing, the patient had the following behavioral events over the last 24-hours: none. Anxious about hearing back from the group home.     On psychiatric interview, patient is seen up in the lounge. She asks when she will be able to leave. Did review with her that she has been accepted, though there is some coordination with guardian and [employer] funding needed before she can go there. Patient does get briefly tearful, noting that she would prefer to just go back to the apartment she was living at for past 3 months. Patient has been compliant with medications, denies side effects. Reports she is sleeping well.          MEDICATIONS   Scheduled Meds:  Current Facility-Administered Medications   Medication Dose Route Frequency Provider Last Rate Last Admin    paliperidone ER (INVEGA) 24 hr tablet 6 mg  6 mg Oral Daily Carol Horton, NP   6 mg at 09/10/24 0811     PRN Meds:.  Current Facility-Administered Medications   Medication Dose Route Frequency Provider Last Rate Last Admin    acetaminophen (TYLENOL) tablet 650 mg  650 mg Oral Q4H PRN Jamshid Rdz APRN CNP   650 mg at 09/10/24 1329    hydrOXYzine HCl (ATARAX) tablet 50 mg  50 mg Oral Q6H PRN Carol Horton, NP   50 mg at 09/10/24 1119    LORazepam (ATIVAN) tablet 1 mg  1 mg Oral Q6H PRN Carol Horton, NP   1 mg at 09/10/24 1240    Or    LORazepam (ATIVAN) injection 1 mg  1 mg Intramuscular Q6H PRN Carol Horton NP        nicotine (NICORETTE) gum 2 mg  2 mg Buccal Q1H PRN Jamshid Rdz APRN CNP   2 mg at 09/10/24 1240    OLANZapine (zyPREXA) tablet 10 mg  10 mg Oral TID PRN Jamshid Rdz APRN CNP   10 mg at  "24    Or    OLANZapine (zyPREXA) injection 10 mg  10 mg Intramuscular TID PRN Jamshid Rdz, AMINA CNP        senna-docusate (SENOKOT-S/PERICOLACE) 8.6-50 MG per tablet 1 tablet  1 tablet Oral BID PRN Lori Kwon APRN CNP   1 tablet at 24 165    traZODone (DESYREL) tablet 50 mg  50 mg Oral At Bedtime PRN Carol Horton NP   50 mg at 24        ALLERGIES   Allergies   Allergen Reactions    Other [No Clinical Screening - See Comments] Anaphylaxis     \"Sodium Penathol\" per mother. Used in anesthesia.  Family hx of reaction.    Thiopental Anaphylaxis     Other reaction(s): Other - Describe In Comment Field    Family Hx of death with this medication    Great grandmother  on the operating table to this medication; worried about allergies to sodium      Other reaction(s): Other - Describe In Comment Field Family Hx of death with this medication    Latex Rash    Amoxicillin Trihydrate GI Disturbance    Amoxicillin-Pot Clavulanate GI Disturbance    Phenobarbital         MENTAL STATUS EXAM   Vitals: /68   Pulse 96   Temp 98.2  F (36.8  C) (Temporal)   Resp 14   Wt 59.3 kg (130 lb 12.8 oz)   SpO2 98%   BMI 20.49 kg/m      Appearance:  awake, alert, dressed in hospital scrubs, appeared as age stated, and slightly unkempt  Attitude:  cooperative, pleasant  Eye Contact:  good  Mood: \"sad\"  Affect: briefly tearful  Speech:  clear, coherent  Psychomotor Behavior:  no evidence of tardive dyskinesia, dystonia, or tics  Thought Process:  concrete  Associations:  no loose associations  Thought Content:  no evidence of suicidal ideation or homicidal ideation, denies AH today  Insight: limited   Judgment:  fair   Oriented to:  time, person, and place  Attention Span and Concentration:  fair  Recent and Remote Memory:  fair  Fund of Knowledge: low-normal  Muscle Strength and Tone: normal  Gait and Station: Normal       LABS   No results found for this or any previous visit (from the " "past 24 hour(s)).        IMPRESSION     This is a 24 year old female with a PMH of schizoaffective disorder- bipolar type, KIRSTY, intellectual disability, stimulant use disorder, and cannabis use disorder who presents to the ED initially noting multiple odd somatic complaints, such as her heart no longer beating. Later reporting auditory hallucinations. Was in agreement to admission to get restarted back on medication. Patient has not taken any medication since she left the hospital in early July. She missed her post hospital follow up and did not attend her psychiatry appointment. She did not get her Invega Sustenna injection that was due 8/5. Patient reports she does not have rides to appointments and just forgets to go. Patient does report some ongoing hallucinations today, denies any active SI. She is not interested in continuing the Invega Sustenna FRIAS, noting that it was \"too much\". She is willing to restart the oral Invega today, noting that she does want the hallucinations to stop. Patient does have guardian in place, who agrees with hospitalization for stabilization. Patient does not seem to have any insight into need for stabilization, is not able to verbalize how she will attend any future appointments and has history of medication noncompliance when outside of the hospital setting.       Today: Accepted to group home, awaiting acceptance date. Patient tolerating Invega, denies side effects. Briefly tearful today, some anxiety about transition to group home setting, though is more accepting of this.     DIAGNOSES     #. Schizoaffective disorder, bipolar type  #. Generalized anxiety disorder  #. Intellectual disability  #. Stimulant (methamphetamine) use disorder, severe  #. Medication noncompliance       PLAN     Location: Unit 5  Legal Status: Orders Placed This Encounter      Voluntary  Has guardian    Safety Assessment:    Behavioral Orders   Procedures    Code 1 - Restrict to Unit    Discontinue 1:1 " attendant for suicide risk     Order Specific Question:   I have performed an in person assessment of the patient     Answer:   Based on this assessment the patient no longer requires a one on one attendant at this point in time.     Order Specific Question:   Rationale     Answer:   Patient States able to remain safe in hospital    Routine Programming     As clinically indicated    Status 15     Every 15 minutes.      PTA psychotropic medications stopped:     -Invega Sustenna 117 mg IM- pt missed last dose, not interested in taking     PTA psychotropic medications continued/changed:     -restart Invega 3 mg daily-> 6 mg daily on 8/29    New medications tried and stopped:     -None    New medications initiated:     -standard unit PRN medications    Today's Changes:    -no medication changes    Programming: Patient will be treated in a therapeutic milieu with appropriate individual and group therapies. Education will be provided on diagnoses, medications, and treatments.     Medical diagnoses:  Per medicine    Consult: None  Tests: None    Anticipated LOS: > 7 days  Disposition: accepted to [facility]       ATTESTATION      AMINA Velasco CNP

## 2024-09-10 NOTE — PLAN OF CARE
Problem: Adult Behavioral Health Plan of Care  Goal: Patient-Specific Goal (Individualization)  Description: Patient will accept medications as prescribed.  Patient will attend groups and participate in the unit activities  Patient will take part in her discharge meeting and comply with plan.    Outcome: Progressing  Note: Report received from [place] complete. Pt observed sleeping in left side lying position with regular and unlabored respirations.    Pt has been in bed with eyes closed and regular respirations. 15 minute and PRN checks all night. No complaints offered.     Pt slept approx  7.5  hours this NOC shift.    Face to face end of shift report communicated to oncoming RN.    Kristina GUTIÉRREZ RN  September 10, 2024  3:55 AM          Problem: Thought Process Alteration  Goal: Optimal Thought Clarity  Description: Patient will have reality based conversation    Outcome: Progressing  Note: Unable to assess due to pt sleeping. No issues or concerns noted at this time.     Goal Outcome Evaluation:

## 2024-09-10 NOTE — PLAN OF CARE
Problem: Adult Behavioral Health Plan of Care  Goal: Patient-Specific Goal (Individualization)  Description: Patient will accept medications as prescribed.  Patient will attend groups and participate in the unit activities  Patient will take part in her discharge meeting and comply with plan.    Outcome: Progressing  Note:     1800 Patient is alert and up on the unit, napped in front of the tv for a short period of time. Patient makes numerous requests for pills. Patient given Tylenol 650 mg for complaints of tooth pain. Patient states it is 10/10. Patient states she was unable to eat due to tooth pain. Patient became upset with peer related to a comment about her needing to go to the BR when she passes gas.    2111 Patient requested and given Hydroxyzine 50 mg po with Ativan 1 mg po and Tylenol 650 mg po. Patient makes frequent trip to nurses station for additional medications. Reassured patient and encouraged patient to rest.    Face to face end of shift report communicated to 11-7 shift RN.     Rosanna Hall RN  9/10/2024  9:17 PM          Problem: Thought Process Alteration  Goal: Optimal Thought Clarity  Description: Patient will have reality based conversation    Outcome: Progressing   Goal Outcome Evaluation:    Plan of Care Reviewed With: patient

## 2024-09-10 NOTE — PROGRESS NOTES
[facility] is working with [employer] waiver to get all things set in place for the group home for pt. Guardian is waiting to receive admission  from group home and once that is received she will fill out and send back.

## 2024-09-10 NOTE — PLAN OF CARE
Problem: Adult Behavioral Health Plan of Care  Goal: Patient-Specific Goal (Individualization)  Description: Patient will accept medications as prescribed.  Patient will attend groups and participate in the unit activities  Patient will take part in her discharge meeting and comply with plan.    Outcome: Progressing  Note: Face to face start of shift report received from RN.  Rounding complete, patient in bed at this time.    Samantha Soriano RN  9/10/2024  7:40 AM    Patient remains in bed until 1000.  Eats 25% of breakfast.  Patient anticipating discharge.  Denies SI/HI, AH/VH and pain.  Endorses anxiety.    PRN:  hydroxyzine 50 mg for anxiety @ 1119  Requests nicotine gum, lets needs be known.      PRN:  Ativan 1 mg @ 1240 for continued anxiety.   PRN:  Tylenol 650 mg @ 1329 for throat pain related to crying.     Patient polite and cooperative with nursing cares and assessment.  Patient lets needs be known.  Patient easily redirectable.          Goal Outcome Evaluation:       Face to face end of shift report communicated to oncoming shift.     Samantah Soriano RN  9/10/2024  3:19 PM

## 2024-09-11 PROCEDURE — 99232 SBSQ HOSP IP/OBS MODERATE 35: CPT | Performed by: NURSE PRACTITIONER

## 2024-09-11 PROCEDURE — 124N000001 HC R&B MH

## 2024-09-11 PROCEDURE — 250N000013 HC RX MED GY IP 250 OP 250 PS 637: Performed by: NURSE PRACTITIONER

## 2024-09-11 PROCEDURE — 250N000013 HC RX MED GY IP 250 OP 250 PS 637

## 2024-09-11 RX ORDER — SIMETHICONE 80 MG
160 TABLET,CHEWABLE ORAL 3 TIMES DAILY
Status: DISCONTINUED | OUTPATIENT
Start: 2024-09-11 | End: 2024-09-13 | Stop reason: HOSPADM

## 2024-09-11 RX ADMIN — OLANZAPINE 10 MG: 10 TABLET, FILM COATED ORAL at 10:49

## 2024-09-11 RX ADMIN — OLANZAPINE 10 MG: 10 TABLET, FILM COATED ORAL at 19:18

## 2024-09-11 RX ADMIN — TRAZODONE HYDROCHLORIDE 50 MG: 50 TABLET ORAL at 20:22

## 2024-09-11 RX ADMIN — SIMETHICONE 160 MG: 80 TABLET, CHEWABLE ORAL at 10:47

## 2024-09-11 RX ADMIN — NICOTINE POLACRILEX 2 MG: 2 GUM, CHEWING ORAL at 18:13

## 2024-09-11 RX ADMIN — NICOTINE POLACRILEX 2 MG: 2 GUM, CHEWING ORAL at 14:57

## 2024-09-11 RX ADMIN — SIMETHICONE 160 MG: 80 TABLET, CHEWABLE ORAL at 14:19

## 2024-09-11 RX ADMIN — SIMETHICONE 160 MG: 80 TABLET, CHEWABLE ORAL at 19:17

## 2024-09-11 RX ADMIN — ACETAMINOPHEN 650 MG: 325 TABLET, FILM COATED ORAL at 15:45

## 2024-09-11 RX ADMIN — PALIPERIDONE 6 MG: 6 TABLET, EXTENDED RELEASE ORAL at 09:51

## 2024-09-11 RX ADMIN — SENNOSIDES AND DOCUSATE SODIUM 1 TABLET: 8.6; 5 TABLET ORAL at 15:44

## 2024-09-11 RX ADMIN — HYDROXYZINE HYDROCHLORIDE 50 MG: 25 TABLET ORAL at 14:19

## 2024-09-11 ASSESSMENT — ACTIVITIES OF DAILY LIVING (ADL)
ADLS_ACUITY_SCORE: 29
LAUNDRY: UNABLE TO COMPLETE
ADLS_ACUITY_SCORE: 29
ORAL_HYGIENE: INDEPENDENT
ADLS_ACUITY_SCORE: 29
DRESS: SCRUBS (BEHAVIORAL HEALTH);INDEPENDENT
HYGIENE/GROOMING: INDEPENDENT
ADLS_ACUITY_SCORE: 29

## 2024-09-11 NOTE — PROGRESS NOTES
Emailed the group home to see if he received the admission packet back from guardian as of Tuesday guardian had not received one from the group home. Waiting for response back.     Group home emailed back and they are waiting for the [facility] to get funding hopefully will hear back by the end of the week.

## 2024-09-11 NOTE — PLAN OF CARE
"  Problem: Adult Behavioral Health Plan of Care  Goal: Patient-Specific Goal (Individualization)  Description: Patient will accept medications as prescribed.  Patient will attend groups and participate in the unit activities  Patient will take part in her discharge meeting and comply with plan.    Outcome: Progressing   Goal Outcome Evaluation:    Plan of Care Reviewed With: patient        Patient is Alert, able to make needs known. VSS, afebrile. Assessment and vitals as charted.   Denies pain. Patient slept in threw breakfast until about 1030 this morning. States she is doing \"okay but tired.\" She denies SI, SIB, HI. Endorses anxiety and AH, not command type. Requested and received PRN Zyprexa 10 mg PO at 1049 for AH. Upon reassessment, patient stated it did help \"a little.\"   Patient also received PRN hydroxyzine 50 mg PO at 1419 for anxiety.   Patient has been up in [facility], attending groups and participating.     Took scheduled medications. Appetite good. Did not eat breakfast but ate 50% of lunch and snacks.       End of shift report given to PM, RN.          "

## 2024-09-11 NOTE — PROGRESS NOTES
"[facility] PSYCHIATRY  PROGRESS NOTE     SUBJECTIVE     Prior to interviewing the patient, I met with nursing and reviewed patient's clinical condition. We discussed clinical care both before and after the interview. I have reviewed the patient's clinical course by review of records including previous notes, labs, and vital signs.     Per nursing, the patient had the following behavioral events over the last 24-hours: none.     On psychiatric interview, patient is seen up in the lounge. She appears calmer today, not tearful. Asks appropriate questions regarding placement and when she will be able to go. Patient reports sleeping well at night. Denies side effects from medication. Did again review switching to FRIAS formulation, however patient does wish to continue taking the oral form. Denies SI, reports occasional voices \"always there\". Did encourage her to go to some groups to keep herself busy.           MEDICATIONS   Scheduled Meds:  Current Facility-Administered Medications   Medication Dose Route Frequency Provider Last Rate Last Admin    paliperidone ER (INVEGA) 24 hr tablet 6 mg  6 mg Oral Daily Carol Horton NP   6 mg at 09/11/24 0951    simethicone (MYLICON) chewable tablet 160 mg  160 mg Oral TID Ktae Carroll CNP   160 mg at 09/11/24 1419     PRN Meds:.  Current Facility-Administered Medications   Medication Dose Route Frequency Provider Last Rate Last Admin    acetaminophen (TYLENOL) tablet 650 mg  650 mg Oral Q4H PRN Jamshid Rdz APRN CNP   650 mg at 09/10/24 1748    hydrOXYzine HCl (ATARAX) tablet 50 mg  50 mg Oral Q6H PRN Carol Horton, NP   50 mg at 09/11/24 1419    LORazepam (ATIVAN) tablet 1 mg  1 mg Oral Q6H PRN Carol Horton, NP   1 mg at 09/10/24 2007    Or    LORazepam (ATIVAN) injection 1 mg  1 mg Intramuscular Q6H PRN Carol Horton NP        nicotine (NICORETTE) gum 2 mg  2 mg Buccal Q1H PRN Jamshid Rdz APRN CNP   2 mg at 09/11/24 1457    OLANZapine (zyPREXA) tablet 10 " "mg  10 mg Oral TID PRN Jamshid Rdz APRN CNP   10 mg at 24 1049    Or    OLANZapine (zyPREXA) injection 10 mg  10 mg Intramuscular TID PRN Jamshid Rdz APRN CNP        senna-docusate (SENOKOT-S/PERICOLACE) 8.6-50 MG per tablet 1 tablet  1 tablet Oral BID PRN Lori Kwon APRN CNP   1 tablet at 24 1659    traZODone (DESYREL) tablet 50 mg  50 mg Oral At Bedtime PRN Carol Horton NP   50 mg at 09/10/24 2007        ALLERGIES   Allergies   Allergen Reactions    Other [No Clinical Screening - See Comments] Anaphylaxis     \"Sodium Penathol\" per mother. Used in anesthesia.  Family hx of reaction.    Thiopental Anaphylaxis     Other reaction(s): Other - Describe In Comment Field    Family Hx of death with this medication    Great grandmother  on the operating table to this medication; worried about allergies to sodium      Other reaction(s): Other - Describe In Comment Field Family Hx of death with this medication    Latex Rash    Amoxicillin Trihydrate GI Disturbance    Amoxicillin-Pot Clavulanate GI Disturbance    Phenobarbital         MENTAL STATUS EXAM   Vitals: /64   Pulse 101   Temp 99.2  F (37.3  C) (Temporal)   Resp 18   Wt 59.3 kg (130 lb 12.8 oz)   SpO2 95%   BMI 20.49 kg/m      Appearance:  awake, alert, dressed in hospital scrubs, appeared as age stated, and slightly unkempt  Attitude:  cooperative, pleasant  Eye Contact:  good  Mood: \"fine\"  Affect: restricted  Speech:  clear, coherent  Psychomotor Behavior:  no evidence of tardive dyskinesia, dystonia, or tics  Thought Process:  concrete  Associations:  no loose associations  Thought Content:  no evidence of suicidal ideation or homicidal ideation, AH have been improving  Insight: limited   Judgment:  fair   Oriented to:  time, person, and place  Attention Span and Concentration:  fair  Recent and Remote Memory:  fair  Fund of Knowledge: low-normal  Muscle Strength and Tone: normal  Gait and Station: Normal   " "    LABS   No results found for this or any previous visit (from the past 24 hour(s)).        IMPRESSION     This is a 24 year old female with a PMH of schizoaffective disorder- bipolar type, KIRSTY, intellectual disability, stimulant use disorder, and cannabis use disorder who presents to the ED initially noting multiple odd somatic complaints, such as her heart no longer beating. Later reporting auditory hallucinations. Was in agreement to admission to get restarted back on medication. Patient has not taken any medication since she left the hospital in early July. She missed her post hospital follow up and did not attend her psychiatry appointment. She did not get her Invega Sustenna injection that was due 8/5. Patient reports she does not have rides to appointments and just forgets to go. Patient does report some ongoing hallucinations today, denies any active SI. She is not interested in continuing the Invega Sustenna FRIAS, noting that it was \"too much\". She is willing to restart the oral Invega today, noting that she does want the hallucinations to stop. Patient does have guardian in place, who agrees with hospitalization for stabilization. Patient does not seem to have any insight into need for stabilization, is not able to verbalize how she will attend any future appointments and has history of medication noncompliance when outside of the hospital setting.       Today: Accepted to group home, awaiting acceptance date. Patient tolerating Invega, denies side effects. Not interested in FRIAS option though compliance will likely improve as she is going to a structured setting where medication is dispensed.      DIAGNOSES     #. Schizoaffective disorder, bipolar type  #. Generalized anxiety disorder  #. Intellectual disability  #. Stimulant (methamphetamine) use disorder, severe  #. Medication noncompliance       PLAN     Location: Unit 5  Legal Status: Orders Placed This Encounter      Voluntary  Has guardian    Safety " Assessment:    Behavioral Orders   Procedures    Code 1 - Restrict to Unit    Discontinue 1:1 attendant for suicide risk     Order Specific Question:   I have performed an in person assessment of the patient     Answer:   Based on this assessment the patient no longer requires a one on one attendant at this point in time.     Order Specific Question:   Rationale     Answer:   Patient States able to remain safe in hospital    Routine Programming     As clinically indicated    Status 15     Every 15 minutes.      PTA psychotropic medications stopped:     -Invega Sustenna 117 mg IM- pt missed last dose, not interested in taking     PTA psychotropic medications continued/changed:     -restart Invega 3 mg daily-> 6 mg daily on 8/29    New medications tried and stopped:     -None    New medications initiated:     -standard unit PRN medications    Today's Changes:    -no medication changes  -Accepted to , awaiting admit date    Programming: Patient will be treated in a therapeutic milieu with appropriate individual and group therapies. Education will be provided on diagnoses, medications, and treatments.     Medical diagnoses:  Per medicine    Consult: None  Tests: None    Anticipated LOS: > 7 days  Disposition: accepted to [facility]       ATTESTATION      AMINA Velasco CNP

## 2024-09-11 NOTE — PLAN OF CARE
Face to face shift report received from PRATIK Quach.       Problem: Adult Behavioral Health Plan of Care  Goal: Patient-Specific Goal (Individualization)  Description: Patient will accept medications as prescribed.  Patient will attend groups and participate in the unit activities  Patient will take part in her discharge meeting and comply with plan.    Outcome: Progressing   Cooperative. Medication compliant. Reports anxiety. Reports auditory hallucinations. Pleasant during conversation. Attends groups. Reports 9/10 mouth pain. Requested and received Tylenol 650 mg at 1545. At this time pt also requested and received Senna- docusate for constipation.  Pt reports relief of pain symptoms at time of reassessment.     1917: Simethicone 160 mg administered at this time for flatulence. At this time pt also reports agitation due to another pt commenting on her flatulence. Administered Zyprexa 10 mg PO.   2022: Trazodone 50 mg for sleep    Problem: Thought Process Alteration  Goal: Optimal Thought Clarity  Description: Patient will have reality based conversation    Outcome: Progressing         Face to face end of shift report to be communicated to oncoming RN.

## 2024-09-11 NOTE — PLAN OF CARE
Problem: Adult Behavioral Health Plan of Care  Goal: Patient-Specific Goal (Individualization)  Description: Patient will accept medications as prescribed.  Patient will attend groups and participate in the unit activities  Patient will take part in her discharge meeting and comply with plan.    Outcome: Progressing  Note: Report received from Rosanna. Rounding complete. Pt observed sleeping in left side lying position with regular and unlabored respirations.    Pt has been in bed with eyes closed and regular respirations. 15 minute and PRN checks all night. No complaints offered.     Pt slept approx 7   hours this NOC shift.    Face to face end of shift report communicated to oncoming RN.    Kristina GUTIÉRREZ RN  September 11, 2024  3:30 AM          Problem: Thought Process Alteration  Goal: Optimal Thought Clarity  Description: Patient will have reality based conversation    Outcome: Progressing  Note: Unable to assess due to pt sleeping. No issues or concerns noted at this time.     Goal Outcome Evaluation:

## 2024-09-12 VITALS
BODY MASS INDEX: 20.49 KG/M2 | WEIGHT: 130.8 LBS | RESPIRATION RATE: 18 BRPM | OXYGEN SATURATION: 98 % | TEMPERATURE: 98.2 F | SYSTOLIC BLOOD PRESSURE: 115 MMHG | DIASTOLIC BLOOD PRESSURE: 69 MMHG | HEART RATE: 83 BPM

## 2024-09-12 PROCEDURE — 99232 SBSQ HOSP IP/OBS MODERATE 35: CPT | Performed by: NURSE PRACTITIONER

## 2024-09-12 PROCEDURE — 250N000013 HC RX MED GY IP 250 OP 250 PS 637: Performed by: NURSE PRACTITIONER

## 2024-09-12 PROCEDURE — 250N000013 HC RX MED GY IP 250 OP 250 PS 637

## 2024-09-12 PROCEDURE — 124N000001 HC R&B MH

## 2024-09-12 RX ORDER — HYDROXYZINE HYDROCHLORIDE 50 MG/1
50 TABLET, FILM COATED ORAL EVERY 6 HOURS PRN
Qty: 90 TABLET | Refills: 1 | Status: SHIPPED | OUTPATIENT
Start: 2024-09-12

## 2024-09-12 RX ORDER — PALIPERIDONE 6 MG/1
6 TABLET, EXTENDED RELEASE ORAL DAILY
Qty: 30 TABLET | Refills: 1 | Status: SHIPPED | OUTPATIENT
Start: 2024-09-13

## 2024-09-12 RX ORDER — TRAZODONE HYDROCHLORIDE 50 MG/1
50 TABLET, FILM COATED ORAL AT BEDTIME
Qty: 30 TABLET | Refills: 1 | Status: SHIPPED | OUTPATIENT
Start: 2024-09-12

## 2024-09-12 RX ORDER — OLANZAPINE 10 MG/1
10 TABLET ORAL 2 TIMES DAILY PRN
Qty: 60 TABLET | Refills: 1 | Status: SHIPPED | OUTPATIENT
Start: 2024-09-12

## 2024-09-12 RX ORDER — LANOLIN ALCOHOL/MO/W.PET/CERES
3 CREAM (GRAM) TOPICAL
Qty: 30 TABLET | Refills: 1 | Status: SHIPPED | OUTPATIENT
Start: 2024-09-12

## 2024-09-12 RX ADMIN — NICOTINE POLACRILEX 2 MG: 2 GUM, CHEWING ORAL at 18:35

## 2024-09-12 RX ADMIN — PALIPERIDONE 6 MG: 6 TABLET, EXTENDED RELEASE ORAL at 08:20

## 2024-09-12 RX ADMIN — LORAZEPAM 1 MG: 1 TABLET ORAL at 08:25

## 2024-09-12 RX ADMIN — HYDROXYZINE HYDROCHLORIDE 50 MG: 25 TABLET ORAL at 02:16

## 2024-09-12 RX ADMIN — OLANZAPINE 10 MG: 10 TABLET, FILM COATED ORAL at 18:50

## 2024-09-12 RX ADMIN — HYDROXYZINE HYDROCHLORIDE 50 MG: 25 TABLET ORAL at 18:35

## 2024-09-12 RX ADMIN — HYDROXYZINE HYDROCHLORIDE 50 MG: 25 TABLET ORAL at 12:44

## 2024-09-12 RX ADMIN — TRAZODONE HYDROCHLORIDE 50 MG: 50 TABLET ORAL at 02:16

## 2024-09-12 RX ADMIN — NICOTINE POLACRILEX 2 MG: 2 GUM, CHEWING ORAL at 12:47

## 2024-09-12 RX ADMIN — SIMETHICONE 160 MG: 80 TABLET, CHEWABLE ORAL at 17:48

## 2024-09-12 RX ADMIN — LORAZEPAM 1 MG: 1 TABLET ORAL at 15:42

## 2024-09-12 RX ADMIN — SIMETHICONE 160 MG: 80 TABLET, CHEWABLE ORAL at 12:41

## 2024-09-12 RX ADMIN — SIMETHICONE 160 MG: 80 TABLET, CHEWABLE ORAL at 08:20

## 2024-09-12 RX ADMIN — OLANZAPINE 10 MG: 10 TABLET, FILM COATED ORAL at 06:58

## 2024-09-12 RX ADMIN — ACETAMINOPHEN 650 MG: 325 TABLET, FILM COATED ORAL at 08:25

## 2024-09-12 RX ADMIN — TRAZODONE HYDROCHLORIDE 50 MG: 50 TABLET ORAL at 20:35

## 2024-09-12 ASSESSMENT — ACTIVITIES OF DAILY LIVING (ADL)
ADLS_ACUITY_SCORE: 29

## 2024-09-12 NOTE — PLAN OF CARE
"Face to face end of shift report received. Rounding completed and patient observed in the lounge. Multiple requests.      Goal Outcome Evaluation: Patient reported very high anxiety due to discharge and seeing her boyfriend this evening. She asked for PRNs often and shortly after would come back and ask the same question. Patient had a visit from her boyfriend. She was very concerned about him. He did not bring any belongings for her to go to the group home. He asked to know if he could be put back on the JEANNA. Patient and visitor were told he is not on the JEANNA so this writer couldn't give out any information. During visit there were two staff in the room at all times. She had to be reminded to maintain appropriate boundaries. She was overheard telling her visitor she plans to be at the \"group home for two months.\"She also said she plans to get a  to get her out sooner. Please see MAR for PRNs given to patient. Patient reported little to no relief from these PRNs. Patient reported \"voices\" and received 10 mg of zyprexa at 18:50. She denied pain.     Face to face end of shift report communicated to oncoming RN.        Problem: Adult Behavioral Health Plan of Care  Goal: Patient-Specific Goal (Individualization)  Description: Patient will accept medications as prescribed.  Patient will attend groups and participate in the unit activities  Patient will take part in her discharge meeting and comply with plan.    Outcome: Not Progressing     Problem: Thought Process Alteration  Goal: Optimal Thought Clarity  Description: Patient will have reality based conversation    Outcome: Not Progressing                     "

## 2024-09-12 NOTE — PROGRESS NOTES
Followed up on Group home referrals. [facility] is not a good fit as the only house available they have is an all male house. [facility] [place],  is waiting for response back.     Pt has been accepted for discharge to [facility] for tomorrow 9/13/24. Ride scheduled with [employer] @ 9:00am.     Spoke with guardian and CM about the discharge ride, guardian is unable to come  pt and CM can not set up a ride. Both have agreed that the ride can be scheduled with [facility].

## 2024-09-12 NOTE — PROGRESS NOTES
"[facility] PSYCHIATRY  PROGRESS NOTE     SUBJECTIVE     Prior to interviewing the patient, I met with nursing and reviewed patient's clinical condition. We discussed clinical care both before and after the interview. I have reviewed the patient's clinical course by review of records including previous notes, labs, and vital signs.     Per nursing, the patient had the following behavioral events over the last 24-hours: none.     On psychiatric interview, patient is seen resting in bed. She reports she is \"okay\" today. She reports feeling tired. She asks if there are any updates on discharge plan, reviewed that SW is following up on referrals and would provide her any updates. Denies having any other questions or concerns.     Did find out later in the day that patient has been accepted to [facility] for tomorrow. Patient updated by [facility]SC[facility]. Medication sent to [facility] per facility request.     Did attempt to call guardian though no answer. Message left to call back if any questions.          MEDICATIONS   Scheduled Meds:  Current Facility-Administered Medications   Medication Dose Route Frequency Provider Last Rate Last Admin    paliperidone ER (INVEGA) 24 hr tablet 6 mg  6 mg Oral Daily GranbergSaturninole, NP   6 mg at 09/12/24 0820    simethicone (MYLICON) chewable tablet 160 mg  160 mg Oral TID Kate Carroll CNP   160 mg at 09/12/24 1241     PRN Meds:.  Current Facility-Administered Medications   Medication Dose Route Frequency Provider Last Rate Last Admin    acetaminophen (TYLENOL) tablet 650 mg  650 mg Oral Q4H PRN Jamshid Rdz APRN CNP   650 mg at 09/12/24 0825    hydrOXYzine HCl (ATARAX) tablet 50 mg  50 mg Oral Q6H PRN Granberg, Carol, NP   50 mg at 09/12/24 1244    LORazepam (ATIVAN) tablet 1 mg  1 mg Oral Q6H PRN Granberg, Carol, NP   1 mg at 09/12/24 1542    Or    LORazepam (ATIVAN) injection 1 mg  1 mg Intramuscular Q6H PRN Granberg, Carol, NP        nicotine (NICORETTE) " "gum 2 mg  2 mg Buccal Q1H PRN Jamshid Rdz APRN CNP   2 mg at 24 1247    OLANZapine (zyPREXA) tablet 10 mg  10 mg Oral TID PRN Jamshid Rdz APRN CNP   10 mg at 24 0658    Or    OLANZapine (zyPREXA) injection 10 mg  10 mg Intramuscular TID PRN Jamshid Rdz APRN CNP        senna-docusate (SENOKOT-S/PERICOLACE) 8.6-50 MG per tablet 1 tablet  1 tablet Oral BID PRN Lori Kwon APRN CNP   1 tablet at 24 1544    traZODone (DESYREL) tablet 50 mg  50 mg Oral At Bedtime PRN Carol Horton NP   50 mg at 24 0216        ALLERGIES   Allergies   Allergen Reactions    Other [No Clinical Screening - See Comments] Anaphylaxis     \"Sodium Penathol\" per mother. Used in anesthesia.  Family hx of reaction.    Thiopental Anaphylaxis     Other reaction(s): Other - Describe In Comment Field    Family Hx of death with this medication    Great grandmother  on the operating table to this medication; worried about allergies to sodium      Other reaction(s): Other - Describe In Comment Field Family Hx of death with this medication    Latex Rash    Amoxicillin Trihydrate GI Disturbance    Amoxicillin-Pot Clavulanate GI Disturbance    Phenobarbital         MENTAL STATUS EXAM   Vitals: BP (!) 95/48   Pulse 85   Temp 98.9  F (37.2  C) (Temporal)   Resp 16   Wt 59.3 kg (130 lb 12.8 oz)   SpO2 96%   BMI 20.49 kg/m      Appearance:  awake, alert, dressed in hospital scrubs, appeared as age stated, and slightly unkempt  Attitude:  cooperative, pleasant  Eye Contact:  good  Mood: \"okay\"  Affect: restricted  Speech:  clear, coherent  Psychomotor Behavior:  no evidence of tardive dyskinesia, dystonia, or tics  Thought Process:  concrete  Associations:  no loose associations  Thought Content:  no evidence of suicidal ideation or homicidal ideation, AH have been improving  Insight: limited   Judgment:  fair   Oriented to:  time, person, and place  Attention Span and Concentration:  fair  Recent and Remote " "Memory:  fair  Fund of Knowledge: low-normal  Muscle Strength and Tone: normal  Gait and Station: Normal       LABS   No results found for this or any previous visit (from the past 24 hour(s)).        IMPRESSION     This is a 24 year old female with a PMH of schizoaffective disorder- bipolar type, KIRSTY, intellectual disability, stimulant use disorder, and cannabis use disorder who presents to the ED initially noting multiple odd somatic complaints, such as her heart no longer beating. Later reporting auditory hallucinations. Was in agreement to admission to get restarted back on medication. Patient has not taken any medication since she left the hospital in early July. She missed her post hospital follow up and did not attend her psychiatry appointment. She did not get her Invega Sustenna injection that was due 8/5. Patient reports she does not have rides to appointments and just forgets to go. Patient does report some ongoing hallucinations today, denies any active SI. She is not interested in continuing the Invega Sustenna FRIAS, noting that it was \"too much\". She is willing to restart the oral Invega today, noting that she does want the hallucinations to stop. Patient does have guardian in place, who agrees with hospitalization for stabilization. Patient does not seem to have any insight into need for stabilization, is not able to verbalize how she will attend any future appointments and has history of medication noncompliance when outside of the hospital setting.       Today: Accepted to group home, can discharge tomorrow Patient tolerating Invega, denies side effects. Medication sent to [facility] per facility request.      DIAGNOSES     #. Schizoaffective disorder, bipolar type  #. Generalized anxiety disorder  #. Intellectual disability  #. Stimulant (methamphetamine) use disorder, severe  #. Medication noncompliance       PLAN     Location: Unit 5  Legal Status: Orders Placed This Encounter      " Voluntary  Has guardian    Safety Assessment:    Behavioral Orders   Procedures    Code 1 - Restrict to Unit    Discontinue 1:1 attendant for suicide risk     Order Specific Question:   I have performed an in person assessment of the patient     Answer:   Based on this assessment the patient no longer requires a one on one attendant at this point in time.     Order Specific Question:   Rationale     Answer:   Patient States able to remain safe in hospital    Routine Programming     As clinically indicated    Status 15     Every 15 minutes.      PTA psychotropic medications stopped:     -Invega Sustenna 117 mg IM- pt missed last dose, not interested in taking     PTA psychotropic medications continued/changed:     -restart Invega 3 mg daily-> 6 mg daily on 8/29    New medications tried and stopped:     -None    New medications initiated:     -standard unit PRN medications    Today's Changes:    -no medication changes  -discharge tomorrow to     Programming: Patient will be treated in a therapeutic milieu with appropriate individual and group therapies. Education will be provided on diagnoses, medications, and treatments.     Medical diagnoses:  Per medicine    Consult: None  Tests: None    Anticipated LOS: > 7 days  Disposition: accepted to [facility] for tomorrow       ATTESTATION      AMINA Velasco CNP

## 2024-09-12 NOTE — PLAN OF CARE
Problem: Adult Behavioral Health Plan of Care  Goal: Patient-Specific Goal (Individualization)  Description: Patient will accept medications as prescribed.  Patient will attend groups and participate in the unit activities  Patient will take part in her discharge meeting and comply with plan.    Outcome: Progressing     Problem: Adult Behavioral Health Plan of Care  Goal: Adheres to Safety Considerations for Self and Others  Outcome: Progressing     Problem: Adult Behavioral Health Plan of Care  Goal: Absence of New-Onset Illness or Injury  Outcome: Progressing     Problem: Thought Process Alteration  Goal: Optimal Thought Clarity  Description: Patient will have reality based conversation    Outcome: Progressing  07:30 - Face to face end of shift report has been received from night shift rn  pt observed in hallway this am, moved to private room this am.  08:30 - pt to her room after eating breakfast in [facility]. Denies si, states she is having anxiety and voices.  Also having 10/10 tooth pain. Medicated with ativan and tylenol took meds as prescribed this am.    09:30 - pt lying in her bed with eyes closed and respirations non labored.  12:50 - medicated with atarax 50 for c/o anxiety, pt states pain is gone. States she has been going sweating to being cold when lying down.   14:00 - pt back to lying on her bed with eyes closed.  Took meds as they are ordered today, did not attend any groups today this shift.  Face to face end of shift report will be communicated to evening shift rn.

## 2024-09-12 NOTE — PLAN OF CARE
Problem: Adult Behavioral Health Plan of Care  Goal: Patient-Specific Goal (Individualization)  Description: Patient will accept medications as prescribed.  Patient will attend groups and participate in the unit activities  Patient will take part in her discharge meeting and comply with plan.    Outcome: Progressing  Note: Report received from Aleshia fuller. Pt observed sleeping in supine position with regular and unlabored respirations.    0216- Pt to the nurse's station with c/o 11/10 anxiety and wanting her second dose of trazodone. Pt was admin trazodone 50 mg and atarax 50 mg. Pt also noted to be picking her nose and then eating it while at the nurse's station door. Pt educated on bacteria, hand washing, and using kleenex instead. Pt bothered that she is not allowed to have the tv on right now. Pt returned to her room briefly for her headphones and returned to the lounge listening to music.     0658- Pt requested and was admin 10 mg Zyprexa PO for c/o high anxiety and severe AH.     Pt has been in bed with eyes closed and regular respirations. 15 minute and PRN checks all night. No complaints offered.     Pt slept approx 5.25   hours this NOC shift.    Face to face end of shift report communicated to oncoming RN.    Kristina GUTIÉRREZ RN  September 12, 2024  2:18 AM          Problem: Thought Process Alteration  Goal: Optimal Thought Clarity  Description: Patient will have reality based conversation    Outcome: Progressing  Note: Pt is able to make her needs known. She did ask multiple times within an approx 1 minute time frame about being able to turn the tv on. This did not seem to be intentional but more as if she forgot that she had just asked. Pt seemed annoyed with not being able to watch tv but was polite about it when it was explained each time that it is unit policy. She does appear distracted and as if she is paranoid of something she is seeing or hearing and very anxious. She denied any AH or VH  but offered c/o having nightmares and wanting writer to admin another medication for that. Pt does not have anything ordered for nightmares and was advised of this.

## 2024-09-13 PROCEDURE — 99239 HOSP IP/OBS DSCHRG MGMT >30: CPT | Performed by: NURSE PRACTITIONER

## 2024-09-13 PROCEDURE — 250N000013 HC RX MED GY IP 250 OP 250 PS 637: Performed by: NURSE PRACTITIONER

## 2024-09-13 RX ADMIN — SIMETHICONE 160 MG: 80 TABLET, CHEWABLE ORAL at 08:31

## 2024-09-13 RX ADMIN — PALIPERIDONE 6 MG: 6 TABLET, EXTENDED RELEASE ORAL at 08:31

## 2024-09-13 ASSESSMENT — ACTIVITIES OF DAILY LIVING (ADL)
ADLS_ACUITY_SCORE: 29

## 2024-09-13 NOTE — PROGRESS NOTES
Pt is discharging at the recommendation of the treatment team. Pt is discharging to [facility] transported by [employer]. Pt denies having any thoughts of hurting themself or anyone else. Pt denies anxiety or depression. Pt guardian will help set up appointments. Discharge instructions, including; demographic sheet, psychiatric evaluation, discharge summary, and AVS were faxed to these next level of care providers.     Pt discharging at 9:00am to [facility] via [employer] volunteer .

## 2024-09-13 NOTE — DISCHARGE SUMMARY
"[facility] PSYCHIATRY  DISCHARGE SUMMARY     DISCHARGE DATA     Yudi Morales MRN# [id number]   Age: 24 year old YOB: 2000     Date of Admission: 8/26/2024  Date of Discharge: September 13, 2024  Discharge Provider: Carol Horton NP       REASON FOR ADMISSION     This is a 24 year old female with a PMH of schizoaffective disorder- bipolar type, KIRSTY, intellectual disability, stimulant use disorder, and cannabis use disorder who presents to the ED initially noting multiple odd somatic complaints, such as her heart no longer beating. Later reporting auditory hallucinations. Was in agreement to admission to get restarted back on medication. Patient has not taken any medication since she left the hospital in early July. She missed her post hospital follow up and did not attend her psychiatry appointment. She did not get her Invega Sustenna injection that was due 8/5. Patient reports she does not have rides to appointments and just forgets to go. Patient does report some ongoing hallucinations today, denies any active SI. She is not interested in continuing the Invega Sustenna FRIAS, noting that it was \"too much\". She is willing to restart the oral Invega today, noting that she does want the hallucinations to stop. Patient does have guardian in place, who agrees with hospitalization for stabilization. Patient does not seem to have any insight into need for stabilization, is not able to verbalize how she will attend any future appointments and has history of medication noncompliance when outside of the hospital setting.          DISCHARGE DIAGNOSES     #. Schizoaffective disorder, bipolar type  #. Generalized anxiety disorder  #. Intellectual disability  #. Stimulant (methamphetamine) use disorder, severe  #. Medication noncompliance       CONSULTS     None       HOSPITAL COURSE     Legal status: Orders Placed This Encounter      Voluntary    Patient was admitted to unit 5 due to the aforementioned " presentation. The patient was placed under 15 minute checks to ensure patient safety. The patient participated in unit programming and groups as able.    Ms. Morales did not require seclusion/restraint during hospitalization.     We reviewed with Ms. Morales current and past medication trials including duration, dose, response and side effects. During this hospitalization, the following changes to the patient's psychotropic medications were made:    PTA psychotropic medications stopped:     -Invega Sustenna 117 mg IM- pt missed last dose, not interested in taking     PTA psychotropic medications continued/changed:     -restart Invega 3 mg daily-> 6 mg daily on 8/29   -Trazodone 50 mg at bedtime    New psychotropic medications tried and stopped:     -none    New psychotropic medications initiated:     -Zyprexa 10 mg BID prn anxiety/irritability  -Hydroxyzine 50 mg every 6 hours prn anxiety    Oral invega restarted upon admission. Patient was not interested in going back on long-acting injectable Invega. Did utilize Trazodone for sleep and Hydroxyzine and Zyprexa for anxiety/irritability with good effect. Tolerating medication well, no side effects. Psychosis did improve with sustained sobriety and back on medication. Symptoms gradually improve over course of hospital stay. Referrals were sent for group home placement, patient was accepted to [facility] group [place] for 9/13/24. Patient will discharge today to group home, will work with  and group home on setting up follow up services in that area.     With these changes and supports the patient noticed improvement in their symptoms and felt sufficiently ready for discharge. As a result, Yudi Morales was discharged to group home. At the time of discharge, Yudi Morales was determined to not be a danger to self or others. The patient was also medically stable for discharge. At the current time of discharge, the patient does not meet criteria for  involuntary hospitalization. On the day of discharge, the patient reports that they do not have suicidal or homicidal ideation. Steps taken to minimize risk include: assessing patient s behavior and thought process daily during hospital stay, discharging patient with adequate plan for follow up for mental and physical health and discussing safety plan of returning to the hospital should the patient ever have thoughts of harming themselves or others. Therefore, based on all available evidence including the factors cited above, the patient does not appear to be at imminent risk for self-harm, and is appropriate for outpatient level of care. However, if patient uses substances or is medication non-adherent, their risk of decompensation and SI will be elevated. This was discussed with the patient.       DISCHARGE MEDICATIONS     Current Discharge Medication List        START taking these medications    Details   hydrOXYzine HCl (ATARAX) 50 MG tablet Take 1 tablet (50 mg) by mouth every 6 hours as needed for anxiety.  Qty: 90 tablet, Refills: 1    Associated Diagnoses: KIRSTY (generalized anxiety disorder)      OLANZapine (ZYPREXA) 10 MG tablet Take 1 tablet (10 mg) by mouth 2 times daily as needed (anxiety/irritability).  Qty: 60 tablet, Refills: 1    Associated Diagnoses: Schizoaffective disorder, bipolar type (H)      paliperidone ER (INVEGA) 6 MG 24 hr tablet Take 1 tablet (6 mg) by mouth daily.  Qty: 30 tablet, Refills: 1    Associated Diagnoses: Schizoaffective disorder, bipolar type (H)           CONTINUE these medications which have CHANGED    Details   melatonin 3 MG tablet Take 1 tablet (3 mg) by mouth nightly as needed for sleep.  Qty: 30 tablet, Refills: 1    Associated Diagnoses: Insomnia, unspecified type      nicotine (NICORETTE) 2 MG gum Place 1 each (2 mg) inside cheek every hour as needed for nicotine withdrawal symptoms.  Qty: 240 each, Refills: 1    Associated Diagnoses: Cigarette nicotine dependence  "without complication      traZODone (DESYREL) 50 MG tablet Take 1 tablet (50 mg) by mouth at bedtime.  Qty: 30 tablet, Refills: 1    Associated Diagnoses: Schizoaffective disorder, bipolar type (H)           STOP taking these medications       paliperidone (INVEGA SUSTENNA) 117 MG/0.75ML JOVANNI Comments:   Reason for Stopping: no longer taking            Reason for two or more neuroleptics: patient taking scheduled Invega to manage symptoms, utilizing as needed Zyprexa for breakthrough symptoms. Tolerating both well, denies side effects. Patient requesting to continue both for ongoing stability.        MENTAL STATUS EXAM   Vitals: /69   Pulse 83   Temp 98.2  F (36.8  C) (Temporal)   Resp 18   Wt 59.3 kg (130 lb 12.8 oz)   SpO2 98%   BMI 20.49 kg/m      Appearance: Alert, oriented, dressed in hospital scrubs, appears stated age   Attitude: Cooperative, pleasant   Eye Contact: Good  Mood: \"okay\", anxious about discharge  Affect: still somewhat restricted  Speech: Normal rate and rhythm   Psychomotor Behavior: No tremor, rigidity, or psychomotor abnormality   Thought Process: concrete, goal oriented  Associations: No loose associations   Thought Content: Denies SI or plan. No SIB. Denies A/V hallucinations. No evidence of delusional thought.  Insight: limited  Judgment: fair  Oriented to: Person, place, and time  Attention Span and Concentration: fair  Recent and Remote Memory: fair  Fund of Knowledge: low average  Muscle Strength and Tone: Grossly normal  Gait and Station: Grossly normal       DISCHARGE PLAN     1.  Education given regarding diagnostic and treatment options with risks, benefits and alternatives with adequate verbalization of understanding.  2.  Discharge to group home. Upon detailed review of risk factors, patient amenable for release.   3.  Continue aforementioned medications and associated medication changes with follow-up by outpatient provider.  4.  Crisis management planning in place.  "   5.  Nursing and  to review further discharge recommendations.   6.  Patient is being discharged with the following appointments as detailed below.      Health Care Follow-up:      Guardian- Germaine Snyder   [phone]   Guardian will help set up psychiatry closer to the group home.      [facility]   [phone] ext: [phone]       DISCHARGE SERVICES PROVIDED     40 minutes spent on discharge services, including:  Final examination of patient.  Review and discussion of hospital stay.  Instructions for continued outpatient care/goals.  Preparation of discharge records.  Preparation of medications refills and new prescriptions.  Preparation of applicable referral forms.        ATTESTATION     Carol Horton NP       LABS THIS ADMISSION     Results for orders placed or performed during the hospital encounter of 08/26/24   UA with Microscopic reflex to Culture     Status: Abnormal    Specimen: Urine, Clean Catch   Result Value Ref Range    Color Urine Yellow Colorless, Straw, Light Yellow, Yellow    Appearance Urine Slightly Cloudy (A) Clear    Glucose Urine Negative Negative mg/dL    Bilirubin Urine Negative Negative    Ketones Urine 10 (A) Negative mg/dL    Specific Gravity Urine 1.033 1.003 - 1.035    Blood Urine Trace (A) Negative    pH Urine 5.5 4.7 - 8.0    Protein Albumin Urine 20 (A) Negative mg/dL    Urobilinogen Urine Normal Normal, 2.0 mg/dL    Nitrite Urine Negative Negative    Leukocyte Esterase Urine Moderate (A) Negative    Bacteria Urine Few (A) None Seen /HPF    Mucus Urine Present (A) None Seen /LPF    RBC Urine 3 (H) <=2 /HPF    WBC Urine 17 (H) <=5 /HPF    Squamous Epithelials Urine 15 (H) <=1 /HPF   Comprehensive metabolic panel     Status: Abnormal   Result Value Ref Range    Sodium 142 135 - 145 mmol/L    Potassium 3.5 3.4 - 5.3 mmol/L    Carbon Dioxide (CO2) 24 22 - 29 mmol/L    Anion Gap 17 (H) 7 - 15 mmol/L    Urea Nitrogen 16.0 6.0 - 20.0 mg/dL     Creatinine 1.07 (H) 0.51 - 0.95 mg/dL    GFR Estimate 75 >60 mL/min/1.73m2    Calcium 9.8 8.8 - 10.4 mg/dL    Chloride 101 98 - 107 mmol/L    Glucose 109 (H) 70 - 99 mg/dL    Alkaline Phosphatase 89 40 - 150 U/L    AST 23 0 - 45 U/L    ALT 13 0 - 50 U/L    Protein Total 8.0 6.4 - 8.3 g/dL    Albumin 4.8 3.5 - 5.2 g/dL    Bilirubin Total 1.1 <=1.2 mg/dL   Magnesium     Status: Normal   Result Value Ref Range    Magnesium 2.1 1.7 - 2.3 mg/dL   TSH with free T4 reflex     Status: Normal   Result Value Ref Range    TSH 2.48 0.30 - 4.20 uIU/mL   Ethyl Alcohol Level     Status: Normal   Result Value Ref Range    Alcohol ethyl <0.01 <=0.01 g/dL   CBC with platelets and differential     Status: None   Result Value Ref Range    WBC Count 6.1 4.0 - 11.0 10e3/uL    RBC Count 5.17 3.80 - 5.20 10e6/uL    Hemoglobin 14.7 11.7 - 15.7 g/dL    Hematocrit 43.5 35.0 - 47.0 %    MCV 84 78 - 100 fL    MCH 28.4 26.5 - 33.0 pg    MCHC 33.8 31.5 - 36.5 g/dL    RDW 12.8 10.0 - 15.0 %    Platelet Count 306 150 - 450 10e3/uL    % Neutrophils 57 %    % Lymphocytes 31 %    % Monocytes 11 %    % Eosinophils 1 %    % Basophils 0 %    % Immature Granulocytes 0 %    NRBCs per 100 WBC 0 <1 /100    Absolute Neutrophils 3.5 1.6 - 8.3 10e3/uL    Absolute Lymphocytes 1.9 0.8 - 5.3 10e3/uL    Absolute Monocytes 0.7 0.0 - 1.3 10e3/uL    Absolute Eosinophils 0.0 0.0 - 0.7 10e3/uL    Absolute Basophils 0.0 0.0 - 0.2 10e3/uL    Absolute Immature Granulocytes 0.0 <=0.4 10e3/uL    Absolute NRBCs 0.0 10e3/uL   Extra Tube     Status: None    Narrative    The following orders were created for panel order Extra Tube.  Procedure                               Abnormality         Status                     ---------                               -----------         ------                     Extra Blue Top Tube[[id number]]                              Final result               Extra Red Top Tube[[id number]]                               Final result                Extra Heparinized Syringe[[id number]]                        Final result                 Please view results for these tests on the individual orders.   Extra Blue Top Tube     Status: None   Result Value Ref Range    Hold Specimen JIC    Extra Red Top Tube     Status: None   Result Value Ref Range    Hold Specimen JIC    Extra Heparinized Syringe     Status: None   Result Value Ref Range    Hold Specimen JIC    HCG qualitative Blood     Status: Normal   Result Value Ref Range    hCG Serum Qualitative Negative Negative   Urine Drug Screen Panel     Status: Abnormal   Result Value Ref Range    Amphetamines Urine Screen Positive (A) Screen Negative    Barbituates Urine Screen Negative Screen Negative    Benzodiazepine Urine Screen Positive (A) Screen Negative    Cannabinoids Urine Screen Negative Screen Negative    Cocaine Urine Screen Negative Screen Negative    Fentanyl Qual Urine Screen Negative Screen Negative    Opiates Urine Screen Negative Screen Negative    PCP Urine Screen Negative Screen Negative   HCG qualitative urine     Status: Normal   Result Value Ref Range    hCG Urine Qualitative Negative Negative   CBC with platelets differential     Status: None    Narrative    The following orders were created for panel order CBC with platelets differential.  Procedure                               Abnormality         Status                     ---------                               -----------         ------                     CBC with platelets and d...[[id number]]                      Final result                 Please view results for these tests on the individual orders.   Urine Drug Screen     Status: Abnormal    Narrative    The following orders were created for panel order Urine Drug Screen.  Procedure                               Abnormality         Status                     ---------                               -----------         ------                     Urine Drug Screen Panel[[id number]]       Abnormal            Final result                 Please view results for these tests on the individual orders.

## 2024-09-13 NOTE — PLAN OF CARE
Problem: Adult Behavioral Health Plan of Care  Goal: Patient-Specific Goal (Individualization)  Description: Patient will accept medications as prescribed.  Patient will attend groups and participate in the unit activities  Patient will take part in her discharge meeting and comply with plan.    Outcome: Progressing  Note: Report received from [facility]. Rounding complete. Pt observed sleeping in right side lying position with regular and unlabored respirations.    Pt has been in bed with eyes closed and regular respirations. 15 minute and PRN checks all night. No complaints offered.     Pt slept approx  8.25  hours this NOC shift.    0630- Went over AVS, suicide risk assessment, education, and made sure pt had no questions regarding her discharge plan or medications a second time. Pt verbalized understanding everything and denied having any questions at this time. Pt denied any SI, plan, or intent. UA went over belongings and security envelope with pt as well.     Face to face end of shift report communicated to oncoming RN.    Kristina GUTIÉRREZ RN  September 13, 2024  2:55 AM        Problem: Thought Process Alteration  Goal: Optimal Thought Clarity  Description: Patient will have reality based conversation    Outcome: Progressing  Note: Unable to assess due to pt sleeping. No issues or concerns noted at this time.     Goal Outcome Evaluation:

## 2024-09-13 NOTE — PLAN OF CARE
Goal Outcome Evaluation:           Problem: Adult Behavioral Health Plan of Care  Goal: Patient-Specific Goal (Individualization)  Description: Patient will accept medications as prescribed.  Patient will attend groups and participate in the unit activities  Patient will take part in her discharge meeting and comply with plan.    Outcome: Progressing       Discharge Note    Patient Discharged to long-term care facility on 9/13/2024 9:17 AM via Health plan transportation accompanied by RN and security.     Patient informed of discharge instructions in AVS. patient verbalizes understanding and denies having any questions pertaining to AVS. Patient stable at time of discharge. Patient denies SI, HI, and thoughts of self harm at time of discharge. All personal belongings returned to patient. Discharge prescriptions sent to [facility] Pharm[facility] via electronic communication. Psych evaluation, history and physical, AVS, and discharge summary faxed to next level of care-[facility].     Philomena Wright RN  9/13/2024  9:17 AM

## 2024-10-31 ENCOUNTER — OFFICE VISIT (OUTPATIENT)
Dept: FAMILY MEDICINE | Facility: CLINIC | Age: 24
End: 2024-10-31
Payer: COMMERCIAL

## 2024-10-31 VITALS
RESPIRATION RATE: 16 BRPM | TEMPERATURE: 98.2 F | BODY MASS INDEX: 23.73 KG/M2 | OXYGEN SATURATION: 99 % | DIASTOLIC BLOOD PRESSURE: 69 MMHG | SYSTOLIC BLOOD PRESSURE: 112 MMHG | HEIGHT: 68 IN | WEIGHT: 156.6 LBS | HEART RATE: 98 BPM

## 2024-10-31 DIAGNOSIS — Z23 HIGH PRIORITY FOR 2019-NCOV VACCINE: ICD-10-CM

## 2024-10-31 DIAGNOSIS — Z23 NEED FOR PROPHYLACTIC VACCINATION AND INOCULATION AGAINST INFLUENZA: ICD-10-CM

## 2024-10-31 DIAGNOSIS — D35.2 PITUITARY MICROADENOMA (H): Primary | ICD-10-CM

## 2024-10-31 DIAGNOSIS — N91.2 AMENORRHEA: ICD-10-CM

## 2024-10-31 PROCEDURE — 90656 IIV3 VACC NO PRSV 0.5 ML IM: CPT | Performed by: STUDENT IN AN ORGANIZED HEALTH CARE EDUCATION/TRAINING PROGRAM

## 2024-10-31 PROCEDURE — 99214 OFFICE O/P EST MOD 30 MIN: CPT | Mod: 25 | Performed by: STUDENT IN AN ORGANIZED HEALTH CARE EDUCATION/TRAINING PROGRAM

## 2024-10-31 PROCEDURE — 91320 SARSCV2 VAC 30MCG TRS-SUC IM: CPT | Performed by: STUDENT IN AN ORGANIZED HEALTH CARE EDUCATION/TRAINING PROGRAM

## 2024-10-31 PROCEDURE — 90480 ADMN SARSCOV2 VAC 1/ONLY CMP: CPT | Performed by: STUDENT IN AN ORGANIZED HEALTH CARE EDUCATION/TRAINING PROGRAM

## 2024-10-31 PROCEDURE — 90471 IMMUNIZATION ADMIN: CPT | Performed by: STUDENT IN AN ORGANIZED HEALTH CARE EDUCATION/TRAINING PROGRAM

## 2024-10-31 ASSESSMENT — PATIENT HEALTH QUESTIONNAIRE - PHQ9
SUM OF ALL RESPONSES TO PHQ QUESTIONS 1-9: 11
10. IF YOU CHECKED OFF ANY PROBLEMS, HOW DIFFICULT HAVE THESE PROBLEMS MADE IT FOR YOU TO DO YOUR WORK, TAKE CARE OF THINGS AT HOME, OR GET ALONG WITH OTHER PEOPLE: VERY DIFFICULT
SUM OF ALL RESPONSES TO PHQ QUESTIONS 1-9: 11

## 2024-10-31 NOTE — PROGRESS NOTES
Assessment & Plan     (D35.2) Pituitary microadenoma (H)  (primary encounter diagnosis)  Comment: Chronic, uncontrolled.  At this time we will place 2 referrals to see which provider will be able to assist with patient's symptoms.  At this time likely can be endocrinology however due to extensive wait time we will recommend also neurology as there are neurological symptoms involving patient symptoms with having numbness and tingling.  Plan: Adult Neurology  Referral, Adult         Endocrinology  Referral            (N91.2) Amenorrhea  Comment: Chronic, uncontrolled.  Patient is pending seeing gynecology on Monday  Plan: Pending evaluation    (Z23) Need for prophylactic vaccination and inoculation against influenza  Comment: Stable  Plan: INFLUENZA VACCINE,SPLIT         VIRUS,TRIVALENT,PF(FLUZONE)            (Z23) High priority for 2019-nCoV vaccine  Comment: Stable  Plan: COVID-19 12+ (PFIZER)            Dictation Disclaimer: Some of this Note has been completed with voice-recognition dictation software. Although errors are generally corrected real-time, there is the potential for a rare error to be present in the completed chart.     The longitudinal plan of care for the diagnosis(es)/condition(s) as documented were addressed during this visit. Due to the added complexity in care, I will continue to support Yudi in the subsequent management and with ongoing continuity of care.            MED REC REQUIRED  Post Medication Reconciliation Status:  Discharge medications reconciled, continue medications without change  Depression Screening Follow Up        10/31/2024     4:19 PM   PHQ   PHQ-9 Total Score 11    Q9: Thoughts of better off dead/self-harm past 2 weeks Several days    F/U: Thoughts of suicide or self-harm No    F/U: Safety concerns Yes        Patient-reported                     Follow Up Actions Taken  Referred patient back to mental health provider    Discussed the following ways the  "patient can remain in a safe environment:  be around others        Marimar Bagley is a 24 year old, presenting for the following health issues:  Hospital F/U      10/31/2024     4:29 PM   Additional Questions   Roomed by Tia   Accompanied by Edman- Admin Staff at [facility]       ED/ Followup:    Facility:  [facility]  Date of visit: 10/21/2024  Reason for visit: Nausea, Lightheaded   Current Status: Staying the same    Caretaker of patient reports that patient has been having amenorrhea. She reports having dizziness and not having her period for some time. She reports having symptoms for the last month and half. Caretaker reports that she was seen in urgent care that informed her that pituitary gland has a small tumor.   LMP August 11th, 2024    She endorses difficulty sleeping and feeling like pressure in her head. She states that her symptoms have been progressively worsening and she is wanting to establish care with a provider to work with managing her overall symptoms.  She states that the symptoms are affecting her normal everyday life and her inability to be able to have a good night sleep.  She reports that \"I feel like there is pressure in my head \".       ROS: 10 point ROS neg other than the symptoms noted above in the HPI.        Objective    /69   Pulse 98   Temp 98.2  F (36.8  C) (Temporal)   Resp 16   Ht 1.72 m (5' 7.72\")   Wt 71 kg (156 lb 9.6 oz)   LMP 08/14/2024 (Approximate)   SpO2 99%   BMI 24.01 kg/m    Body mass index is 24.01 kg/m .  Physical Exam   GENERAL: healthy, alert and no distress  EYES: Eyes grossly normal to inspection, PERRL and conjunctivae and sclerae normal  Neck: No visible JVD or lymphadenopathy   RESP: symmetrical rise in chest   CV: No peripheral edema notable   MS: no gross musculoskeletal defects noted  SKIN: no suspicious lesions or rashes  PSYCH: mentation appears normal, affect normal/bright              Signed Electronically by: " SCOTT CARPENTER MD    Answers submitted by the patient for this visit:  Patient Health Questionnaire (Submitted on 10/31/2024)  If you checked off any problems, how difficult have these problems made it for you to do your work, take care of things at home, or get along with other people?: Very difficult  PHQ9 TOTAL SCORE: 11

## 2024-11-05 ENCOUNTER — TELEPHONE (OUTPATIENT)
Dept: ENDOCRINOLOGY | Facility: CLINIC | Age: 24
End: 2024-11-05
Payer: COMMERCIAL

## 2024-11-05 NOTE — TELEPHONE ENCOUNTER
Left Voicemail (1st Attempt) for the patient to call back and schedule the following:    Appointment type: New Pituitary   Provider: Any that see pituitary   Return date: within 3 months   Specialty phone number: [phone]  Additional appointment(s) needed:   Additonal Notes: Xiao GARCIA Deanne M, RN  [id number]  Please help schedule Schedule Non-On Call  [place] within 3 months with any Endo MD    Examples:  11/27 Araki in person csc  12/30 Araki virtual mg   1/3 Araki virtual mg   1/7 Radulescu virtual mg   1/8 Araki in person csc  1/10 Araki in person mg     Please note that the above appointment(s) will require manual scheduling as they are marked as [place] and will not appear using auto search. Do not schedule the patient if another patient has already been scheduled in the requested appointment slot.     Lori Lerner on 11/5/2024 at 10:53 AM

## 2024-11-08 NOTE — CONFIDENTIAL NOTE
RECORDS RECEIVED FROM: internal    DATE RECEIVED: 11.27.24    NOTES (FOR ALL VISITS) STATUS DETAILS   OFFICE NOTES from referring provider internal    Mary Hayden MD      OFFICE NOTES from other specialist internal  4.19.24 [facility]      ED NOTES [facility]- 10.21.24 [facility]      MEDICATION LIST internal     IMAGING      MRI (BRAIN) internal  1/18/23   CT (HEAD/NECK/CHEST/ABDOMEN) internal  6.25.24   LABS     DIABETES: HBGA1C, CREATININE, FASTING LIPIDS, MICROALBUMIN URINE, POTASSIUM, TSH, T4    THYROID: TSH, T4, CBC, THYRODLONULIN, TOTAL T3, FREE T4, CALCITONIN, CEA internal  TSH- 10.21.24  BMP- 10.21.24  Cbc- 10.21.24  T4- 8.26.24   Cmp- 8.26.24   Cbc-  6.25.24  Cortisol- 2.15.23  Prolactin- 11.15.22

## 2024-11-26 NOTE — PROGRESS NOTES
"24 10:46 AM : Appointment reminder phone call made to patient. Unable to LVM                                                                                 - Endocrinology Initial Consultation -    Reason for visit/consult:  Pituitary microadenoma (H) and galactorrhea    Primary care provider: Sindhu Sandoval    HPI: 24 year old female is here for endocrinology clinic visit for Pituitary microadenoma (H) and galactorrhea.   Patient was accompanied by facility staff today. She is current at drug recovery [facility] and lives in that facility since 2024.   She has had multiple history of substance abuse, including several times hospitalization.   She has a history of pregnancy and delivery in 2021. Per record, her PRL level was elevated 103 in 2022, she was not pregnant at that time. She admitted she has had galactorrhea for the past 2 years.   Since 2024 at a current facility, she complainted HA, and staff noticed galactorrhea, missing period since 2024, she was taken to ER, and was told to be seen by endo. Of note, she has period very recently (11/10/2024).   Her PRL reported in 2023 was 19 normal range, however since then no record measured.         Past Medical/Surgical History:  Past Medical History:   Diagnosis Date    Allergic rhinitis 10/4/2011    KIRSTY (generalized anxiety disorder) 2019    Hyperprolactinemia (H) 2022    PTSD (post-traumatic stress disorder)     Suicidal behavior with attempted self-injury (H) 2021     Past Surgical History:   Procedure Laterality Date    TONSILLECTOMY & ADENOIDECTOMY         Allergies:  Allergies   Allergen Reactions    Other [No Clinical Screening - See Comments] Anaphylaxis     \"Sodium Penathol\" per mother. Used in anesthesia.  Family hx of reaction.    Thiopental Anaphylaxis     Other reaction(s): Other - Describe In Comment Field    Family Hx of death with this medication    Great grandmother  on the operating table to this " medication; worried about allergies to sodium      Other reaction(s): Other - Describe In Comment Field Family Hx of death with this medication    Latex Rash    Amoxicillin Trihydrate GI Disturbance    Amoxicillin-Pot Clavulanate GI Disturbance    Phenobarbital        Current Medications   Current Outpatient Medications   Medication Sig Dispense Refill    hydrOXYzine HCl (ATARAX) 50 MG tablet Take 1 tablet (50 mg) by mouth every 6 hours as needed for anxiety. 90 tablet 1    melatonin 3 MG tablet Take 1 tablet (3 mg) by mouth nightly as needed for sleep. 30 tablet 1    nicotine (NICORETTE) 2 MG gum Place 1 each (2 mg) inside cheek every hour as needed for nicotine withdrawal symptoms. 240 each 1    OLANZapine (ZYPREXA) 10 MG tablet Take 1 tablet (10 mg) by mouth 2 times daily as needed (anxiety/irritability). 60 tablet 1    traZODone (DESYREL) 50 MG tablet Take 1 tablet (50 mg) by mouth at bedtime. 30 tablet 1    paliperidone ER (INVEGA) 6 MG 24 hr tablet Take 1 tablet (6 mg) by mouth daily. 30 tablet 1     No current facility-administered medications for this visit.       Family History:  No family history on file.    Social History:  Social History     Tobacco Use    Smoking status: Every Day     Types: Vaping Device     Passive exposure: Past    Smokeless tobacco: Never   Substance Use Topics    Alcohol use: No     Comment: Denies       ROS:  Full review of systems taken with the help of the intake sheet. Otherwise a complete 14 point review of systems was taken and is negative unless stated in the history above.      Physical Exam:   Vitals: /73   Pulse 97   Wt 66.2 kg (146 lb)   LMP 11/11/2024 (Approximate)   SpO2 100%   BMI 22.39 kg/m    BMI= Body mass index is 22.39 kg/m .   General: well appearing, no acute distress, pleasant and conversant,   Mental Status/neuro: alert and oriented  Face: symmetrical, normal facial color  Eyes: anicteric, no proptosis or lid lag  Resp: normally breathing  Breast:  "no galactorrhea bilateral.       Labs : I reviewed data from epic and extract and summarize the pertinent data here.   Lab Results   Component Value Date     08/26/2024     06/10/2020      Lab Results   Component Value Date    POTASSIUM 3.5 08/26/2024    POTASSIUM 3.3 06/10/2020     Lab Results   Component Value Date    CHLORIDE 101 08/26/2024    CHLORIDE 101 06/10/2020     Lab Results   Component Value Date    ELIZABETH 9.8 08/26/2024    ELIZABETH 9.7 06/10/2020     Lab Results   Component Value Date    CO2 24 08/26/2024    CO2 31 06/10/2020     Lab Results   Component Value Date    BUN 16.0 08/26/2024    BUN 9 06/10/2020     Lab Results   Component Value Date    CR 1.07 08/26/2024    CR 0.86 06/10/2020     Lab Results   Component Value Date     08/26/2024     07/06/2024    GLC 97 06/10/2020     Lab Results   Component Value Date    TSH 2.48 08/26/2024     Lab Results   Component Value Date    T4 0.78 06/10/2020     Lab Results   Component Value Date    A1C 4.9 06/25/2024    A1C 5.0 06/10/2020       No results found for: \"IGF1\"  No results found for: \"LH\"  No results found for: \"FSH\"  No results found for: \"ESTROGEN\"  No results found for: \"PROLACTIN\"        MRI Brain: I personally reviewed the original images and agree with the below reports.   No results found for this or any previous visit.          Assessment and Plan  24 year old female with     - repeat PRL level as well as IGF1 and TFT today    - if PRL elevating, then possibly prolactinoma, we will start cabergoline 0.25 mg weekly.       Return to clinic with me in 5 months     45 minutes spent on the date of the encounter doing chart review, history and exam, personal review of imaging, outside record, documentation and further activities as noted above.    Note: Chart documentation done in part with Dragon Voice Recognition software. Although reviewed after completion, some word and grammatical errors may remain.  Please consider this when " interpreting information in this chart     Amy Aldana MD  Staff Physician  Endocrinology and Metabolism  License: [id number]

## 2024-11-27 ENCOUNTER — OFFICE VISIT (OUTPATIENT)
Dept: ENDOCRINOLOGY | Facility: CLINIC | Age: 24
End: 2024-11-27
Payer: COMMERCIAL

## 2024-11-27 ENCOUNTER — LAB (OUTPATIENT)
Dept: LAB | Facility: CLINIC | Age: 24
End: 2024-11-27
Payer: COMMERCIAL

## 2024-11-27 ENCOUNTER — PRE VISIT (OUTPATIENT)
Dept: ENDOCRINOLOGY | Facility: CLINIC | Age: 24
End: 2024-11-27

## 2024-11-27 VITALS
DIASTOLIC BLOOD PRESSURE: 73 MMHG | WEIGHT: 146 LBS | SYSTOLIC BLOOD PRESSURE: 108 MMHG | HEART RATE: 97 BPM | OXYGEN SATURATION: 100 % | BODY MASS INDEX: 22.39 KG/M2

## 2024-11-27 DIAGNOSIS — D35.2 PITUITARY MICROADENOMA (H): ICD-10-CM

## 2024-11-27 DIAGNOSIS — D35.2 PITUITARY MICROADENOMA (H): Primary | ICD-10-CM

## 2024-11-27 DIAGNOSIS — N64.3 GALACTORRHEA: ICD-10-CM

## 2024-11-27 LAB
PROLACTIN SERPL 3RD IS-MCNC: 17 NG/ML (ref 5–23)
T4 FREE SERPL-MCNC: 1.04 NG/DL (ref 0.9–1.7)
TSH SERPL DL<=0.005 MIU/L-ACNC: 0.91 UIU/ML (ref 0.3–4.2)

## 2024-11-27 PROCEDURE — 84305 ASSAY OF SOMATOMEDIN: CPT | Performed by: INTERNAL MEDICINE

## 2024-11-27 PROCEDURE — 84443 ASSAY THYROID STIM HORMONE: CPT | Performed by: PATHOLOGY

## 2024-11-27 PROCEDURE — 84146 ASSAY OF PROLACTIN: CPT | Performed by: INTERNAL MEDICINE

## 2024-11-27 PROCEDURE — 84439 ASSAY OF FREE THYROXINE: CPT | Performed by: PATHOLOGY

## 2024-11-27 PROCEDURE — 99000 SPECIMEN HANDLING OFFICE-LAB: CPT | Performed by: PATHOLOGY

## 2024-11-27 PROCEDURE — 36415 COLL VENOUS BLD VENIPUNCTURE: CPT | Performed by: PATHOLOGY

## 2024-11-27 ASSESSMENT — PAIN SCALES - GENERAL: PAINLEVEL_OUTOF10: NO PAIN (0)

## 2024-11-27 NOTE — NURSING NOTE
"Chief Complaint   Patient presents with    Pituitary Problem     Vital signs:      BP: 108/73 Pulse: 97     SpO2: 100 %       Weight: 66.2 kg (146 lb)  Estimated body mass index is 22.39 kg/m  as calculated from the following:    Height as of 10/31/24: 1.72 m (5' 7.72\").    Weight as of this encounter: 66.2 kg (146 lb).        "

## 2024-11-27 NOTE — LETTER
11/27/2024       RE: Yudi Morales  [address] [facility] [address]     Dear Colleague,    Thank you for referring your patient, Yudi Morales, to the [facility] [place] at [facility]. Please see a copy of my visit note below.    11/26/24 10:46 AM : Appointment reminder phone call made to patient. Unable to [facility]                                                                                 - Endocrinology Initial Consultation -    Reason for visit/consult:  Pituitary microadenoma (H) and galactorrhea    Primary care provider: Sindhu Sandoval    HPI: 24 year old female is here for endocrinology clinic visit for Pituitary microadenoma (H) and galactorrhea.   Patient was accompanied by facility staff today. She is current at drug recovery program and lives in that facility since 9/2024.   She has had multiple history of substance abuse, including several times hospitalization.   She has a history of pregnancy and delivery in 12/2021. Per record, her PRL level was elevated 103 in 11/2022, she was not pregnant at that time. She admitted she has had galactorrhea for the past 2 years.   Since 9/2024 at a current facility, she complainted HA, and staff noticed galactorrhea, missing period since 8/2024, she was taken to ER, and was told to be seen by endo. Of note, she has period very recently (11/10/2024).   Her PRL reported in 2/2023 was 19 normal range, however since then no record measured.         Past Medical/Surgical History:  Past Medical History:   Diagnosis Date     Allergic rhinitis 10/4/2011     KIRSTY (generalized anxiety disorder) 7/19/2019     Hyperprolactinemia (H) 11/16/2022     PTSD (post-traumatic stress disorder)      Suicidal behavior with attempted self-injury (H) 2/2/2021     Past Surgical History:   Procedure Laterality Date     TONSILLECTOMY & ADENOIDECTOMY  2008       Allergies:  Allergies   Allergen Reactions      "Other [No Clinical Screening - See Comments] Anaphylaxis     \"Sodium Penathol\" per mother. Used in anesthesia.  Family hx of reaction.     Thiopental Anaphylaxis     Other reaction(s): Other - Describe In Comment Field    Family Hx of death with this medication    Great grandmother  on the operating table to this medication; worried about allergies to sodium      Other reaction(s): Other - Describe In Comment Field Family Hx of death with this medication     Latex Rash     Amoxicillin Trihydrate GI Disturbance     Amoxicillin-Pot Clavulanate GI Disturbance     Phenobarbital        Current Medications   Current Outpatient Medications   Medication Sig Dispense Refill     hydrOXYzine HCl (ATARAX) 50 MG tablet Take 1 tablet (50 mg) by mouth every 6 hours as needed for anxiety. 90 tablet 1     melatonin 3 MG tablet Take 1 tablet (3 mg) by mouth nightly as needed for sleep. 30 tablet 1     nicotine (NICORETTE) 2 MG gum Place 1 each (2 mg) inside cheek every hour as needed for nicotine withdrawal symptoms. 240 each 1     OLANZapine (ZYPREXA) 10 MG tablet Take 1 tablet (10 mg) by mouth 2 times daily as needed (anxiety/irritability). 60 tablet 1     traZODone (DESYREL) 50 MG tablet Take 1 tablet (50 mg) by mouth at bedtime. 30 tablet 1     paliperidone ER (INVEGA) 6 MG 24 hr tablet Take 1 tablet (6 mg) by mouth daily. 30 tablet 1     No current facility-administered medications for this visit.       Family History:  No family history on file.    Social History:  Social History     Tobacco Use     Smoking status: Every Day     Types: Vaping Device     Passive exposure: Past     Smokeless tobacco: Never   Substance Use Topics     Alcohol use: No     Comment: Denies       ROS:  Full review of systems taken with the help of the intake sheet. Otherwise a complete 14 point review of systems was taken and is negative unless stated in the history above.      Physical Exam:   Vitals: /73   Pulse 97   Wt 66.2 kg (146 lb)  " " LMP 11/11/2024 (Approximate)   SpO2 100%   BMI 22.39 kg/m    BMI= Body mass index is 22.39 kg/m .   General: well appearing, no acute distress, pleasant and conversant,   Mental Status/neuro: alert and oriented  Face: symmetrical, normal facial color  Eyes: anicteric, no proptosis or lid lag  Resp: normally breathing  Breast: no galactorrhea bilateral.       Labs : I reviewed data from epic and extract and summarize the pertinent data here.   Lab Results   Component Value Date     08/26/2024     06/10/2020      Lab Results   Component Value Date    POTASSIUM 3.5 08/26/2024    POTASSIUM 3.3 06/10/2020     Lab Results   Component Value Date    CHLORIDE 101 08/26/2024    CHLORIDE 101 06/10/2020     Lab Results   Component Value Date    ELIZABETH 9.8 08/26/2024    ELIZABETH 9.7 06/10/2020     Lab Results   Component Value Date    CO2 24 08/26/2024    CO2 31 06/10/2020     Lab Results   Component Value Date    BUN 16.0 08/26/2024    BUN 9 06/10/2020     Lab Results   Component Value Date    CR 1.07 08/26/2024    CR 0.86 06/10/2020     Lab Results   Component Value Date     08/26/2024     07/06/2024    GLC 97 06/10/2020     Lab Results   Component Value Date    TSH 2.48 08/26/2024     Lab Results   Component Value Date    T4 0.78 06/10/2020     Lab Results   Component Value Date    A1C 4.9 06/25/2024    A1C 5.0 06/10/2020       No results found for: \"IGF1\"  No results found for: \"LH\"  No results found for: \"FSH\"  No results found for: \"ESTROGEN\"  No results found for: \"PROLACTIN\"        MRI Brain: I personally reviewed the original images and agree with the below reports.   No results found for this or any previous visit.          Assessment and Plan  24 year old female with     - repeat PRL level as well as IGF1 and TFT today    - if PRL elevating, then possibly prolactinoma, we will start cabergoline 0.25 mg weekly.       Return to clinic with me in 5 months     45 minutes spent on the date of the " encounter doing chart review, history and exam, personal review of imaging, outside record, documentation and further activities as noted above.    Note: Chart documentation done in part with Dragon Voice Recognition software. Although reviewed after completion, some word and grammatical errors may remain.  Please consider this when interpreting information in this chart     Amy Aldana MD  Staff Physician  Endocrinology and Metabolism  License: [id number]       Again, thank you for allowing me to participate in the care of your patient.      Sincerely,    Amy Aldana MD

## 2024-11-27 NOTE — NURSING NOTE
"Chief Complaint   Patient presents with    Pituitary Problem     Vital signs:      BP: 108/73 Pulse: 97     SpO2: 100 %       Weight: 59 kg (130 lb)  Estimated body mass index is 19.93 kg/m  as calculated from the following:    Height as of 10/31/24: 1.72 m (5' 7.72\").    Weight as of this encounter: 59 kg (130 lb).        "

## 2024-11-29 LAB — IGF-I BLD-MCNC: 325 NG/ML (ref 105–337)

## 2025-04-04 ENCOUNTER — TELEPHONE (OUTPATIENT)
Dept: ENDOCRINOLOGY | Facility: CLINIC | Age: 25
End: 2025-04-04

## 2025-04-04 NOTE — TELEPHONE ENCOUNTER
Guardian Name and Number: Germaine Zambrano    Client is currently at [facility].   Demographic notes updated.   Delores Morales RN on 4/4/2025 at 11:57 AM

## 2025-04-07 NOTE — PROGRESS NOTES
- Endocrinology follow up  -    Reason for visit/consult:  Pituitary microadenoma (H) and galactorrhea    Primary care provider: Sindhu Sandoval        Assessment and Plan  24 year old female with history of hyperprolactiemia (109) then currently normal, history of galactorrhea, with micro sella lesion         - repeat PRL today     - if PRL elevating, we will start cabergoline 0.25 mg weekly.       Return to clinic with me in 6 month by virtual follow up     45 minutes spent on the date of the encounter doing chart review, history and exam, personal review of imaging, outside record, documentation and further activities as noted above.    Note: Chart documentation done in part with Dragon Voice Recognition software. Although reviewed after completion, some word and grammatical errors may remain.  Please consider this when interpreting information in this chart     Amy Aldana MD  Staff Physician  Endocrinology and Metabolism  License: [id number]     Interval History as of 4/23/2025 : Patient had recent relapse to the drug and was hospitalized for detox 1 month ago. Currently no galactorrhea, but mentioned she was escaping the facility she noticed some breast discharge per patient.   HPI: 24 year old female is here for endocrinology clinic visit for Pituitary microadenoma (H) and galactorrhea.   Patient was accompanied by facility staff today. She is current at drug recovery program and lives in that facility since 9/2024.   She has had multiple history of substance abuse, including several times hospitalization.   She has a history of pregnancy and delivery in 12/2021. Per record, her PRL level was elevated 103 in 11/2022, she was not pregnant at that time. She admitted she has had galactorrhea for the past 2 years.   Since 9/2024 at a current facility, she complainted HA, and staff noticed galactorrhea, missing period since 8/2024,  "she was taken to ER, and was told to be seen by endo. Of note, she has period very recently (11/10/2024).   Her PRL reported in 2023 was 19 normal range, however since then no record measured.         Past Medical/Surgical History:  Past Medical History:   Diagnosis Date    Allergic rhinitis 10/4/2011    KIRSTY (generalized anxiety disorder) 2019    Hyperprolactinemia 2022    PTSD (post-traumatic stress disorder)     Suicidal behavior with attempted self-injury (H) 2021     Past Surgical History:   Procedure Laterality Date    TONSILLECTOMY & ADENOIDECTOMY         Allergies:  Allergies   Allergen Reactions    Other [No Clinical Screening - See Comments] Anaphylaxis     \"Sodium Penathol\" per mother. Used in anesthesia.  Family hx of reaction.    Thiopental Anaphylaxis     Other reaction(s): Other - Describe In Comment Field    Family Hx of death with this medication    Great grandmother  on the operating table to this medication; worried about allergies to sodium      Other reaction(s): Other - Describe In Comment Field Family Hx of death with this medication    Latex Rash    Amoxicillin Trihydrate GI Disturbance    Amoxicillin-Pot Clavulanate GI Disturbance    Phenobarbital        Current Medications   Current Outpatient Medications   Medication Sig Dispense Refill    cloNIDine (CATAPRES) 0.1 MG tablet Take 0.1 mg by mouth.      hydrOXYzine HCl (ATARAX) 50 MG tablet Take 1 tablet (50 mg) by mouth every 6 hours as needed for anxiety. 90 tablet 1    melatonin 3 MG tablet Take 1 tablet (3 mg) by mouth nightly as needed for sleep. 30 tablet 1    nicotine (NICORETTE) 2 MG gum Place 1 each (2 mg) inside cheek every hour as needed for nicotine withdrawal symptoms. 240 each 1    norgestimate-ethinyl estradiol (ORTHO-CYCLEN) 0.25-35 MG-MCG tablet Take 1 tablet by mouth.      OLANZapine (ZYPREXA) 10 MG tablet Take 1 tablet (10 mg) by mouth 2 times daily as needed (anxiety/irritability). 60 tablet 1    " paliperidone ER (INVEGA) 6 MG 24 hr tablet Take 1 tablet (6 mg) by mouth daily. 30 tablet 1    traZODone (DESYREL) 50 MG tablet Take 1 tablet (50 mg) by mouth at bedtime. 30 tablet 1     No current facility-administered medications for this visit.       Family History:  No family history on file.    Social History:  Social History     Tobacco Use    Smoking status: Every Day     Types: Vaping Device     Passive exposure: Past    Smokeless tobacco: Never   Substance Use Topics    Alcohol use: No     Comment: Denies       ROS:  Full review of systems taken with the help of the intake sheet. Otherwise a complete 14 point review of systems was taken and is negative unless stated in the history above.      Physical Exam:   Vitals: /69 (BP Location: Right arm)   Pulse 105   Wt 70.3 kg (155 lb)   SpO2 97%   BMI 23.77 kg/m    BMI= Body mass index is 23.77 kg/m .   General: well appearing, no acute distress, pleasant and conversant,   Mental Status/neuro: alert and oriented  Face: symmetrical, normal facial color  Eyes: anicteric, no proptosis or lid lag  Resp: normally breathing  Breast: no galactorrhea bilateral.       Labs : I reviewed data from epic and extract and summarize the pertinent data here.      Latest Reference Range & Units 11/15/22 07:48 02/15/23 08:09 11/27/24 15:34   Prolactin 5 - 23 ng/mL 103 (H) 19 17   (H): Data is abnormally high    Lab Results   Component Value Date     08/26/2024     06/10/2020      Lab Results   Component Value Date    POTASSIUM 3.5 08/26/2024    POTASSIUM 3.3 06/10/2020     Lab Results   Component Value Date    CHLORIDE 101 08/26/2024    CHLORIDE 101 06/10/2020     Lab Results   Component Value Date    ELIZABETH 9.8 08/26/2024    ELIZABETH 9.7 06/10/2020     Lab Results   Component Value Date    CO2 24 08/26/2024    CO2 31 06/10/2020     Lab Results   Component Value Date    BUN 16.0 08/26/2024    BUN 9 06/10/2020     Lab Results   Component Value Date    CR 1.07  "08/26/2024    CR 0.86 06/10/2020     Lab Results   Component Value Date     08/26/2024     07/06/2024    GLC 97 06/10/2020     Lab Results   Component Value Date    TSH 2.48 08/26/2024     Lab Results   Component Value Date    T4 0.78 06/10/2020     Lab Results   Component Value Date    A1C 4.9 06/25/2024    A1C 5.0 06/10/2020       No results found for: \"IGF1\"  No results found for: \"LH\"  No results found for: \"FSH\"  No results found for: \"ESTROGEN\"  No results found for: \"PROLACTIN\"        MRI Brain: I personally reviewed the original images and agree with the below reports.   1/2023    5 mm hypoenhanced lesion in the sella.         "

## 2025-04-23 ENCOUNTER — OFFICE VISIT (OUTPATIENT)
Dept: ENDOCRINOLOGY | Facility: CLINIC | Age: 25
End: 2025-04-23
Payer: COMMERCIAL

## 2025-04-23 ENCOUNTER — LAB (OUTPATIENT)
Dept: LAB | Facility: CLINIC | Age: 25
End: 2025-04-23
Payer: COMMERCIAL

## 2025-04-23 VITALS
WEIGHT: 155 LBS | OXYGEN SATURATION: 97 % | DIASTOLIC BLOOD PRESSURE: 69 MMHG | BODY MASS INDEX: 23.77 KG/M2 | SYSTOLIC BLOOD PRESSURE: 111 MMHG | HEART RATE: 105 BPM

## 2025-04-23 DIAGNOSIS — E22.1 HYPERPROLACTINEMIA: Primary | ICD-10-CM

## 2025-04-23 DIAGNOSIS — E22.1 HYPERPROLACTINEMIA: ICD-10-CM

## 2025-04-23 PROCEDURE — 84146 ASSAY OF PROLACTIN: CPT | Performed by: INTERNAL MEDICINE

## 2025-04-23 PROCEDURE — 36415 COLL VENOUS BLD VENIPUNCTURE: CPT | Performed by: PATHOLOGY

## 2025-04-23 PROCEDURE — 99000 SPECIMEN HANDLING OFFICE-LAB: CPT | Performed by: PATHOLOGY

## 2025-04-23 RX ORDER — CLONIDINE HYDROCHLORIDE 0.1 MG/1
0.1 TABLET ORAL
COMMUNITY
Start: 2025-04-15

## 2025-04-23 RX ORDER — NORGESTIMATE AND ETHINYL ESTRADIOL 0.25-0.035
1 KIT ORAL
COMMUNITY
Start: 2025-04-15

## 2025-04-23 ASSESSMENT — PAIN SCALES - GENERAL: PAINLEVEL_OUTOF10: NO PAIN (0)

## 2025-04-23 NOTE — LETTER
4/23/2025       RE: Yudi Morales  [address] [place] [address]     Dear Colleague,    Thank you for referring your patient, Yudi Morales, to the [facility] ENDOCRINOLOGY [facility]IN[facility] [place] at [facility]. Please see a copy of my visit note below.                                                                                     - Endocrinology follow up  -    Reason for visit/consult:  Pituitary microadenoma (H) and galactorrhea    Primary care provider: Sindhu Sandoval        Assessment and Plan  24 year old female with history of hyperprolactiemia (109) then currently normal, history of galactorrhea, with micro sella lesion         - repeat PRL today     - if PRL elevating, we will start cabergoline 0.25 mg weekly.       Return to clinic with me in 6 month by virtual follow up     45 minutes spent on the date of the encounter doing chart review, history and exam, personal review of imaging, outside record, documentation and further activities as noted above.    Note: Chart documentation done in part with Dragon Voice Recognition software. Although reviewed after completion, some word and grammatical errors may remain.  Please consider this when interpreting information in this chart     Amy Aldana MD  Staff Physician  Endocrinology and Metabolism  License: [id number]     Interval History as of 4/23/2025 : Patient had recent relapse to the drug and was hospitalized for detox 1 month ago. Currently no galactorrhea, but mentioned she was escaping the facility she noticed some breast discharge per patient.   HPI: 24 year old female is here for endocrinology clinic visit for Pituitary microadenoma (H) and galactorrhea.   Patient was accompanied by facility staff today. She is current at drug recovery program and lives in that facility since 9/2024.   She has had multiple history of substance abuse, including several times hospitalization.   She has a  "history of pregnancy and delivery in 2021. Per record, her PRL level was elevated 103 in 2022, she was not pregnant at that time. She admitted she has had galactorrhea for the past 2 years.   Since 2024 at a current facility, she complainted HA, and staff noticed galactorrhea, missing period since 2024, she was taken to ER, and was told to be seen by endo. Of note, she has period very recently (11/10/2024).   Her PRL reported in 2023 was 19 normal range, however since then no record measured.         Past Medical/Surgical History:  Past Medical History:   Diagnosis Date     Allergic rhinitis 10/4/2011     KIRSTY (generalized anxiety disorder) 2019     Hyperprolactinemia 2022     PTSD (post-traumatic stress disorder)      Suicidal behavior with attempted self-injury (H) 2021     Past Surgical History:   Procedure Laterality Date     TONSILLECTOMY & ADENOIDECTOMY         Allergies:  Allergies   Allergen Reactions     Other [No Clinical Screening - See Comments] Anaphylaxis     \"Sodium Penathol\" per mother. Used in anesthesia.  Family hx of reaction.     Thiopental Anaphylaxis     Other reaction(s): Other - Describe In Comment Field    Family Hx of death with this medication    Great grandmother  on the operating table to this medication; worried about allergies to sodium      Other reaction(s): Other - Describe In Comment Field Family Hx of death with this medication     Latex Rash     Amoxicillin Trihydrate GI Disturbance     Amoxicillin-Pot Clavulanate GI Disturbance     Phenobarbital        Current Medications   Current Outpatient Medications   Medication Sig Dispense Refill     cloNIDine (CATAPRES) 0.1 MG tablet Take 0.1 mg by mouth.       hydrOXYzine HCl (ATARAX) 50 MG tablet Take 1 tablet (50 mg) by mouth every 6 hours as needed for anxiety. 90 tablet 1     melatonin 3 MG tablet Take 1 tablet (3 mg) by mouth nightly as needed for sleep. 30 tablet 1     nicotine (NICORETTE) 2 MG " gum Place 1 each (2 mg) inside cheek every hour as needed for nicotine withdrawal symptoms. 240 each 1     norgestimate-ethinyl estradiol (ORTHO-CYCLEN) 0.25-35 MG-MCG tablet Take 1 tablet by mouth.       OLANZapine (ZYPREXA) 10 MG tablet Take 1 tablet (10 mg) by mouth 2 times daily as needed (anxiety/irritability). 60 tablet 1     paliperidone ER (INVEGA) 6 MG 24 hr tablet Take 1 tablet (6 mg) by mouth daily. 30 tablet 1     traZODone (DESYREL) 50 MG tablet Take 1 tablet (50 mg) by mouth at bedtime. 30 tablet 1     No current facility-administered medications for this visit.       Family History:  No family history on file.    Social History:  Social History     Tobacco Use     Smoking status: Every Day     Types: Vaping Device     Passive exposure: Past     Smokeless tobacco: Never   Substance Use Topics     Alcohol use: No     Comment: Denies       ROS:  Full review of systems taken with the help of the intake sheet. Otherwise a complete 14 point review of systems was taken and is negative unless stated in the history above.      Physical Exam:   Vitals: /69 (BP Location: Right arm)   Pulse 105   Wt 70.3 kg (155 lb)   SpO2 97%   BMI 23.77 kg/m    BMI= Body mass index is 23.77 kg/m .   General: well appearing, no acute distress, pleasant and conversant,   Mental Status/neuro: alert and oriented  Face: symmetrical, normal facial color  Eyes: anicteric, no proptosis or lid lag  Resp: normally breathing  Breast: no galactorrhea bilateral.       Labs : I reviewed data from epic and extract and summarize the pertinent data here.      Latest Reference Range & Units 11/15/22 07:48 02/15/23 08:09 11/27/24 15:34   Prolactin 5 - 23 ng/mL 103 (H) 19 17   (H): Data is abnormally high    Lab Results   Component Value Date     08/26/2024     06/10/2020      Lab Results   Component Value Date    POTASSIUM 3.5 08/26/2024    POTASSIUM 3.3 06/10/2020     Lab Results   Component Value Date    CHLORIDE 101  "08/26/2024    CHLORIDE 101 06/10/2020     Lab Results   Component Value Date    ELIZABETH 9.8 08/26/2024    ELIZABETH 9.7 06/10/2020     Lab Results   Component Value Date    CO2 24 08/26/2024    CO2 31 06/10/2020     Lab Results   Component Value Date    BUN 16.0 08/26/2024    BUN 9 06/10/2020     Lab Results   Component Value Date    CR 1.07 08/26/2024    CR 0.86 06/10/2020     Lab Results   Component Value Date     08/26/2024     07/06/2024    GLC 97 06/10/2020     Lab Results   Component Value Date    TSH 2.48 08/26/2024     Lab Results   Component Value Date    T4 0.78 06/10/2020     Lab Results   Component Value Date    A1C 4.9 06/25/2024    A1C 5.0 06/10/2020       No results found for: \"IGF1\"  No results found for: \"LH\"  No results found for: \"FSH\"  No results found for: \"ESTROGEN\"  No results found for: \"PROLACTIN\"        MRI Brain: I personally reviewed the original images and agree with the below reports.   1/2023    5 mm hypoenhanced lesion in the sella.           Again, thank you for allowing me to participate in the care of your patient.      Sincerely,    Amy Aldana MD    "

## 2025-04-23 NOTE — NURSING NOTE
"Chief Complaint   Patient presents with    Pituitary Problem     Vital signs:      BP: 111/69 Pulse: 105     SpO2: 97 %       Weight: 70.3 kg (155 lb)  Estimated body mass index is 23.77 kg/m  as calculated from the following:    Height as of 10/31/24: 1.72 m (5' 7.72\").    Weight as of this encounter: 70.3 kg (155 lb).        "

## 2025-04-24 LAB — PROLACTIN SERPL 3RD IS-MCNC: 59 NG/ML (ref 5–23)

## (undated) RX ORDER — KETOROLAC TROMETHAMINE 30 MG/ML
INJECTION, SOLUTION INTRAMUSCULAR; INTRAVENOUS
Status: DISPENSED
Start: 2023-04-24

## (undated) RX ORDER — ONDANSETRON 4 MG/1
TABLET, ORALLY DISINTEGRATING ORAL
Status: DISPENSED
Start: 2023-04-24

## (undated) RX ORDER — NEOMYCIN/BACITRACIN/POLYMYXINB 3.5-400-5K
OINTMENT (GRAM) TOPICAL
Status: DISPENSED
Start: 2020-05-12

## (undated) RX ORDER — ONDANSETRON 2 MG/ML
INJECTION INTRAMUSCULAR; INTRAVENOUS
Status: DISPENSED
Start: 2019-09-11